# Patient Record
Sex: MALE | Race: BLACK OR AFRICAN AMERICAN | URBAN - METROPOLITAN AREA
[De-identification: names, ages, dates, MRNs, and addresses within clinical notes are randomized per-mention and may not be internally consistent; named-entity substitution may affect disease eponyms.]

---

## 2015-05-08 LAB — CRC RECOMMENDATION EXT: NORMAL

## 2017-07-26 ENCOUNTER — HISTORICAL (OUTPATIENT)
Dept: ADMINISTRATIVE | Facility: HOSPITAL | Age: 57
End: 2017-07-26

## 2017-07-26 LAB
ABS NEUT (OLG): 3.75 X10(3)/MCL (ref 2.1–9.2)
ABS NEUT (OLG): 3.89 X10(3)/MCL (ref 2.1–9.2)
ALBUMIN SERPL-MCNC: 3.5 GM/DL (ref 3.4–5)
ALBUMIN/GLOB SERPL: 1 RATIO (ref 1–2)
ALP SERPL-CCNC: 82 UNIT/L (ref 45–117)
ALT SERPL-CCNC: 21 UNIT/L (ref 12–78)
AST SERPL-CCNC: 15 UNIT/L (ref 15–37)
BASOPHILS # BLD AUTO: 0.04 X10(3)/MCL
BASOPHILS # BLD AUTO: 0.05 X10(3)/MCL
BASOPHILS NFR BLD AUTO: 1 % (ref 0–1)
BASOPHILS NFR BLD AUTO: 1 % (ref 0–1)
BILIRUB SERPL-MCNC: 0.2 MG/DL (ref 0.2–1)
BILIRUBIN DIRECT+TOT PNL SERPL-MCNC: <0.1 MG/DL
BILIRUBIN DIRECT+TOT PNL SERPL-MCNC: >0.1 MG/DL
BUN SERPL-MCNC: 12 MG/DL (ref 7–18)
CALCIUM SERPL-MCNC: 8.8 MG/DL (ref 8.5–10.1)
CD3+CD4+ CELLS # SPEC: 193 UNIT/L (ref 589–1505)
CD3+CD4+ CELLS NFR BLD: 11.1 % (ref 31–59)
CHLORIDE SERPL-SCNC: 108 MMOL/L (ref 98–107)
CHOLEST SERPL-MCNC: 168 MG/DL
CHOLEST/HDLC SERPL: 4.1 {RATIO} (ref 0–5)
CO2 SERPL-SCNC: 28 MMOL/L (ref 21–32)
CREAT SERPL-MCNC: 1.4 MG/DL (ref 0.6–1.3)
EOSINOPHIL # BLD AUTO: 0.1 10*3/UL
EOSINOPHIL # BLD AUTO: 0.11 X10(3)/MCL
EOSINOPHIL NFR BLD AUTO: 2 % (ref 0–5)
EOSINOPHIL NFR BLD AUTO: 2 % (ref 0–5)
ERYTHROCYTE [DISTWIDTH] IN BLOOD BY AUTOMATED COUNT: 12.6 % (ref 11.5–14.5)
ERYTHROCYTE [DISTWIDTH] IN BLOOD BY AUTOMATED COUNT: 12.6 % (ref 11.5–14.5)
GLOBULIN SER-MCNC: 4.8 GM/ML (ref 2.3–3.5)
GLUCOSE SERPL-MCNC: 89 MG/DL (ref 74–106)
HCT VFR BLD AUTO: 45.4 % (ref 40–51)
HCT VFR BLD AUTO: 45.4 % (ref 40–51)
HDLC SERPL-MCNC: 41 MG/DL
HGB BLD-MCNC: 15 GM/DL (ref 13.5–17.5)
HGB BLD-MCNC: 15.3 GM/DL (ref 13.5–17.5)
IMM GRANULOCYTES # BLD AUTO: 0.01 10*3/UL
IMM GRANULOCYTES # BLD AUTO: 0.02 10*3/UL
IMM GRANULOCYTES NFR BLD AUTO: 0 %
IMM GRANULOCYTES NFR BLD AUTO: 0 %
LDLC SERPL CALC-MCNC: 71 MG/DL (ref 0–130)
LYMPHOCYTES # BLD AUTO: 1.74 X10(3)/MCL
LYMPHOCYTES # BLD AUTO: 1.77 X10(3)/MCL
LYMPHOCYTES # BLD AUTO: 1736 UNIT/L (ref 1260–5520)
LYMPHOCYTES NFR BLD AUTO: 28 % (ref 15–40)
LYMPHOCYTES NFR BLD AUTO: 29 % (ref 15–40)
LYMPHOCYTES NFR LN MANUAL: 28 % (ref 28–48)
LYMPHOMA - T-CELL MARKERS SPEC-IMP: ABNORMAL
MCH RBC QN AUTO: 30.6 PG (ref 26–34)
MCH RBC QN AUTO: 31.2 PG (ref 26–34)
MCHC RBC AUTO-ENTMCNC: 33 GM/DL (ref 31–37)
MCHC RBC AUTO-ENTMCNC: 33.7 GM/DL (ref 31–37)
MCV RBC AUTO: 92.5 FL (ref 80–100)
MCV RBC AUTO: 92.7 FL (ref 80–100)
MONOCYTES # BLD AUTO: 0.4 X10(3)/MCL
MONOCYTES # BLD AUTO: 0.44 X10(3)/MCL
MONOCYTES NFR BLD AUTO: 7 % (ref 4–12)
MONOCYTES NFR BLD AUTO: 7 % (ref 4–12)
NEUTROPHILS # BLD AUTO: 3.75 X10(3)/MCL
NEUTROPHILS # BLD AUTO: 3.89 X10(3)/MCL
NEUTROPHILS NFR BLD AUTO: 62 X10(3)/MCL
NEUTROPHILS NFR BLD AUTO: 62 X10(3)/MCL
PLATELET # BLD AUTO: 241 X10(3)/MCL (ref 130–400)
PLATELET # BLD AUTO: 253 X10(3)/MCL (ref 130–400)
PMV BLD AUTO: 10.8 FL (ref 7.4–10.4)
PMV BLD AUTO: 10.9 FL (ref 7.4–10.4)
POTASSIUM SERPL-SCNC: 4.2 MMOL/L (ref 3.5–5.1)
PROT SERPL-MCNC: 8.3 GM/DL (ref 6.4–8.2)
RBC # BLD AUTO: 4.9 X10(6)/MCL (ref 4.5–5.9)
RBC # BLD AUTO: 4.91 X10(6)/MCL (ref 4.5–5.9)
SODIUM SERPL-SCNC: 142 MMOL/L (ref 136–145)
TRIGL SERPL-MCNC: 282 MG/DL
VLDLC SERPL CALC-MCNC: 56 MG/DL
WBC # BLD AUTO: 6200 /MM3 (ref 4500–11500)
WBC # SPEC AUTO: 6.1 X10(3)/MCL (ref 4.5–11)
WBC # SPEC AUTO: 6.2 X10(3)/MCL (ref 4.5–11)

## 2017-08-14 ENCOUNTER — HISTORICAL (OUTPATIENT)
Dept: RADIOLOGY | Facility: HOSPITAL | Age: 57
End: 2017-08-14

## 2017-10-09 ENCOUNTER — HISTORICAL (OUTPATIENT)
Dept: ADMINISTRATIVE | Facility: HOSPITAL | Age: 57
End: 2017-10-09

## 2017-10-09 LAB
ABS NEUT (OLG): 3.72 X10(3)/MCL (ref 2.1–9.2)
ABS NEUT (OLG): 3.85 X10(3)/MCL (ref 2.1–9.2)
ALBUMIN SERPL-MCNC: 3.6 GM/DL (ref 3.4–5)
ALBUMIN/GLOB SERPL: 1 RATIO (ref 1–2)
ALP SERPL-CCNC: 68 UNIT/L (ref 45–117)
ALT SERPL-CCNC: 17 UNIT/L (ref 12–78)
AST SERPL-CCNC: 11 UNIT/L (ref 15–37)
BASOPHILS # BLD AUTO: 0.05 X10(3)/MCL
BASOPHILS # BLD AUTO: 0.05 X10(3)/MCL
BASOPHILS NFR BLD AUTO: 1 % (ref 0–1)
BASOPHILS NFR BLD AUTO: 1 % (ref 0–1)
BILIRUB SERPL-MCNC: 0.3 MG/DL (ref 0.2–1)
BILIRUBIN DIRECT+TOT PNL SERPL-MCNC: 0.1 MG/DL
BILIRUBIN DIRECT+TOT PNL SERPL-MCNC: 0.2 MG/DL
BUN SERPL-MCNC: 16 MG/DL (ref 7–18)
CALCIUM SERPL-MCNC: 9 MG/DL (ref 8.5–10.1)
CD3+CD4+ CELLS # SPEC: 220 UNIT/L (ref 589–1505)
CD3+CD4+ CELLS NFR BLD: 12.9 % (ref 31–59)
CHLORIDE SERPL-SCNC: 106 MMOL/L (ref 98–107)
CHOLEST SERPL-MCNC: 213 MG/DL
CHOLEST/HDLC SERPL: 4.7 {RATIO} (ref 0–5)
CO2 SERPL-SCNC: 28 MMOL/L (ref 21–32)
CREAT SERPL-MCNC: 1.3 MG/DL (ref 0.6–1.3)
EOSINOPHIL # BLD AUTO: 0.09 X10(3)/MCL
EOSINOPHIL # BLD AUTO: 0.1 10*3/UL
EOSINOPHIL NFR BLD AUTO: 2 % (ref 0–5)
EOSINOPHIL NFR BLD AUTO: 2 % (ref 0–5)
ERYTHROCYTE [DISTWIDTH] IN BLOOD BY AUTOMATED COUNT: 12.5 % (ref 11.5–14.5)
ERYTHROCYTE [DISTWIDTH] IN BLOOD BY AUTOMATED COUNT: 12.6 % (ref 11.5–14.5)
GLOBULIN SER-MCNC: 4.4 GM/ML (ref 2.3–3.5)
GLUCOSE SERPL-MCNC: 130 MG/DL (ref 74–106)
HCT VFR BLD AUTO: 46.3 % (ref 40–51)
HCT VFR BLD AUTO: 46.9 % (ref 40–51)
HDLC SERPL-MCNC: 45 MG/DL
HGB BLD-MCNC: 15.9 GM/DL (ref 13.5–17.5)
HGB BLD-MCNC: 16 GM/DL (ref 13.5–17.5)
IMM GRANULOCYTES # BLD AUTO: 0.01 10*3/UL
IMM GRANULOCYTES # BLD AUTO: 0.01 10*3/UL
IMM GRANULOCYTES NFR BLD AUTO: 0 %
IMM GRANULOCYTES NFR BLD AUTO: 0 %
LDLC SERPL CALC-MCNC: 108 MG/DL (ref 0–130)
LYMPHOCYTES # BLD AUTO: 1.56 X10(3)/MCL
LYMPHOCYTES # BLD AUTO: 1.74 X10(3)/MCL
LYMPHOCYTES # BLD AUTO: 1708 UNIT/L (ref 1260–5520)
LYMPHOCYTES NFR BLD AUTO: 27 % (ref 15–40)
LYMPHOCYTES NFR BLD AUTO: 28 % (ref 15–40)
LYMPHOCYTES NFR LN MANUAL: 28 % (ref 28–48)
LYMPHOMA - T-CELL MARKERS SPEC-IMP: ABNORMAL
MCH RBC QN AUTO: 30.9 PG (ref 26–34)
MCH RBC QN AUTO: 31.6 PG (ref 26–34)
MCHC RBC AUTO-ENTMCNC: 33.9 GM/DL (ref 31–37)
MCHC RBC AUTO-ENTMCNC: 34.6 GM/DL (ref 31–37)
MCV RBC AUTO: 91.1 FL (ref 80–100)
MCV RBC AUTO: 91.5 FL (ref 80–100)
MONOCYTES # BLD AUTO: 0.37 X10(3)/MCL
MONOCYTES # BLD AUTO: 0.39 X10(3)/MCL
MONOCYTES NFR BLD AUTO: 6 % (ref 4–12)
MONOCYTES NFR BLD AUTO: 6 % (ref 4–12)
NEUTROPHILS # BLD AUTO: 3.72 X10(3)/MCL
NEUTROPHILS # BLD AUTO: 3.85 X10(3)/MCL
NEUTROPHILS NFR BLD AUTO: 63 X10(3)/MCL
NEUTROPHILS NFR BLD AUTO: 64 X10(3)/MCL
PLATELET # BLD AUTO: 238 X10(3)/MCL (ref 130–400)
PLATELET # BLD AUTO: 251 X10(3)/MCL (ref 130–400)
PMV BLD AUTO: 10.8 FL (ref 7.4–10.4)
PMV BLD AUTO: 11.2 FL (ref 7.4–10.4)
POTASSIUM SERPL-SCNC: 3.8 MMOL/L (ref 3.5–5.1)
PROT SERPL-MCNC: 8 GM/DL (ref 6.4–8.2)
RBC # BLD AUTO: 5.06 X10(6)/MCL (ref 4.5–5.9)
RBC # BLD AUTO: 5.15 X10(6)/MCL (ref 4.5–5.9)
SODIUM SERPL-SCNC: 141 MMOL/L (ref 136–145)
TRIGL SERPL-MCNC: 299 MG/DL
VLDLC SERPL CALC-MCNC: 60 MG/DL
WBC # BLD AUTO: 6100 /MM3 (ref 4500–11500)
WBC # SPEC AUTO: 5.8 X10(3)/MCL (ref 4.5–11)
WBC # SPEC AUTO: 6.1 X10(3)/MCL (ref 4.5–11)

## 2018-01-08 ENCOUNTER — HISTORICAL (OUTPATIENT)
Dept: ADMINISTRATIVE | Facility: HOSPITAL | Age: 58
End: 2018-01-08

## 2018-01-08 LAB
ABS NEUT (OLG): 3.06 X10(3)/MCL (ref 2.1–9.2)
ABS NEUT (OLG): 3.22 X10(3)/MCL (ref 2.1–9.2)
ALBUMIN SERPL-MCNC: 3.7 GM/DL (ref 3.4–5)
ALBUMIN/GLOB SERPL: 1 RATIO (ref 1–2)
ALP SERPL-CCNC: 66 UNIT/L (ref 45–117)
ALT SERPL-CCNC: 29 UNIT/L (ref 12–78)
AST SERPL-CCNC: 39 UNIT/L (ref 15–37)
BASOPHILS # BLD AUTO: 0.02 X10(3)/MCL
BASOPHILS # BLD AUTO: 0.03 X10(3)/MCL
BASOPHILS NFR BLD AUTO: 0 % (ref 0–1)
BASOPHILS NFR BLD AUTO: 0 % (ref 0–1)
BILIRUB SERPL-MCNC: 0.5 MG/DL (ref 0.2–1)
BILIRUBIN DIRECT+TOT PNL SERPL-MCNC: <0.1 MG/DL
BILIRUBIN DIRECT+TOT PNL SERPL-MCNC: ABNORMAL MG/DL
BUN SERPL-MCNC: 14 MG/DL (ref 7–18)
CALCIUM SERPL-MCNC: 8.6 MG/DL (ref 8.5–10.1)
CD3+CD4+ CELLS # SPEC: 185 UNIT/L (ref 589–1505)
CD3+CD4+ CELLS NFR BLD: 11.6 % (ref 31–59)
CHLORIDE SERPL-SCNC: 106 MMOL/L (ref 98–107)
CHOLEST SERPL-MCNC: 184 MG/DL
CHOLEST/HDLC SERPL: 5 {RATIO} (ref 0–5)
CO2 SERPL-SCNC: 28 MMOL/L (ref 21–32)
CREAT SERPL-MCNC: 1.1 MG/DL (ref 0.6–1.3)
EOSINOPHIL # BLD AUTO: 0.08 X10(3)/MCL
EOSINOPHIL # BLD AUTO: 0.1 X10(3)/MCL
EOSINOPHIL NFR BLD AUTO: 2 % (ref 0–5)
EOSINOPHIL NFR BLD AUTO: 2 % (ref 0–5)
ERYTHROCYTE [DISTWIDTH] IN BLOOD BY AUTOMATED COUNT: 11.9 % (ref 11.5–14.5)
ERYTHROCYTE [DISTWIDTH] IN BLOOD BY AUTOMATED COUNT: 11.9 % (ref 11.5–14.5)
GLOBULIN SER-MCNC: 5 GM/ML (ref 2.3–3.5)
GLUCOSE SERPL-MCNC: 93 MG/DL (ref 74–106)
HCT VFR BLD AUTO: 47 % (ref 40–51)
HCT VFR BLD AUTO: 47 % (ref 40–51)
HDLC SERPL-MCNC: 37 MG/DL
HGB BLD-MCNC: 16.1 GM/DL (ref 13.5–17.5)
HGB BLD-MCNC: 16.2 GM/DL (ref 13.5–17.5)
IMM GRANULOCYTES # BLD AUTO: 0.01 10*3/UL
IMM GRANULOCYTES # BLD AUTO: 0.01 10*3/UL
IMM GRANULOCYTES NFR BLD AUTO: 0 %
IMM GRANULOCYTES NFR BLD AUTO: 0 %
LDLC SERPL CALC-MCNC: 99 MG/DL (ref 0–130)
LYMPHOCYTES # BLD AUTO: 1.51 X10(3)/MCL
LYMPHOCYTES # BLD AUTO: 1.59 X10(3)/MCL
LYMPHOCYTES # BLD AUTO: 1595 UNIT/L (ref 1260–5520)
LYMPHOCYTES NFR BLD AUTO: 29 % (ref 15–40)
LYMPHOCYTES NFR BLD AUTO: 30 % (ref 15–40)
LYMPHOCYTES NFR LN MANUAL: 29 % (ref 28–48)
LYMPHOMA - T-CELL MARKERS SPEC-IMP: ABNORMAL
MCH RBC QN AUTO: 31.2 PG (ref 26–34)
MCH RBC QN AUTO: 31.3 PG (ref 26–34)
MCHC RBC AUTO-ENTMCNC: 34.3 GM/DL (ref 31–37)
MCHC RBC AUTO-ENTMCNC: 34.5 GM/DL (ref 31–37)
MCV RBC AUTO: 90.7 FL (ref 80–100)
MCV RBC AUTO: 91.1 FL (ref 80–100)
MONOCYTES # BLD AUTO: 0.42 X10(3)/MCL
MONOCYTES # BLD AUTO: 0.52 X10(3)/MCL
MONOCYTES NFR BLD AUTO: 10 % (ref 4–12)
MONOCYTES NFR BLD AUTO: 8 % (ref 4–12)
NEUTROPHILS # BLD AUTO: 3.06 X10(3)/MCL
NEUTROPHILS # BLD AUTO: 3.22 X10(3)/MCL
NEUTROPHILS NFR BLD AUTO: 59 X10(3)/MCL
NEUTROPHILS NFR BLD AUTO: 60 X10(3)/MCL
PLATELET # BLD AUTO: 116 X10(3)/MCL (ref 130–400)
PLATELET # BLD AUTO: 145 X10(3)/MCL (ref 130–400)
PMV BLD AUTO: 11.4 FL (ref 7.4–10.4)
PMV BLD AUTO: 11.9 FL (ref 7.4–10.4)
POTASSIUM SERPL-SCNC: 4.4 MMOL/L (ref 3.5–5.1)
PROT SERPL-MCNC: 8.7 GM/DL (ref 6.4–8.2)
RBC # BLD AUTO: 5.16 X10(6)/MCL (ref 4.5–5.9)
RBC # BLD AUTO: 5.18 X10(6)/MCL (ref 4.5–5.9)
SODIUM SERPL-SCNC: 141 MMOL/L (ref 136–145)
TRIGL SERPL-MCNC: 242 MG/DL
VLDLC SERPL CALC-MCNC: 48 MG/DL
WBC # BLD AUTO: 5500 /MM3 (ref 4500–11500)
WBC # SPEC AUTO: 5.1 X10(3)/MCL (ref 4.5–11)
WBC # SPEC AUTO: 5.5 X10(3)/MCL (ref 4.5–11)

## 2018-07-17 ENCOUNTER — HISTORICAL (OUTPATIENT)
Dept: ADMINISTRATIVE | Facility: HOSPITAL | Age: 58
End: 2018-07-17

## 2018-07-17 LAB
ABS NEUT (OLG): 4.07 X10(3)/MCL (ref 2.1–9.2)
ALBUMIN SERPL-MCNC: 3.9 GM/DL (ref 3.4–5)
ALBUMIN/GLOB SERPL: 1 RATIO (ref 1–2)
ALP SERPL-CCNC: 70 UNIT/L (ref 45–117)
ALT SERPL-CCNC: 27 UNIT/L (ref 12–78)
APPEARANCE, UA: CLEAR
AST SERPL-CCNC: 26 UNIT/L (ref 15–37)
BACTERIA #/AREA URNS AUTO: ABNORMAL /[HPF]
BASOPHILS # BLD AUTO: 0.05 X10(3)/MCL
BASOPHILS NFR BLD AUTO: 1 %
BILIRUB SERPL-MCNC: 0.5 MG/DL (ref 0.2–1)
BILIRUB UR QL STRIP: NEGATIVE
BILIRUBIN DIRECT+TOT PNL SERPL-MCNC: 0.1 MG/DL
BILIRUBIN DIRECT+TOT PNL SERPL-MCNC: 0.4 MG/DL
BUN SERPL-MCNC: 14 MG/DL (ref 7–18)
CALCIUM SERPL-MCNC: 9 MG/DL (ref 8.5–10.1)
CD3+CD4+ CELLS # SPEC: 269 UNIT/L (ref 589–1505)
CD3+CD4+ CELLS NFR BLD: 13 % (ref 31–59)
CHLORIDE SERPL-SCNC: 103 MMOL/L (ref 98–107)
CHOLEST SERPL-MCNC: 236 MG/DL
CHOLEST/HDLC SERPL: 6.2 {RATIO} (ref 0–5)
CO2 SERPL-SCNC: 29 MMOL/L (ref 21–32)
COLOR UR: NORMAL
CREAT SERPL-MCNC: 1.5 MG/DL (ref 0.6–1.3)
DEPRECATED CALCIDIOL+CALCIFEROL SERPL-MC: 26.9 NG/ML (ref 30–80)
EOSINOPHIL # BLD AUTO: 0.12 X10(3)/MCL
EOSINOPHIL NFR BLD AUTO: 2 %
ERYTHROCYTE [DISTWIDTH] IN BLOOD BY AUTOMATED COUNT: 12.9 % (ref 11.5–14.5)
GLOBULIN SER-MCNC: 4.8 GM/ML (ref 2.3–3.5)
GLUCOSE (UA): NORMAL
GLUCOSE SERPL-MCNC: 103 MG/DL (ref 74–106)
HCT VFR BLD AUTO: 47.9 % (ref 40–51)
HCV AB SERPL QL IA: NONREACTIVE
HDLC SERPL-MCNC: 38 MG/DL
HGB BLD-MCNC: 16 GM/DL (ref 13.5–17.5)
HGB UR QL STRIP: NEGATIVE
HYALINE CASTS #/AREA URNS LPF: ABNORMAL /[LPF]
IMM GRANULOCYTES # BLD AUTO: 0.01 10*3/UL
IMM GRANULOCYTES NFR BLD AUTO: 0 %
KETONES UR QL STRIP: NEGATIVE
LDLC SERPL CALC-MCNC: ABNORMAL MG/DL (ref 0–130)
LEUKOCYTE ESTERASE UR QL STRIP: NEGATIVE
LYMPHOCYTES # BLD AUTO: 2.06 X10(3)/MCL
LYMPHOCYTES # BLD AUTO: 2070 UNIT/L (ref 1260–5520)
LYMPHOCYTES NFR BLD AUTO: 30 % (ref 13–40)
LYMPHOCYTES NFR LN MANUAL: 30 % (ref 28–48)
LYMPHOMA - T-CELL MARKERS SPEC-IMP: ABNORMAL
MCH RBC QN AUTO: 30.4 PG (ref 26–34)
MCHC RBC AUTO-ENTMCNC: 33.4 GM/DL (ref 31–37)
MCV RBC AUTO: 91.1 FL (ref 80–100)
MONOCYTES # BLD AUTO: 0.55 X10(3)/MCL
MONOCYTES NFR BLD AUTO: 8 % (ref 4–12)
NEG CONT SPOT COUNT: NORMAL
NEUTROPHILS # BLD AUTO: 4.07 X10(3)/MCL
NEUTROPHILS NFR BLD AUTO: 60 X10(3)/MCL
NITRITE UR QL STRIP: NEGATIVE
PANEL A SPOT COUNT: 1
PANEL B SPOT COUNT: 1
PH UR STRIP: 6 [PH] (ref 4.5–8)
PLATELET # BLD AUTO: 216 X10(3)/MCL (ref 130–400)
PMV BLD AUTO: 11.2 FL (ref 7.4–10.4)
POS CONT SPOT COUNT: NORMAL
POTASSIUM SERPL-SCNC: 3.6 MMOL/L (ref 3.5–5.1)
PROT SERPL-MCNC: 8.7 GM/DL (ref 6.4–8.2)
PROT UR QL STRIP: NEGATIVE
RBC # BLD AUTO: 5.26 X10(6)/MCL (ref 4.5–5.9)
RBC #/AREA URNS AUTO: ABNORMAL /[HPF]
SODIUM SERPL-SCNC: 140 MMOL/L (ref 136–145)
SP GR UR STRIP: 1.02 (ref 1–1.03)
SQUAMOUS #/AREA URNS LPF: ABNORMAL /[LPF]
T PALLIDUM AB SER QL: NONREACTIVE
T-SPOT.TB: NEGATIVE
TRIGL SERPL-MCNC: 402 MG/DL
TSH SERPL-ACNC: 2.14 MIU/L (ref 0.36–3.74)
UROBILINOGEN UR STRIP-ACNC: NORMAL
VLDLC SERPL CALC-MCNC: ABNORMAL MG/DL
WBC # BLD AUTO: 6900 /MM3 (ref 4500–11500)
WBC # SPEC AUTO: 6.9 X10(3)/MCL (ref 4.5–11)
WBC #/AREA URNS AUTO: ABNORMAL /HPF

## 2018-09-11 ENCOUNTER — HISTORICAL (OUTPATIENT)
Dept: ADMINISTRATIVE | Facility: HOSPITAL | Age: 58
End: 2018-09-11

## 2018-09-11 LAB
ABS NEUT (OLG): 4.92 X10(3)/MCL (ref 2.1–9.2)
ALBUMIN SERPL-MCNC: 3.9 GM/DL (ref 3.4–5)
ALBUMIN/GLOB SERPL: 1 RATIO (ref 1–2)
ALP SERPL-CCNC: 71 UNIT/L (ref 45–117)
ALT SERPL-CCNC: 22 UNIT/L (ref 12–78)
AST SERPL-CCNC: 24 UNIT/L (ref 15–37)
BASOPHILS # BLD AUTO: 0.05 X10(3)/MCL
BASOPHILS NFR BLD AUTO: 1 %
BILIRUB SERPL-MCNC: 0.5 MG/DL (ref 0.2–1)
BILIRUBIN DIRECT+TOT PNL SERPL-MCNC: 0.1 MG/DL
BILIRUBIN DIRECT+TOT PNL SERPL-MCNC: 0.4 MG/DL
BUN SERPL-MCNC: 17 MG/DL (ref 7–18)
CALCIUM SERPL-MCNC: 8.9 MG/DL (ref 8.5–10.1)
CD3+CD4+ CELLS # SPEC: 286 UNIT/L (ref 589–1505)
CD3+CD4+ CELLS NFR BLD: 14.1 % (ref 31–59)
CHLORIDE SERPL-SCNC: 105 MMOL/L (ref 98–107)
CHOLEST SERPL-MCNC: 231 MG/DL
CHOLEST/HDLC SERPL: 5.2 {RATIO} (ref 0–5)
CO2 SERPL-SCNC: 27 MMOL/L (ref 21–32)
CREAT SERPL-MCNC: 1.7 MG/DL (ref 0.6–1.3)
EOSINOPHIL # BLD AUTO: 0.16 10*3/UL
EOSINOPHIL NFR BLD AUTO: 2 %
ERYTHROCYTE [DISTWIDTH] IN BLOOD BY AUTOMATED COUNT: 13.4 % (ref 11.5–14.5)
GLOBULIN SER-MCNC: 5.1 GM/ML (ref 2.3–3.5)
GLUCOSE SERPL-MCNC: 99 MG/DL (ref 74–106)
HCT VFR BLD AUTO: 49.1 % (ref 40–51)
HDLC SERPL-MCNC: 44 MG/DL
HGB BLD-MCNC: 16.2 GM/DL (ref 13.5–17.5)
IMM GRANULOCYTES # BLD AUTO: 0.02 10*3/UL
IMM GRANULOCYTES NFR BLD AUTO: 0 %
LDLC SERPL CALC-MCNC: 132 MG/DL (ref 0–130)
LYMPHOCYTES # BLD AUTO: 2.08 X10(3)/MCL
LYMPHOCYTES # BLD AUTO: 2028 UNIT/L (ref 1260–5520)
LYMPHOCYTES NFR BLD AUTO: 26 % (ref 13–40)
LYMPHOCYTES NFR LN MANUAL: 26 % (ref 28–48)
LYMPHOMA - T-CELL MARKERS SPEC-IMP: ABNORMAL
MCH RBC QN AUTO: 30.3 PG (ref 26–34)
MCHC RBC AUTO-ENTMCNC: 33 GM/DL (ref 31–37)
MCV RBC AUTO: 91.9 FL (ref 80–100)
MONOCYTES # BLD AUTO: 0.61 X10(3)/MCL
MONOCYTES NFR BLD AUTO: 8 % (ref 4–12)
NEUTROPHILS # BLD AUTO: 4.92 X10(3)/MCL
NEUTROPHILS NFR BLD AUTO: 63 X10(3)/MCL
PLATELET # BLD AUTO: 241 X10(3)/MCL (ref 130–400)
PMV BLD AUTO: 10.9 FL (ref 7.4–10.4)
POTASSIUM SERPL-SCNC: 4 MMOL/L (ref 3.5–5.1)
PROT SERPL-MCNC: 9 GM/DL (ref 6.4–8.2)
RBC # BLD AUTO: 5.34 X10(6)/MCL (ref 4.5–5.9)
SODIUM SERPL-SCNC: 137 MMOL/L (ref 136–145)
TRIGL SERPL-MCNC: 273 MG/DL
VLDLC SERPL CALC-MCNC: 55 MG/DL
WBC # BLD AUTO: 7800 /MM3 (ref 4500–11500)
WBC # SPEC AUTO: 7.8 X10(3)/MCL (ref 4.5–11)

## 2018-12-12 ENCOUNTER — HISTORICAL (OUTPATIENT)
Dept: ADMINISTRATIVE | Facility: HOSPITAL | Age: 58
End: 2018-12-12

## 2018-12-12 LAB
ABS NEUT (OLG): 5.59 X10(3)/MCL (ref 2.1–9.2)
ALBUMIN SERPL-MCNC: 3.8 GM/DL (ref 3.4–5)
ALBUMIN/GLOB SERPL: 1 RATIO (ref 1–2)
ALP SERPL-CCNC: 70 UNIT/L (ref 45–117)
ALT SERPL-CCNC: 23 UNIT/L (ref 12–78)
AST SERPL-CCNC: 17 UNIT/L (ref 15–37)
BASOPHILS # BLD AUTO: 0.06 X10(3)/MCL
BASOPHILS NFR BLD AUTO: 1 %
BILIRUB SERPL-MCNC: 0.4 MG/DL (ref 0.2–1)
BILIRUBIN DIRECT+TOT PNL SERPL-MCNC: 0.1 MG/DL
BILIRUBIN DIRECT+TOT PNL SERPL-MCNC: 0.3 MG/DL
BUN SERPL-MCNC: 16 MG/DL (ref 7–18)
CALCIUM SERPL-MCNC: 9.4 MG/DL (ref 8.5–10.1)
CD3+CD4+ CELLS # SPEC: 280 UNIT/L (ref 589–1505)
CD3+CD4+ CELLS NFR BLD: 15.7 % (ref 31–59)
CHLORIDE SERPL-SCNC: 105 MMOL/L (ref 98–107)
CHOLEST SERPL-MCNC: 210 MG/DL
CHOLEST/HDLC SERPL: 5.2 {RATIO} (ref 0–5)
CO2 SERPL-SCNC: 32 MMOL/L (ref 21–32)
CREAT SERPL-MCNC: 1.5 MG/DL (ref 0.6–1.3)
DEPRECATED CALCIDIOL+CALCIFEROL SERPL-MC: 26.16 NG/ML (ref 30–80)
EOSINOPHIL # BLD AUTO: 0.09 X10(3)/MCL
EOSINOPHIL NFR BLD AUTO: 1 %
ERYTHROCYTE [DISTWIDTH] IN BLOOD BY AUTOMATED COUNT: 13.3 % (ref 11.5–14.5)
GLOBULIN SER-MCNC: 4.8 GM/ML (ref 2.3–3.5)
GLUCOSE SERPL-MCNC: 74 MG/DL (ref 74–106)
HCT VFR BLD AUTO: 49.8 % (ref 40–51)
HDLC SERPL-MCNC: 40 MG/DL
HGB BLD-MCNC: 16.6 GM/DL (ref 13.5–17.5)
IMM GRANULOCYTES # BLD AUTO: 0.02 10*3/UL
IMM GRANULOCYTES NFR BLD AUTO: 0 %
LDLC SERPL CALC-MCNC: 111 MG/DL (ref 0–130)
LYMPHOCYTES # BLD AUTO: 1.81 X10(3)/MCL
LYMPHOCYTES # BLD AUTO: 1782 UNIT/L (ref 1260–5520)
LYMPHOCYTES NFR BLD AUTO: 22 % (ref 13–40)
LYMPHOCYTES NFR LN MANUAL: 22 % (ref 28–48)
LYMPHOMA - T-CELL MARKERS SPEC-IMP: ABNORMAL
MCH RBC QN AUTO: 31 PG (ref 26–34)
MCHC RBC AUTO-ENTMCNC: 33.3 GM/DL (ref 31–37)
MCV RBC AUTO: 92.9 FL (ref 80–100)
MONOCYTES # BLD AUTO: 0.55 X10(3)/MCL
MONOCYTES NFR BLD AUTO: 7 % (ref 4–12)
NEUTROPHILS # BLD AUTO: 5.59 X10(3)/MCL
NEUTROPHILS NFR BLD AUTO: 69 X10(3)/MCL
PLATELET # BLD AUTO: 250 X10(3)/MCL (ref 130–400)
PMV BLD AUTO: 11.7 FL (ref 7.4–10.4)
POTASSIUM SERPL-SCNC: 3.9 MMOL/L (ref 3.5–5.1)
PROT SERPL-MCNC: 8.6 GM/DL (ref 6.4–8.2)
RBC # BLD AUTO: 5.36 X10(6)/MCL (ref 4.5–5.9)
SODIUM SERPL-SCNC: 140 MMOL/L (ref 136–145)
TRIGL SERPL-MCNC: 297 MG/DL
VLDLC SERPL CALC-MCNC: 59 MG/DL
WBC # BLD AUTO: 8100 /MM3 (ref 4500–11500)
WBC # SPEC AUTO: 8.1 X10(3)/MCL (ref 4.5–11)

## 2019-05-07 ENCOUNTER — HISTORICAL (OUTPATIENT)
Dept: ADMINISTRATIVE | Facility: HOSPITAL | Age: 59
End: 2019-05-07

## 2019-05-07 LAB
ABS NEUT (OLG): 5.72 X10(3)/MCL (ref 2.1–9.2)
ALBUMIN SERPL-MCNC: 3.6 GM/DL (ref 3.4–5)
ALBUMIN/GLOB SERPL: 0.8 RATIO (ref 1.1–2)
ALP SERPL-CCNC: 64 UNIT/L (ref 45–117)
ALT SERPL-CCNC: 21 UNIT/L (ref 12–78)
AST SERPL-CCNC: 12 UNIT/L (ref 15–37)
BASOPHILS # BLD AUTO: 0.07 X10(3)/MCL
BASOPHILS NFR BLD AUTO: 1 %
BILIRUB SERPL-MCNC: 0.3 MG/DL (ref 0.2–1)
BILIRUBIN DIRECT+TOT PNL SERPL-MCNC: 0.1 MG/DL
BILIRUBIN DIRECT+TOT PNL SERPL-MCNC: 0.2 MG/DL
BUN SERPL-MCNC: 21 MG/DL (ref 7–18)
CALCIUM SERPL-MCNC: 9.4 MG/DL (ref 8.5–10.1)
CD3+CD4+ CELLS # SPEC: 306 UNIT/L (ref 589–1505)
CD3+CD4+ CELLS NFR BLD: 15 % (ref 31–59)
CHLORIDE SERPL-SCNC: 108 MMOL/L (ref 98–107)
CHOLEST SERPL-MCNC: 207 MG/DL
CHOLEST/HDLC SERPL: 4.4 {RATIO} (ref 0–5)
CO2 SERPL-SCNC: 31 MMOL/L (ref 21–32)
CREAT SERPL-MCNC: 1.5 MG/DL (ref 0.6–1.3)
DEPRECATED CALCIDIOL+CALCIFEROL SERPL-MC: 28.71 NG/ML (ref 30–80)
EOSINOPHIL # BLD AUTO: 0.12 10*3/UL
EOSINOPHIL NFR BLD AUTO: 1 %
ERYTHROCYTE [DISTWIDTH] IN BLOOD BY AUTOMATED COUNT: 12.8 % (ref 11.5–14.5)
GLOBULIN SER-MCNC: 4.4 GM/ML (ref 2.3–3.5)
GLUCOSE SERPL-MCNC: 94 MG/DL (ref 74–106)
HCT VFR BLD AUTO: 47.5 % (ref 40–51)
HDLC SERPL-MCNC: 47 MG/DL
HGB BLD-MCNC: 15.6 GM/DL (ref 13.5–17.5)
IMM GRANULOCYTES # BLD AUTO: 0.03 10*3/UL
IMM GRANULOCYTES NFR BLD AUTO: 0 %
LDLC SERPL CALC-MCNC: 95 MG/DL (ref 0–130)
LYMPHOCYTES # BLD AUTO: 2 X10(3)/MCL
LYMPHOCYTES # BLD AUTO: 2040 UNIT/L (ref 1260–5520)
LYMPHOCYTES NFR BLD AUTO: 24 % (ref 13–40)
LYMPHOCYTES NFR LN MANUAL: 24 % (ref 28–48)
LYMPHOMA - T-CELL MARKERS SPEC-IMP: ABNORMAL
MCH RBC QN AUTO: 31.1 PG (ref 26–34)
MCHC RBC AUTO-ENTMCNC: 32.8 GM/DL (ref 31–37)
MCV RBC AUTO: 94.8 FL (ref 80–100)
MONOCYTES # BLD AUTO: 0.57 X10(3)/MCL
MONOCYTES NFR BLD AUTO: 7 % (ref 4–12)
NEUTROPHILS # BLD AUTO: 5.72 X10(3)/MCL
NEUTROPHILS NFR BLD AUTO: 67 X10(3)/MCL
PLATELET # BLD AUTO: 221 X10(3)/MCL (ref 130–400)
PMV BLD AUTO: 11 FL (ref 7.4–10.4)
POTASSIUM SERPL-SCNC: 4.8 MMOL/L (ref 3.5–5.1)
PROT SERPL-MCNC: 8 GM/DL (ref 6.4–8.2)
RBC # BLD AUTO: 5.01 X10(6)/MCL (ref 4.5–5.9)
SODIUM SERPL-SCNC: 141 MMOL/L (ref 136–145)
TRIGL SERPL-MCNC: 324 MG/DL
VLDLC SERPL CALC-MCNC: 65 MG/DL
WBC # BLD AUTO: 8500 /MM3 (ref 4500–11500)
WBC # SPEC AUTO: 8.5 X10(3)/MCL (ref 4.5–11)

## 2019-07-01 ENCOUNTER — HISTORICAL (OUTPATIENT)
Dept: RADIOLOGY | Facility: HOSPITAL | Age: 59
End: 2019-07-01

## 2019-07-01 LAB
ALBUMIN SERPL-MCNC: 3.6 GM/DL (ref 3.4–5)
ALBUMIN/GLOB SERPL: 0.9 RATIO (ref 1.1–2)
ALP SERPL-CCNC: 67 UNIT/L (ref 45–117)
ALT SERPL-CCNC: 24 UNIT/L (ref 12–78)
AST SERPL-CCNC: 17 UNIT/L (ref 15–37)
BILIRUB SERPL-MCNC: 0.2 MG/DL (ref 0.2–1)
BILIRUBIN DIRECT+TOT PNL SERPL-MCNC: <0.1 MG/DL
BILIRUBIN DIRECT+TOT PNL SERPL-MCNC: ABNORMAL MG/DL
BUN SERPL-MCNC: 12 MG/DL (ref 7–18)
CALCIUM SERPL-MCNC: 8.9 MG/DL (ref 8.5–10.1)
CHLORIDE SERPL-SCNC: 106 MMOL/L (ref 98–107)
CO2 SERPL-SCNC: 32 MMOL/L (ref 21–32)
CREAT SERPL-MCNC: 1.2 MG/DL (ref 0.6–1.3)
GLOBULIN SER-MCNC: 4.1 GM/ML (ref 2.3–3.5)
GLUCOSE SERPL-MCNC: 110 MG/DL (ref 74–106)
POTASSIUM SERPL-SCNC: 4.1 MMOL/L (ref 3.5–5.1)
PROT SERPL-MCNC: 7.7 GM/DL (ref 6.4–8.2)
SODIUM SERPL-SCNC: 139 MMOL/L (ref 136–145)

## 2019-11-07 ENCOUNTER — HISTORICAL (OUTPATIENT)
Dept: ADMINISTRATIVE | Facility: HOSPITAL | Age: 59
End: 2019-11-07

## 2019-11-07 LAB
ABS NEUT (OLG): 3.84 X10(3)/MCL (ref 2.1–9.2)
ALBUMIN SERPL-MCNC: 3.9 GM/DL (ref 3.4–5)
ALBUMIN/GLOB SERPL: 0.8 RATIO (ref 1.1–2)
ALP SERPL-CCNC: 75 UNIT/L (ref 45–117)
ALT SERPL-CCNC: 35 UNIT/L (ref 12–78)
APPEARANCE, UA: CLEAR
AST SERPL-CCNC: 30 UNIT/L (ref 15–37)
BACTERIA #/AREA URNS AUTO: ABNORMAL /HPF
BASOPHILS # BLD AUTO: 0.1 X10(3)/MCL (ref 0–0.2)
BASOPHILS NFR BLD AUTO: 1 %
BILIRUB SERPL-MCNC: 0.3 MG/DL (ref 0.2–1)
BILIRUB UR QL STRIP: NEGATIVE
BILIRUBIN DIRECT+TOT PNL SERPL-MCNC: <0.1 MG/DL (ref 0–0.2)
BILIRUBIN DIRECT+TOT PNL SERPL-MCNC: ABNORMAL MG/DL
BUN SERPL-MCNC: 12 MG/DL (ref 7–18)
CALCIUM SERPL-MCNC: 9 MG/DL (ref 8.5–10.1)
CD3+CD4+ CELLS # SPEC: 367 UNIT/L (ref 589–1505)
CD3+CD4+ CELLS NFR BLD: 17.1 % (ref 31–59)
CHLORIDE SERPL-SCNC: 108 MMOL/L (ref 98–107)
CHOLEST SERPL-MCNC: 221 MG/DL
CHOLEST/HDLC SERPL: 5.1 {RATIO} (ref 0–5)
CO2 SERPL-SCNC: 25 MMOL/L (ref 21–32)
COLOR UR: YELLOW
CREAT SERPL-MCNC: 1.3 MG/DL (ref 0.6–1.3)
DEPRECATED CALCIDIOL+CALCIFEROL SERPL-MC: 24.08 NG/ML (ref 30–80)
EOSINOPHIL # BLD AUTO: 0.1 X10(3)/MCL (ref 0–0.9)
EOSINOPHIL NFR BLD AUTO: 2 %
ERYTHROCYTE [DISTWIDTH] IN BLOOD BY AUTOMATED COUNT: 12 % (ref 11.5–14.5)
GLOBULIN SER-MCNC: 4.9 GM/ML (ref 2.3–3.5)
GLUCOSE (UA): NEGATIVE
GLUCOSE SERPL-MCNC: 103 MG/DL (ref 74–106)
HCT VFR BLD AUTO: 49.4 % (ref 40–51)
HCV AB SERPL QL IA: NONREACTIVE
HDLC SERPL-MCNC: 43 MG/DL (ref 40–59)
HGB BLD-MCNC: 16.6 GM/DL (ref 13.5–17.5)
HGB UR QL STRIP: 0.03 MG/DL
HYALINE CASTS #/AREA URNS LPF: ABNORMAL /LPF
IMM GRANULOCYTES # BLD AUTO: 0.03 10*3/UL
IMM GRANULOCYTES NFR BLD AUTO: 0 %
KETONES UR QL STRIP: NEGATIVE
LDLC SERPL CALC-MCNC: 126 MG/DL
LEUKOCYTE ESTERASE UR QL STRIP: NEGATIVE
LYMPHOCYTES # BLD AUTO: 2.2 X10(3)/MCL (ref 0.6–4.6)
LYMPHOCYTES # BLD AUTO: 2144 UNIT/L (ref 1260–5520)
LYMPHOCYTES NFR BLD AUTO: 32 %
LYMPHOCYTES NFR LN MANUAL: 32 % (ref 28–48)
LYMPHOMA - T-CELL MARKERS SPEC-IMP: ABNORMAL
MCH RBC QN AUTO: 30.9 PG (ref 26–34)
MCHC RBC AUTO-ENTMCNC: 33.6 GM/DL (ref 31–37)
MCV RBC AUTO: 92 FL (ref 80–100)
MONOCYTES # BLD AUTO: 0.5 X10(3)/MCL (ref 0.1–1.3)
MONOCYTES NFR BLD AUTO: 7 %
NEG CONT SPOT COUNT: NORMAL
NEUTROPHILS # BLD AUTO: 3.84 X10(3)/MCL (ref 2.1–9.2)
NEUTROPHILS NFR BLD AUTO: 57 %
NITRITE UR QL STRIP: NEGATIVE
PANEL A SPOT COUNT: 0
PANEL B SPOT COUNT: 0
PH UR STRIP: 5.5 [PH] (ref 4.5–8)
PLATELET # BLD AUTO: 280 X10(3)/MCL (ref 130–400)
PMV BLD AUTO: 9.8 FL (ref 7.4–10.4)
POS CONT SPOT COUNT: NORMAL
POTASSIUM SERPL-SCNC: 3.7 MMOL/L (ref 3.5–5.1)
PROT SERPL-MCNC: 8.8 GM/DL (ref 6.4–8.2)
PROT UR QL STRIP: 30 MG/DL
PSA SERPL-MCNC: 0.5 NG/ML
RBC # BLD AUTO: 5.37 X10(6)/MCL (ref 4.5–5.9)
RBC #/AREA URNS AUTO: ABNORMAL /HPF
SODIUM SERPL-SCNC: 140 MMOL/L (ref 136–145)
SP GR UR STRIP: 1.02 (ref 1–1.03)
SQUAMOUS #/AREA URNS LPF: ABNORMAL /LPF
T PALLIDUM AB SER QL: NONREACTIVE
T-SPOT.TB: NORMAL
T4 FREE SERPL-MCNC: 0.69 NG/DL (ref 0.76–1.46)
TRIGL SERPL-MCNC: 261 MG/DL
TSH SERPL-ACNC: 4.11 MIU/L (ref 0.36–3.74)
UROBILINOGEN UR STRIP-ACNC: NORMAL
VLDLC SERPL CALC-MCNC: 52 MG/DL
WBC # BLD AUTO: 6700 /MM3 (ref 4500–11500)
WBC # SPEC AUTO: 6.7 X10(3)/MCL (ref 4.5–11)
WBC #/AREA URNS AUTO: ABNORMAL /HPF

## 2020-07-21 ENCOUNTER — HISTORICAL (OUTPATIENT)
Dept: ADMINISTRATIVE | Facility: HOSPITAL | Age: 60
End: 2020-07-21

## 2020-07-21 LAB
ABS NEUT (OLG): 4.63 X10(3)/MCL (ref 2.1–9.2)
ALBUMIN SERPL-MCNC: 3.8 GM/DL (ref 3.4–5)
ALBUMIN/GLOB SERPL: 0.8 RATIO (ref 1.1–2)
ALP SERPL-CCNC: 74 UNIT/L (ref 45–117)
ALT SERPL-CCNC: 27 UNIT/L (ref 12–78)
AST SERPL-CCNC: 20 UNIT/L (ref 15–37)
BASOPHILS # BLD AUTO: 0.1 X10(3)/MCL (ref 0–0.2)
BASOPHILS NFR BLD AUTO: 1 %
BILIRUB SERPL-MCNC: 0.4 MG/DL (ref 0.2–1)
BILIRUBIN DIRECT+TOT PNL SERPL-MCNC: <0.1 MG/DL (ref 0–0.2)
BILIRUBIN DIRECT+TOT PNL SERPL-MCNC: >0.3 MG/DL
BUN SERPL-MCNC: 15 MG/DL (ref 7–18)
CALCIUM SERPL-MCNC: 8.8 MG/DL (ref 8.5–10.1)
CD3+CD4+ CELLS # SPEC: 344 UNIT/L (ref 589–1505)
CD3+CD4+ CELLS NFR BLD: 15.1 % (ref 31–59)
CHLORIDE SERPL-SCNC: 108 MMOL/L (ref 98–107)
CHOLEST SERPL-MCNC: 175 MG/DL
CHOLEST/HDLC SERPL: 4.4 {RATIO} (ref 0–5)
CO2 SERPL-SCNC: 28 MMOL/L (ref 21–32)
CREAT SERPL-MCNC: 1.3 MG/DL (ref 0.6–1.3)
DEPRECATED CALCIDIOL+CALCIFEROL SERPL-MC: 25.1 NG/ML (ref 30–80)
EOSINOPHIL # BLD AUTO: 0.1 X10(3)/MCL (ref 0–0.9)
EOSINOPHIL NFR BLD AUTO: 2 %
ERYTHROCYTE [DISTWIDTH] IN BLOOD BY AUTOMATED COUNT: 12.5 % (ref 11.5–14.5)
GLOBULIN SER-MCNC: 4.6 GM/ML (ref 2.3–3.5)
GLUCOSE SERPL-MCNC: 103 MG/DL (ref 74–106)
HCT VFR BLD AUTO: 44.8 % (ref 40–51)
HDLC SERPL-MCNC: 40 MG/DL (ref 40–59)
HGB BLD-MCNC: 15 GM/DL (ref 13.5–17.5)
IMM GRANULOCYTES # BLD AUTO: 0.02 10*3/UL
IMM GRANULOCYTES NFR BLD AUTO: 0 %
LDLC SERPL CALC-MCNC: 88 MG/DL
LYMPHOCYTES # BLD AUTO: 2.2 X10(3)/MCL (ref 0.6–4.6)
LYMPHOCYTES # BLD AUTO: 2280 UNIT/L (ref 1260–5520)
LYMPHOCYTES NFR BLD AUTO: 30 %
LYMPHOCYTES NFR LN MANUAL: 30 % (ref 28–48)
LYMPHOMA - T-CELL MARKERS SPEC-IMP: ABNORMAL
MCH RBC QN AUTO: 30.1 PG (ref 26–34)
MCHC RBC AUTO-ENTMCNC: 33.5 GM/DL (ref 31–37)
MCV RBC AUTO: 90 FL (ref 80–100)
MONOCYTES # BLD AUTO: 0.5 X10(3)/MCL (ref 0.1–1.3)
MONOCYTES NFR BLD AUTO: 7 %
NEUTROPHILS # BLD AUTO: 4.63 X10(3)/MCL (ref 2.1–9.2)
NEUTROPHILS NFR BLD AUTO: 61 %
PLATELET # BLD AUTO: 280 X10(3)/MCL (ref 130–400)
PMV BLD AUTO: 10.2 FL (ref 7.4–10.4)
POTASSIUM SERPL-SCNC: 3.8 MMOL/L (ref 3.5–5.1)
PROT SERPL-MCNC: 8.4 GM/DL (ref 6.4–8.2)
RBC # BLD AUTO: 4.98 X10(6)/MCL (ref 4.5–5.9)
SODIUM SERPL-SCNC: 140 MMOL/L (ref 136–145)
TRIGL SERPL-MCNC: 236 MG/DL
TSH SERPL-ACNC: 1.68 MIU/L (ref 0.36–3.74)
VLDLC SERPL CALC-MCNC: 47 MG/DL
WBC # BLD AUTO: 7600 /MM3 (ref 4500–11500)
WBC # SPEC AUTO: 7.6 X10(3)/MCL (ref 4.5–11)

## 2020-09-30 ENCOUNTER — HISTORICAL (OUTPATIENT)
Dept: RADIOLOGY | Facility: HOSPITAL | Age: 60
End: 2020-09-30

## 2021-01-19 ENCOUNTER — HISTORICAL (OUTPATIENT)
Dept: ADMINISTRATIVE | Facility: HOSPITAL | Age: 61
End: 2021-01-19

## 2021-01-19 LAB
ABS NEUT (OLG): 3.21 X10(3)/MCL (ref 2.1–9.2)
ALBUMIN SERPL-MCNC: 4.3 GM/DL (ref 3.5–5)
ALBUMIN/GLOB SERPL: 1.1 RATIO (ref 1.1–2)
ALP SERPL-CCNC: 58 UNIT/L (ref 40–150)
ALT SERPL-CCNC: 21 UNIT/L (ref 0–55)
APPEARANCE, UA: CLEAR
AST SERPL-CCNC: 29 UNIT/L (ref 5–34)
BACTERIA #/AREA URNS AUTO: ABNORMAL /HPF
BASOPHILS # BLD AUTO: 0 X10(3)/MCL (ref 0–0.2)
BASOPHILS NFR BLD AUTO: 1 %
BILIRUB SERPL-MCNC: 0.7 MG/DL
BILIRUB UR QL STRIP: NEGATIVE
BILIRUBIN DIRECT+TOT PNL SERPL-MCNC: 0.2 MG/DL (ref 0–0.5)
BILIRUBIN DIRECT+TOT PNL SERPL-MCNC: 0.5 MG/DL (ref 0–0.8)
BUN SERPL-MCNC: 14 MG/DL (ref 8.4–25.7)
CALCIUM SERPL-MCNC: 9.6 MG/DL (ref 8.4–10.2)
CD3+CD4+ CELLS # SPEC: 361 UNIT/L (ref 589–1505)
CD3+CD4+ CELLS NFR BLD: 17.2 % (ref 31–59)
CHLORIDE SERPL-SCNC: 105 MMOL/L (ref 98–107)
CHOLEST SERPL-MCNC: 250 MG/DL
CHOLEST/HDLC SERPL: 7 {RATIO} (ref 0–5)
CO2 SERPL-SCNC: 25 MMOL/L (ref 22–29)
COLOR UR: YELLOW
CREAT SERPL-MCNC: 1.21 MG/DL (ref 0.73–1.18)
DEPRECATED CALCIDIOL+CALCIFEROL SERPL-MC: 24.8 NG/ML (ref 30–80)
EOSINOPHIL # BLD AUTO: 0.1 X10(3)/MCL (ref 0–0.9)
EOSINOPHIL NFR BLD AUTO: 2 %
ERYTHROCYTE [DISTWIDTH] IN BLOOD BY AUTOMATED COUNT: 13.1 % (ref 11.5–14.5)
GLOBULIN SER-MCNC: 3.8 GM/DL (ref 2.4–3.5)
GLUCOSE (UA): NEGATIVE
GLUCOSE SERPL-MCNC: 94 MG/DL (ref 74–100)
HCT VFR BLD AUTO: 47.6 % (ref 40–51)
HCV AB SERPL QL IA: NONREACTIVE
HDLC SERPL-MCNC: 37 MG/DL (ref 35–60)
HGB BLD-MCNC: 15.9 GM/DL (ref 13.5–17.5)
HGB UR QL STRIP: NEGATIVE
HYALINE CASTS #/AREA URNS LPF: ABNORMAL /LPF
IMM GRANULOCYTES # BLD AUTO: 0.02 10*3/UL
IMM GRANULOCYTES NFR BLD AUTO: 0 %
KETONES UR QL STRIP: NEGATIVE
LDLC SERPL CALC-MCNC: 158 MG/DL (ref 50–140)
LEUKOCYTE ESTERASE UR QL STRIP: NEGATIVE
LYMPHOCYTES # BLD AUTO: 2.1 X10(3)/MCL (ref 0.6–4.6)
LYMPHOCYTES # BLD AUTO: 2100 UNIT/L (ref 1260–5520)
LYMPHOCYTES NFR BLD AUTO: 35 %
LYMPHOCYTES NFR LN MANUAL: 35 % (ref 28–48)
LYMPHOMA - T-CELL MARKERS SPEC-IMP: ABNORMAL
MCH RBC QN AUTO: 30.9 PG (ref 26–34)
MCHC RBC AUTO-ENTMCNC: 33.4 GM/DL (ref 31–37)
MCV RBC AUTO: 92.4 FL (ref 80–100)
MONOCYTES # BLD AUTO: 0.4 X10(3)/MCL (ref 0.1–1.3)
MONOCYTES NFR BLD AUTO: 8 %
NEG CONT SPOT COUNT: NORMAL
NEUTROPHILS # BLD AUTO: 3.21 X10(3)/MCL (ref 2.1–9.2)
NEUTROPHILS NFR BLD AUTO: 54 %
NITRITE UR QL STRIP: NEGATIVE
PANEL A SPOT COUNT: 0
PANEL B SPOT COUNT: 1
PH UR STRIP: 6 [PH] (ref 4.5–8)
PLATELET # BLD AUTO: 225 X10(3)/MCL (ref 130–400)
PMV BLD AUTO: 10.8 FL (ref 7.4–10.4)
POS CONT SPOT COUNT: NORMAL
POTASSIUM SERPL-SCNC: 4.7 MMOL/L (ref 3.5–5.1)
PROT SERPL-MCNC: 8.1 GM/DL (ref 6.4–8.3)
PROT UR QL STRIP: 10 MG/DL
PSA SERPL-MCNC: 0.63 NG/ML
RBC # BLD AUTO: 5.15 X10(6)/MCL (ref 4.5–5.9)
RBC #/AREA URNS AUTO: ABNORMAL /HPF
SODIUM SERPL-SCNC: 141 MMOL/L (ref 136–145)
SP GR UR STRIP: 1.02 (ref 1–1.03)
SQUAMOUS #/AREA URNS LPF: ABNORMAL /LPF
T PALLIDUM AB SER QL: NONREACTIVE
T-SPOT.TB: NORMAL
TRIGL SERPL-MCNC: 274 MG/DL (ref 34–140)
TSH SERPL-ACNC: 1.84 UIU/ML (ref 0.35–4.94)
UROBILINOGEN UR STRIP-ACNC: 3 MG/DL
VLDLC SERPL CALC-MCNC: 55 MG/DL
WBC # BLD AUTO: 6000 /MM3 (ref 4500–11500)
WBC # SPEC AUTO: 6 X10(3)/MCL (ref 4.5–11)
WBC #/AREA URNS AUTO: ABNORMAL /HPF

## 2021-05-25 ENCOUNTER — HISTORICAL (OUTPATIENT)
Dept: ADMINISTRATIVE | Facility: HOSPITAL | Age: 61
End: 2021-05-25

## 2021-05-25 LAB
ABS NEUT (OLG): 3.59 X10(3)/MCL (ref 2.1–9.2)
ALBUMIN SERPL-MCNC: 3.8 GM/DL (ref 3.5–5)
ALBUMIN/GLOB SERPL: 1 RATIO (ref 1.1–2)
ALP SERPL-CCNC: 56 UNIT/L (ref 40–150)
ALT SERPL-CCNC: 28 UNIT/L (ref 0–55)
AST SERPL-CCNC: 45 UNIT/L (ref 5–34)
BASOPHILS # BLD AUTO: 0 X10(3)/MCL (ref 0–0.2)
BASOPHILS NFR BLD AUTO: 0 %
BILIRUB SERPL-MCNC: 0.7 MG/DL
BILIRUBIN DIRECT+TOT PNL SERPL-MCNC: 0.2 MG/DL (ref 0–0.5)
BILIRUBIN DIRECT+TOT PNL SERPL-MCNC: 0.5 MG/DL (ref 0–0.8)
BUN SERPL-MCNC: 9.8 MG/DL (ref 8.4–25.7)
CALCIUM SERPL-MCNC: 9.1 MG/DL (ref 8.4–10.2)
CD3+CD4+ CELLS # SPEC: 186 UNIT/L (ref 589–1505)
CD3+CD4+ CELLS NFR BLD: 14.7 % (ref 31–59)
CHLORIDE SERPL-SCNC: 107 MMOL/L (ref 98–107)
CO2 SERPL-SCNC: 24 MMOL/L (ref 22–29)
CREAT SERPL-MCNC: 1.19 MG/DL (ref 0.73–1.18)
DEPRECATED CALCIDIOL+CALCIFEROL SERPL-MC: 21.3 NG/ML (ref 30–80)
EOSINOPHIL # BLD AUTO: 0.1 X10(3)/MCL (ref 0–0.9)
EOSINOPHIL NFR BLD AUTO: 2 %
ERYTHROCYTE [DISTWIDTH] IN BLOOD BY AUTOMATED COUNT: 12.3 % (ref 11.5–14.5)
GLOBULIN SER-MCNC: 3.9 GM/DL (ref 2.4–3.5)
GLUCOSE SERPL-MCNC: 135 MG/DL (ref 74–100)
HCT VFR BLD AUTO: 46.7 % (ref 40–51)
HGB BLD-MCNC: 15.7 GM/DL (ref 13.5–17.5)
IMM GRANULOCYTES # BLD AUTO: 0.01 10*3/UL
IMM GRANULOCYTES NFR BLD AUTO: 0 %
LYMPHOCYTES # BLD AUTO: 1.2 X10(3)/MCL (ref 0.6–4.6)
LYMPHOCYTES # BLD AUTO: 1265 UNIT/L (ref 1260–5520)
LYMPHOCYTES NFR BLD AUTO: 23 %
LYMPHOCYTES NFR LN MANUAL: 23 % (ref 28–48)
LYMPHOMA - T-CELL MARKERS SPEC-IMP: ABNORMAL
MCH RBC QN AUTO: 30.7 PG (ref 26–34)
MCHC RBC AUTO-ENTMCNC: 33.6 GM/DL (ref 31–37)
MCV RBC AUTO: 91.4 FL (ref 80–100)
MONOCYTES # BLD AUTO: 0.6 X10(3)/MCL (ref 0.1–1.3)
MONOCYTES NFR BLD AUTO: 10 %
NEUTROPHILS # BLD AUTO: 3.59 X10(3)/MCL (ref 2.1–9.2)
NEUTROPHILS NFR BLD AUTO: 65 %
NRBC BLD AUTO-RTO: 0 % (ref 0–0.2)
PLATELET # BLD AUTO: 226 X10(3)/MCL (ref 130–400)
PMV BLD AUTO: 10.6 FL (ref 7.4–10.4)
POTASSIUM SERPL-SCNC: 3.4 MMOL/L (ref 3.5–5.1)
PROT SERPL-MCNC: 7.7 GM/DL (ref 6.4–8.3)
RBC # BLD AUTO: 5.11 X10(6)/MCL (ref 4.5–5.9)
SODIUM SERPL-SCNC: 139 MMOL/L (ref 136–145)
WBC # BLD AUTO: 5500 /MM3 (ref 4500–11500)
WBC # SPEC AUTO: 5.5 X10(3)/MCL (ref 4.5–11)

## 2021-11-08 ENCOUNTER — HISTORICAL (OUTPATIENT)
Dept: ADMINISTRATIVE | Facility: HOSPITAL | Age: 61
End: 2021-11-08

## 2021-11-08 LAB
ABS NEUT (OLG): 3.42 X10(3)/MCL (ref 2.1–9.2)
ALBUMIN SERPL-MCNC: 4 GM/DL (ref 3.5–5)
ALBUMIN/GLOB SERPL: 1 RATIO (ref 1.1–2)
ALP SERPL-CCNC: 66 UNIT/L (ref 40–150)
ALT SERPL-CCNC: 17 UNIT/L (ref 0–55)
AST SERPL-CCNC: 24 UNIT/L (ref 5–34)
BASOPHILS # BLD AUTO: 0 X10(3)/MCL (ref 0–0.2)
BASOPHILS NFR BLD AUTO: 0 %
BILIRUB SERPL-MCNC: 0.6 MG/DL
BILIRUBIN DIRECT+TOT PNL SERPL-MCNC: 0.2 MG/DL (ref 0–0.5)
BILIRUBIN DIRECT+TOT PNL SERPL-MCNC: 0.4 MG/DL (ref 0–0.8)
BUN SERPL-MCNC: 14.2 MG/DL (ref 8.4–25.7)
CALCIUM SERPL-MCNC: 9.8 MG/DL (ref 8.7–10.5)
CD3+CD4+ CELLS # SPEC: 147 UNIT/L (ref 589–1505)
CD3+CD4+ CELLS NFR BLD: 12.1 % (ref 31–59)
CHLORIDE SERPL-SCNC: 106 MMOL/L (ref 98–107)
CHOLEST SERPL-MCNC: 198 MG/DL
CHOLEST/HDLC SERPL: 4 {RATIO} (ref 0–5)
CO2 SERPL-SCNC: 25 MMOL/L (ref 22–29)
CREAT SERPL-MCNC: 1.35 MG/DL (ref 0.73–1.18)
DEPRECATED CALCIDIOL+CALCIFEROL SERPL-MC: 33.3 NG/ML (ref 30–80)
EOSINOPHIL # BLD AUTO: 0.1 X10(3)/MCL (ref 0–0.9)
EOSINOPHIL NFR BLD AUTO: 1 %
ERYTHROCYTE [DISTWIDTH] IN BLOOD BY AUTOMATED COUNT: 11.9 % (ref 11.5–14.5)
GLOBULIN SER-MCNC: 4.1 GM/DL (ref 2.4–3.5)
GLUCOSE SERPL-MCNC: 106 MG/DL (ref 74–100)
HAV AB SER QL IA: NONREACTIVE
HBV SURFACE AB SER-ACNC: 0 MIU/ML
HBV SURFACE AB SERPL IA-ACNC: NONREACTIVE M[IU]/ML
HCT VFR BLD AUTO: 50 % (ref 40–51)
HDLC SERPL-MCNC: 45 MG/DL (ref 35–60)
HGB BLD-MCNC: 16.6 GM/DL (ref 13.5–17.5)
IMM GRANULOCYTES # BLD AUTO: 0.02 10*3/UL
IMM GRANULOCYTES NFR BLD AUTO: 0 %
LDLC SERPL CALC-MCNC: 126 MG/DL (ref 50–140)
LYMPHOCYTES # BLD AUTO: 1.2 X10(3)/MCL (ref 0.6–4.6)
LYMPHOCYTES # BLD AUTO: 1219 UNIT/L (ref 1260–5520)
LYMPHOCYTES NFR BLD AUTO: 23 %
LYMPHOCYTES NFR LN MANUAL: 23 % (ref 28–48)
MCH RBC QN AUTO: 32 PG (ref 26–34)
MCHC RBC AUTO-ENTMCNC: 33.2 GM/DL (ref 31–37)
MCV RBC AUTO: 96.3 FL (ref 80–100)
MONOCYTES # BLD AUTO: 0.5 X10(3)/MCL (ref 0.1–1.3)
MONOCYTES NFR BLD AUTO: 10 %
NEUTROPHILS # BLD AUTO: 3.42 X10(3)/MCL (ref 2.1–9.2)
NEUTROPHILS NFR BLD AUTO: 65 %
NRBC BLD AUTO-RTO: 0 % (ref 0–0.2)
PLATELET # BLD AUTO: 226 X10(3)/MCL (ref 130–400)
PMV BLD AUTO: 10.1 FL (ref 7.4–10.4)
POTASSIUM SERPL-SCNC: 4 MMOL/L (ref 3.5–5.1)
PROT SERPL-MCNC: 8.1 GM/DL (ref 6.4–8.3)
RBC # BLD AUTO: 5.19 X10(6)/MCL (ref 4.5–5.9)
SODIUM SERPL-SCNC: 140 MMOL/L (ref 136–145)
TRIGL SERPL-MCNC: 137 MG/DL (ref 34–140)
VLDLC SERPL CALC-MCNC: 27 MG/DL
WBC # BLD AUTO: 5300 /MM3 (ref 4500–11500)
WBC # SPEC AUTO: 5.3 X10(3)/MCL (ref 4.5–11)

## 2022-04-10 ENCOUNTER — HISTORICAL (OUTPATIENT)
Dept: ADMINISTRATIVE | Facility: HOSPITAL | Age: 62
End: 2022-04-10
Payer: COMMERCIAL

## 2022-04-25 DIAGNOSIS — Z23 NEED FOR VACCINATION: Primary | ICD-10-CM

## 2022-04-28 VITALS
WEIGHT: 200.31 LBS | DIASTOLIC BLOOD PRESSURE: 70 MMHG | OXYGEN SATURATION: 100 % | HEIGHT: 73 IN | SYSTOLIC BLOOD PRESSURE: 122 MMHG | BODY MASS INDEX: 26.55 KG/M2

## 2022-07-13 ENCOUNTER — TELEPHONE (OUTPATIENT)
Dept: INFECTIOUS DISEASES | Facility: CLINIC | Age: 62
End: 2022-07-13
Payer: COMMERCIAL

## 2022-07-15 NOTE — TELEPHONE ENCOUNTER
Called and spoke with patient to schedule follow up appointment with Romi. Patient is scheduled for 08/22/2022 at 7:50am. Patient voiced understanding.

## 2022-09-15 ENCOUNTER — DOCUMENTATION ONLY (OUTPATIENT)
Dept: ADMINISTRATIVE | Facility: HOSPITAL | Age: 62
End: 2022-09-15
Payer: COMMERCIAL

## 2023-03-15 ENCOUNTER — TELEPHONE (OUTPATIENT)
Dept: INFECTIOUS DISEASES | Facility: CLINIC | Age: 63
End: 2023-03-15
Payer: COMMERCIAL

## 2023-03-15 NOTE — TELEPHONE ENCOUNTER
Attempted to contact patient to schedule B20 intake. No answer. No option to leave voicemail. Unable to reach letter mailed to patient.    LV 11/08/2021  NS 03/02/2022, 08/22/2022, 09/13/2022, 10/26/2022

## 2024-11-19 ENCOUNTER — HOSPITAL ENCOUNTER (INPATIENT)
Facility: HOSPITAL | Age: 64
LOS: 7 days | Discharge: HOME | DRG: 974 | End: 2024-11-26
Attending: EMERGENCY MEDICINE | Admitting: INTERNAL MEDICINE
Payer: MEDICARE

## 2024-11-19 DIAGNOSIS — C79.9 METASTATIC MALIGNANT NEOPLASM, UNSPECIFIED SITE: ICD-10-CM

## 2024-11-19 DIAGNOSIS — R91.8 RIGHT LOWER LOBE LUNG MASS: ICD-10-CM

## 2024-11-19 DIAGNOSIS — B20 HIV DISEASE: ICD-10-CM

## 2024-11-19 DIAGNOSIS — R07.9 CHEST PAIN: ICD-10-CM

## 2024-11-19 DIAGNOSIS — R31.9 URINARY TRACT INFECTION WITH HEMATURIA, SITE UNSPECIFIED: ICD-10-CM

## 2024-11-19 DIAGNOSIS — B20 SYMPTOMATIC HIV INFECTION: ICD-10-CM

## 2024-11-19 DIAGNOSIS — R62.7 FAILURE TO THRIVE IN ADULT: Primary | ICD-10-CM

## 2024-11-19 DIAGNOSIS — N39.0 URINARY TRACT INFECTION WITH HEMATURIA, SITE UNSPECIFIED: ICD-10-CM

## 2024-11-19 LAB
ABS NEUT CALC (OHS): 7.17 X10(3)/MCL (ref 2.1–9.2)
ALBUMIN SERPL-MCNC: 2 G/DL (ref 3.4–4.8)
ALBUMIN/GLOB SERPL: 0.3 RATIO (ref 1.1–2)
ALP SERPL-CCNC: 152 UNIT/L (ref 40–150)
ALT SERPL-CCNC: 15 UNIT/L (ref 0–55)
ANION GAP SERPL CALC-SCNC: 8 MEQ/L
AST SERPL-CCNC: 21 UNIT/L (ref 5–34)
BACTERIA #/AREA URNS AUTO: ABNORMAL /HPF
BILIRUB SERPL-MCNC: 0.4 MG/DL
BILIRUB UR QL STRIP.AUTO: NEGATIVE
BUN SERPL-MCNC: 35.3 MG/DL (ref 8.4–25.7)
CALCIUM SERPL-MCNC: 8.6 MG/DL (ref 8.8–10)
CHLORIDE SERPL-SCNC: 106 MMOL/L (ref 98–107)
CLARITY UR: ABNORMAL
CO2 SERPL-SCNC: 23 MMOL/L (ref 23–31)
COLOR UR AUTO: YELLOW
CREAT SERPL-MCNC: 1.8 MG/DL (ref 0.72–1.25)
CREAT/UREA NIT SERPL: 20
ERYTHROCYTE [DISTWIDTH] IN BLOOD BY AUTOMATED COUNT: 14.9 % (ref 11.5–17)
GFR SERPLBLD CREATININE-BSD FMLA CKD-EPI: 42 ML/MIN/1.73/M2
GLOBULIN SER-MCNC: 6.3 GM/DL (ref 2.4–3.5)
GLUCOSE SERPL-MCNC: 93 MG/DL (ref 82–115)
GLUCOSE UR QL STRIP: NORMAL
HCT VFR BLD AUTO: 24.2 % (ref 42–52)
HGB BLD-MCNC: 7.8 G/DL (ref 14–18)
HGB UR QL STRIP: ABNORMAL
HOLD SPECIMEN: NORMAL
HYALINE CASTS #/AREA URNS LPF: ABNORMAL /LPF
KETONES UR QL STRIP: NEGATIVE
LEUKOCYTE ESTERASE UR QL STRIP: 500
LIPASE SERPL-CCNC: 34 U/L
LYMPHOCYTES NFR BLD MANUAL: 0.5 X10(3)/MCL
LYMPHOCYTES NFR BLD MANUAL: 6 % (ref 13–40)
MAGNESIUM SERPL-MCNC: 1.9 MG/DL (ref 1.6–2.6)
MCH RBC QN AUTO: 27.9 PG (ref 27–31)
MCHC RBC AUTO-ENTMCNC: 32.2 G/DL (ref 33–36)
MCV RBC AUTO: 86.4 FL (ref 80–94)
MONOCYTES NFR BLD MANUAL: 0.67 X10(3)/MCL (ref 0.1–1.3)
MONOCYTES NFR BLD MANUAL: 8 % (ref 2–11)
NEUTROPHILS NFR BLD MANUAL: 75 % (ref 47–80)
NEUTS BAND NFR BLD MANUAL: 11 % (ref 0–11)
NITRITE UR QL STRIP: NEGATIVE
NRBC BLD AUTO-RTO: 0 %
PH UR STRIP: 6.5 [PH]
PLATELET # BLD AUTO: 481 X10(3)/MCL (ref 130–400)
PLATELET # BLD EST: ABNORMAL 10*3/UL
PMV BLD AUTO: 9.6 FL (ref 7.4–10.4)
POTASSIUM SERPL-SCNC: 2.7 MMOL/L (ref 3.5–5.1)
PROT SERPL-MCNC: 8.3 GM/DL (ref 5.8–7.6)
PROT UR QL STRIP: ABNORMAL
RBC # BLD AUTO: 2.8 X10(6)/MCL (ref 4.7–6.1)
RBC #/AREA URNS AUTO: ABNORMAL /HPF
RBC MORPH BLD: NORMAL
SODIUM SERPL-SCNC: 137 MMOL/L (ref 136–145)
SP GR UR STRIP.AUTO: 1.01 (ref 1–1.03)
SQUAMOUS #/AREA URNS LPF: ABNORMAL /HPF
TRICHOMONAS UR QL COMP ASSIST: ABNORMAL /HPF
UROBILINOGEN UR STRIP-ACNC: NORMAL
WBC # BLD AUTO: 8.34 X10(3)/MCL (ref 4.5–11.5)
WBC #/AREA URNS AUTO: >100 /HPF

## 2024-11-19 PROCEDURE — 82746 ASSAY OF FOLIC ACID SERUM: CPT

## 2024-11-19 PROCEDURE — 85730 THROMBOPLASTIN TIME PARTIAL: CPT

## 2024-11-19 PROCEDURE — 63600175 PHARM REV CODE 636 W HCPCS

## 2024-11-19 PROCEDURE — 80053 COMPREHEN METABOLIC PANEL: CPT | Performed by: EMERGENCY MEDICINE

## 2024-11-19 PROCEDURE — 82607 VITAMIN B-12: CPT

## 2024-11-19 PROCEDURE — 85610 PROTHROMBIN TIME: CPT

## 2024-11-19 PROCEDURE — 83690 ASSAY OF LIPASE: CPT | Performed by: EMERGENCY MEDICINE

## 2024-11-19 PROCEDURE — 87077 CULTURE AEROBIC IDENTIFY: CPT | Performed by: EMERGENCY MEDICINE

## 2024-11-19 PROCEDURE — 25000003 PHARM REV CODE 250: Performed by: EMERGENCY MEDICINE

## 2024-11-19 PROCEDURE — 96374 THER/PROPH/DIAG INJ IV PUSH: CPT

## 2024-11-19 PROCEDURE — 85007 BL SMEAR W/DIFF WBC COUNT: CPT | Performed by: EMERGENCY MEDICINE

## 2024-11-19 PROCEDURE — 81001 URINALYSIS AUTO W/SCOPE: CPT | Performed by: EMERGENCY MEDICINE

## 2024-11-19 PROCEDURE — 83735 ASSAY OF MAGNESIUM: CPT | Performed by: EMERGENCY MEDICINE

## 2024-11-19 PROCEDURE — 63600175 PHARM REV CODE 636 W HCPCS: Performed by: EMERGENCY MEDICINE

## 2024-11-19 PROCEDURE — 21400001 HC TELEMETRY ROOM

## 2024-11-19 PROCEDURE — 99285 EMERGENCY DEPT VISIT HI MDM: CPT | Mod: 25

## 2024-11-19 PROCEDURE — 25500020 PHARM REV CODE 255

## 2024-11-19 PROCEDURE — 80074 ACUTE HEPATITIS PANEL: CPT

## 2024-11-19 RX ORDER — SODIUM CHLORIDE, SODIUM LACTATE, POTASSIUM CHLORIDE, CALCIUM CHLORIDE 600; 310; 30; 20 MG/100ML; MG/100ML; MG/100ML; MG/100ML
INJECTION, SOLUTION INTRAVENOUS CONTINUOUS
Status: DISCONTINUED | OUTPATIENT
Start: 2024-11-19 | End: 2024-11-22

## 2024-11-19 RX ORDER — MAGNESIUM SULFATE HEPTAHYDRATE 40 MG/ML
2 INJECTION, SOLUTION INTRAVENOUS ONCE
Status: COMPLETED | OUTPATIENT
Start: 2024-11-20 | End: 2024-11-20

## 2024-11-19 RX ORDER — CEFTRIAXONE 1 G/1
1 INJECTION, POWDER, FOR SOLUTION INTRAMUSCULAR; INTRAVENOUS
Status: COMPLETED | OUTPATIENT
Start: 2024-11-19 | End: 2024-11-19

## 2024-11-19 RX ORDER — CEFTRIAXONE 1 G/1
1 INJECTION, POWDER, FOR SOLUTION INTRAMUSCULAR; INTRAVENOUS
Status: DISCONTINUED | OUTPATIENT
Start: 2024-11-20 | End: 2024-11-21

## 2024-11-19 RX ORDER — IBUPROFEN 200 MG
24 TABLET ORAL
Status: DISCONTINUED | OUTPATIENT
Start: 2024-11-20 | End: 2024-11-26 | Stop reason: HOSPADM

## 2024-11-19 RX ORDER — DOCUSATE SODIUM 100 MG
400 CAPSULE ORAL
Status: DISCONTINUED | OUTPATIENT
Start: 2024-11-19 | End: 2024-11-26 | Stop reason: HOSPADM

## 2024-11-19 RX ORDER — NALOXONE HCL 0.4 MG/ML
0.02 VIAL (ML) INJECTION
Status: DISCONTINUED | OUTPATIENT
Start: 2024-11-20 | End: 2024-11-26 | Stop reason: HOSPADM

## 2024-11-19 RX ORDER — GLUCAGON 1 MG
1 KIT INJECTION
Status: DISCONTINUED | OUTPATIENT
Start: 2024-11-20 | End: 2024-11-26 | Stop reason: HOSPADM

## 2024-11-19 RX ORDER — HEPARIN SODIUM 5000 [USP'U]/ML
5000 INJECTION, SOLUTION INTRAVENOUS; SUBCUTANEOUS EVERY 12 HOURS
Status: DISCONTINUED | OUTPATIENT
Start: 2024-11-20 | End: 2024-11-26

## 2024-11-19 RX ORDER — IBUPROFEN 200 MG
16 TABLET ORAL
Status: DISCONTINUED | OUTPATIENT
Start: 2024-11-20 | End: 2024-11-26 | Stop reason: HOSPADM

## 2024-11-19 RX ORDER — SODIUM CHLORIDE 0.9 % (FLUSH) 0.9 %
10 SYRINGE (ML) INJECTION EVERY 12 HOURS PRN
Status: DISCONTINUED | OUTPATIENT
Start: 2024-11-20 | End: 2024-11-26 | Stop reason: HOSPADM

## 2024-11-19 RX ORDER — METRONIDAZOLE 500 MG/1
500 TABLET ORAL EVERY 12 HOURS
Status: DISCONTINUED | OUTPATIENT
Start: 2024-11-20 | End: 2024-11-26 | Stop reason: HOSPADM

## 2024-11-19 RX ORDER — HEPARIN SODIUM 5000 [USP'U]/ML
5000 INJECTION, SOLUTION INTRAVENOUS; SUBCUTANEOUS EVERY 8 HOURS
Status: DISCONTINUED | OUTPATIENT
Start: 2024-11-20 | End: 2024-11-19

## 2024-11-19 RX ADMIN — POTASSIUM BICARBONATE 40 MEQ: 391 TABLET, EFFERVESCENT ORAL at 09:11

## 2024-11-19 RX ADMIN — IOHEXOL 100 ML: 350 INJECTION, SOLUTION INTRAVENOUS at 07:11

## 2024-11-19 RX ADMIN — Medication 400 ML: at 06:11

## 2024-11-19 RX ADMIN — SODIUM CHLORIDE, POTASSIUM CHLORIDE, SODIUM LACTATE AND CALCIUM CHLORIDE: 600; 310; 30; 20 INJECTION, SOLUTION INTRAVENOUS at 11:11

## 2024-11-19 RX ADMIN — POTASSIUM BICARBONATE 40 MEQ: 391 TABLET, EFFERVESCENT ORAL at 06:11

## 2024-11-19 RX ADMIN — MAGNESIUM SULFATE HEPTAHYDRATE 2 G: 40 INJECTION, SOLUTION INTRAVENOUS at 11:11

## 2024-11-19 RX ADMIN — CEFTRIAXONE SODIUM 1 G: 1 INJECTION, POWDER, FOR SOLUTION INTRAMUSCULAR; INTRAVENOUS at 09:11

## 2024-11-19 NOTE — ED PROVIDER NOTES
Encounter Date: 11/19/2024       History     Chief Complaint   Patient presents with    Fatigue     IN / [facility] W REPORT OF DECLINING HEALTH FOR MONTHS.  PT ONLY CO LT FOOT PAIN.  FAMILY REPORTS WEAKNESS W WT LOSS.  PT POOR HISTORIAN.      Patient presents emergency department via EMS accompanied by his sister generalized fatigue.  Patient is oriented x3 and states that he has been cough for a long time but can not quantify timeframe.  He also states that he has lost over 60 lb but can not quantify long a time this is gone on for.  He states he is supposed to be on medications not tell me what he should be on and denies any medical.  He denies any pain although he does get nausea with some vomiting sometimes after eating.  Patient states that he was at [facility] approximately 2 weeks ago and was ultimately discharged.  He is accompanied by sister is other sister on the phone stating that he is unable to take care for themselves and he needs to go to nursing home.  They state that he lives in a home that she owns and he wants him out.  She also states that he has been urinating blood.  He denies any history of cancer.      Review of patient's allergies indicates:  No Known Allergies  History reviewed. No pertinent past medical history.  Past Surgical History:   Procedure Laterality Date    COLONOSCOPY  05/08/2015     No family history on file.  Social History     Tobacco Use    Smoking status: Never    Smokeless tobacco: Never   Substance Use Topics    Alcohol use: Not Currently     Review of Systems   Constitutional:  Positive for fatigue.   Respiratory:  Positive for cough.    Gastrointestinal:  Positive for nausea and vomiting.   Genitourinary:  Positive for hematuria.       Physical Exam     Initial Vitals [11/19/24 1543]   BP Pulse Resp Temp SpO2   120/80 93 16 98.8 °F (37.1 °C) 100 %      MAP       --         Physical Exam    Constitutional: No distress.   Frail-appearing patient   HENT:   Head:  Normocephalic and atraumatic.   Dry oral mucous membranes   Eyes: EOM are normal. Pupils are equal, round, and reactive to light.   Neck:   Normal range of motion.  Cardiovascular:  Normal rate and regular rhythm.           Pulmonary/Chest: Breath sounds normal. No respiratory distress. He has no wheezes. He has no rales.   Abdominal: Abdomen is soft. Bowel sounds are normal. He exhibits no distension. There is no abdominal tenderness. There is no rebound.   Musculoskeletal:         General: Normal range of motion.      Cervical back: Normal range of motion.     Neurological: He is alert and oriented to person, place, and time.   Skin:   Bilateral lower extremity stasis dermatitis   Psychiatric: He has a normal mood and affect.         ED Course   Critical Care    Date/Time: 11/19/2024 9:10 PM    Performed by: Boni Emmanuel MD  Authorized by: Boni Emmanuel MD  Total critical care time (exclusive of procedural time) : 35 minutes  Critical care was time spent personally by me on the following activities: re-evaluation of patient's condition, review of old charts, ordering and review of radiographic studies, ordering and review of laboratory studies, ordering and performing treatments and interventions and obtaining history from patient or surrogate.  Comments: IV antibiotics        Labs Reviewed   COMPREHENSIVE METABOLIC PANEL - Abnormal       Result Value    Sodium 137      Potassium 2.7 (*)     Chloride 106      CO2 23      Glucose 93      Blood Urea Nitrogen 35.3 (*)     Creatinine 1.80 (*)     Calcium 8.6 (*)     Protein Total 8.3 (*)     Albumin 2.0 (*)     Globulin 6.3 (*)     Albumin/Globulin Ratio 0.3 (*)     Bilirubin Total 0.4       (*)     ALT 15      AST 21      eGFR 42      Anion Gap 8.0      BUN/Creatinine Ratio 20     URINALYSIS, REFLEX TO URINE CULTURE - Abnormal    Color, UA Yellow      Appearance, UA Extra Turbid (*)     Specific Gravity, UA 1.011      pH, UA 6.5      Protein, UA 2+ (*)      Glucose, UA Normal      Ketones, UA Negative      Blood, UA 1+ (*)     Bilirubin, UA Negative      Urobilinogen, UA Normal      Nitrites, UA Negative      Leukocyte Esterase,  (*)     RBC, UA 6-10 (*)     WBC, UA >100 (*)     Bacteria, UA Many (*)     Squamous Epithelial Cells, UA Many      Trichomonas, UA Many (*)     Hyaline Casts, UA None Seen     CBC WITH DIFFERENTIAL - Abnormal    WBC 8.34      RBC 2.80 (*)     Hgb 7.8 (*)     Hct 24.2 (*)     MCV 86.4      MCH 27.9      MCHC 32.2 (*)     RDW 14.9      Platelet 481 (*)     MPV 9.6      NRBC% 0.0     MANUAL DIFFERENTIAL - Abnormal    Neutrophils % 75      Bands % 11      Lymphs % 6 (*)     Monocytes % 8      Neutrophils Abs Calc 7.1724      Lymphs Abs 0.5004 (*)     Monocytes Abs 0.6672      Platelets Increased (*)     RBC Morph Normal     MAGNESIUM - Normal    Magnesium Level 1.90     LIPASE - Normal    Lipase Level 34     CULTURE, URINE   CBC W/ AUTO DIFFERENTIAL    Narrative:     The following orders were created for panel order CBC auto differential.  Procedure                               Abnormality         Status                     ---------                               -----------         ------                     CBC with Differential[[id number]]       Abnormal            Final result               Manual Differential[[id number]]         Abnormal            Final result                 Please view results for these tests on the individual orders.   EXTRA TUBES    Narrative:     The following orders were created for panel order EXTRA TUBES.  Procedure                               Abnormality         Status                     ---------                               -----------         ------                     Light Blue Top Hold[[id number]]                             Final result               Light Green Top Hold[[id number]]                            Final result               Lavender Top Hold[[id number]]                                Final result               Gold Top Hold[[id number]]                                   Final result               Pink Top Hold[[id number]]                                   Final result                 Please view results for these tests on the individual orders.   LIGHT BLUE TOP HOLD    Extra Tube Hold for add-ons.     LIGHT GREEN TOP HOLD    Extra Tube Hold for add-ons.     LAVENDER TOP HOLD    Extra Tube Hold for add-ons.     GOLD TOP HOLD    Extra Tube Hold for add-ons.     PINK TOP HOLD    Extra Tube Hold for add-ons.            Imaging Results              CT Chest Abdomen Pelvis With IV Contrast (XPD) NO Oral Contrast (Final result)  Result time 11/19/24 19:30:59   Procedure changed from CT Abdomen Pelvis With IV Contrast NO Oral Contrast     Final result by Ezio Deleon MD (11/19/24 19:30:59)                   Impression:      1.   Right lower lung lobe large irregular defined consolidation with areas of necrosis may be secondary to infection though with concern for associated malignancy.    2.  Left lower lung lobe nodules could be metastatic.    3.  Small bowel and colonic excessive fluid and slight mural thickening and some enhancement suggest enterocolitis.  Please correlate clinically.      Electronically signed by: Ezio Deleon  Date:    11/19/2024  Time:    19:30               Narrative:    EXAMINATION:  CT CHEST ABDOMEN PELVIS WITH IV CONTRAST (XPD)    CLINICAL HISTORY:  unexplained weight loss, hematuria;    TECHNIQUE:  Multidetector axial images were obtained from the thoracic inlet through the greater trochanters following the administration of IV contrast.    Dose length product of 258 mGycm. Automated exposure control was utilized to minimize radiation dose.    COMPARISON:  CT abdomen pelvis December 22, 2015.    CHEST FINDINGS:    Right lower lung lobe posteriorly along the subpleural location is remarkable for large irregular defined heterogeneous consolidation with necrotic component  which shows maximum anterior-posterior diameter of 5.2 cm and transverse diameter 9.0 cm.  The maximum sagittal length of this consolidation 16.0 cm on image 53 series 8..  Within left lower lung lobe are up to 5-6 mm nodules like on image 71 and image 75 series 4.  Also seen are left lower lung lobe are very subtle minimal nodularities.  There is no pulmonary edema.  No significant fluid within the pleural and pericardial spaces.  There are no enlarged chest lymph nodes.  Thoracic aorta is without aneurysmal dilatation.  There are no signs of dissection.  Cardiac chamber size is within normal limits.    ABDOMINAL FINDINGS:    Liver, pancreas and spleen are unremarkable.  Gallbladder is decompressed without dilatation of the ducts.  Adrenals are not enlarged in size.  Kidneys function and drain well.  No intra-abdominal or pelvic hypermetabolic enlarged lymph nodes or implants.    There is stable presumably benign retrocaval ovoid hypodensity without significant enhancement and is seen on image 115 series 2.    Stomach is mostly decompressed.  There are multiple loops of small bowel with mild fluid-filled dilatation and mural thickening without definite transition.  There is also excessive fluid within the colon and perhaps slight mural thickening and enhancement.  Findings suggest enterocolitis.  Please correlate clinically.  Overall assessment of colon is limited due to presence of fecal content and lack of enteric contrast.  Please correlate patient is up-to-date on colonoscopy.  No suggestion of bowel obstruction.  No inflammatory standings identified.  No free fluid.    PELVIC FINDINGS:    Urinary bladder is partially decompressed without thickened wall.  No intrapelvic free fluid.    No acute or aggressive skeletal abnormality                                       Medications   electrolytes-dextrose (Pedialyte) oral solution 400 mL (400 mLs Oral Given 11/19/24 6744)   potassium bicarbonate disintegrating  tablet 40 mEq (40 mEq Oral Given 11/19/24 1805)   iohexoL (OMNIPAQUE 350) 350 mg iodine/mL injection (100 mLs  Given 11/19/24 1902)   cefTRIAXone injection 1 g (1 g Intravenous Given 11/19/24 2103)   potassium bicarbonate disintegrating tablet 40 mEq (40 mEq Oral Given 11/19/24 2102)     Medical Decision Making                        Medical Decision Making:   Differential Diagnosis:   Electrolyte derangement, gastritis, cancer, failure to thrive, neglect, pneumonia, bronchitis, dehydration, kidney failure  ED Management:  Patient's potassium 2 was provided oral.  Labs otherwise within nonsignificant does show signs of UTI ceftriaxone provided.  As patient looks cachectic has been having unexplained weight loss CT chest pelvis confirm concern of metastasis not only his lungs but likely and abdomen as he has diffuse read as suggestive of enterocolitis but he has no abdominal likely metastatic abnormality.  Discussed with the admitting team             Clinical Impression:  Final diagnoses:  [R62.7] Failure to thrive in adult (Primary)  [C79.9] Metastatic malignant neoplasm, unspecified site  [N39.0, R31.9] Urinary tract infection with hematuria, site unspecified          ED Disposition Condition    Admit Stable                Boni Emmanuel MD  11/19/24 2110

## 2024-11-20 PROBLEM — E43 SEVERE MALNUTRITION: Status: ACTIVE | Noted: 2024-11-20

## 2024-11-20 LAB
ALBUMIN SERPL-MCNC: 2 G/DL (ref 3.4–4.8)
ALBUMIN/GLOB SERPL: 0.3 RATIO (ref 1.1–2)
ALP SERPL-CCNC: 140 UNIT/L (ref 40–150)
ALT SERPL-CCNC: 16 UNIT/L (ref 0–55)
ANION GAP SERPL CALC-SCNC: 10 MEQ/L
ANION GAP SERPL CALC-SCNC: 7 MEQ/L
APTT PPP: 48.3 SECONDS (ref 23.2–33.7)
AST SERPL-CCNC: 25 UNIT/L (ref 5–34)
B PERT.PT PRMT NPH QL NAA+NON-PROBE: NOT DETECTED
BASOPHILS # BLD AUTO: 0.01 X10(3)/MCL
BASOPHILS NFR BLD AUTO: 0.1 %
BILIRUB SERPL-MCNC: 0.3 MG/DL
BUN SERPL-MCNC: 31.5 MG/DL (ref 8.4–25.7)
BUN SERPL-MCNC: 31.8 MG/DL (ref 8.4–25.7)
C PNEUM DNA NPH QL NAA+NON-PROBE: NOT DETECTED
CALCIUM SERPL-MCNC: 8.3 MG/DL (ref 8.8–10)
CALCIUM SERPL-MCNC: 8.6 MG/DL (ref 8.8–10)
CHLORIDE SERPL-SCNC: 103 MMOL/L (ref 98–107)
CHLORIDE SERPL-SCNC: 105 MMOL/L (ref 98–107)
CO2 SERPL-SCNC: 23 MMOL/L (ref 23–31)
CO2 SERPL-SCNC: 26 MMOL/L (ref 23–31)
CREAT SERPL-MCNC: 1.37 MG/DL (ref 0.72–1.25)
CREAT SERPL-MCNC: 1.49 MG/DL (ref 0.72–1.25)
CREAT/UREA NIT SERPL: 21
CREAT/UREA NIT SERPL: 23
CRYPTOC AG SER QL IA.RAPID: NEGATIVE
CRYPTOSP AG SPEC QL: NEGATIVE
EOSINOPHIL # BLD AUTO: 0.03 X10(3)/MCL (ref 0–0.9)
EOSINOPHIL NFR BLD AUTO: 0.4 %
ERYTHROCYTE [DISTWIDTH] IN BLOOD BY AUTOMATED COUNT: 14.8 % (ref 11.5–17)
FOLATE SERPL-MCNC: 14.2 NG/ML (ref 7–31.4)
G LAMBLIA AG STL QL IA: NEGATIVE
GFR SERPLBLD CREATININE-BSD FMLA CKD-EPI: 53 ML/MIN/1.73/M2
GFR SERPLBLD CREATININE-BSD FMLA CKD-EPI: 58 ML/MIN/1.73/M2
GLOBULIN SER-MCNC: 6.6 GM/DL (ref 2.4–3.5)
GLUCOSE SERPL-MCNC: 108 MG/DL (ref 82–115)
GLUCOSE SERPL-MCNC: 98 MG/DL (ref 82–115)
HADV DNA NPH QL NAA+NON-PROBE: NOT DETECTED
HAV IGM SERPL QL IA: NONREACTIVE
HBV CORE IGM SERPL QL IA: NONREACTIVE
HBV SURFACE AG SERPL QL IA: NONREACTIVE
HCOV 229E RNA NPH QL NAA+NON-PROBE: NOT DETECTED
HCOV HKU1 RNA NPH QL NAA+NON-PROBE: NOT DETECTED
HCOV NL63 RNA NPH QL NAA+NON-PROBE: NOT DETECTED
HCOV OC43 RNA NPH QL NAA+NON-PROBE: NOT DETECTED
HCT VFR BLD AUTO: 24.7 % (ref 42–52)
HCV AB SERPL QL IA: NONREACTIVE
HGB BLD-MCNC: 7.7 G/DL (ref 14–18)
HIV1 RNA # SERPL NAA+PROBE: ABNORMAL COPIES/ML
HIV1 RNA SERPL NAA+PROBE-LOG#: DETECTED {LOG_COPIES}/ML
HMPV RNA NPH QL NAA+NON-PROBE: NOT DETECTED
HOLD SPECIMEN: NORMAL
HPIV1 RNA NPH QL NAA+NON-PROBE: NOT DETECTED
HPIV2 RNA NPH QL NAA+NON-PROBE: NOT DETECTED
HPIV3 RNA NPH QL NAA+NON-PROBE: NOT DETECTED
HPIV4 RNA NPH QL NAA+NON-PROBE: NOT DETECTED
IMM GRANULOCYTES # BLD AUTO: 0.08 X10(3)/MCL (ref 0–0.04)
IMM GRANULOCYTES NFR BLD AUTO: 0.9 %
INR PPP: 1.2
LYMPHOCYTES # BLD AUTO: 0.62 X10(3)/MCL (ref 0.6–4.6)
LYMPHOCYTES NFR BLD AUTO: 7.3 %
M PNEUMO DNA NPH QL NAA+NON-PROBE: NOT DETECTED
MAGNESIUM SERPL-MCNC: 2.2 MG/DL (ref 1.6–2.6)
MCH RBC QN AUTO: 26.7 PG (ref 27–31)
MCHC RBC AUTO-ENTMCNC: 31.2 G/DL (ref 33–36)
MCV RBC AUTO: 85.8 FL (ref 80–94)
MONOCYTES # BLD AUTO: 0.68 X10(3)/MCL (ref 0.1–1.3)
MONOCYTES NFR BLD AUTO: 8.1 %
NEUTROPHILS # BLD AUTO: 7.02 X10(3)/MCL (ref 2.1–9.2)
NEUTROPHILS NFR BLD AUTO: 83.2 %
NRBC BLD AUTO-RTO: 0 %
PHOSPHATE SERPL-MCNC: 2.5 MG/DL (ref 2.3–4.7)
PLATELET # BLD AUTO: 496 X10(3)/MCL (ref 130–400)
PMV BLD AUTO: 9.6 FL (ref 7.4–10.4)
POTASSIUM SERPL-SCNC: 2.8 MMOL/L (ref 3.5–5.1)
POTASSIUM SERPL-SCNC: 3.2 MMOL/L (ref 3.5–5.1)
PROT SERPL-MCNC: 8.6 GM/DL (ref 5.8–7.6)
PROTHROMBIN TIME: 15.1 SECONDS (ref 11.4–14)
RBC # BLD AUTO: 2.88 X10(6)/MCL (ref 4.7–6.1)
RSV RNA NPH QL NAA+NON-PROBE: NOT DETECTED
RV+EV RNA NPH QL NAA+NON-PROBE: NOT DETECTED
SODIUM SERPL-SCNC: 136 MMOL/L (ref 136–145)
SODIUM SERPL-SCNC: 138 MMOL/L (ref 136–145)
T PALLIDUM AB SER QL: NONREACTIVE
VIT B12 SERPL-MCNC: 224 PG/ML (ref 213–816)
WBC # BLD AUTO: 8.44 X10(3)/MCL (ref 4.5–11.5)

## 2024-11-20 PROCEDURE — 84100 ASSAY OF PHOSPHORUS: CPT

## 2024-11-20 PROCEDURE — 97165 OT EVAL LOW COMPLEX 30 MIN: CPT

## 2024-11-20 PROCEDURE — 36415 COLL VENOUS BLD VENIPUNCTURE: CPT

## 2024-11-20 PROCEDURE — 36415 COLL VENOUS BLD VENIPUNCTURE: CPT | Mod: XB | Performed by: INTERNAL MEDICINE

## 2024-11-20 PROCEDURE — 86780 TREPONEMA PALLIDUM: CPT

## 2024-11-20 PROCEDURE — 63600175 PHARM REV CODE 636 W HCPCS

## 2024-11-20 PROCEDURE — 87899 AGENT NOS ASSAY W/OPTIC: CPT

## 2024-11-20 PROCEDURE — 85025 COMPLETE CBC W/AUTO DIFF WBC: CPT

## 2024-11-20 PROCEDURE — 87328 CRYPTOSPORIDIUM AG IA: CPT

## 2024-11-20 PROCEDURE — 25000003 PHARM REV CODE 250

## 2024-11-20 PROCEDURE — 83735 ASSAY OF MAGNESIUM: CPT

## 2024-11-20 PROCEDURE — 87040 BLOOD CULTURE FOR BACTERIA: CPT

## 2024-11-20 PROCEDURE — 87798 DETECT AGENT NOS DNA AMP: CPT

## 2024-11-20 PROCEDURE — 87536 HIV-1 QUANT&REVRSE TRNSCRPJ: CPT

## 2024-11-20 PROCEDURE — 86635 COCCIDIOIDES ANTIBODY: CPT

## 2024-11-20 PROCEDURE — 87209 SMEAR COMPLEX STAIN: CPT

## 2024-11-20 PROCEDURE — 86777 TOXOPLASMA ANTIBODY: CPT

## 2024-11-20 PROCEDURE — 80053 COMPREHEN METABOLIC PANEL: CPT

## 2024-11-20 PROCEDURE — 97161 PT EVAL LOW COMPLEX 20 MIN: CPT

## 2024-11-20 PROCEDURE — 21400001 HC TELEMETRY ROOM

## 2024-11-20 PROCEDURE — 27000207 HC ISOLATION

## 2024-11-20 PROCEDURE — 87427 SHIGA-LIKE TOXIN AG IA: CPT

## 2024-11-20 PROCEDURE — 80048 BASIC METABOLIC PNL TOTAL CA: CPT | Mod: XB

## 2024-11-20 PROCEDURE — 25000003 PHARM REV CODE 250: Performed by: EMERGENCY MEDICINE

## 2024-11-20 PROCEDURE — 87070 CULTURE OTHR SPECIMN AEROBIC: CPT

## 2024-11-20 PROCEDURE — 87632 RESP VIRUS 6-11 TARGETS: CPT

## 2024-11-20 PROCEDURE — 86698 HISTOPLASMA ANTIBODY: CPT

## 2024-11-20 PROCEDURE — 84425 ASSAY OF VITAMIN B-1: CPT

## 2024-11-20 PROCEDURE — 86480 TB TEST CELL IMMUN MEASURE: CPT

## 2024-11-20 PROCEDURE — 86359 T CELLS TOTAL COUNT: CPT

## 2024-11-20 PROCEDURE — 0219U NFCT AGT HIV GNRJ SEQ ALYS: CPT

## 2024-11-20 RX ORDER — POTASSIUM CHLORIDE 750 MG/1
30 CAPSULE, EXTENDED RELEASE ORAL ONCE
Status: COMPLETED | OUTPATIENT
Start: 2024-11-20 | End: 2024-11-20

## 2024-11-20 RX ADMIN — CEFTRIAXONE SODIUM 1 G: 1 INJECTION, POWDER, FOR SOLUTION INTRAMUSCULAR; INTRAVENOUS at 09:11

## 2024-11-20 RX ADMIN — Medication 400 ML: at 10:11

## 2024-11-20 RX ADMIN — Medication 400 ML: at 01:11

## 2024-11-20 RX ADMIN — METRONIDAZOLE 500 MG: 500 TABLET ORAL at 08:11

## 2024-11-20 RX ADMIN — METRONIDAZOLE 500 MG: 500 TABLET ORAL at 09:11

## 2024-11-20 RX ADMIN — Medication 400 ML: at 09:11

## 2024-11-20 RX ADMIN — HEPARIN SODIUM 5000 UNITS: 5000 INJECTION, SOLUTION INTRAVENOUS; SUBCUTANEOUS at 09:11

## 2024-11-20 RX ADMIN — THERA TABS 1 TABLET: TAB at 09:11

## 2024-11-20 RX ADMIN — SODIUM CHLORIDE, POTASSIUM CHLORIDE, SODIUM LACTATE AND CALCIUM CHLORIDE: 600; 310; 30; 20 INJECTION, SOLUTION INTRAVENOUS at 03:11

## 2024-11-20 RX ADMIN — Medication 400 ML: at 06:11

## 2024-11-20 RX ADMIN — POTASSIUM BICARBONATE 25 MEQ: 977.5 TABLET, EFFERVESCENT ORAL at 01:11

## 2024-11-20 RX ADMIN — POTASSIUM BICARBONATE 50 MEQ: 977.5 TABLET, EFFERVESCENT ORAL at 06:11

## 2024-11-20 RX ADMIN — HEPARIN SODIUM 5000 UNITS: 5000 INJECTION, SOLUTION INTRAVENOUS; SUBCUTANEOUS at 08:11

## 2024-11-20 RX ADMIN — POTASSIUM BICARBONATE 25 MEQ: 977.5 TABLET, EFFERVESCENT ORAL at 03:11

## 2024-11-20 RX ADMIN — POTASSIUM CHLORIDE 30 MEQ: 750 CAPSULE, EXTENDED RELEASE ORAL at 09:11

## 2024-11-20 RX ADMIN — SODIUM CHLORIDE, POTASSIUM CHLORIDE, SODIUM LACTATE AND CALCIUM CHLORIDE: 600; 310; 30; 20 INJECTION, SOLUTION INTRAVENOUS at 12:11

## 2024-11-20 RX ADMIN — Medication 400 ML: at 02:11

## 2024-11-20 NOTE — PLAN OF CARE
24 1001   Discharge Assessment   Assessment Type Discharge Planning Assessment   Confirmed/corrected address, phone number and insurance Yes   Confirmed Demographics Correct on Facesheet   Source of Information patient   Reason For Admission FTT, Chest pain, UTI with hematuria, Metastatic malignant neoplasm   People in Home sibling(s)   Facility Arrived From: Home   Do you expect to return to your current living situation? Yes   Do you have help at home or someone to help you manage your care at home? Yes   Who are your caregiver(s) and their phone number(s)? BrotherJonathan; SonEnrique   Prior to hospitilization cognitive status: Alert/Oriented   Current cognitive status: Alert/Oriented   Walking or Climbing Stairs Difficulty no   Dressing/Bathing Difficulty no   Home Layout Able to live on 1st floor   Equipment Currently Used at Home none   Readmission within 30 days? No   Patient currently being followed by outpatient case management? No   Do you currently have service(s) that help you manage your care at home? No   Do you take prescription medications? Yes   Do you have prescription coverage? Yes   Coverage [employer]   Do you have any problems affording any of your prescribed medications? No   Who is going to help you get home at discharge? Family   How do you get to doctors appointments? car, drives self   Are you on dialysis? No   Discharge Plan A Home with family   DME Needed Upon Discharge  none   Discharge Plan discussed with: Patient   Transition of Care Barriers Social   OTHER   Name(s) of People in Home BrotherJonathan     Pt single with 9 children; Resides with brother; SO now  - Pt would like son, Enrique Terry listed as emergency contact, but was unable to provide contact info; CM to follow for d/c planning needs.

## 2024-11-20 NOTE — PLAN OF CARE
ID consulted for HIV management in a patient with known HIV disease who has been noncompliant since 2021. 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    CT chest shows a RLL mass vs consolidation, primary team has reached out to IR to attempt biopsy vs draiange of this.    Recommendations:  - Please obtain the following studies:   - Histoplasma/blastomyces urine and serum antigens   - Fungitell   - Aspergillus serum atg   - PJP PCR, sputum   - Fungal blood culture   - Mycobacterial blood culture   - AFB smear and cultures x3, with 1 specimen obtained from early AM    - ID has placed the patient on airborne isolation out of an abundance of caution   - Fungal sputum cx   - GC/chlamydia urine NAAT   - GC/chlamydia throat and rectal swabs   - GI PCR   - C diff antigen, stool   - HLA  serum    - G6PD   - Brucella serum antibodies   - Francisella serum antibodies   - Q fever serologies   - Bartonella serologies and PCR, serum   - Agree with RLL mass bx. Please send for the following:    - Aerobic culture    - Anaeorbic culture    - AFB culture    - Fungal culture    - MTB PCR (if fluid)    - Cell count and diff (if fluid)    - ADA (if fluid)    - Glucose (if fluid)    - pH (if fluid)    - Pathology with added staining for AFB and fungal organisms    Additional recommendations and full consult note to follow    Sg Sanford MD  Infectious Diseases Faculty   of Medicine

## 2024-11-20 NOTE — PROGRESS NOTES
Met with pt's sons, Anne Terry Jr. ([phone]-[facility]) & Luis F Terry ([phone]-[place]). Obtained son, Enrique's contact # [phone], who resides with  and will be returning to [place] from out of town job over the weekend.

## 2024-11-20 NOTE — CARE UPDATE
[facility] Internal Medicine  CARE UPDATE NOTE    Patient's Name: Anne Terry   : 1960  MRN: [id number]   Date: 2024     Interval History:  Took over care from night team. Patient states that he's doing well. He states that he presented to the ED for decreased PO intake and unintended weight loss. Also reports complaints of productive cough with no bloody streaks for the past 2 months. States that he's been having loose stools upwards of 3 times a day for the past several weeks. Of note, patient states that he started following ID around , but he didn't know for what reason. Otherwise, patient reports nausea and vomiting when eating food, but otherwise denies any chest pain, SOB, abdominal pain, constipation, changes in urination, and peripheral edema.    Review of Systems: A 12-pt review of systems was performed and is negative unless stated above.    VITAL SIGNS: 24 HR MIN & MAX Most Recent Vitals   Temp  Min: 98.4 °F (36.9 °C)  Max: 99.1 °F (37.3 °C)  99.1 °F (37.3 °C)   BP  Min: 119/67  Max: 133/75  133/75    Pulse  Min: 62  Max: 93  77   Resp  Min: 16  Max: 20  20    SpO2  Min: 93 %  Max: 100 %  98 %    Body mass index is 16.87 kg/m².    Physical Exam  Constitutional:       General: He is awake.      Appearance: He is cachectic. He is ill-appearing (chronically). He is not toxic-appearing.   Eyes:      General: No scleral icterus.  Cardiovascular:      Rate and Rhythm: Normal rate and regular rhythm.      Pulses: Normal pulses.      Heart sounds: Normal heart sounds. No murmur heard.  Pulmonary:      Effort: Pulmonary effort is normal. No respiratory distress.      Breath sounds: Normal breath sounds.   Musculoskeletal:      Right lower leg: No edema.      Left lower leg: No edema.   Skin:     General: Skin is warm and dry.      Findings: Lesion (hyperpigmented lesions to the pretibial region) present.   Neurological:      General: No focal deficit present.       Mental Status: He is alert.   Psychiatric:         Behavior: Behavior normal. Behavior is cooperative.       Labs:  CBC:  Recent Labs   Lab 11/19/24  1635 11/20/24  0314   WBC 8.34 8.44   HGB 7.8* 7.7*   HCT 24.2* 24.7*   * 496*   MCV 86.4 85.8   RDW 14.9 14.8     BMP/CMP:  Recent Labs   Lab 11/19/24  1635 11/20/24  0314 11/20/24  1114    136 138   K 2.7* 2.8* 3.2*    103 105   CO2 23 23 26   BUN 35.3* 31.8* 31.5*   CREATININE 1.80* 1.49* 1.37*   GLUCOSE 93 108 98   EGFRNORACEVR 42 53 58     RFTs/LFTs:  Recent Labs   Lab 11/19/24  1635 11/19/24  2332 11/20/24  0314 11/20/24  1114   CALCIUM 8.6*  --  8.6* 8.3*   LABPROT 8.3* 15.1* 8.6*  --    ALBUMIN 2.0*  --  2.0*  --    AST 21  --  25  --    ALT 15  --  16  --    ALKPHOS 152*  --  140  --    BILITOT 0.4  --  0.3  --      Recent Labs   Lab 11/19/24  1635 11/20/24  0314   MG 1.90 2.20   PHOS  --  2.5     Assessment and Plan:  Uncontrolled HIV/AIDs  Thrombocytosis  Lung mass concerning for malignancy  - Has history of HIV infection/AIDS; used to follow with [facility] ID, last appointment in 2021. Not currently on anti-retrovirals. 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  - HIV ,696, CD4 pending. HIV genotyping pending.  - Syphilis, Giardia/Cryptosporidium, and Hepatitis panel negative.  - CMV, Toxoplasma, TB, Histoplasma, Pneumocystis, and Coccidioides   - ID consulted. Recommended opportunistic infection workup as well as RLL biopsy. Orders placed.  - Respiratory panel, stool culture, and stool ova/parasite ordered.  - Plan to consult IR for RLL lung biopsy tomorrow.  - PT/OT following.  - Nutrition consulted    Failure to thrive, suspected 2/2 to above  Normocytic anemia  - Patient reported decreased PO intake and unintended weight loss.  - Vitamin B12 and folate WNL. Vitamin B1 pending.  - Continue multivitamin.  - Nutrition consulted.    Hypokalemia  - Upon  admission, potassium noted to be 2.7. Repeat potassium 2.8 after potassium bicarbonate 40 mEq.  - Ordered potassium bicarbonate 50 mEq and potassium chloride 30 mEq earlier today with repeat 3.2. Ordered another potassium bicarbonate 25 mEq given 2 hours apart.  - CTM electrolytes and     TAYLOR on CKD  - Cr 1.8 (1.3 baseline). Most recent 1.37.  - Continue LR @ 75 mL/hr.  - CTM.     UTI  Trichomonas on UA  - Received ceftriaxone 1 g in ED  - Continue ceftriaxone 1g daily and metronidazole 500 mg BID x 7 days  - Urine culture pending      Case was discussed and seen with Dr. Partida. Please appreciate attending's attestation to follow.    Jarod Best MD  PGY-1 Resident  [employer] [facility]  11/20/2024

## 2024-11-20 NOTE — CONSULTS
Inpatient Nutrition Assessment    Admit Date: 11/19/2024   Total duration of encounter: 1 day   Patient Age: 64 y.o.    Nutrition Recommendation/Prescription     Continue regular diet; encouraged oral intake  Will order boost plus tid; Boost Plus (provides 360 kcal, 14 g protein per serving)   Electrolyte repletion as needed--Low K noted  MVI/fe  Consider megace to stimulate appetite  Pt also mentioned frequent hiccups; ? Medication tx  Will monitor nutrition status     Communication of Recommendations: reviewed with nurse and reviewed with patient    Nutrition Assessment     Malnutrition Assessment/Nutrition-Focused Physical Exam    Malnutrition Context: chronic illness (11/20/24 1421)  Malnutrition Level: severe (11/20/24 1421)  Energy Intake (Malnutrition): less than or equal to 50% for greater than or equal to 1 month (11/20/24 1421)  Weight Loss (Malnutrition): greater than 7.5% in 3 months (11/20/24 1421)  Subcutaneous Fat (Malnutrition): moderate depletion (11/20/24 1421)  Orbital Region (Subcutaneous Fat Loss): moderate depletion  Upper Arm Region (Subcutaneous Fat Loss): mild depletion     Muscle Mass (Malnutrition): mild depletion (11/20/24 1421)  Yarsanism Region (Muscle Loss): mild depletion  Clavicle Bone Region (Muscle Loss): mild depletion                    Fluid Accumulation (Malnutrition): other (see comments) (Not present) (11/20/24 1421)     Hand  Strength, Right (Malnutrition): Unable to assess (11/20/24 1421)  A minimum of two characteristics is recommended for diagnosis of either severe or non-severe malnutrition.    Chart Review    Reason Seen: continuous nutrition monitoring, malnutrition screening tool (MST), and physician consult for failure to thrive     Malnutrition Screening Tool Results   Have you recently lost weight without trying?: Yes: Unsure how much  Have you been eating poorly because of a decreased appetite?: No   MST Score: 2   Diagnosis:  Failure to thrive, HIV/Aids, lung  mass/concern for malignancy, hypokalemia, thrombocytopenia, TAYLOR/CKD, UTI, trichomones     Relevant Medical History: HIV/Aids, HTN, CKD     Scheduled Medications:  cefTRIAXone (Rocephin) IV (PEDS and ADULTS), 1 g, Q24H  electrolytes-dextrose, 400 mL, Q4H  heparin (porcine), 5,000 Units, Q12H  metroNIDAZOLE, 500 mg, Q12H  multivitamin, 1 tablet, Daily  potassium bicarbonate, 25 mEq, Once    Continuous Infusions:  lactated ringers, Last Rate: 75 mL/hr at 11/20/24 0039    PRN Medications:  dextrose 10%, 12.5 g, PRN  dextrose 10%, 25 g, PRN  glucagon (human recombinant), 1 mg, PRN  glucose, 16 g, PRN  glucose, 24 g, PRN  naloxone, 0.02 mg, PRN  sodium chloride 0.9%, 10 mL, Q12H PRN    Calorie Containing IV Medications: no significant kcals from medications at this time    Recent Labs   Lab 11/19/24  1635 11/20/24  0314 11/20/24  1114    136 138   K 2.7* 2.8* 3.2*   CALCIUM 8.6* 8.6* 8.3*   PHOS  --  2.5  --    MG 1.90 2.20  --     103 105   CO2 23 23 26   BUN 35.3* 31.8* 31.5*   CREATININE 1.80* 1.49* 1.37*   EGFRNORACEVR 42 53 58   GLUCOSE 93 108 98   BILITOT 0.4 0.3  --    ALKPHOS 152* 140  --    ALT 15 16  --    AST 21 25  --    ALBUMIN 2.0* 2.0*  --    LIPASE 34  --   --    WBC 8.34 8.44  --    HGB 7.8* 7.7*  --    HCT 24.2* 24.7*  --      Nutrition Orders:  Diet Adult Regular      Appetite/Oral Intake: poor/25-50% of meals  Factors Affecting Nutritional Intake: altered gastrointestinal function, decreased appetite, diarrhea, and hiccups  Social Needs Impacting Access to Food: none identified  Food/Adventist/Cultural Preferences: none reported  Food Allergies: none reported  Last Bowel Movement: 11/18/24  Wound(s):  none reported     Comments    (11/20) Received MST --wt loss; consult for FTT. Pt reported several month hx decreased oral intake; approx 1 meal per day; some foods cause diarrhea; + significant wt loss--approx 40-50# over past 2-3 months; EMR wt hx 3 year ago--pt weighed 200#; no recent  "documentation in EMR. Pt with + fat/muscle wasting . Will order ONS for added nutrition. Consider megace to stimulate appetite. Pt also mentioned hiccups with meals; ? Medication to assist with symptoms. Labs acknowledged: Bun/Cr (H)--hx CKD. K (L)--suggest repletion if remains low; on pedialyte .     Anthropometrics    Height: 6' 1" (185.4 cm),    Last Weight: 58 kg (127 lb 13.9 oz) (24 0141), Weight Method: Standard Scale  BMI (Calculated): 16.9  BMI Classification: underweight (BMI less than 18.5)        Ideal Body Weight (IBW), Male: 184 lb     % Ideal Body Weight, Male (lb): 69.49 %                 Usual Body Weight (UBW), k.2 kg  % Usual Body Weight: 75.29     Usual Weight Provided By: patient and EMR weight history    Wt Readings from Last 5 Encounters:   24 58 kg (127 lb 13.9 oz)   21 90.9 kg (200 lb 4.6 oz)     Weight Change(s) Since Admission: UBW approx 170#  Wt Readings from Last 1 Encounters:   24 0141 58 kg (127 lb 13.9 oz)   24 0019 58 kg (127 lb 13.9 oz)   24 1543 26.8 kg (59 lb)   Admit Weight: 26.8 kg (59 lb) (24 1543), Weight Method: Standard Scale    Estimated Needs    Weight Used For Calorie Calculations: 58 kg (127 lb 13.9 oz)  Energy Calorie Requirements (kcal): 2030 kcal/d; 35 ivory/kg /wt gain  Energy Need Method: Kcal/kg  Weight Used For Protein Calculations: 58 kg (127 lb 13.9 oz)  Protein Requirements: 75 gm protein/d ; 1.3 gm/kg --monitor renal fx  Fluid Requirements (mL): 1740 ml/d; 30 ml/kg        Enteral Nutrition     Patient not receiving enteral nutrition at this time.    Parenteral Nutrition     Patient not receiving parenteral nutrition support at this time.    Evaluation of Received Nutrient Intake    Calories: not meeting estimated needs  Protein: not meeting estimated needs    Patient Education     Not applicable.    Nutrition Diagnosis     PES: Inadequate oral intake related to chronic illness as evidenced by Eating 50% or less meal " > month; + wt loss --> 7.5 % over 2-3 months . (new)     PES: Severe chronic disease or condition related malnutrition Related to chronic illness As Evidenced by:  - weight loss: > 7.5% in 3 months - energy intake: <= 50% for 2 months (meets criteria for <= 50% >= 1 month (severe - social/environmental)) - muscle mass depletion: 3 areas of mild muscle loss (Clavicle, Pectoralis, Temporalis) - loss of subcutaneous fat: 1 area of moderate fat loss (Infraorbital), 2 areas of mild fat loss (Buccal, Triceps Skinfold) new    Nutrition Interventions     Intervention(s): multivitamin/mineral supplement therapy and collaboration with other providers  Intervention(s): Oral diet/nutrient modifications;Oral nutritional supplement    Goal: Meet greater than 80% of nutritional needs by follow-up. (new)  Goal: Maintain weight throughout hospitalization. (new)    Nutrition Goals & Monitoring     Dietitian will monitor: food and beverage intake, weight, and electrolyte/renal panel  Discharge planning: continue regular diet with boost plus oral supplements  Nutrition Risk/Follow-Up: high (follow-up in 1-4 days)   Please consult if re-assessment needed sooner.

## 2024-11-20 NOTE — H&P
[facility] Medicine Wards History and Physical Note      Resident Team: [facility] Medicine List 1  Attending Physician: Marianne Partida MD  Resident: Matt Anthony MD   Intern: Heather Voss MD     Date of Admit: 11/19/2024  Chief Complaint   Fatigue and weight loss    HPI   Anne Terry is a 64 y.o. male, with PMH significant for HIV/AIDS, who presented to [facility] ED on 11/19/2024  with primary complaints of fatigue and weight loss. Patient states that he began feeling more fatigued, had decreased appetite, and has experienced significant weight loss since October. He also has had a dry cough but denies hemoptysis. He has only been able to tolerate one meal a day, consisting of mainly cookies and soda. All other foods cause diarrhea. Denies chest pain, abdominal pain, nausea, vomiting, constipation, dysuria, urinary frequency, hematuria, hematochezia, or melena. He was feeling more weak and fatigued today so he called 911.     Per chart review, patient has a history of HIV/AIDS diagnosed on 2015. He used to follow with [facility] but was lost to follow up since 2021. He was on Ziagen 300 mg daily, Descovy daily, Prezista 800 mg daily, and Norovir 100 mg daily. He has a history of medication non-adherence. He also has a history of cryptococcosis and histoplasmosis in 2015. 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       ED Course - Upon arrival, pt afebrile 98.8 F, /80, HR 93, RR 16, SpO2 100% on RA. Workup notable for H/H 7.8/24.2, Plt 481, decreased absolute lymphocytes, potassium 2.7, BUN 35.3, creatinine 1.8 (baseline 1.3), elevated total protein 8.3, albumin 2.0, . UA 2+ protein, 1+ blood, 500 leukocyte esterase, >100 WBC, many bacteria, many trichomonas. CT chest abdomen pelvis performed showing RLL lung irregularly defined consolidation with areas of necrosis and LLL nodules, concerning for malignancy. Also showed small bowel and colonic mural thickening and enterocolitis.  He was given ceftriaxone 1 g and potassium bicarb 40 mEq in the ED. Internal medicine was consulted for further management.     History    PMH: HIV/AIDS, histoplasmosis, cryptococcosis, HTN, CKD, former tobacco user per chart review    Past Surgical History:   Past Surgical History:   Procedure Laterality Date    COLONOSCOPY  2015     Family History: Patient states that both his mother and father had cancer but he does not recall what kind.     Social:   Social History     Tobacco Use    Smoking status: Never    Smokeless tobacco: Never   Substance Use Topics    Alcohol use: Not Currently     Allergies: Review of patient's allergies indicates:  No Known Allergies  Home Medications   Prior to Admission medications    Not on File       Review of Systems   Review of Systems   Constitutional:  Positive for malaise/fatigue and weight loss. Negative for chills and fever.   Respiratory:  Positive for cough and shortness of breath. Negative for hemoptysis and sputum production.    Cardiovascular:  Negative for chest pain, palpitations and leg swelling.   Gastrointestinal:  Positive for diarrhea. Negative for abdominal pain, blood in stool, constipation, melena, nausea and vomiting.   Genitourinary:  Negative for dysuria, flank pain, frequency and hematuria.   Neurological:  Positive for weakness. Negative for headaches.        Vital Signs    BP  Min: 119/67  Max: 129/76  Temp  Av.8 °F (37.1 °C)  Min: 98.8 °F (37.1 °C)  Max: 98.8 °F (37.1 °C)  Pulse  Av.8  Min: 82  Max: 93  Resp  Av  Min: 16  Max: 20  SpO2  Av.7 %  Min: 99 %  Max: 100 %  Weight  Av.8 kg (59 lb)  Min: 26.8 kg (59 lb)  Max: 26.8 kg (59 lb)  Body mass index is 7.82 kg/m².  I/O last 3 completed shifts:  In: 400 [P.O.:400]  Out: -     Physical Exam    Physical Exam  Vitals and nursing note reviewed.   Constitutional:       General: He is not in acute distress.     Appearance: He is cachectic. He is ill-appearing.   HENT:      Head:  "Normocephalic and atraumatic.      Mouth/Throat:      Mouth: Mucous membranes are dry.      Pharynx: Oropharynx is clear.   Eyes:      Extraocular Movements: Extraocular movements intact.   Cardiovascular:      Rate and Rhythm: Normal rate and regular rhythm.      Pulses: Normal pulses.      Heart sounds: Normal heart sounds. No murmur heard.  Pulmonary:      Effort: Pulmonary effort is normal.      Breath sounds: Decreased air movement present. No wheezing, rhonchi or rales.   Abdominal:      General: Abdomen is flat. Bowel sounds are normal.      Palpations: Abdomen is soft.      Tenderness: There is no abdominal tenderness. There is no guarding.   Musculoskeletal:      Cervical back: Neck supple.      Right lower leg: No edema.      Left lower leg: No edema.   Skin:     General: Skin is warm and dry.      Comments: Hyperpigmented discoloration of bilateral anterior lower legs/ankles. No overlying skin breakdown or ulceration.    Neurological:      Mental Status: He is alert and oriented to person, place, and time.         Labs    Most Recent Data:  CBC:   Lab Results   Component Value Date    WBC 8.34 11/19/2024    HGB 7.8 (L) 11/19/2024    HCT 24.2 (L) 11/19/2024     (H) 11/19/2024    MCV 86.4 11/19/2024    RDW 14.9 11/19/2024     WBC Differential:   Recent Labs   Lab 11/19/24  1635   WBC 8.34   HGB 7.8*   HCT 24.2*   *   MCV 86.4     BMP:   Lab Results   Component Value Date     11/19/2024    K 2.7 (LL) 11/19/2024     11/19/2024    CO2 23 11/19/2024    BUN 35.3 (H) 11/19/2024    CREATININE 1.80 (H) 11/19/2024    CALCIUM 8.6 (L) 11/19/2024    MG 1.90 11/19/2024     LFTs:   Lab Results   Component Value Date    ALBUMIN 2.0 (L) 11/19/2024    BILITOT 0.4 11/19/2024    AST 21 11/19/2024    ALKPHOS 152 (H) 11/19/2024    ALT 15 11/19/2024     Coags: No results found for: "INR", "PROTIME", "PTT"  FLP:   Lab Results   Component Value Date    CHOL 198 11/08/2021    HDL 45 11/08/2021    TRIG 137 " "11/08/2021     DM:   Lab Results   Component Value Date    CREATININE 1.80 (H) 11/19/2024     Thyroid:   Lab Results   Component Value Date    TSH 1.8365 01/19/2021      Anemia: No results found for: "IRON", "TIBC", "FERRITIN", "SATURATEDIRO"  No results found for: "AOWCXQNZ12"  No results found for: "FOLATE"     Cardiac: No results found for: "TROPONINI", "CKTOTAL", "CKMB", "BNP"  Urinalysis:   Lab Results   Component Value Date    COLORU Yellow 11/19/2024    NITRITE Negative 11/19/2024    KETONESU Negative 01/19/2021    UROBILINOGEN Normal 11/19/2024    WBCUA >100 (A) 11/19/2024       Trended Lab Data:  Recent Labs   Lab 11/19/24  1635   WBC 8.34   HGB 7.8*   HCT 24.2*   *   MCV 86.4   RDW 14.9      K 2.7*      CO2 23   BUN 35.3*   CREATININE 1.80*   ALBUMIN 2.0*   BILITOT 0.4   AST 21   ALKPHOS 152*   ALT 15       Trended Cardiac Data:  No results for input(s): "TROPONINI", "CKTOTAL", "CKMB", "BNP" in the last 168 hours.    Microbiology Data:  Microbiology Results (last 7 days)       Procedure Component Value Units Date/Time    Urine culture [[id number]] Collected: 11/19/24 1824    Order Status: Sent Specimen: Urine Updated: 11/19/24 1849             Imaging      Imaging Results              CT Chest Abdomen Pelvis With IV Contrast (XPD) NO Oral Contrast (Final result)  Result time 11/19/24 19:30:59   Procedure changed from CT Abdomen Pelvis With IV Contrast NO Oral Contrast     Final result by Ezio Deleon MD (11/19/24 19:30:59)                   Impression:      1.   Right lower lung lobe large irregular defined consolidation with areas of necrosis may be secondary to infection though with concern for associated malignancy.    2.  Left lower lung lobe nodules could be metastatic.    3.  Small bowel and colonic excessive fluid and slight mural thickening and some enhancement suggest enterocolitis.  Please correlate clinically.      Electronically signed by: Ezio" Deleon  Date:    11/19/2024  Time:    19:30               Narrative:    EXAMINATION:  CT CHEST ABDOMEN PELVIS WITH IV CONTRAST (XPD)    CLINICAL HISTORY:  unexplained weight loss, hematuria;    TECHNIQUE:  Multidetector axial images were obtained from the thoracic inlet through the greater trochanters following the administration of IV contrast.    Dose length product of 258 mGycm. Automated exposure control was utilized to minimize radiation dose.    COMPARISON:  CT abdomen pelvis December 22, 2015.    CHEST FINDINGS:    Right lower lung lobe posteriorly along the subpleural location is remarkable for large irregular defined heterogeneous consolidation with necrotic component which shows maximum anterior-posterior diameter of 5.2 cm and transverse diameter 9.0 cm.  The maximum sagittal length of this consolidation 16.0 cm on image 53 series 8..  Within left lower lung lobe are up to 5-6 mm nodules like on image 71 and image 75 series 4.  Also seen are left lower lung lobe are very subtle minimal nodularities.  There is no pulmonary edema.  No significant fluid within the pleural and pericardial spaces.  There are no enlarged chest lymph nodes.  Thoracic aorta is without aneurysmal dilatation.  There are no signs of dissection.  Cardiac chamber size is within normal limits.    ABDOMINAL FINDINGS:    Liver, pancreas and spleen are unremarkable.  Gallbladder is decompressed without dilatation of the ducts.  Adrenals are not enlarged in size.  Kidneys function and drain well.  No intra-abdominal or pelvic hypermetabolic enlarged lymph nodes or implants.    There is stable presumably benign retrocaval ovoid hypodensity without significant enhancement and is seen on image 115 series 2.    Stomach is mostly decompressed.  There are multiple loops of small bowel with mild fluid-filled dilatation and mural thickening without definite transition.  There is also excessive fluid within the colon and perhaps slight mural  thickening and enhancement.  Findings suggest enterocolitis.  Please correlate clinically.  Overall assessment of colon is limited due to presence of fecal content and lack of enteric contrast.  Please correlate patient is up-to-date on colonoscopy.  No suggestion of bowel obstruction.  No inflammatory standings identified.  No free fluid.    PELVIC FINDINGS:    Urinary bladder is partially decompressed without thickened wall.  No intrapelvic free fluid.    No acute or aggressive skeletal abnormality                                       Assessment and Plan     Failure to thrive  Immunocompromised, hx of HIV/AIDS  Lung mass concerning for malignancy  Hypokalemia  Thrombocytosis  - Has history of HIV infection/AIDS; used to follow with [facility], last appointment in 2021. Not currently on anti-retrovirals. Had history of cryptococcosis and histoplasmosis. Cecal mass biopsied in 2021 showing fungal colitis per chart review  - CD4 count, HIV RNA quant, histoplasma Ab, quant TB gold, CMV quant, toxoplasma Ab, syphilis Ab, giardia/cryptosporidium antigen, hepatitis panel pending  - K 2.7 on admit. Given potassium bicarb 40 mEq in ED  - Daily multivitamin  - CBC, CMP, mag, phos, folate, thiamine, B12, PT/INR, PTT pending  - Replete electrolytes as needed  - Plan to consult IR for lung mass biopsy  - Plan for ID consult in AM  - Nutrition consulted  - PT/OT consulted    TAYLOR on CKD  - Cr 1.8 (1.3 baseline)  - LR @ 75 mL/hr    UTI  Trichomonas on UA  - Received ceftriaxone 1 g in ED  - Continue ceftriaxone 1g daily and metronidazole 500 mg BID x 7 days  - Urine culture pending        CODE STATUS: Full  Access: peripheral IV  Antibiotics: ceftriaxone and metronidazole  Diet: Regular  DVT Prophylaxis: heparin  GI Prophylaxis: None  Fluids: LR @ 75 mL/hr      Disposition: 64 year old male, with history of HIV/AIDS not currently on anti-retrovirals, who is admitted for failure to thrive and TAYLOR. CT chest abdomen pelvis with concern  for malignancy. Discharge pending course.       Heather Voss MD  [facility]

## 2024-11-20 NOTE — MEDICAL/APP STUDENT
[facility]   Inpatient Infectious Diseases Consultation    Physician requesting consultation: Marianne Partida MD  Service requesting consultation: Internal Medicine  Reason for consultation: HIV management    Historian: Patient    Isolation Status: Airborne     HPI:     Patient is a 63 yo male with past medical history of HIV (CD4 147 2021) who presented to [facility] ED on 11/19/2024 due to fatigue, weight loss, decreased oral intake, and cough for the past 2 months. He states that most foods make him nauseous or vomit. He was diagnosed with HIV in 2015 and at that time was found to have cryptococcus, histoplasma, and fungal colitis of the cecum. He was previously treated with Ziagen, Descovy, Prezista, and Norovir but stopped taking his medications and following up in clinic in 2021 after his wife passed away. He reports he has several dogs, cows, and pigs at home and sometimes hunts rabbits and skins them himself. He denies fever, chills, abdominal pain, diarrhea, pain with urination.     In the ED, patient was afebrile and normotensive with normal WBC count, hypokalemia, TAYLOR, decreased absolute lymphocytes, UA with 500 LE and many trichomonas. CT showed right lower lobe irregular consolidation with areas of necrosis secondary to infection with concern for underlying malignancy, left lower lobe nodules concerning for metastasis, and small bowel and colonic fluid and slight mural thickening and enhancement suggestive of enterocolitis. He received ceftriaxone 1g.     Today he remains with normal vital signs and reports feeling at his baseline. HIV viral load found to be 114,696 on 11/20. Syphilis antibody, hepatitis studies, giardia antigen, cryptosporidium antigen, and cryptococcal antigen unremarkable.        Antibiotic / Antiviral / Antifungal history:  11/19 - ceftriaxone 1g qd, Flagyl 500 BID day 1/7 11/20 - ceftriaxone 1g qd, Flagyl 500 BID day 2/7        Past Medical History/Past  Surgical History/Social History     History reviewed. No pertinent past medical history.    Past Surgical History:   Procedure Laterality Date    COLONOSCOPY  05/08/2015        FAMILY HISTORY:  family history is not on file.     SOCIAL HISTORY:   Social History     Socioeconomic History    Marital status: Single   Tobacco Use    Smoking status: Never    Smokeless tobacco: Never   Substance and Sexual Activity    Alcohol use: Not Currently     Social Drivers of Health     Financial Resource Strain: Low Risk  (11/20/2024)    Overall Financial Resource Strain (CARDIA)     Difficulty of Paying Living Expenses: Not hard at all   Food Insecurity: No Food Insecurity (11/20/2024)    Hunger Vital Sign     Worried About Running Out of Food in the Last Year: Never true     Ran Out of Food in the Last Year: Never true   Transportation Needs: No Transportation Needs (11/20/2024)    TRANSPORTATION NEEDS     Transportation : No   Stress: No Stress Concern Present (11/20/2024)    [employer] - Occupational Stress Questionnaire     Feeling of Stress : Not at all   Housing Stability: Low Risk  (11/20/2024)    Housing Stability Vital Sign     Unable to Pay for Housing in the Last Year: No     Homeless in the Last Year: No        ALLERGIES: Review of patient's allergies indicates:  No Known Allergies      Review of Systems     Review of Systems   Constitutional:  Positive for weight loss. Negative for chills and fever.   Respiratory:  Positive for cough and sputum production. Negative for hemoptysis and shortness of breath.    Cardiovascular:  Negative for chest pain and leg swelling.   Gastrointestinal:  Positive for diarrhea, nausea and vomiting.   Genitourinary:  Negative for dysuria, frequency and urgency.         MEDICATIONS:   Scheduled Meds:   cefTRIAXone (Rocephin) IV (PEDS and ADULTS)  1 g Intravenous Q24H    electrolytes-dextrose  400 mL Oral Q4H    heparin (porcine)  5,000 Units Subcutaneous Q12H     metroNIDAZOLE  500 mg Oral Q12H    multivitamin  1 tablet Oral Daily    potassium bicarbonate  25 mEq Oral Once    potassium bicarbonate  25 mEq Oral Once     Continuous Infusions:   lactated ringers   Intravenous Continuous 75 mL/hr at 11/20/24 0039 New Bag at 11/20/24 0039     PRN Meds:.  Current Facility-Administered Medications:     dextrose 10%, 12.5 g, Intravenous, PRN    dextrose 10%, 25 g, Intravenous, PRN    glucagon (human recombinant), 1 mg, Intramuscular, PRN    glucose, 16 g, Oral, PRN    glucose, 24 g, Oral, PRN    naloxone, 0.02 mg, Intravenous, PRN    sodium chloride 0.9%, 10 mL, Intravenous, Q12H PRN    Physical exam:     Temp:  [98.4 °F (36.9 °C)-99.1 °F (37.3 °C)] 99.1 °F (37.3 °C)  Pulse:  [62-93] 77  Resp:  [16-20] 20  SpO2:  [93 %-100 %] 98 %  BP: (119-133)/(67-80) 133/75     GENERAL: A&Ox3, NAD, cachectic   HEENT: atraumatic, normocephalic, anicteric, moist oral mucosa without lesions, exudate, or erythema  LUNGS: breathing unlabored, lungs CTA bilateral  HEART: RRR; no murmur, rub, or gallop  ABDOMEN: abdomen soft, nondistended, BS noted x 4 quadrants, no tympany, no rebound, guarding, or organomegaly  : no CVA tenderness  EXTREMITIES: no edema, clubbing, or cyanosis   SKIN: several dark pigmented lesions to bilateral lower extremities below the knees   NEURO: speech fluent and intact, facial symmetry preserved, no tremor  PSYCH: cooperative, normal mood and affect      LABS:     I have personally reviewed patient's labs.  Pertinent results noted below.    CBC  Recent Labs     11/19/24  1635 11/20/24 0314   WBC 8.34 8.44   HGB 7.8* 7.7*   HCT 24.2* 24.7*   * 496*       Differential  Recent Labs   Lab 11/20/24 0314   WBC 8.44   HGB 7.7*   HCT 24.7*   *        Basic Metabolic Panel  Recent Labs     11/19/24  1635 11/20/24  0314 11/20/24  1114    136 138   K 2.7* 2.8* 3.2*    103 105   CO2 23 23 26   BUN 35.3* 31.8* 31.5*   CREATININE 1.80* 1.49* 1.37*         Hepatic Panel  Lab Results   Component Value Date    ALT 16 11/20/2024    AST 25 11/20/2024    ALKPHOS 140 11/20/2024    BILITOT 0.3 11/20/2024        Urinalysis:  Results for orders placed or performed during the hospital encounter of 11/19/24   Urinalysis, Reflex to Urine Culture    Specimen: Urine   Result Value Ref Range    Color, UA Yellow Yellow, Light-Yellow, Dark Yellow, Taylor, Straw    Appearance, UA Extra Turbid (A) Clear    Specific Gravity, UA 1.011 1.005 - 1.030    pH, UA 6.5 5.0 - 8.5    Protein, UA 2+ (A) Negative    Glucose, UA Normal Negative, Normal    Ketones, UA Negative Negative    Blood, UA 1+ (A) Negative    Bilirubin, UA Negative Negative    Urobilinogen, UA Normal 0.2, 1.0, Normal    Nitrites, UA Negative Negative    Leukocyte Esterase,  (A) Negative    RBC, UA 6-10 (A) None Seen, 0-2, 3-5, 0-5 /HPF    WBC, UA >100 (A) None Seen, 0-2, 3-5, 0-5 /HPF    Bacteria, UA Many (A) None Seen, Rare, Occasional /HPF    Squamous Epithelial Cells, UA Many /HPF    Trichomonas, UA Many (A) None Seen /HPF    Hyaline Casts, UA None Seen /lpf       Estimated Creatinine Clearance: 45.9 mL/min (A) (based on SCr of 1.37 mg/dL (H)).     ESR:  No results found for this or any previous visit.   CRP:  No results found for this or any previous visit.        MICRO AND PATHOLOGY:   Colonization:  No results found for this or any previous visit.      IMAGING:     I have personally reviewed patient's imaging. Pertinent results noted below.  CT Chest Abdomen Pelvis With IV Contrast (XPD) NO Oral Contrast   Final Result      1.   Right lower lung lobe large irregular defined consolidation with areas of necrosis may be secondary to infection though with concern for associated malignancy.      2.  Left lower lung lobe nodules could be metastatic.      3.  Small bowel and colonic excessive fluid and slight mural thickening and some enhancement suggest enterocolitis.  Please correlate clinically.          Electronically signed by: Ezio Deleon   Date:    11/19/2024   Time:    19:30            IMPRESSION   Patient is a 63 yo male with past medical history of HIV (CD4 147 2021) who presented on 11/19 due to fatigue, weight loss, decreased oral intake, and cough for the past 2 months. He has a past history of histoplasma, cryptococcus, and fungal colitis of the cecum. He has not taken his medications for HIV since 2021. He was found on CT to have RLL irregular consolidation with necrosis likely secondary to infection but with concern for malignancy, LLL nodules concerning for metastasis, and fluid and enhancement of the colon suggestive of enterocolitis.    HIV  Right lower lobe consolidation  Lung mass concerning for malignancy   Urinary tract infection   Acute kidney injury  Hypokalemia   Thrombocytosis       RECOMMENDATIONS:    - Recommend increasing ceftriaxone to 2g daily   - Please obtain the following studies:              - Histoplasma/blastomyces urine and serum antigens              - Fungitell              - Aspergillus serum atg              - PJP PCR, sputum              - Fungal blood culture              - Mycobacterial blood culture              - AFB smear and cultures x3, with 1 specimen obtained from early AM                          - ID has placed the patient on airborne isolation out of an abundance of caution              - Fungal sputum cx              - GC/chlamydia urine NAAT              - GC/chlamydia throat and rectal swabs              - GI PCR              - C diff antigen, stool              - HLA  serum               - G6PD              - Brucella serum antibodies              - Francisella serum antibodies              - Q fever serologies              - Bartonella serologies and PCR, serum              - Agree with RLL mass bx. Please send for the following:                          - Aerobic culture                          - Anaeorbic culture                          - AFB  culture                          - Fungal culture                          - MTB PCR (if fluid)                          - Cell count and diff (if fluid)                          - ADA (if fluid)                          - Glucose (if fluid)                          - pH (if fluid)                          - Pathology with added staining for AFB and fungal organisms  - Will follow up CD4 count, CMV PCR, Toxoplasma antibody, Quantiferon gold, Pneumocystis jiroveii PCR, coccidioides antibody, stool ova/parasite  - Will follow up blood, respiratory, urine, and stool cultures     Amelia Davis, MS4  [employer]    *Portions of this note dictated using EMR integrated voice recognition software, and may be subject to voice recognition errors not corrected at proofreading. Please contact writer for clarification if needed. *

## 2024-11-20 NOTE — PT/OT/SLP EVAL
"Occupational Therapy   Evaluation and Discharge Note    Name: Anne Terry  MRN: [id number]  ED due to: fatigue, weight loss, prolonged cough   Admitting Diagnosis: Failure to thrive in adult, immunocompromised-hx HIV/AIDS, lung mass concerning for malignancy, hypokalemia, thrombocytosis, TAYLOR on CKD, UTI  Recent Surgery: * No surgery found *      Recommendations:     Discharge Recommendations: No Therapy Indicated  Discharge Equipment Recommendations: none  Barriers to discharge:  None    Assessment:     Anne Terry is a 64 y.o. male with a medical diagnosis of Failure to thrive in adult. Pt presents with decreased activity tolerance although able to complete ADL tasks and functional mobility without assistance. Pt denies any deficits or decline from PLOF. At this time, patient is functioning at their prior level of function and does not require further acute OT services.     Plan:     During this hospitalization, patient does not require further acute OT services.  Please re-consult if situation changes.    Plan of Care Reviewed with: patient    Subjective     Chief Complaint: "I don't need therapy. I'm just here because I have a cough"  Patient/Family Comments/goals: to return home    Occupational Profile:  Living Environment: pt was residing at home alone in 2 story home although reports will dc to 1st floor apartment with son, tub/shower combo  Previous level of function: pt reports I with ADL tasks and functional mobility without AD, pt reports son provides pt transportation, pt reports able to ambulate in grocery store for needs, reports goes outdoors "to tend to my horses"  Roles and Routines:   Equipment Used at home: none  Assistance upon Discharge: reports son able to assist    Pain/Comfort:  Pain Rating 1: 0/10    Patients cultural, spiritual, Yazidi conflicts given the current situation:      Objective:     Communicated with: nurse prior to session.  Patient found HOB elevated with peripheral IV " "upon OT entry to room.    General Precautions: Standard,    Orthopedic Precautions:    Braces:    Respiratory Status: Room air   BP seated 123/70   Occupational Performance:    Bed Mobility:    Patient completed Rolling/Turning to Left with  independence  Patient completed Scooting/Bridging with independence  Patient completed Supine to Sit with independence  Patient completed Sit to Supine with independence    Functional Mobility/Transfers:  Patient completed Sit <> Stand Transfer with independence  with  no assistive device   Patient completed Toilet Transfer Step Transfer technique with modified independence with  no AD  Functional Mobility: pt ambulated x 130ft without AD I; OT with IV in tow    Activities of Daily Living:  Upper Body Dressing: modified independence doffed/donned hospital gown  Lower Body Dressing: modified independence doffed/donned B socks, doffed/donned pull on brief mod I  Toileting: modified independence .    Cognitive/Visual Perceptual:  Cognitive/Psychosocial Skills:     -       Oriented to: Person, Place, and oriented to month, mod cueing for year initially stated "2004, then 2014", reports in hospital due to cough   -       Follows Commands/attention:Follows multistep  commands  -       Mood/Affect/Coping skills/emotional control: Cooperative and Pleasant    Physical Exam:  Balance: -       standing balance mod I, sitting balance I   Upper Extremity Range of Motion:  -       Right Upper Extremity: WNL  -       Left Upper Extremity: WNL  Upper Extremity Strength:    -       Right Upper Extremity: WNL  -       Left Upper Extremity: WNL    AMPAC 6 Click ADL:  AMPAC Total Score:      Treatment & Education:  Education on OT POC, increasing OOB activity, use of call button when attached to IV, ambulate in hallway 3x/day    Patient left HOB elevated with all lines intact, call button in reach, and nurse notified    GOALS:   Multidisciplinary Problems       Occupational Therapy Goals  "      Not on file                    History:     History reviewed. No pertinent past medical history.      Past Surgical History:   Procedure Laterality Date    COLONOSCOPY  05/08/2015       Time Tracking:     OT Date of Treatment: 11/20/24  OT Start Time: 0956  OT Stop Time: 1020  OT Total Time (min): 24 min    Billable Minutes:Evaluation low comp    11/20/2024

## 2024-11-20 NOTE — PT/OT/SLP EVAL
Physical Therapy Evaluation & Discharge Note    Patient Name:  Anne Terry   MRN:  [id number]    Recommendations     Therapy Intensity Recommendations at Discharge: No Therapy Indicated  Discharge Equipment Recommendations: none   Equipment to be obtained for discharge: none.  Barriers to discharge: medical diagnosis    Assessment     Anne Terry is a 64 y.o. male admitted with a medical diagnosis of Failure to thrive in adult.  1. Failure to thrive in adult    2. Metastatic malignant neoplasm, unspecified site    3. Urinary tract infection with hematuria, site unspecified    4. Chest pain       Patient Active Problem List   Diagnosis    Failure to thrive in adult      He presents with the following impairments/functional limitations:   (none regarding PT).    Patient was seen by therapy for evaluation only.    Recent Surgery: * No surgery found *      Plan     Patient discharged from acute PT Services on 11/20/24.    Based on current functional levels observed, patient does not require further acute PT services.    Re-consult PT Services if patient's status changes and therapy services warranted.    Subjective     Communicated with patient's nurse Anny prior to session.    Patient agreeable to participate in evaluation.     Chief Complaint: none  Patient/Family Comments/goals: to go home  Pain/Comfort:  Pain Rating 1: 0/10  Pain Rating Post-Intervention 1: 0/10    Patients cultural, spiritual, Christian conflicts given the current situation: no    Social History  Living Environment: Patient  lives with his brother  in a single level home, with no steps, with tub-shower combo.  Functional Level: Prior to admission patient ambulated without assistive device, was independent in ADL's, and was independent in IADL's.  Equipment Used at Home: none  Equipment owned (not currently used): none.  Assistance Upon Discharge: family.    Objective     Patient found supine in bed with peripheral IV  upon PT entry to  room.    General Precautions: Standard, airborne   Orthopedic Precautions:N/A   Braces: N/A  Respiratory Status: room air    Exams:  Orientation: Patient is oriented to person, place, time, situation  Commands: Patient follows commands consistently  BILAT UE ROM/strength - defer to OT - see OT note for details  RLE ROM: WNL  RLE Strength: WFL  LLE ROM: WFL  LLE Strength: WFL    Functional Mobility:    Bed Mobility:  Supine to Sit: independence    Transfers:  Sit to Stand: modified independence with no assistive device    Gait:  Patient ambulated 20 ft with no assistive device and modified independence.  Patient demonstrates      steady gait       no loss of balance       no mis-steps.    Other Mobility:  N/A    Balance:  Sit  Static: NORMAL: No deviations seen in posture held statically  Dynamic: NORMAL: No deviations seen in posture held dynamically  Stand  Static: NORMAL: No deviations seen in posture held statically  Dynamic: NORMAL: No deviations seen in posture held dynamically    Additional Treatment Session  N/A    Patient left left side lying in bed with all lines intact, call button in reach, tray table at bedside, and patient's nurse notified.    Education     Patient educated on the importance of early mobility to prevent functional decline during hospital stay.  Patient educated on and assisted with functional mobility as noted above.  Patient educated on PT Plan of Care and role of PT in acute care.  Patient was instructed to utilize staff assistance for mobility/transfers.  White board updated regarding patient's safest level of mobility with staff assistance    Goals - No STG's or LTG's established secondary to patient was seen for evaluation and discharge     Multidisciplinary Problems       Physical Therapy Goals       Not on file                    History   History reviewed. No pertinent past medical history.  Past Surgical History:   Procedure Laterality Date    COLONOSCOPY  05/08/2015     Time  Tracking     PT Received On: 11/20/24  PT Start Time: 1148     PT Stop Time: 1215  PT Total Time (min): 27 min     Billable Minutes: Evaluation 20 11/20/2024

## 2024-11-21 PROBLEM — A59.9: Status: ACTIVE | Noted: 2024-11-21

## 2024-11-21 PROBLEM — B20 SYMPTOMATIC HIV INFECTION: Status: ACTIVE | Noted: 2024-11-21

## 2024-11-21 PROBLEM — N39.0 UTI (URINARY TRACT INFECTION): Status: ACTIVE | Noted: 2024-11-21

## 2024-11-21 LAB
ABO + RH BLD: NORMAL
ABORH RETYPE: NORMAL
ALBUMIN SERPL-MCNC: 1.7 G/DL (ref 3.4–4.8)
ALBUMIN/GLOB SERPL: 0.3 RATIO (ref 1.1–2)
ALP SERPL-CCNC: 153 UNIT/L (ref 40–150)
ALT SERPL-CCNC: 18 UNIT/L (ref 0–55)
ANION GAP SERPL CALC-SCNC: 7 MEQ/L
AST SERPL-CCNC: 35 UNIT/L (ref 5–34)
BACTERIA UR CULT: ABNORMAL
BASOPHILS # BLD AUTO: 0.01 X10(3)/MCL
BASOPHILS NFR BLD AUTO: 0.2 %
BILIRUB SERPL-MCNC: 0.2 MG/DL
BLD PROD TYP BPU: NORMAL
BLOOD UNIT EXPIRATION DATE: NORMAL
BLOOD UNIT TYPE CODE: 7300
BUN SERPL-MCNC: 25.3 MG/DL (ref 8.4–25.7)
C IMMITIS AB SER QL IA: REACTIVE
C TRACH DNA SPEC QL NAA+PROBE: NOT DETECTED
C TRACH RRNA UR QL NAA+PROBE: NOT DETECTED
CALCIUM SERPL-MCNC: 8.3 MG/DL (ref 8.8–10)
CD3 CELLS # BLD: 405 CELLS/MCL (ref 550–2202)
CD3 CELLS NFR BLD: 86 % (ref 58–86)
CD3+CD4+ CELLS # BLD: 37 CELLS/MCL (ref 365–1437)
CD3+CD4+ CELLS NFR BLD: 8 % (ref 32–64)
CD3+CD4+ CELLS/CD3+CD8+ CLL BLD: 0.1 %
CD3+CD8+ CELLS # BLD: 366 CELLS/MCL (ref 80–846)
CD3+CD8+ CELLS NFR BLD: 78 % (ref 8–40)
CD45 CELLS # BLD: 0.47 THOU/MCL (ref 0.82–2.84)
CHLORIDE SERPL-SCNC: 107 MMOL/L (ref 98–107)
CO2 SERPL-SCNC: 25 MMOL/L (ref 23–31)
CREAT SERPL-MCNC: 1.1 MG/DL (ref 0.72–1.25)
CREAT/UREA NIT SERPL: 23
CROSSMATCH INTERPRETATION: NORMAL
CRP SERPL-MCNC: 84.1 MG/L
DISPENSE STATUS: NORMAL
EOSINOPHIL # BLD AUTO: 0.11 X10(3)/MCL (ref 0–0.9)
EOSINOPHIL NFR BLD AUTO: 1.8 %
ERYTHROCYTE [DISTWIDTH] IN BLOOD BY AUTOMATED COUNT: 14.7 % (ref 11.5–17)
ERYTHROCYTE [SEDIMENTATION RATE] IN BLOOD: 49 MM/HR (ref 0–15)
FERRITIN SERPL-MCNC: 1890.19 NG/ML (ref 21.81–274.66)
GFR SERPLBLD CREATININE-BSD FMLA CKD-EPI: >60 ML/MIN/1.73/M2
GLOBULIN SER-MCNC: 5.5 GM/DL (ref 2.4–3.5)
GLUCOSE SERPL-MCNC: 103 MG/DL (ref 82–115)
GROUP & RH: NORMAL
HCT VFR BLD AUTO: 21 % (ref 42–52)
HGB BLD-MCNC: 6.8 G/DL (ref 14–18)
IMM GRANULOCYTES # BLD AUTO: 0.06 X10(3)/MCL (ref 0–0.04)
IMM GRANULOCYTES NFR BLD AUTO: 1 %
INDIRECT COOMBS: NORMAL
IRON SATN MFR SERPL: 15 % (ref 20–50)
IRON SERPL-MCNC: 15 UG/DL (ref 65–175)
LYMPHOCYTES # BLD AUTO: 0.56 X10(3)/MCL (ref 0.6–4.6)
LYMPHOCYTES NFR BLD AUTO: 9 %
MAGNESIUM SERPL-MCNC: 2 MG/DL (ref 1.6–2.6)
MCH RBC QN AUTO: 27.5 PG (ref 27–31)
MCHC RBC AUTO-ENTMCNC: 32.4 G/DL (ref 33–36)
MCV RBC AUTO: 85 FL (ref 80–94)
MONOCYTES # BLD AUTO: 0.39 X10(3)/MCL (ref 0.1–1.3)
MONOCYTES NFR BLD AUTO: 6.3 %
N GONORRHOEA DNA SPEC QL NAA+PROBE: NOT DETECTED
N GONORRHOEA DNA UR QL NAA+PROBE: NOT DETECTED
NEUTROPHILS # BLD AUTO: 5.1 X10(3)/MCL (ref 2.1–9.2)
NEUTROPHILS NFR BLD AUTO: 81.7 %
NRBC BLD AUTO-RTO: 0 %
PHOSPHATE SERPL-MCNC: 1.2 MG/DL (ref 2.3–4.7)
PLATELET # BLD AUTO: 481 X10(3)/MCL (ref 130–400)
PMV BLD AUTO: 9.3 FL (ref 7.4–10.4)
POTASSIUM SERPL-SCNC: 3.5 MMOL/L (ref 3.5–5.1)
PROT SERPL-MCNC: 7.2 GM/DL (ref 5.8–7.6)
RBC # BLD AUTO: 2.47 X10(6)/MCL (ref 4.7–6.1)
SODIUM SERPL-SCNC: 139 MMOL/L (ref 136–145)
SOURCE (OHS): NORMAL
SPECIMEN OUTDATE: NORMAL
T GONDII IGG SER QL IA: NEGATIVE
T GONDII IGG SER-ACNC: <3 IU/ML
T GONDII IGM SERPL QL IA: NEGATIVE
T VAGINALIS RRNA UR QL NAA+PROBE: NOT DETECTED
TIBC SERPL-MCNC: 103 UG/DL (ref 250–450)
TIBC SERPL-MCNC: 88 UG/DL (ref 60–240)
TRANSFERRIN SERPL-MCNC: 82 MG/DL (ref 163–344)
UNIT NUMBER: NORMAL
WBC # BLD AUTO: 6.23 X10(3)/MCL (ref 4.5–11.5)

## 2024-11-21 PROCEDURE — 82955 ASSAY OF G6PD ENZYME: CPT

## 2024-11-21 PROCEDURE — P9016 RBC LEUKOCYTES REDUCED: HCPCS

## 2024-11-21 PROCEDURE — 81381 HLA I TYPING 1 ALLELE HR: CPT

## 2024-11-21 PROCEDURE — 83550 IRON BINDING TEST: CPT

## 2024-11-21 PROCEDURE — 86140 C-REACTIVE PROTEIN: CPT | Performed by: NURSE PRACTITIONER

## 2024-11-21 PROCEDURE — 25000003 PHARM REV CODE 250

## 2024-11-21 PROCEDURE — 87206 SMEAR FLUORESCENT/ACID STAI: CPT

## 2024-11-21 PROCEDURE — 85025 COMPLETE CBC W/AUTO DIFF WBC: CPT

## 2024-11-21 PROCEDURE — 30233N0 TRANSFUSION OF AUTOLOGOUS RED BLOOD CELLS INTO PERIPHERAL VEIN, PERCUTANEOUS APPROACH: ICD-10-PCS | Performed by: INTERNAL MEDICINE

## 2024-11-21 PROCEDURE — 86611 BARTONELLA ANTIBODY: CPT

## 2024-11-21 PROCEDURE — 36430 TRANSFUSION BLD/BLD COMPNT: CPT

## 2024-11-21 PROCEDURE — 36415 COLL VENOUS BLD VENIPUNCTURE: CPT

## 2024-11-21 PROCEDURE — 86668 FRANCISELLA TULARENSIS: CPT

## 2024-11-21 PROCEDURE — 87449 NOS EACH ORGANISM AG IA: CPT

## 2024-11-21 PROCEDURE — 87206 SMEAR FLUORESCENT/ACID STAI: CPT | Performed by: INTERNAL MEDICINE

## 2024-11-21 PROCEDURE — 82728 ASSAY OF FERRITIN: CPT

## 2024-11-21 PROCEDURE — 86901 BLOOD TYPING SEROLOGIC RH(D): CPT

## 2024-11-21 PROCEDURE — 25000003 PHARM REV CODE 250: Performed by: EMERGENCY MEDICINE

## 2024-11-21 PROCEDURE — 87206 SMEAR FLUORESCENT/ACID STAI: CPT | Performed by: STUDENT IN AN ORGANIZED HEALTH CARE EDUCATION/TRAINING PROGRAM

## 2024-11-21 PROCEDURE — 86622 BRUCELLA ANTIBODY: CPT

## 2024-11-21 PROCEDURE — 87661 TRICHOMONAS VAGINALIS AMPLIF: CPT | Performed by: INTERNAL MEDICINE

## 2024-11-21 PROCEDURE — 63600175 PHARM REV CODE 636 W HCPCS

## 2024-11-21 PROCEDURE — 63700000 PHARM REV CODE 250 ALT 637 W/O HCPCS

## 2024-11-21 PROCEDURE — 85652 RBC SED RATE AUTOMATED: CPT | Performed by: NURSE PRACTITIONER

## 2024-11-21 PROCEDURE — 63600175 PHARM REV CODE 636 W HCPCS: Performed by: INTERNAL MEDICINE

## 2024-11-21 PROCEDURE — 27000207 HC ISOLATION

## 2024-11-21 PROCEDURE — 87305 ASPERGILLUS AG IA: CPT

## 2024-11-21 PROCEDURE — 87491 CHLMYD TRACH DNA AMP PROBE: CPT

## 2024-11-21 PROCEDURE — 87801 DETECT AGNT MULT DNA AMPLI: CPT

## 2024-11-21 PROCEDURE — 87103 BLOOD FUNGUS CULTURE: CPT

## 2024-11-21 PROCEDURE — 21400001 HC TELEMETRY ROOM

## 2024-11-21 PROCEDURE — 80053 COMPREHEN METABOLIC PANEL: CPT

## 2024-11-21 PROCEDURE — 84100 ASSAY OF PHOSPHORUS: CPT

## 2024-11-21 PROCEDURE — 86638 Q FEVER ANTIBODY: CPT

## 2024-11-21 PROCEDURE — 86923 COMPATIBILITY TEST ELECTRIC: CPT

## 2024-11-21 PROCEDURE — 87491 CHLMYD TRACH DNA AMP PROBE: CPT | Mod: 59 | Performed by: NURSE PRACTITIONER

## 2024-11-21 PROCEDURE — 83735 ASSAY OF MAGNESIUM: CPT

## 2024-11-21 RX ORDER — LANOLIN ALCOHOL/MO/W.PET/CERES
1 CREAM (GRAM) TOPICAL DAILY
Status: DISCONTINUED | OUTPATIENT
Start: 2024-11-21 | End: 2024-11-26 | Stop reason: HOSPADM

## 2024-11-21 RX ORDER — SODIUM,POTASSIUM PHOSPHATES 280-250MG
2 POWDER IN PACKET (EA) ORAL ONCE
Status: DISCONTINUED | OUTPATIENT
Start: 2024-11-21 | End: 2024-11-21

## 2024-11-21 RX ORDER — CEFTRIAXONE 2 G/1
2 INJECTION, POWDER, FOR SOLUTION INTRAMUSCULAR; INTRAVENOUS
Status: DISCONTINUED | OUTPATIENT
Start: 2024-11-22 | End: 2024-11-24

## 2024-11-21 RX ORDER — HYDROCODONE BITARTRATE AND ACETAMINOPHEN 500; 5 MG/1; MG/1
TABLET ORAL
Status: DISCONTINUED | OUTPATIENT
Start: 2024-11-21 | End: 2024-11-26 | Stop reason: HOSPADM

## 2024-11-21 RX ORDER — FLUCONAZOLE 100 MG/1
200 TABLET ORAL NIGHTLY
Status: DISCONTINUED | OUTPATIENT
Start: 2024-11-21 | End: 2024-11-26 | Stop reason: HOSPADM

## 2024-11-21 RX ADMIN — Medication 400 ML: at 11:11

## 2024-11-21 RX ADMIN — HEPARIN SODIUM 5000 UNITS: 5000 INJECTION, SOLUTION INTRAVENOUS; SUBCUTANEOUS at 08:11

## 2024-11-21 RX ADMIN — FLUCONAZOLE 200 MG: 100 TABLET ORAL at 08:11

## 2024-11-21 RX ADMIN — Medication 400 ML: at 05:11

## 2024-11-21 RX ADMIN — Medication 400 ML: at 10:11

## 2024-11-21 RX ADMIN — Medication 400 ML: at 02:11

## 2024-11-21 RX ADMIN — Medication 400 ML: at 01:11

## 2024-11-21 RX ADMIN — THERA TABS 1 TABLET: TAB at 08:11

## 2024-11-21 RX ADMIN — METRONIDAZOLE 500 MG: 500 TABLET ORAL at 08:11

## 2024-11-21 RX ADMIN — CEFTRIAXONE SODIUM 1 G: 1 INJECTION, POWDER, FOR SOLUTION INTRAMUSCULAR; INTRAVENOUS at 08:11

## 2024-11-21 RX ADMIN — POTASSIUM PHOSPHATE, MONOBASIC AND POTASSIUM PHOSPHATE, DIBASIC 30 MMOL: 224; 236 INJECTION, SOLUTION, CONCENTRATE INTRAVENOUS at 07:11

## 2024-11-21 RX ADMIN — FERROUS SULFATE TAB 325 MG (65 MG ELEMENTAL FE) 1 EACH: 325 (65 FE) TAB at 08:11

## 2024-11-21 RX ADMIN — SODIUM CHLORIDE, POTASSIUM CHLORIDE, SODIUM LACTATE AND CALCIUM CHLORIDE: 600; 310; 30; 20 INJECTION, SOLUTION INTRAVENOUS at 10:11

## 2024-11-21 NOTE — PROGRESS NOTES
"[facility] INFECTIOUS DISEASES CONSULT PROGRESS NOTE      Reason for consultation     HIV management and RLL consolidation      Interval history     Patient reports he feels well but with continued cough productive of yellow sputum. Denies fever, chills, shortness of breath. Urine cultures 11/19 show E.coli. Today he reports that he thinks the dark lesions on his lower legs may have started around the same time as his other symptoms of nausea, vomiting, diarrhea, fatigue about 2 months ago.    Antibiotic history:  Rocephin Flagyl 11/19 - present     Medications     Scheduled Meds:   cefTRIAXone (Rocephin) IV (PEDS and ADULTS)  1 g Intravenous Q24H    electrolytes-dextrose  400 mL Oral Q4H    ferrous sulfate  1 tablet Oral Daily    heparin (porcine)  5,000 Units Subcutaneous Q12H    metroNIDAZOLE  500 mg Oral Q12H    multivitamin  1 tablet Oral Daily    potassium phosphate IVPB  30 mmol Intravenous Once     Continuous Infusions:   lactated ringers   Intravenous Continuous 75 mL/hr at 11/21/24 0618 Rate Verify at 11/21/24 0618     PRN Meds:.  Current Facility-Administered Medications:     dextrose 10%, 12.5 g, Intravenous, PRN    dextrose 10%, 25 g, Intravenous, PRN    glucagon (human recombinant), 1 mg, Intramuscular, PRN    glucose, 16 g, Oral, PRN    glucose, 24 g, Oral, PRN    naloxone, 0.02 mg, Intravenous, PRN    sodium chloride 0.9%, 10 mL, Intravenous, Q12H PRN    Allergies: Review of patient's allergies indicates:  No Known Allergies    Physical exam:     /71   Pulse 76   Temp 99.1 °F (37.3 °C) (Oral)   Resp 18   Ht 6' 1" (1.854 m)   Wt 58 kg (127 lb 13.9 oz)   SpO2 (!) 94%   BMI 16.87 kg/m²   Wt Readings from Last 1 Encounters:   11/20/24 58 kg (127 lb 13.9 oz)     Body mass index is 16.87 kg/m².    GENERAL: A&Ox3, NAD, cachectic   HEENT: NC/AT, anicteric, white patchy lesions to tongue/oral mucosa  LUNGS: mildly decreased breath sounds , no labored breathing  HEART: RRR; no murmur, rub, or " "gallop  ABDOMEN: +BS, no tympany, soft, NT/ND, no rebound, guarding, or organomegaly  EXTREMITIES: no edema, clubbing, or cyanosis   SKIN: several dark pigmented lesions to bilateral lower extremities below the knees   NEURO: speech fluent and intact, facial symmetry preserved, no tremor, CN II-XII grossly intact   PSYCH: cooperative, normal mood and affect  LINES: none       LABS:     I have personally reviewed patient's labs.  Pertinent results noted below.    CBC  Recent Labs     11/19/24  1635 11/20/24  0314 11/21/24  0431   WBC 8.34 8.44 6.23   HGB 7.8* 7.7* 6.8*   HCT 24.2* 24.7* 21.0*   * 496* 481*       Differential  No results for input(s): "NEUTOPHILPCT", "LYMPHOPCT", "MONOPCT", "EOSPCT", "BASOPCT", "NEUTROABS", "LYMPHSABS", "MONOSABS", "EOSABS" in the last 72 hours.    Basic Metabolic Panel  Recent Labs     11/19/24  1635 11/20/24  0314 11/20/24  1114 11/21/24  0431    136 138 139   K 2.7* 2.8* 3.2* 3.5    103 105 107   CO2 23 23 26 25   BUN 35.3* 31.8* 31.5* 25.3   CREATININE 1.80* 1.49* 1.37* 1.10        Hepatic Panel  Recent Labs     11/19/24  1635 11/20/24  0314 11/21/24  0431   ALT 15 16 18   AST 21 25 35*   ALKPHOS 152* 140 153*   BILITOT 0.4 0.3 0.2   ALBUMIN 2.0* 2.0* 1.7*       Urinalysis:  Recent Labs     11/19/24  1824   PROTEINUA 2+*   NITRITE Negative   LEUKOCYTESUR 500*   COLORU Yellow   RBCUA 6-10*   BACTERIA Many*         Imaging     CT Chest Abdomen Pelvis With IV Contrast (XPD) NO Oral Contrast   Final Result      1.   Right lower lung lobe large irregular defined consolidation with areas of necrosis may be secondary to infection though with concern for associated malignancy.      2.  Left lower lung lobe nodules could be metastatic.      3.  Small bowel and colonic excessive fluid and slight mural thickening and some enhancement suggest enterocolitis.  Please correlate clinically.         Electronically signed by: Ezio Deleon   Date:    11/19/2024   Time:    19:30    " "        Micro/Path   Colonization:  No components found for: "MRSMSSSCRFLD", "MRSSCRPCRSWB", "SCRCULT"          IMPRESSION     Patient is a 63 yo male with past medical history of HIV ([id number] 2021) who presented on 11/19 due to fatigue, weight loss, decreased oral intake, and cough for the past 2 months. He has a past history of histoplasma, cryptococcus, and fungal colitis of the cecum. He has not taken his medications for HIV since 2021. He was found on CT to have RLL irregular consolidation with necrosis likely secondary to infection but with concern for malignancy, LLL nodules concerning for metastasis, and fluid and enhancement of the colon suggestive of enterocolitis.     HIV  Right lower lobe consolidation Lung mass concerning for malignancy vs OI pathgoen  Cystitis    - urine culture 11/19 E.coli  Trichomonas infection   - UA 11/19 many Trichomonas  Oral thrush   Acute kidney injury  Hypokalemia   Thrombocytosis   Exposure to lagomorphs     CrCl: Estimated Creatinine Clearance: 57.1 mL/min (based on SCr of 1.1 mg/dL).    RECOMMENDATIONS:    - Start fluconazole 200 mg daily for 10 days    - oral thrush  - Continue ceftriaxone 2g daily for a total of 5 days                  - Urine culture 11/19 E.coli   - Continue Flagyl 500 mg q12 hours for a total of 7 days    - UA 11/19 many Trichomonas  - Given exposures to rabbits, cows, and pigs will add on zoonotic testing below, although these diagnoses are lower on the differential  - Will follow up the following studies:              - Histoplasma/blastomyces urine and serum antigens              - Fungitell              - Aspergillus serum atg              - PJP PCR, sputum              - Fungal blood culture              - Mycobacterial blood culture              - AFB smear and cultures x3, with 1 specimen obtained from early AM                          - ID has placed the patient on airborne isolation out of an abundance of caution              - Fungal sputum " cx              - GC/chlamydia urine NAAT              - GC/chlamydia throat and rectal swabs              - GI PCR              - C diff antigen, stool              - HLA  serum               - G6PD              - Brucella serum antibodies              - Francisella serum antibodies              - Q fever serologies              - Bartonella serologies and PCR, serum              - CMV PCR              - Toxoplasma antibody              - Quantiferon gold              - Pneumocystis jiroveii PCR               - coccidioides antibody               - CD4 count              - blood and stool cultures, stool ova/parasite   - Will avoid starting CAP treatment for now; suspect RLL mass is likely either malignancy or OI pathogen  - Agree with RLL mass bx. Please send for the following:                          - Aerobic culture                          - Anaeorbic culture                          - AFB culture                          - Fungal culture                          - MTB PCR (if fluid)                          - Cell count and diff (if fluid)                          - ADA (if fluid)                          - Glucose (if fluid)                          - pH (if fluid)                          - Pathology with added staining for AFB and fungal organisms  - ID has added on 2 AFB smears to be sent to [facility] for read. 3 smears have also been collected but sent to [place]. Once patient has 3 negative smears, isolation can be removed  - Follow up sputum AFB cultures x3  - Holding ART regimen for now to avoid potential IRIS    ID will follow along with you.     Amelia Davis, MS4  [employer]      Sg Sanford MD  Infectious Diseases Faculty

## 2024-11-21 NOTE — PLAN OF CARE
Problem: Adult Inpatient Plan of Care  Goal: Plan of Care Review  Outcome: Progressing     Problem: Adult Inpatient Plan of Care  Goal: Patient-Specific Goal (Individualized)  Outcome: Progressing     Problem: Adult Inpatient Plan of Care  Goal: Absence of Hospital-Acquired Illness or Injury  Outcome: Progressing     Problem: Adult Inpatient Plan of Care  Goal: Optimal Comfort and Wellbeing  Outcome: Progressing     Problem: Adult Inpatient Plan of Care  Goal: Readiness for Transition of Care  Outcome: Progressing     Problem: Wound  Goal: Optimal Coping  Outcome: Progressing     Problem: Wound  Goal: Optimal Functional Ability  Outcome: Progressing     Problem: Wound  Goal: Absence of Infection Signs and Symptoms  Outcome: Progressing     Problem: Wound  Goal: Improved Oral Intake  Outcome: Progressing

## 2024-11-21 NOTE — PLAN OF CARE
Problem: Adult Inpatient Plan of Care  Goal: Plan of Care Review  Outcome: Progressing  Goal: Patient-Specific Goal (Individualized)  Outcome: Progressing  Goal: Absence of Hospital-Acquired Illness or Injury  Outcome: Progressing  Goal: Optimal Comfort and Wellbeing  Outcome: Progressing  Goal: Readiness for Transition of Care  Outcome: Progressing     Problem: Wound  Goal: Optimal Coping  Outcome: Progressing  Goal: Optimal Functional Ability  Outcome: Progressing  Goal: Absence of Infection Signs and Symptoms  Outcome: Progressing  Goal: Improved Oral Intake  Outcome: Progressing  Goal: Optimal Pain Control and Function  Outcome: Progressing  Goal: Skin Health and Integrity  Outcome: Progressing  Goal: Optimal Wound Healing  Outcome: Progressing     Problem: Infection  Goal: Absence of Infection Signs and Symptoms  Outcome: Progressing     Problem: Skin Injury Risk Increased  Goal: Skin Health and Integrity  Outcome: Progressing

## 2024-11-21 NOTE — PROGRESS NOTES
[facility] Internal Medicine  PROGRESS NOTE    Patient's Name: Anne Terry  : 1960  MRN: [id number]    Admission Date: 2024  Length of Stay: 2  Attending Physician: Marianne Partida MD  Resident: Agustin  Intern: Nasir    SUBJECTIVE     Chief Complaint   Patient presents with    Fatigue     IN / AASI W REPORT OF DECLINING HEALTH FOR MONTHS.  PT ONLY CO LT FOOT PAIN.  FAMILY REPORTS WEAKNESS W WT LOSS.  PT POOR HISTORIAN.      History of Present Illness:  Anne Terry is a 64 y.o. male, with PMH significant for HIV/AIDS, who presented to [facility] ED on 2024  with primary complaints of fatigue and weight loss. Patient states that he began feeling more fatigued, had decreased appetite, and has experienced significant weight loss since October. He also has had a dry cough but denies hemoptysis. He has only been able to tolerate one meal a day, consisting of mainly cookies and soda. All other foods cause diarrhea. Denies chest pain, abdominal pain, nausea, vomiting, constipation, dysuria, urinary frequency, hematuria, hematochezia, or melena. He was feeling more weak and fatigued today so he called 911.      Per chart review, patient has a history of HIV/AIDS diagnosed on . He used to follow with [facility] ID but was lost to follow up since . He was on Ziagen 300 mg daily, Descovy daily, Prezista 800 mg daily, and Norovir 100 mg daily. He has a history of medication non-adherence. He also has a history of cryptococcosis and histoplasmosis in . 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        ED Course - Upon arrival, pt afebrile 98.8 F, /80, HR 93, RR 16, SpO2 100% on RA. Workup notable for H/H 7.8/24.2, Plt 481, decreased absolute lymphocytes, potassium 2.7, BUN 35.3, creatinine 1.8 (baseline 1.3), elevated total protein 8.3, albumin 2.0, . UA 2+ protein, 1+ blood, 500 leukocyte esterase, >100 WBC, many bacteria,  many trichomonas. CT chest abdomen pelvis performed showing RLL lung irregularly defined consolidation with areas of necrosis and LLL nodules, concerning for malignancy. Also showed small bowel and colonic mural thickening and enterocolitis. He was given ceftriaxone 1 g and potassium bicarb 40 mEq in the ED. Internal medicine was consulted for further management.     Hospital Course/Significant Events:  11/19/2024: Admitted to [facility].    Interval History:  NAEON. Overnight vitals WNL. Labs significant for Hgb 6.8, hypophosphatemia 1.2, and improving creatinine 1.1. Type and screen and 1 unit PRBC transfusion ordered. Otherwise, patient states that he's feeling better today. States that he feels hungry and the food is not enough. Otherwise, he states that his nausea and vomiting has improved. Denies any chest pain, SOB, abdominal pain, changes in BM, and changes in urination.    Spoke with IR today. They were able to do it today; however, patient has eaten this morning, they are unable to complete it today. They report that they can do it outpatient, but the turnaround is 2 months. They only do inpatient biopsies on Thursdays.    Review of Systems:  A 12-pt ROS was conducted and was negative unless stated above.    OBJECTIVE     VITAL SIGNS: 24 HR MIN & MAX Most Recent Vitals   Temp  Min: 99 °F (37.2 °C)  Max: 99.9 °F (37.7 °C)  99.1 °F (37.3 °C)   BP  Min: 122/74  Max: 133/75  129/71    Pulse  Min: 66  Max: 100  76   Resp  Min: 18  Max: 20  18    SpO2  Min: 94 %  Max: 100 %  (!) 94 %    Body mass index is 16.87 kg/m².    Intake/Output:  I/O last 3 completed shifts:  In: 4256 [P.O.:2040; I.V.:2216]  Out: 1220 [Urine:720; Stool:500]  Constitutional:       General: He is awake.      Appearance: He is cachectic. He is ill-appearing (chronically). He is not toxic-appearing.   Eyes:      General: No scleral icterus.  Cardiovascular:      Rate and Rhythm: Normal rate and regular rhythm.      Pulses: Normal pulses.       Heart sounds: Normal heart sounds. No murmur heard.  Pulmonary:      Effort: Pulmonary effort is normal. No respiratory distress.      Breath sounds: Normal breath sounds.   Musculoskeletal:      Right lower leg: No edema.      Left lower leg: No edema.   Skin:     General: Skin is warm and dry.      Findings: Lesion (hyperpigmented lesions to the pretibial region) present.   Neurological:      General: No focal deficit present.      Mental Status: He is alert.   Psychiatric:         Behavior: Behavior normal. Behavior is cooperative.     Current Medications:  Scheduled:   [START ON 11/22/2024] cefTRIAXone (Rocephin) IV (PEDS and ADULTS)  2 g Intravenous Q24H    electrolytes-dextrose  400 mL Oral Q4H    ferrous sulfate  1 tablet Oral Daily    heparin (porcine)  5,000 Units Subcutaneous Q12H    metroNIDAZOLE  500 mg Oral Q12H    multivitamin  1 tablet Oral Daily    potassium phosphate IVPB  30 mmol Intravenous Once      Infusions:   lactated ringers   Intravenous Continuous 75 mL/hr at 11/21/24 0618 Rate Verify at 11/21/24 0618     PRNs:    Current Facility-Administered Medications:     0.9%  NaCl infusion (for blood administration), , Intravenous, Q24H PRN    dextrose 10%, 12.5 g, Intravenous, PRN    dextrose 10%, 25 g, Intravenous, PRN    glucagon (human recombinant), 1 mg, Intramuscular, PRN    glucose, 16 g, Oral, PRN    glucose, 24 g, Oral, PRN    naloxone, 0.02 mg, Intravenous, PRN    sodium chloride 0.9%, 10 mL, Intravenous, Q12H PRN  Labs:  CBC:  Recent Labs   Lab 11/19/24  1635 11/20/24  0314 11/21/24  0431   WBC 8.34 8.44 6.23   HGB 7.8* 7.7* 6.8*   HCT 24.2* 24.7* 21.0*   * 496* 481*   MCV 86.4 85.8 85.0   RDW 14.9 14.8 14.7     BMP/CMP:  Recent Labs   Lab 11/20/24  0314 11/20/24  1114 11/21/24  0431    138 139   K 2.8* 3.2* 3.5    105 107   CO2 23 26 25   BUN 31.8* 31.5* 25.3   CREATININE 1.49* 1.37* 1.10   GLUCOSE 108 98 103   EGFRNORACEVR 53 58 >60     RFTs/LFTs:  Recent Labs   Lab  "11/19/24  1635 11/19/24  2332 11/20/24  0314 11/20/24  1114 11/21/24  0431   CALCIUM 8.6*  --  8.6* 8.3* 8.3*   LABPROT 8.3* 15.1* 8.6*  --  7.2   ALBUMIN 2.0*  --  2.0*  --  1.7*   AST 21  --  25  --  35*   ALT 15  --  16  --  18   ALKPHOS 152*  --  140  --  153*   BILITOT 0.4  --  0.3  --  0.2     Recent Labs   Lab 11/19/24  1635 11/20/24  0314 11/21/24  0431   MG 1.90 2.20 2.00   PHOS  --  2.5 1.2*     Cardiac Panel:  No results for input(s): "TROPONINI", "CKTOTAL", "CKMB", "BNP" in the last 168 hours.  Interval Imaging:  CT Chest Abdomen Pelvis With IV Contrast (XPD) NO Oral Contrast  Narrative: EXAMINATION:  CT CHEST ABDOMEN PELVIS WITH IV CONTRAST (XPD)    CLINICAL HISTORY:  unexplained weight loss, hematuria;    TECHNIQUE:  Multidetector axial images were obtained from the thoracic inlet through the greater trochanters following the administration of IV contrast.    Dose length product of 258 mGycm. Automated exposure control was utilized to minimize radiation dose.    COMPARISON:  CT abdomen pelvis December 22, 2015.    CHEST FINDINGS:    Right lower lung lobe posteriorly along the subpleural location is remarkable for large irregular defined heterogeneous consolidation with necrotic component which shows maximum anterior-posterior diameter of 5.2 cm and transverse diameter 9.0 cm.  The maximum sagittal length of this consolidation 16.0 cm on image 53 series 8..  Within left lower lung lobe are up to 5-6 mm nodules like on image 71 and image 75 series 4.  Also seen are left lower lung lobe are very subtle minimal nodularities.  There is no pulmonary edema.  No significant fluid within the pleural and pericardial spaces.  There are no enlarged chest lymph nodes.  Thoracic aorta is without aneurysmal dilatation.  There are no signs of dissection.  Cardiac chamber size is within normal limits.    ABDOMINAL FINDINGS:    Liver, pancreas and spleen are unremarkable.  Gallbladder is decompressed without dilatation " of the ducts.  Adrenals are not enlarged in size.  Kidneys function and drain well.  No intra-abdominal or pelvic hypermetabolic enlarged lymph nodes or implants.    There is stable presumably benign retrocaval ovoid hypodensity without significant enhancement and is seen on image 115 series 2.    Stomach is mostly decompressed.  There are multiple loops of small bowel with mild fluid-filled dilatation and mural thickening without definite transition.  There is also excessive fluid within the colon and perhaps slight mural thickening and enhancement.  Findings suggest enterocolitis.  Please correlate clinically.  Overall assessment of colon is limited due to presence of fecal content and lack of enteric contrast.  Please correlate patient is up-to-date on colonoscopy.  No suggestion of bowel obstruction.  No inflammatory standings identified.  No free fluid.    PELVIC FINDINGS:    Urinary bladder is partially decompressed without thickened wall.  No intrapelvic free fluid.    No acute or aggressive skeletal abnormality  Impression: 1.   Right lower lung lobe large irregular defined consolidation with areas of necrosis may be secondary to infection though with concern for associated malignancy.    2.  Left lower lung lobe nodules could be metastatic.    3.  Small bowel and colonic excessive fluid and slight mural thickening and some enhancement suggest enterocolitis.  Please correlate clinically.    Electronically signed by: Ezio Deleon  Date:    11/19/2024  Time:    19:30     Microbiology:  Microbiology Results (last 7 days)       Procedure Component Value Units Date/Time    Chlamydia/GC, PCR [[id number]] Collected: 11/21/24 0928    Order Status: Completed Specimen: Genital from Rectal Updated: 11/21/24 1125     Chlamydia trachomatis PCR Not Detected     N. gonorrhea PCR Not Detected     Source Rectal    Narrative:      The Xpert CT/NG test, performed on the [employer] system is a qualitative in vitro real-time  polymerase chain reaction (PCR) test for the automated detected and differentiation for genomic DNA from Chlamydia trachomatis (CT) and/or Neisseria gonorrhoeae (NG).    Mycobacteria and Nocardia Culture [[id number]]     Order Status: Sent Specimen: Respiratory from Sputum, Expectorated     Mycobacteria and Nocardia Culture [[id number]] Collected: 11/21/24 0739    Order Status: Sent Specimen: Respiratory from Sputum, Expectorated Updated: 11/21/24 0746    Mycobacteria and Nocardia Culture [[id number]] Collected: 11/21/24 0738    Order Status: Sent Specimen: Respiratory from Sputum, Expectorated Updated: 11/21/24 0745    Respiratory Culture [[id number]] Collected: 11/20/24 1245    Order Status: Completed Specimen: Sputum, Expectorated Updated: 11/21/24 0724     Respiratory Culture Normal respiratory adriel     GRAM STAIN Quality 3+      Moderate Yeast      Moderate Gram Positive Rods      Few Gram Negative Rods      Rare Gram positive cocci    Urine culture [[id number]]  (Abnormal)  (Susceptibility) Collected: 11/19/24 1824    Order Status: Completed Specimen: Urine Updated: 11/21/24 0631     Urine Culture >/= 100,000 colonies/ml Escherichia coli    Fungal Culture Blood [[id number]] Collected: 11/21/24 0431    Order Status: Sent Specimen: Venous Blood Line Updated: 11/21/24 0445    Chlamydia/GC, PCR [[id number]] Collected: 11/21/24 0341    Order Status: Canceled Specimen: Urine, Clean Catch Updated: 11/21/24 0400    Fungal Culture [[id number]] Collected: 11/20/24 2251    Order Status: Sent Specimen: Sputum, Expectorated Updated: 11/20/24 2346    C.trach/N.gonor AMP RNA [[id number]]     Order Status: Canceled     Clostridium Diff Toxin, A & B, EIA [[id number]]     Order Status: Sent Specimen: Stool     Cryptococcal antigen, blood [[id number]]  (Normal) Collected: 11/20/24 0314    Order Status: Completed Specimen: Blood, Venous Updated: 11/20/24 0721     Cryptococcal Antigen, Serum Negative    Stool Culture [[id number]]  Collected: 11/20/24 0547    Order Status: Resulted Specimen: Stool Updated: 11/20/24 0713    Clostridium Diff Toxin, A & B, EIA [[id number]]     Order Status: Canceled Specimen: Stool     Blood Culture [[id number]] Collected: 11/20/24 0314    Order Status: Sent Specimen: Blood from Hand, Left Updated: 11/20/24 0430    Blood Culture [[id number]] Collected: 11/20/24 0314    Order Status: Sent Specimen: Blood from Arm, Left Updated: 11/20/24 0429          Antibiotics:  Antibiotics (From admission, onward)      Start     Stop Route Frequency Ordered    11/22/24 0900  cefTRIAXone injection 2 g         11/27/24 0859 IV Every 24 hours (non-standard times) 11/21/24 1133    11/20/24 0900  metroNIDAZOLE tablet 500 mg         11/27/24 0859 Oral Every 12 hours 11/19/24 2324             ASSESSMENT AND PLAN   Uncontrolled HIV/AIDs  Thrombocytosis  Lung mass concerning for malignancy  - Has history of HIV infection/AIDS; used to follow with [facility], last appointment in 2021. Not currently on anti-retrovirals. 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  - HIV ,696, CD4 pending. HIV genotyping pending.  - Syphilis, Giardia/Cryptosporidium antigen, hepatitis panel, RVP negative  - ID consulted. Recommended opportunistic infection workup as well as RLL biopsy. Orders placed for OI workup.  - GI panel, stool culture, and stool ova/parasite pending.  - IR consulted. IR states they would be able to do it today but unable to do so due to patient not being NPO. Stated they can do outpatient biopsy but waitlist is 2 months.  - Will put referral to IR for outpatient biopsy.   - PT/OT following.  - Nutrition consulted.     Failure to thrive, suspected 2/2 to above  Iron deficiency anemia  Normocytic anemia  - Patient reported decreased PO intake and unintended weight loss.  - Vitamin B12 and folate WNL. Iron panel notable for low iron, transferrin, TIBC, and iron  saturation, suggestive of TIFFANIE.  - Vitamin B1 and ferritin pending  - Continue multivitamin. Ordered additional iron.  - Hgb 6.8 this morning. Ordered 1 unit pRBC.  - Will need GI evaluation outpatient with scope.  - Nutrition consulted.     Hypokalemia  Hypophosphatemia  - Upon admission, potassium noted to be 2.7. Repeat potassium 2.8 after potassium bicarbonate 40 mEq.  - K 3.5 and P 1.2 today.   - Ordered potassium phosphate.  - CTM electrolytes and replete PRN.     TAYLOR on CKD (improved).  - Cr 1.8 (1.3 baseline). Most recent 1.1.0  - Continue LR @ 75 mL/hr.  - CTM.     UTI  Trichomonas on UA  - Received ceftriaxone 1 g in ED  - Increased ceftriaxone to 2g daily and continue metronidazole 500 mg BID x 7 days  - Urine culture positive for 100k+ Gram negative rods.      DVT Prophylaxis: Heparin  GI Prophylaxis: None  Abx: Rocephin and metronidazole  Access: PIV  Fluids: LR @ 75 mL/hr  Diet: Diet Adult Regular    Code Status: Full Code    Disposition: 64 y.o. male with PMHx uncontrolled HIV admitted for failure to thrive. ID consulted and recommended OI workup, which were placed. Disposition pending clinical improvement and tolerating PO intake.       Case was discussed and seen with Dr. Partida. Please appreciate attending's attestation to follow.    Jarod Best MD  PGY-1 Resident  [facility] Internal Medicine Team 1  11/21/2024

## 2024-11-21 NOTE — CONSULTS
[facility] and [facility]   Inpatient Infectious Diseases Consultation    Physician requesting consultation: Marianne Partida MD  Service requesting consultation: Internal Medicine  Reason for consultation: HIV management    Historian: Patient    Isolation Status: Airborne     HPI:     Patient is a 63 yo male with past medical history of HIV (CD4 147 2021) who presented to [facility] ED on 11/19/2024 due to fatigue, weight loss, decreased oral intake, and cough for the past 2 months. He states that most foods make him nauseous or vomit. He was diagnosed with HIV in 2015 and at that time was found to have cryptococcus, histoplasma, and fungal colitis of the cecum. He was previously treated with Ziagen, Descovy, Prezista, and Norovir but stopped taking his medications and following up in clinic in 2021 after his wife passed away. He reports he has several dogs, cows, and pigs at home and sometimes hunts rabbits and skins them himself. He denies fever, chills, abdominal pain, diarrhea, pain with urination.     In the ED, patient was afebrile and normotensive with normal WBC count, hypokalemia, TAYLOR, decreased absolute lymphocytes, UA with 500 LE and many trichomonas. CT showed right lower lobe irregular consolidation with areas of necrosis secondary to infection with concern for underlying malignancy, left lower lobe nodules concerning for metastasis, and small bowel and colonic fluid and slight mural thickening and enhancement suggestive of enterocolitis. He received ceftriaxone 1g.     Today he remains with normal vital signs and reports feeling at his baseline. HIV viral load found to be 114,696 on 11/20. Syphilis antibody, hepatitis studies, giardia antigen, cryptosporidium antigen, and cryptococcal antigen unremarkable.      Patient reports that he often hunts and skins rabbits on his property, although cannot recall the last time he went hunting. He also has cows and pigs on his property but denies  exposure to other farm animals. Denies bird exposure. States he does not do much yardwork. 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 Denies any sexual activity since his wife passed away in 2019 and is unsure how he acquired trichomonas. Denies h/o MSM or IVDU. Patient states he is willing to restart his HIV treatment and come to clinic for follow up.      Antibiotic / Antiviral / Antifungal history:  11/19 - ceftriaxone 1g qd, Flagyl 500 BID day 1/7 11/20 - ceftriaxone 1g qd, Flagyl 500 BID day 2/7        Past Medical History/Past Surgical History/Social History     History reviewed. No pertinent past medical history.    Past Surgical History:   Procedure Laterality Date    COLONOSCOPY  05/08/2015        FAMILY HISTORY:  family history is not on file.     SOCIAL HISTORY:   Social History     Socioeconomic History    Marital status: Single   Tobacco Use    Smoking status: Never    Smokeless tobacco: Never   Substance and Sexual Activity    Alcohol use: Not Currently     Social Drivers of Health     Financial Resource Strain: Low Risk  (11/20/2024)    Overall Financial Resource Strain (CARDIA)     Difficulty of Paying Living Expenses: Not hard at all   Food Insecurity: No Food Insecurity (11/20/2024)    Hunger Vital Sign     Worried About Running Out of Food in the Last Year: Never true     Ran Out of Food in the Last Year: Never true   Transportation Needs: No Transportation Needs (11/20/2024)    TRANSPORTATION NEEDS     Transportation : No   Stress: No Stress Concern Present (11/20/2024)    [place] [employer] - Occupational Stress Questionnaire     Feeling of Stress : Not at all   Housing Stability: Low Risk  (11/20/2024)    Housing Stability Vital Sign     Unable to Pay for Housing in the Last Year: No     Homeless in the Last Year: No        ALLERGIES: Review of patient's allergies indicates:  No Known Allergies      Review of Systems     Review of Systems    Constitutional:  Positive for weight loss. Negative for chills and fever.   Respiratory:  Positive for cough and sputum production. Negative for hemoptysis and shortness of breath.    Cardiovascular:  Negative for chest pain and leg swelling.   Gastrointestinal:  Positive for diarrhea, nausea and vomiting.   Genitourinary:  Negative for dysuria, frequency and urgency.         MEDICATIONS:   Scheduled Meds:   cefTRIAXone (Rocephin) IV (PEDS and ADULTS)  1 g Intravenous Q24H    electrolytes-dextrose  400 mL Oral Q4H    ferrous sulfate  1 tablet Oral Daily    heparin (porcine)  5,000 Units Subcutaneous Q12H    metroNIDAZOLE  500 mg Oral Q12H    multivitamin  1 tablet Oral Daily    potassium phosphate IVPB  30 mmol Intravenous Once     Continuous Infusions:   lactated ringers   Intravenous Continuous 75 mL/hr at 11/21/24 0618 Rate Verify at 11/21/24 0618     PRN Meds:.  Current Facility-Administered Medications:     dextrose 10%, 12.5 g, Intravenous, PRN    dextrose 10%, 25 g, Intravenous, PRN    glucagon (human recombinant), 1 mg, Intramuscular, PRN    glucose, 16 g, Oral, PRN    glucose, 24 g, Oral, PRN    naloxone, 0.02 mg, Intravenous, PRN    sodium chloride 0.9%, 10 mL, Intravenous, Q12H PRN    Physical exam:     Temp:  [99 °F (37.2 °C)-99.9 °F (37.7 °C)] 99.1 °F (37.3 °C)  Pulse:  [] 76  Resp:  [18-20] 18  SpO2:  [94 %-100 %] 94 %  BP: (122-133)/(68-78) 129/71     GENERAL: A&Ox3, NAD, cachectic   HEENT: atraumatic, normocephalic, anicteric, moist oral mucosa without lesions, exudate, or erythema  LUNGS: breathing unlabored, lungs CTA bilateral  HEART: RRR; no murmur, rub, or gallop  ABDOMEN: abdomen soft, nondistended, BS noted x 4 quadrants, no tympany, no rebound, guarding, or organomegaly  : no CVA tenderness  EXTREMITIES: no edema, clubbing, or cyanosis   SKIN: several dark pigmented lesions to bilateral lower extremities below the knees   NEURO: speech fluent and intact, facial symmetry preserved,  no tremor  PSYCH: cooperative, normal mood and affect      LABS:     I have personally reviewed patient's labs.  Pertinent results noted below.    CBC  Recent Labs     11/19/24  1635 11/20/24  0314 11/21/24  0431   WBC 8.34 8.44 6.23   HGB 7.8* 7.7* 6.8*   HCT 24.2* 24.7* 21.0*   * 496* 481*       Differential  Recent Labs   Lab 11/21/24  0431   WBC 6.23   HGB 6.8*   HCT 21.0*   *        Basic Metabolic Panel  Recent Labs     11/19/24  1635 11/20/24  0314 11/20/24  1114 11/21/24  0431    136 138 139   K 2.7* 2.8* 3.2* 3.5    103 105 107   CO2 23 23 26 25   BUN 35.3* 31.8* 31.5* 25.3   CREATININE 1.80* 1.49* 1.37* 1.10        Hepatic Panel  Lab Results   Component Value Date    ALT 18 11/21/2024    AST 35 (H) 11/21/2024    ALKPHOS 153 (H) 11/21/2024    BILITOT 0.2 11/21/2024        Urinalysis:  Results for orders placed or performed during the hospital encounter of 11/19/24   Urinalysis, Reflex to Urine Culture    Specimen: Urine   Result Value Ref Range    Color, UA Yellow Yellow, Light-Yellow, Dark Yellow, Taylor, Straw    Appearance, UA Extra Turbid (A) Clear    Specific Gravity, UA 1.011 1.005 - 1.030    pH, UA 6.5 5.0 - 8.5    Protein, UA 2+ (A) Negative    Glucose, UA Normal Negative, Normal    Ketones, UA Negative Negative    Blood, UA 1+ (A) Negative    Bilirubin, UA Negative Negative    Urobilinogen, UA Normal 0.2, 1.0, Normal    Nitrites, UA Negative Negative    Leukocyte Esterase,  (A) Negative    RBC, UA 6-10 (A) None Seen, 0-2, 3-5, 0-5 /HPF    WBC, UA >100 (A) None Seen, 0-2, 3-5, 0-5 /HPF    Bacteria, UA Many (A) None Seen, Rare, Occasional /HPF    Squamous Epithelial Cells, UA Many /HPF    Trichomonas, UA Many (A) None Seen /HPF    Hyaline Casts, UA None Seen /lpf       Estimated Creatinine Clearance: 57.1 mL/min (based on SCr of 1.1 mg/dL).     ESR:  No results found for this or any previous visit.   CRP:  No results found for this or any previous visit.        MICRO  AND PATHOLOGY:   Colonization:  No results found for this or any previous visit.      IMAGING:     I have personally reviewed patient's imaging. Pertinent results noted below.  CT Chest Abdomen Pelvis With IV Contrast (XPD) NO Oral Contrast   Final Result      1.   Right lower lung lobe large irregular defined consolidation with areas of necrosis may be secondary to infection though with concern for associated malignancy.      2.  Left lower lung lobe nodules could be metastatic.      3.  Small bowel and colonic excessive fluid and slight mural thickening and some enhancement suggest enterocolitis.  Please correlate clinically.         Electronically signed by: Ezio Deleon   Date:    11/19/2024   Time:    19:30            IMPRESSION   Patient is a 63 yo male with past medical history of HIV (CD4 147 2021) who presented on 11/19 due to fatigue, weight loss, decreased oral intake, and cough for the past 2 months. He has a past history of histoplasma, cryptococcus, and fungal colitis of the cecum. He has not taken his medications for HIV since 2021. He was found on CT to have RLL irregular consolidation with necrosis likely secondary to infection but with concern for malignancy, LLL nodules concerning for metastasis, and fluid and enhancement of the colon suggestive of enterocolitis.    HIV  Right lower lobe consolidation  Lung mass concerning for malignancy   Urinary tract infection   Acute kidney injury  Hypokalemia   Thrombocytosis   Exposure to lagomorphs     RECOMMENDATIONS:    - Recommend increasing ceftriaxone to 2g daily for empiric treatment of UTI   - Follow up urine culture  - Given exposures to rabbits, cows, and pigs will add on zoonotic testing below, although these diagnoses are lower on the differential  - Will avoid starting CAP treatment for now; suspect RLL mass is likely either malignancy or OI pathogen  - Please obtain the following studies:              - Histoplasma/blastomyces urine and serum  antigens              - Fungitell              - Aspergillus serum atg              - PJP PCR, sputum              - Fungal blood culture              - Mycobacterial blood culture              - AFB smear and cultures x3, with 1 specimen obtained from early AM                          - ID has placed the patient on airborne isolation out of an abundance of caution              - Fungal sputum cx              - GC/chlamydia urine NAAT              - GC/chlamydia throat and rectal swabs              - GI PCR              - C diff antigen, stool              - HLA  serum               - G6PD              - Brucella serum antibodies              - Francisella serum antibodies              - Q fever serologies              - Bartonella serologies and PCR, serum              - Agree with RLL mass bx. Please send for the following:                          - Aerobic culture                          - Anaeorbic culture                          - AFB culture                          - Fungal culture                          - MTB PCR (if fluid)                          - Cell count and diff (if fluid)                          - ADA (if fluid)                          - Glucose (if fluid)                          - pH (if fluid)                          - Pathology with added staining for AFB and fungal organisms  - Will follow up CD4 count, CMV PCR, Toxoplasma antibody, Quantiferon gold, Pneumocystis jiroveii PCR, coccidioides antibody, stool ova/parasite  - Will follow up blood, respiratory, urine, and stool cultures     Amelia Davis, MS4  [employer]    Attending attestation    I hereby acknowledge that I am relying upon documentation authored by a medical student working under my supervision and further I hereby attest that I have verified the student documentation or findings by personally performing the physical exam and medical decision making activities of the Evaluation and Management  service to be billed. Note has been edited to reflect updated assessment and plan.    Sg Sanford MD  Infectious Diseases Faculty   of Medicine

## 2024-11-22 ENCOUNTER — TELEPHONE (OUTPATIENT)
Dept: INFECTIOUS DISEASES | Facility: HOSPITAL | Age: 64
End: 2024-11-22
Payer: MEDICARE

## 2024-11-22 LAB
1,3 BETA GLUCAN SER QL: POSITIVE
1,3 BETA GLUCAN SER-MCNC: 301 PG/ML
ALBUMIN SERPL-MCNC: 1.6 G/DL (ref 3.4–4.8)
ALBUMIN/GLOB SERPL: 0.3 RATIO (ref 1.1–2)
ALP SERPL-CCNC: 153 UNIT/L (ref 40–150)
ALT SERPL-CCNC: 20 UNIT/L (ref 0–55)
ANION GAP SERPL CALC-SCNC: 3 MEQ/L
AST SERPL-CCNC: 32 UNIT/L (ref 5–34)
BACTERIA SPEC CULT: ABNORMAL
BACTERIA STL CULT: NORMAL
BASOPHILS # BLD AUTO: 0.01 X10(3)/MCL
BASOPHILS NFR BLD AUTO: 0.2 %
BILIRUB SERPL-MCNC: 0.2 MG/DL
BUN SERPL-MCNC: 19.4 MG/DL (ref 8.4–25.7)
CALCIUM SERPL-MCNC: 7.9 MG/DL (ref 8.8–10)
CHLORIDE SERPL-SCNC: 108 MMOL/L (ref 98–107)
CMV DNA SERPL NAA+PROBE-ACNC: 37 IU/ML
CO2 SERPL-SCNC: 28 MMOL/L (ref 23–31)
CREAT SERPL-MCNC: 1.1 MG/DL (ref 0.72–1.25)
CREAT/UREA NIT SERPL: 18
EOSINOPHIL # BLD AUTO: 0.16 X10(3)/MCL (ref 0–0.9)
EOSINOPHIL NFR BLD AUTO: 3.1 %
ERYTHROCYTE [DISTWIDTH] IN BLOOD BY AUTOMATED COUNT: 15 % (ref 11.5–17)
G6PD RBC-CCNT: 11.4 U/G HB (ref 8–11.9)
GALACTOMANNAN AG SERPL IA-ACNC: <0.5 INDEX
GAMMA INTERFERON BACKGROUND BLD IA-ACNC: 0.03 IU/ML
GFR SERPLBLD CREATININE-BSD FMLA CKD-EPI: >60 ML/MIN/1.73/M2
GLOBULIN SER-MCNC: 4.7 GM/DL (ref 2.4–3.5)
GLUCOSE SERPL-MCNC: 87 MG/DL (ref 82–115)
GRAM STN SPEC: ABNORMAL
HCT VFR BLD AUTO: 22 % (ref 42–52)
HGB BLD-MCNC: 7.1 G/DL (ref 14–18)
HOLD SPECIMEN: NORMAL
IMM GRANULOCYTES # BLD AUTO: 0.04 X10(3)/MCL (ref 0–0.04)
IMM GRANULOCYTES NFR BLD AUTO: 0.8 %
LYMPHOCYTES # BLD AUTO: 0.77 X10(3)/MCL (ref 0.6–4.6)
LYMPHOCYTES NFR BLD AUTO: 15.1 %
M AVIUM PARATB TISS QL ZN STN: NORMAL
M AVIUM PARATB TISS QL ZN STN: NORMAL
M TB IFN-G BLD-IMP: ABNORMAL
M TB IFN-G CD4+ BCKGRND COR BLD-ACNC: 0 IU/ML
M TB IFN-G CD4+CD8+ BCKGRND COR BLD-ACNC: 0 IU/ML
MAGNESIUM SERPL-MCNC: 1.9 MG/DL (ref 1.6–2.6)
MCH RBC QN AUTO: 27.8 PG (ref 27–31)
MCHC RBC AUTO-ENTMCNC: 32.3 G/DL (ref 33–36)
MCV RBC AUTO: 86.3 FL (ref 80–94)
MITOGEN IGNF BCKGRD COR BLD-ACNC: 0.21 IU/ML
MONOCYTES # BLD AUTO: 0.48 X10(3)/MCL (ref 0.1–1.3)
MONOCYTES NFR BLD AUTO: 9.4 %
NEUTROPHILS # BLD AUTO: 3.63 X10(3)/MCL (ref 2.1–9.2)
NEUTROPHILS NFR BLD AUTO: 71.4 %
NRBC BLD AUTO-RTO: 0 %
PHOSPHATE SERPL-MCNC: 1.9 MG/DL (ref 2.3–4.7)
PLATELET # BLD AUTO: 389 X10(3)/MCL (ref 130–400)
PMV BLD AUTO: 9.4 FL (ref 7.4–10.4)
POTASSIUM SERPL-SCNC: 3.5 MMOL/L (ref 3.5–5.1)
PROT SERPL-MCNC: 6.3 GM/DL (ref 5.8–7.6)
RBC # BLD AUTO: 2.55 X10(6)/MCL (ref 4.7–6.1)
SODIUM SERPL-SCNC: 139 MMOL/L (ref 136–145)
VIT B1 BLD-SCNC: 64 NMOL/L (ref 70–180)
WBC # BLD AUTO: 5.09 X10(3)/MCL (ref 4.5–11.5)

## 2024-11-22 PROCEDURE — 21400001 HC TELEMETRY ROOM

## 2024-11-22 PROCEDURE — 36415 COLL VENOUS BLD VENIPUNCTURE: CPT | Performed by: INTERNAL MEDICINE

## 2024-11-22 PROCEDURE — 84100 ASSAY OF PHOSPHORUS: CPT

## 2024-11-22 PROCEDURE — 25000003 PHARM REV CODE 250

## 2024-11-22 PROCEDURE — 63600175 PHARM REV CODE 636 W HCPCS

## 2024-11-22 PROCEDURE — 85025 COMPLETE CBC W/AUTO DIFF WBC: CPT

## 2024-11-22 PROCEDURE — 36415 COLL VENOUS BLD VENIPUNCTURE: CPT

## 2024-11-22 PROCEDURE — 25000003 PHARM REV CODE 250: Performed by: EMERGENCY MEDICINE

## 2024-11-22 PROCEDURE — 83735 ASSAY OF MAGNESIUM: CPT

## 2024-11-22 PROCEDURE — 80053 COMPREHEN METABOLIC PANEL: CPT

## 2024-11-22 PROCEDURE — 27000207 HC ISOLATION

## 2024-11-22 PROCEDURE — 63700000 PHARM REV CODE 250 ALT 637 W/O HCPCS

## 2024-11-22 RX ORDER — ATOVAQUONE 750 MG/5ML
1500 SUSPENSION ORAL DAILY
Status: DISCONTINUED | OUTPATIENT
Start: 2024-11-23 | End: 2024-11-23

## 2024-11-22 RX ORDER — POTASSIUM CHLORIDE 20 MEQ/1
40 TABLET, EXTENDED RELEASE ORAL ONCE
Status: COMPLETED | OUTPATIENT
Start: 2024-11-22 | End: 2024-11-22

## 2024-11-22 RX ORDER — SODIUM,POTASSIUM PHOSPHATES 280-250MG
2 POWDER IN PACKET (EA) ORAL
Status: COMPLETED | OUTPATIENT
Start: 2024-11-22 | End: 2024-11-22

## 2024-11-22 RX ADMIN — METRONIDAZOLE 500 MG: 500 TABLET ORAL at 08:11

## 2024-11-22 RX ADMIN — CEFTRIAXONE SODIUM 2 G: 2 INJECTION, POWDER, FOR SOLUTION INTRAMUSCULAR; INTRAVENOUS at 09:11

## 2024-11-22 RX ADMIN — POTASSIUM & SODIUM PHOSPHATES POWDER PACK 280-160-250 MG 2 PACKET: 280-160-250 PACK at 08:11

## 2024-11-22 RX ADMIN — POTASSIUM CHLORIDE 40 MEQ: 1500 TABLET, EXTENDED RELEASE ORAL at 09:11

## 2024-11-22 RX ADMIN — METRONIDAZOLE 500 MG: 500 TABLET ORAL at 09:11

## 2024-11-22 RX ADMIN — Medication 400 ML: at 10:11

## 2024-11-22 RX ADMIN — Medication 400 ML: at 11:11

## 2024-11-22 RX ADMIN — Medication 400 ML: at 05:11

## 2024-11-22 RX ADMIN — POTASSIUM & SODIUM PHOSPHATES POWDER PACK 280-160-250 MG 2 PACKET: 280-160-250 PACK at 09:11

## 2024-11-22 RX ADMIN — HEPARIN SODIUM 5000 UNITS: 5000 INJECTION, SOLUTION INTRAVENOUS; SUBCUTANEOUS at 08:11

## 2024-11-22 RX ADMIN — Medication 400 ML: at 03:11

## 2024-11-22 RX ADMIN — FLUCONAZOLE 200 MG: 100 TABLET ORAL at 08:11

## 2024-11-22 RX ADMIN — THERA TABS 1 TABLET: TAB at 09:11

## 2024-11-22 RX ADMIN — FERROUS SULFATE TAB 325 MG (65 MG ELEMENTAL FE) 1 EACH: 325 (65 FE) TAB at 09:11

## 2024-11-22 RX ADMIN — POTASSIUM & SODIUM PHOSPHATES POWDER PACK 280-160-250 MG 2 PACKET: 280-160-250 PACK at 11:11

## 2024-11-22 RX ADMIN — POTASSIUM & SODIUM PHOSPHATES POWDER PACK 280-160-250 MG 2 PACKET: 280-160-250 PACK at 05:11

## 2024-11-22 RX ADMIN — HEPARIN SODIUM 5000 UNITS: 5000 INJECTION, SOLUTION INTRAVENOUS; SUBCUTANEOUS at 09:11

## 2024-11-22 NOTE — TELEPHONE ENCOUNTER
Please do intake on patient.  He will need a RTC appointment for B20 with Romi ZHANG in 2-3 weeks.    He is in . He is currently on airborne precautions.  Hopefully these will be removed by Monday.  Thank you

## 2024-11-22 NOTE — PROGRESS NOTES
Inpatient Nutrition Assessment    Admit Date: 11/19/2024   Total duration of encounter: 3 days   Patient Age: 64 y.o.    Nutrition Recommendation/Prescription     Continue regular/double portion diet; encouraged oral intake  continue boost plus tid; Boost Plus (provides 360 kcal, 14 g protein per serving)   Electrolyte repletion as needed  MVI/fe  Consider megace to stimulate appetite  Pt also mentioned frequent hiccups; ? Medication tx  Will monitor nutrition status     Communication of Recommendations: reviewed with nurse and reviewed with patient    Nutrition Assessment     Malnutrition Assessment/Nutrition-Focused Physical Exam    Malnutrition Context: chronic illness (11/20/24 1421)  Malnutrition Level: severe (11/20/24 1421)  Energy Intake (Malnutrition): less than or equal to 50% for greater than or equal to 1 month (11/20/24 1421)  Weight Loss (Malnutrition): greater than 7.5% in 3 months (11/20/24 1421)  Subcutaneous Fat (Malnutrition): moderate depletion (11/20/24 1421)  Orbital Region (Subcutaneous Fat Loss): moderate depletion  Upper Arm Region (Subcutaneous Fat Loss): mild depletion     Muscle Mass (Malnutrition): mild depletion (11/20/24 1421)  Christianity Region (Muscle Loss): mild depletion  Clavicle Bone Region (Muscle Loss): mild depletion            Fluid Accumulation (Malnutrition): other (see comments) (Not present) (11/20/24 1421)     Hand  Strength, Right (Malnutrition): Unable to assess (11/20/24 1421)  A minimum of two characteristics is recommended for diagnosis of either severe or non-severe malnutrition.    Chart Review    Reason Seen: continuous nutrition monitoring, malnutrition screening tool (MST), and physician consult for failure to thrive     Malnutrition Screening Tool Results   Have you recently lost weight without trying?: Yes: Unsure how much  Have you been eating poorly because of a decreased appetite?: No   MST Score: 2   Diagnosis:  Failure to thrive, HIV/Aids, lung  mass/concern for malignancy, hypokalemia, thrombocytopenia, TAYLOR/CKD, UTI, trichomones thrush    Relevant Medical History: HIV/Aids, HTN, CKD     Scheduled Medications:  cefTRIAXone (Rocephin) IV (PEDS and ADULTS), 2 g, Q24H  electrolytes-dextrose, 400 mL, Q4H  ferrous sulfate, 1 tablet, Daily  fluconazole, 200 mg, QHS  heparin (porcine), 5,000 Units, Q12H  metroNIDAZOLE, 500 mg, Q12H  multivitamin, 1 tablet, Daily  potassium, sodium phosphates, 2 packet, QID (AC & HS)    Continuous Infusions:     PRN Medications:  0.9%  NaCl infusion (for blood administration), , Q24H PRN  dextrose 10%, 12.5 g, PRN  dextrose 10%, 25 g, PRN  glucagon (human recombinant), 1 mg, PRN  glucose, 16 g, PRN  glucose, 24 g, PRN  naloxone, 0.02 mg, PRN  sodium chloride 0.9%, 10 mL, Q12H PRN    Calorie Containing IV Medications: no significant kcals from medications at this time    Recent Labs   Lab 11/19/24  1635 11/20/24  0314 11/20/24  1114 11/21/24  0431 11/22/24  0401    136 138 139 139   K 2.7* 2.8* 3.2* 3.5 3.5   CALCIUM 8.6* 8.6* 8.3* 8.3* 7.9*   PHOS  --  2.5  --  1.2* 1.9*   MG 1.90 2.20  --  2.00 1.90    103 105 107 108*   CO2 23 23 26 25 28   BUN 35.3* 31.8* 31.5* 25.3 19.4   CREATININE 1.80* 1.49* 1.37* 1.10 1.10   EGFRNORACEVR 42 53 58 >60 >60   GLUCOSE 93 108 98 103 87   BILITOT 0.4 0.3  --  0.2 0.2   ALKPHOS 152* 140  --  153* 153*   ALT 15 16  --  18 20   AST 21 25  --  35* 32   ALBUMIN 2.0* 2.0*  --  1.7* 1.6*   CRP  --   --   --  84.10*  --    LIPASE 34  --   --   --   --    WBC 8.34 8.44  --  6.23 5.09   HGB 7.8* 7.7*  --  6.8* 7.1*   HCT 24.2* 24.7*  --  21.0* 22.0*     Nutrition Orders:  Diet Adult Regular Double Portions  Dietary nutrition supplements TID; Boost Plus Nutritional Drink - Very Vanilla    Appetite/Oral Intake: fair/50-75% of mealspo intake increasing   Factors Affecting Nutritional Intake: altered gastrointestinal function, decreased appetite, diarrhea, and hiccups  Social Needs Impacting Access  "to Food: none identified  Food/Buddhism/Cultural Preferences: none reported  Food Allergies: none reported  Last Bowel Movement: 24  Wound(s):  nurse reported dry skin; not open wounds     Comments  () Pt reported he is eating better; 50-75% meals; drinking some of boost supplement. Encouraged oral intake. Labs acknowledged: H/H (L)--consider fe.     () Received MST --wt loss; consult for FTT. Pt reported several month hx decreased oral intake; approx 1 meal per day; some foods cause diarrhea; + significant wt loss--approx 40-50# over past 2-3 months; EMR wt hx 3 year ago--pt weighed 200#; no recent documentation in EMR. Pt with + fat/muscle wasting . Will order ONS for added nutrition. Consider megace to stimulate appetite. Pt also mentioned hiccups with meals; ? Medication to assist with symptoms. Labs acknowledged: Bun/Cr (H)--hx CKD. K (L)--suggest repletion if remains low; on pedialyte .     Anthropometrics    Height: 6' 1" (185.4 cm),    Last Weight: 58 kg (127 lb 13.9 oz) (24 0141), Weight Method: Standard Scale  BMI (Calculated): 16.9  BMI Classification: underweight (BMI less than 18.5)        Ideal Body Weight (IBW), Male: 184 lb     % Ideal Body Weight, Male (lb): 69.49 %                 Usual Body Weight (UBW), k.2 kg  % Usual Body Weight: 75.29     Usual Weight Provided By: patient and EMR weight history    Wt Readings from Last 5 Encounters:   24 58 kg (127 lb 13.9 oz)   21 90.9 kg (200 lb 4.6 oz)     Weight Change(s) Since Admission: UBW approx 170#  Wt Readings from Last 1 Encounters:   24 0141 58 kg (127 lb 13.9 oz)   24 0019 58 kg (127 lb 13.9 oz)   24 1543 26.8 kg (59 lb)   Admit Weight: 26.8 kg (59 lb) (24 1543), Weight Method: Standard Scale    Estimated Needs    Weight Used For Calorie Calculations: 58 kg (127 lb 13.9 oz)  Energy Calorie Requirements (kcal): 2030 kcal/d; 35 ivory/kg /wt gain  Energy Need Method: Kcal/kg  Weight Used " For Protein Calculations: 58 kg (127 lb 13.9 oz)  Protein Requirements: 75 gm protein/d ; 1.3 gm/kg --monitor renal fx  Fluid Requirements (mL): 1740 ml/d; 30 ml/kg        Enteral Nutrition     Patient not receiving enteral nutrition at this time.    Parenteral Nutrition     Patient not receiving parenteral nutrition support at this time.    Evaluation of Received Nutrient Intake    Calories: not meeting estimated needs(11/22) oral intake increasing   Protein: not meeting estimated needs    Patient Education     Not applicable.    Nutrition Diagnosis     PES: Inadequate oral intake related to chronic illness as evidenced by Eating 50% or less meal > month; + wt loss --> 7.5 % over 2-3 months . (active)     PES: Severe chronic disease or condition related malnutrition Related to chronic illness As Evidenced by:  - weight loss: > 7.5% in 3 months - energy intake: <= 50% for 2 months (meets criteria for <= 50% >= 1 month (severe - social/environmental)) - muscle mass depletion: 3 areas of mild muscle loss (Clavicle, Pectoralis, Temporalis) - loss of subcutaneous fat: 1 area of moderate fat loss (Infraorbital), 2 areas of mild fat loss (Buccal, Triceps Skinfold) active    Nutrition Interventions     Intervention(s): multivitamin/mineral supplement therapy and collaboration with other providers  Intervention(s): Oral diet/nutrient modifications;Oral nutritional supplement    Goal: Meet greater than 80% of nutritional needs by follow-up. (goal progressing)  Goal: Maintain weight throughout hospitalization. (goal progressing)    Nutrition Goals & Monitoring     Dietitian will monitor: food and beverage intake, weight, and electrolyte/renal panel  Discharge planning: continue regular diet with boost plus oral supplements  Nutrition Risk/Follow-Up: high (follow-up in 1-4 days)   Please consult if re-assessment needed sooner.

## 2024-11-22 NOTE — PROGRESS NOTES
"[facility] INFECTIOUS DISEASES CONSULT PROGRESS NOTE      Reason for consultation     HIV management, RLL consolidation       Interval history     Patient reports he feels well with improvement in his cough and sputum production. Denies fever, chills, shortness of breath, abdominal pain, nausea, vomiting, diarrhea. CD4 count 37 (8%).     Antibiotic history:    Rocephin, Flagyl 11/19 - present   Fluconazole 11/21 - present     Medications     Scheduled Meds:   cefTRIAXone (Rocephin) IV (PEDS and ADULTS)  2 g Intravenous Q24H    electrolytes-dextrose  400 mL Oral Q4H    ferrous sulfate  1 tablet Oral Daily    fluconazole  200 mg Oral QHS    heparin (porcine)  5,000 Units Subcutaneous Q12H    metroNIDAZOLE  500 mg Oral Q12H    multivitamin  1 tablet Oral Daily    potassium, sodium phosphates  2 packet Oral QID (AC & HS)     Continuous Infusions:  PRN Meds:.  Current Facility-Administered Medications:     0.9%  NaCl infusion (for blood administration), , Intravenous, Q24H PRN    dextrose 10%, 12.5 g, Intravenous, PRN    dextrose 10%, 25 g, Intravenous, PRN    glucagon (human recombinant), 1 mg, Intramuscular, PRN    glucose, 16 g, Oral, PRN    glucose, 24 g, Oral, PRN    naloxone, 0.02 mg, Intravenous, PRN    sodium chloride 0.9%, 10 mL, Intravenous, Q12H PRN    Allergies: Review of patient's allergies indicates:  No Known Allergies    Physical exam:     BP (!) 150/67   Pulse 61   Temp 98.7 °F (37.1 °C) (Oral)   Resp 18   Ht 6' 1" (1.854 m)   Wt 58 kg (127 lb 13.9 oz)   SpO2 97%   BMI 16.87 kg/m²   Wt Readings from Last 1 Encounters:   11/20/24 58 kg (127 lb 13.9 oz)     Body mass index is 16.87 kg/m².    GENERAL: A&Ox3, NAD, cachectic   HEENT: NC/AT, anicteric, white patchy lesions to tongue/oral mucosa  LUNGS: CTA b/l, no labored breathing  HEART: RRR; no murmur, rub, or gallop  ABDOMEN: +BS, no tympany, soft, NT/ND, no rebound, guarding, or organomegaly  EXTREMITIES: no edema, clubbing, or cyanosis   SKIN: several " "dark pigmented lesions to bilateral lower extremities   NEURO: speech fluent and intact, facial symmetry preserved, no tremor, CN II-XII grossly intact   PSYCH: cooperative, normal mood and affect  LINES: none       LABS:     I have personally reviewed patient's labs.  Pertinent results noted below.    CBC  Recent Labs     11/19/24  1635 11/20/24  0314 11/21/24  0431 11/22/24  0401   WBC 8.34 8.44 6.23 5.09   HGB 7.8* 7.7* 6.8* 7.1*   HCT 24.2* 24.7* 21.0* 22.0*   * 496* 481* 389       Differential  No results for input(s): "NEUTOPHILPCT", "LYMPHOPCT", "MONOPCT", "EOSPCT", "BASOPCT", "NEUTROABS", "LYMPHSABS", "MONOSABS", "EOSABS" in the last 72 hours.    Basic Metabolic Panel  Recent Labs     11/19/24  1635 11/20/24  0314 11/20/24  1114 11/21/24  0431 11/22/24  0401    136 138 139 139   K 2.7* 2.8* 3.2* 3.5 3.5    103 105 107 108*   CO2 23 23 26 25 28   BUN 35.3* 31.8* 31.5* 25.3 19.4   CREATININE 1.80* 1.49* 1.37* 1.10 1.10        Hepatic Panel  Recent Labs     11/19/24  1635 11/20/24  0314 11/21/24  0431 11/22/24  0401   ALT 15 16 18 20   AST 21 25 35* 32   ALKPHOS 152* 140 153* 153*   BILITOT 0.4 0.3 0.2 0.2   ALBUMIN 2.0* 2.0* 1.7* 1.6*       Urinalysis:  Recent Labs     11/19/24  1824   PROTEINUA 2+*   NITRITE Negative   LEUKOCYTESUR 500*   COLORU Yellow   RBCUA 6-10*   BACTERIA Many*         Imaging     CT Chest Abdomen Pelvis With IV Contrast (XPD) NO Oral Contrast   Final Result      1.   Right lower lung lobe large irregular defined consolidation with areas of necrosis may be secondary to infection though with concern for associated malignancy.      2.  Left lower lung lobe nodules could be metastatic.      3.  Small bowel and colonic excessive fluid and slight mural thickening and some enhancement suggest enterocolitis.  Please correlate clinically.         Electronically signed by: Ezio Deleon   Date:    11/19/2024   Time:    19:30            Micro/Path   Colonization:  No components " "found for: "MRSMSSSCRFLD", "MRSSCRPCRSWB", "SCRCULT"  Urine culture 11/19 E.coli           IMPRESSION     Patient is a 63 yo male with past medical history of HIV (CD4 147 2021) who presented on 11/19 due to fatigue, weight loss, decreased oral intake, and cough for the past 2 months. He has a past history of histoplasma, cryptococcus, and fungal colitis of the cecum. He has not taken his medications for HIV since 2021. He was found on CT to have RLL irregular consolidation with necrosis likely secondary to infection but with concern for malignancy, LLL nodules concerning for metastasis, and fluid and enhancement of the colon suggestive of enterocolitis.     HIV   - CD4 count 37 11/20  Right lower lobe consolidation   Lung mass concerning for malignancy vs OI pathgoen  Cystitis                  - Urine culture 11/19 E.coli  Trichomonas infection                 - UA 11/19 many Trichomonas  Oral thrush   Acute kidney injury  Hypokalemia   Thrombocytosis   Exposure to lagomorphs     CrCl: Estimated Creatinine Clearance: 57.1 mL/min (based on SCr of 1.1 mg/dL).    RECOMMENDATIONS:    - Start atovaquone 1500 mg daily for PJP prophylaxis; patient was previously on atovaquone so will restart due to possible history of Bactrim intolerance   - CD4 count 37 (8%)  - Continue fluconazole 200 mg daily for a total of 10 days                  - Oral thrush  - Continue ceftriaxone 2g daily for a total of 5 days                  - Urine culture 11/19 E.coli   - Continue Flagyl 500 mg q12 hours for a total of 7 days                  - UA 11/19 many Trichomonas  - Given exposures to rabbits, cows, and pigs will add on zoonotic testing below, although these diagnoses are lower on the differential  - Will follow up the following studies:              - Histoplasma/blastomyces urine and serum antigens              - Fungitell              - Aspergillus serum atg              - PJP PCR, sputum              - Fungal blood culture          "     - Mycobacterial blood culture              - AFB smear and cultures x3, with 1 specimen obtained from early AM                          - ID has placed the patient on airborne isolation out of an abundance of caution              - Fungal sputum cx              - GC/chlamydia urine NAAT              - GC/chlamydia throat and rectal swabs              - GI PCR              - C diff antigen, stool              - HLA  serum               - G6PD              - Brucella serum antibodies              - Francisella serum antibodies              - Q fever serologies              - Bartonella serologies and PCR, serum              - CMV PCR              - Toxoplasma antibody              - Quantiferon gold              - Pneumocystis jiroveii PCR               - coccidioides antibody               - blood and stool cultures, stool ova/parasite   - Will avoid starting CAP treatment for now; suspect RLL mass is likely either malignancy or OI pathogen  - Holding ART regimen for now to avoid potential IRIS  - Agree with RLL mass bx. Please send for the following:                          - Aerobic culture                          - Anaeorbic culture                          - AFB culture                          - Fungal culture                          - MTB PCR (if fluid)                          - Cell count and diff (if fluid)                          - ADA (if fluid)                          - Glucose (if fluid)                          - pH (if fluid)                          - Pathology with added staining for AFB and fungal organisms  - ID has added on 2 AFB smears to be sent to [facility] for read. 3 smears have also been collected but sent to [facility]. Once patient has 3 negative smears, isolation can be removed  - Follow up sputum AFB cultures x3   - Patient is not ready for discharge at this time. Ideally patient will receive lung mass biopsy while inpatient due to significant outpatient wait time.      ID will follow  along with you.     Amelia Davis, MS4  [employer]    Attending attestation    I hereby acknowledge that I am relying upon documentation authored by a medical student working under my supervision and further I hereby attest that I have verified the student documentation or findings by personally performing the physical exam and medical decision making activities of the Evaluation and Management service to be billed. Note has been edited to reflect updated assessment and plan.    Sg Sanford MD  [employer]

## 2024-11-22 NOTE — PROGRESS NOTES
[facility] Internal Medicine  PROGRESS NOTE    Patient's Name: Anne Terry  : 1960  MRN: [id number]    Admission Date: 2024  Length of Stay: 3  Attending Physician: Marianne Partida MD  Resident: Agustin  Intern: Nasir    SUBJECTIVE     Chief Complaint   Patient presents with    Fatigue     IN / AASI W REPORT OF DECLINING HEALTH FOR MONTHS.  PT ONLY CO LT FOOT PAIN.  FAMILY REPORTS WEAKNESS W WT LOSS.  PT POOR HISTORIAN.      History of Present Illness:  Anne Terry is a 64 y.o. male, with PMH significant for HIV/AIDS, who presented to [facility] ED on 2024  with primary complaints of fatigue and weight loss. Patient states that he began feeling more fatigued, had decreased appetite, and has experienced significant weight loss since October. He also has had a dry cough but denies hemoptysis. He has only been able to tolerate one meal a day, consisting of mainly cookies and soda. All other foods cause diarrhea. Denies chest pain, abdominal pain, nausea, vomiting, constipation, dysuria, urinary frequency, hematuria, hematochezia, or melena. He was feeling more weak and fatigued today so he called 911.      Per chart review, patient has a history of HIV/AIDS diagnosed on . He used to follow with [facility] ID but was lost to follow up since . He was on Ziagen 300 mg daily, Descovy daily, Prezista 800 mg daily, and Norovir 100 mg daily. He has a history of medication non-adherence. He also has a history of cryptococcosis and histoplasmosis in . 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        ED Course - Upon arrival, pt afebrile 98.8 F, /80, HR 93, RR 16, SpO2 100% on RA. Workup notable for H/H 7.8/24.2, Plt 481, decreased absolute lymphocytes, potassium 2.7, BUN 35.3, creatinine 1.8 (baseline 1.3), elevated total protein 8.3, albumin 2.0, . UA 2+ protein, 1+ blood, 500 leukocyte esterase, >100 WBC, many bacteria,  many trichomonas. CT chest abdomen pelvis performed showing RLL lung irregularly defined consolidation with areas of necrosis and LLL nodules, concerning for malignancy. Also showed small bowel and colonic mural thickening and enterocolitis. He was given ceftriaxone 1 g and potassium bicarb 40 mEq in the ED. Internal medicine was consulted for further management.     Hospital Course/Significant Events:  11/19/2024: Admitted to [facility].    Interval History:  NAEON. Slightly hypertensive last night to  but otherwise WNL. Labs significant for Hgb 7.1 s/p 1 unit pRBC and hypophosphatemia 1.9. Otherwise, patient states that he's doing well. Tolerating food and fluids without nausea or vomiting. Denies any chest pain, SOB, abdominal pain, changes in BM, and changes in urination.    Awaiting further ID recommendations and awaiting scheduling with biopsy with IR.    Review of Systems:  A 12-pt ROS was conducted and was negative unless stated above.    OBJECTIVE     VITAL SIGNS: 24 HR MIN & MAX Most Recent Vitals   Temp  Min: 98.6 °F (37 °C)  Max: 99.9 °F (37.7 °C)  98.7 °F (37.1 °C)   BP  Min: 128/68  Max: 150/67  (!) 150/67    Pulse  Min: 61  Max: 83  61   Resp  Min: 17  Max: 18  18    SpO2  Min: 94 %  Max: 100 %  97 %    Body mass index is 16.87 kg/m².    Intake/Output:  I/O last 3 completed shifts:  In: 6629.4 [P.O.:3360; I.V.:2776.1; IV Piggyback:493.3]  Out: 600 [Urine:600]  Constitutional:       General: He is awake.      Appearance: He is cachectic. He is ill-appearing (chronically). He is not toxic-appearing.   Eyes:      General: No scleral icterus.  Cardiovascular:      Rate and Rhythm: Normal rate and regular rhythm.      Pulses: Normal pulses.      Heart sounds: Normal heart sounds. No murmur heard.  Pulmonary:      Effort: Pulmonary effort is normal. No respiratory distress.      Breath sounds: Normal breath sounds.   Musculoskeletal:      Right lower leg: No edema.      Left lower leg: No edema.    Skin:     General: Skin is warm and dry.      Findings: Lesion (hyperpigmented lesions to the pretibial region) present.   Neurological:      General: No focal deficit present.      Mental Status: He is alert.   Psychiatric:         Behavior: Behavior normal. Behavior is cooperative.     Current Medications:  Scheduled:   cefTRIAXone (Rocephin) IV (PEDS and ADULTS)  2 g Intravenous Q24H    electrolytes-dextrose  400 mL Oral Q4H    ferrous sulfate  1 tablet Oral Daily    fluconazole  200 mg Oral QHS    heparin (porcine)  5,000 Units Subcutaneous Q12H    metroNIDAZOLE  500 mg Oral Q12H    multivitamin  1 tablet Oral Daily      Infusions:   lactated ringers   Intravenous Continuous 75 mL/hr at 11/22/24 0419 Rate Verify at 11/22/24 0419     PRNs:    Current Facility-Administered Medications:     0.9%  NaCl infusion (for blood administration), , Intravenous, Q24H PRN    dextrose 10%, 12.5 g, Intravenous, PRN    dextrose 10%, 25 g, Intravenous, PRN    glucagon (human recombinant), 1 mg, Intramuscular, PRN    glucose, 16 g, Oral, PRN    glucose, 24 g, Oral, PRN    naloxone, 0.02 mg, Intravenous, PRN    sodium chloride 0.9%, 10 mL, Intravenous, Q12H PRN  Labs:  CBC:  Recent Labs   Lab 11/20/24 0314 11/21/24  0431 11/22/24  0401   WBC 8.44 6.23 5.09   HGB 7.7* 6.8* 7.1*   HCT 24.7* 21.0* 22.0*   * 481* 389   MCV 85.8 85.0 86.3   RDW 14.8 14.7 15.0     BMP/CMP:  Recent Labs   Lab 11/20/24 0314 11/20/24  1114 11/21/24  0431    138 139   K 2.8* 3.2* 3.5    105 107   CO2 23 26 25   BUN 31.8* 31.5* 25.3   CREATININE 1.49* 1.37* 1.10   GLUCOSE 108 98 103   EGFRNORACEVR 53 58 >60     RFTs/LFTs:  Recent Labs   Lab 11/19/24  1635 11/19/24  2332 11/20/24 0314 11/20/24  1114 11/21/24  0431   CALCIUM 8.6*  --  8.6* 8.3* 8.3*   LABPROT 8.3* 15.1* 8.6*  --  7.2   ALBUMIN 2.0*  --  2.0*  --  1.7*   AST 21  --  25  --  35*   ALT 15  --  16  --  18   ALKPHOS 152*  --  140  --  153*   BILITOT 0.4  --  0.3  --  0.2  "    Recent Labs   Lab 11/19/24  1635 11/20/24  0314 11/21/24  0431   MG 1.90 2.20 2.00   PHOS  --  2.5 1.2*     Cardiac Panel:  No results for input(s): "TROPONINI", "CKTOTAL", "CKMB", "BNP" in the last 168 hours.  Interval Imaging:  CT Chest Abdomen Pelvis With IV Contrast (XPD) NO Oral Contrast  Narrative: EXAMINATION:  CT CHEST ABDOMEN PELVIS WITH IV CONTRAST (XPD)    CLINICAL HISTORY:  unexplained weight loss, hematuria;    TECHNIQUE:  Multidetector axial images were obtained from the thoracic inlet through the greater trochanters following the administration of IV contrast.    Dose length product of 258 mGycm. Automated exposure control was utilized to minimize radiation dose.    COMPARISON:  CT abdomen pelvis December 22, 2015.    CHEST FINDINGS:    Right lower lung lobe posteriorly along the subpleural location is remarkable for large irregular defined heterogeneous consolidation with necrotic component which shows maximum anterior-posterior diameter of 5.2 cm and transverse diameter 9.0 cm.  The maximum sagittal length of this consolidation 16.0 cm on image 53 series 8..  Within left lower lung lobe are up to 5-6 mm nodules like on image 71 and image 75 series 4.  Also seen are left lower lung lobe are very subtle minimal nodularities.  There is no pulmonary edema.  No significant fluid within the pleural and pericardial spaces.  There are no enlarged chest lymph nodes.  Thoracic aorta is without aneurysmal dilatation.  There are no signs of dissection.  Cardiac chamber size is within normal limits.    ABDOMINAL FINDINGS:    Liver, pancreas and spleen are unremarkable.  Gallbladder is decompressed without dilatation of the ducts.  Adrenals are not enlarged in size.  Kidneys function and drain well.  No intra-abdominal or pelvic hypermetabolic enlarged lymph nodes or implants.    There is stable presumably benign retrocaval ovoid hypodensity without significant enhancement and is seen on image 115 series " 2.    Stomach is mostly decompressed.  There are multiple loops of small bowel with mild fluid-filled dilatation and mural thickening without definite transition.  There is also excessive fluid within the colon and perhaps slight mural thickening and enhancement.  Findings suggest enterocolitis.  Please correlate clinically.  Overall assessment of colon is limited due to presence of fecal content and lack of enteric contrast.  Please correlate patient is up-to-date on colonoscopy.  No suggestion of bowel obstruction.  No inflammatory standings identified.  No free fluid.    PELVIC FINDINGS:    Urinary bladder is partially decompressed without thickened wall.  No intrapelvic free fluid.    No acute or aggressive skeletal abnormality  Impression: 1.   Right lower lung lobe large irregular defined consolidation with areas of necrosis may be secondary to infection though with concern for associated malignancy.    2.  Left lower lung lobe nodules could be metastatic.    3.  Small bowel and colonic excessive fluid and slight mural thickening and some enhancement suggest enterocolitis.  Please correlate clinically.    Electronically signed by: Ezio Deleon  Date:    11/19/2024  Time:    19:30     Microbiology:  Microbiology Results (last 7 days)       Procedure Component Value Units Date/Time    Mycobacteria and Nocardia Culture [[id number]] Collected: 11/21/24 2143    Order Status: Sent Specimen: Respiratory from Sputum, Expectorated Updated: 11/21/24 2153    AFB Smear [[id number]] Collected: 11/21/24 2143    Order Status: Sent Specimen: Sputum, Expectorated Updated: 11/21/24 2153    Mycobacteria and Nocardia Culture [[id number]] Collected: 11/21/24 1632    Order Status: Sent Specimen: Respiratory from Sputum, Expectorated Updated: 11/21/24 2002    C.trach/N.gonor AMP RNA [[id number]] Collected: 11/21/24 1510    Order Status: Sent Specimen: Throat Updated: 11/21/24 1539    AFB Smear [[id number]] Collected: 11/21/24 2574     Order Status: Canceled Specimen: Sputum Updated: 11/21/24 1401    AFB Smear [[id number]] Collected: 11/21/24 0738    Order Status: Sent Specimen: Sputum Updated: 11/21/24 1401    Chlamydia/GC, PCR [[id number]]     Order Status: Canceled Specimen: Genital from Anal     Stool Culture [[id number]]  (Normal) Collected: 11/20/24 0547    Order Status: Completed Specimen: Stool Updated: 11/21/24 1208     Stool Culture Negative for Salmonella, Shigella, Campylobacter, Vibrio, Aeromonas, Pleisiomonas,Yersinia, or Shiga Toxin 1 and 2.    Chlamydia/GC, PCR [[id number]] Collected: 11/21/24 0928    Order Status: Completed Specimen: Genital from Rectal Updated: 11/21/24 1125     Chlamydia trachomatis PCR Not Detected     N. gonorrhea PCR Not Detected     Source Rectal    Narrative:      The Xpert CT/NG test, performed on the GeneXpert system is a qualitative in vitro real-time polymerase chain reaction (PCR) test for the automated detected and differentiation for genomic DNA from Chlamydia trachomatis (CT) and/or Neisseria gonorrhoeae (NG).    Mycobacteria and Nocardia Culture [[id number]] Collected: 11/21/24 0739    Order Status: Sent Specimen: Respiratory from Sputum, Expectorated Updated: 11/21/24 0746    Mycobacteria and Nocardia Culture [[id number]] Collected: 11/21/24 0738    Order Status: Sent Specimen: Respiratory from Sputum, Expectorated Updated: 11/21/24 0745    Respiratory Culture [[id number]] Collected: 11/20/24 1245    Order Status: Completed Specimen: Sputum, Expectorated Updated: 11/21/24 0724     Respiratory Culture Normal respiratory adriel     GRAM STAIN Quality 3+      Moderate Yeast      Moderate Gram Positive Rods      Few Gram Negative Rods      Rare Gram positive cocci    Urine culture [[id number]]  (Abnormal)  (Susceptibility) Collected: 11/19/24 1824    Order Status: Completed Specimen: Urine Updated: 11/21/24 0631     Urine Culture >/= 100,000 colonies/ml Escherichia coli    Fungal Culture Blood  [[id number]] Collected: 11/21/24 0431    Order Status: Sent Specimen: Venous Blood Line Updated: 11/21/24 0445    Chlamydia/GC, PCR [[id number]] Collected: 11/21/24 0341    Order Status: Canceled Specimen: Urine, Clean Catch Updated: 11/21/24 0400    Fungal Culture [[id number]] Collected: 11/20/24 2251    Order Status: Sent Specimen: Sputum, Expectorated Updated: 11/20/24 2346    C.trach/N.gonor AMP RNA [[id number]]     Order Status: Canceled     Clostridium Diff Toxin, A & B, EIA [[id number]]     Order Status: Sent Specimen: Stool     Cryptococcal antigen, blood [[id number]]  (Normal) Collected: 11/20/24 0314    Order Status: Completed Specimen: Blood, Venous Updated: 11/20/24 0721     Cryptococcal Antigen, Serum Negative    Clostridium Diff Toxin, A & B, EIA [[id number]]     Order Status: Canceled Specimen: Stool     Blood Culture [[id number]] Collected: 11/20/24 0314    Order Status: Sent Specimen: Blood from Hand, Left Updated: 11/20/24 0430    Blood Culture [[id number]] Collected: 11/20/24 0314    Order Status: Sent Specimen: Blood from Arm, Left Updated: 11/20/24 0429          Antibiotics:  Antibiotics (From admission, onward)      Start     Stop Route Frequency Ordered    11/22/24 0900  cefTRIAXone injection 2 g         11/27/24 0859 IV Every 24 hours (non-standard times) 11/21/24 1133    11/20/24 0900  metroNIDAZOLE tablet 500 mg         11/27/24 0859 Oral Every 12 hours 11/19/24 2324             ASSESSMENT AND PLAN   Uncontrolled HIV/AIDs (VL 114k, CD4 count 37 (8%))  Thrombocytosis  RLL lung opacity concerning for malignancy  - Has history of HIV infection/AIDS; used to follow with [facility] ID, last appointment in 2021. Not currently on anti-retrovirals. 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  - HIV ,696, CD4 count 37 (8%). HIV genotyping pending.  - Syphilis, Giardia/Cryptosporidium antigen, hepatitis panel, RVP,  Coccidioides, Toxoplasma negative  - ID consulted. Recommended opportunistic infection workup as well as RLL biopsy. Following OI workup.  - GI panel, stool culture, and stool ova/parasite pending.  - IR consulted for RLL lung opacity, infection vs malignancy. They state that they can possible do it early next week. Will work with IR to schedule and place NPO status as appropriate.  - PT/OT following.  - Nutrition consulted.     Failure to thrive, suspected 2/2 to above  Severe malnutrition  Iron deficiency anemia  Anemia of chronic disease  - Patient reported decreased PO intake and unintended weight loss.  - Vitamin B12 and folate WNL. Iron panel notable for low iron, transferrin, TIBC, and iron saturation, suggestive of TIFFANIE. Ferritin elevated most likely 2/2 chronic disease.  - Vitamin B1 pending.  - Continue multivitamin and iron.  - Hgb 7.1 from 6.8 s/p 1 unit pRBC. Inappropriate, but may be due to extensive blood draws for infectious workup.  - Will need GI evaluation outpatient with scope.  - Nutrition consulted. Recommended Megace.     Hypokalemia (improving)  Hypophosphatemia  - Upon admission, potassium noted to be 2.7. Repeat potassium 2.8 after potassium bicarbonate 40 mEq.  - K 3.5 and P 1.9 today.  - Ordered potassium chloride 40 mEq and 2 potassium-sodium-phosphate packets.  - CTM electrolytes and replete PRN.     TAYLOR on CKD (resolved)  - Cr 1.8 (1.1 baseline). Most recent 1.10.  - Discontinued LR as creatinine now at baseline.  - Continue Pedialyte and encouraged PO hydration.  - CTM.     UTI  Trichomonas on UA  - UA suggestive of UTI. Received ceftriaxone 1 g in ED.  - Urine culture positive for 100k+ E. Coli  - Continue ceftriaxone 2g daily for 5 days and continue metronidazole 500 mg BID for 7 days    Oral candidiasis  - Respiratory culture showed moderate yeast.  - Started on fluconazole 200 mg daily on 11/21 for 10 days (end date: 12/1/2024)      DVT Prophylaxis: Heparin  GI Prophylaxis:  None  Abx: Rocephin, metronidazole, and fluconazole  Access: PIV  Fluids: None  Diet: Diet Adult Regular Double Portions    Code Status: Full Code    Disposition: 64 y.o. male with PMHx uncontrolled HIV admitted for failure to thrive. ID following. Awaiting negative Mycobacterium and AFB before isolation precautions taken off. Disposition pending Mycobacterium workup.       Case was discussed and seen with Dr. Partida. Please appreciate attending's attestation to follow.    Jarod Best MD  PGY-1 Resident  [facility] Medicine Team 1  11/22/2024

## 2024-11-23 LAB
ALBUMIN SERPL-MCNC: 1.5 G/DL (ref 3.4–4.8)
ALBUMIN/GLOB SERPL: 0.3 RATIO (ref 1.1–2)
ALP SERPL-CCNC: 231 UNIT/L (ref 40–150)
ALT SERPL-CCNC: 29 UNIT/L (ref 0–55)
ANION GAP SERPL CALC-SCNC: 5 MEQ/L
AST SERPL-CCNC: 64 UNIT/L (ref 5–34)
BARTONELLA DNA BLD QL NAA+PROBE: NEGATIVE
BASOPHILS # BLD AUTO: 0.02 X10(3)/MCL
BASOPHILS NFR BLD AUTO: 0.4 %
BILIRUB SERPL-MCNC: 0.1 MG/DL
BUN SERPL-MCNC: 17.5 MG/DL (ref 8.4–25.7)
C TRACH RRNA SPEC QL NAA+PROBE: NEGATIVE
CALCIUM SERPL-MCNC: 7.9 MG/DL (ref 8.8–10)
CHLORIDE SERPL-SCNC: 111 MMOL/L (ref 98–107)
CO2 SERPL-SCNC: 26 MMOL/L (ref 23–31)
CREAT SERPL-MCNC: 0.95 MG/DL (ref 0.72–1.25)
CREAT/UREA NIT SERPL: 18
CRP SERPL-MCNC: 31.2 MG/L
EOSINOPHIL # BLD AUTO: 0.2 X10(3)/MCL (ref 0–0.9)
EOSINOPHIL NFR BLD AUTO: 4.3 %
ERYTHROCYTE [DISTWIDTH] IN BLOOD BY AUTOMATED COUNT: 15.1 % (ref 11.5–17)
ERYTHROCYTE [SEDIMENTATION RATE] IN BLOOD: 41 MM/HR (ref 0–15)
GFR SERPLBLD CREATININE-BSD FMLA CKD-EPI: >60 ML/MIN/1.73/M2
GLOBULIN SER-MCNC: 4.7 GM/DL (ref 2.4–3.5)
GLUCOSE SERPL-MCNC: 152 MG/DL (ref 82–115)
HCT VFR BLD AUTO: 22.8 % (ref 42–52)
HGB BLD-MCNC: 7.5 G/DL (ref 14–18)
IMM GRANULOCYTES # BLD AUTO: 0.03 X10(3)/MCL (ref 0–0.04)
IMM GRANULOCYTES NFR BLD AUTO: 0.6 %
LYMPHOCYTES # BLD AUTO: 1.18 X10(3)/MCL (ref 0.6–4.6)
LYMPHOCYTES NFR BLD AUTO: 25.4 %
M HISTOPLASMA/BLASTOMYCES AG RESULT, U: NOT DETECTED
M HISTOPLASMA/BLASTOMYCES AG RESULT: NOT DETECTED
M HISTOPLASMA/BLASTOMYCES AG VALUE, U: NOT DETECTED NG/ML
M HISTOPLASMA/BLASTOMYCES AG VALUE: NOT DETECTED NG/ML
MAGNESIUM SERPL-MCNC: 1.5 MG/DL (ref 1.6–2.6)
MCH RBC QN AUTO: 29 PG (ref 27–31)
MCHC RBC AUTO-ENTMCNC: 32.9 G/DL (ref 33–36)
MCV RBC AUTO: 88 FL (ref 80–94)
MONOCYTES # BLD AUTO: 0.44 X10(3)/MCL (ref 0.1–1.3)
MONOCYTES NFR BLD AUTO: 9.5 %
N GONORRHOEA RRNA SPEC QL NAA+PROBE: NEGATIVE
NEUTROPHILS # BLD AUTO: 2.78 X10(3)/MCL (ref 2.1–9.2)
NEUTROPHILS NFR BLD AUTO: 59.8 %
NRBC BLD AUTO-RTO: 0 %
P JIROVECII DNA L RESP QL NAA+NON-PROBE: NEGATIVE
P JIROVECII DNA L RESP QL NAA+NON-PROBE: POSITIVE
PHOSPHATE SERPL-MCNC: 2.2 MG/DL (ref 2.3–4.7)
PLATELET # BLD AUTO: 411 X10(3)/MCL (ref 130–400)
PMV BLD AUTO: 9.5 FL (ref 7.4–10.4)
POTASSIUM SERPL-SCNC: 4 MMOL/L (ref 3.5–5.1)
PROT SERPL-MCNC: 6.2 GM/DL (ref 5.8–7.6)
RBC # BLD AUTO: 2.59 X10(6)/MCL (ref 4.7–6.1)
RHODAMINE-AURAMINE STN SPEC: NORMAL
SODIUM SERPL-SCNC: 142 MMOL/L (ref 136–145)
SPECIMEN SOURCE: ABNORMAL
SPECIMEN SOURCE: NORMAL
WBC # BLD AUTO: 4.65 X10(3)/MCL (ref 4.5–11.5)

## 2024-11-23 PROCEDURE — 85652 RBC SED RATE AUTOMATED: CPT | Performed by: NURSE PRACTITIONER

## 2024-11-23 PROCEDURE — 36415 COLL VENOUS BLD VENIPUNCTURE: CPT

## 2024-11-23 PROCEDURE — 86140 C-REACTIVE PROTEIN: CPT | Performed by: NURSE PRACTITIONER

## 2024-11-23 PROCEDURE — 63600175 PHARM REV CODE 636 W HCPCS

## 2024-11-23 PROCEDURE — 27000207 HC ISOLATION

## 2024-11-23 PROCEDURE — 80053 COMPREHEN METABOLIC PANEL: CPT

## 2024-11-23 PROCEDURE — 25000003 PHARM REV CODE 250

## 2024-11-23 PROCEDURE — 63700000 PHARM REV CODE 250 ALT 637 W/O HCPCS

## 2024-11-23 PROCEDURE — 84100 ASSAY OF PHOSPHORUS: CPT

## 2024-11-23 PROCEDURE — 25000003 PHARM REV CODE 250: Performed by: EMERGENCY MEDICINE

## 2024-11-23 PROCEDURE — 83735 ASSAY OF MAGNESIUM: CPT

## 2024-11-23 PROCEDURE — 21400001 HC TELEMETRY ROOM

## 2024-11-23 PROCEDURE — 85025 COMPLETE CBC W/AUTO DIFF WBC: CPT

## 2024-11-23 RX ORDER — SODIUM,POTASSIUM PHOSPHATES 280-250MG
2 POWDER IN PACKET (EA) ORAL
Status: DISCONTINUED | OUTPATIENT
Start: 2024-11-23 | End: 2024-11-26 | Stop reason: HOSPADM

## 2024-11-23 RX ORDER — ATOVAQUONE 750 MG/5ML
750 SUSPENSION ORAL EVERY 12 HOURS
Status: DISCONTINUED | OUTPATIENT
Start: 2024-11-24 | End: 2024-11-26 | Stop reason: HOSPADM

## 2024-11-23 RX ORDER — THIAMINE HCL 100 MG
100 TABLET ORAL DAILY
Status: DISCONTINUED | OUTPATIENT
Start: 2024-11-23 | End: 2024-11-26 | Stop reason: HOSPADM

## 2024-11-23 RX ORDER — MAGNESIUM SULFATE HEPTAHYDRATE 40 MG/ML
2 INJECTION, SOLUTION INTRAVENOUS ONCE
Status: COMPLETED | OUTPATIENT
Start: 2024-11-23 | End: 2024-11-23

## 2024-11-23 RX ADMIN — Medication 400 ML: at 06:11

## 2024-11-23 RX ADMIN — Medication 400 ML: at 11:11

## 2024-11-23 RX ADMIN — Medication 400 ML: at 07:11

## 2024-11-23 RX ADMIN — HEPARIN SODIUM 5000 UNITS: 5000 INJECTION, SOLUTION INTRAVENOUS; SUBCUTANEOUS at 08:11

## 2024-11-23 RX ADMIN — METRONIDAZOLE 500 MG: 500 TABLET ORAL at 08:11

## 2024-11-23 RX ADMIN — Medication 400 ML: at 02:11

## 2024-11-23 RX ADMIN — Medication 100 MG: at 11:11

## 2024-11-23 RX ADMIN — HEPARIN SODIUM 5000 UNITS: 5000 INJECTION, SOLUTION INTRAVENOUS; SUBCUTANEOUS at 09:11

## 2024-11-23 RX ADMIN — METRONIDAZOLE 500 MG: 500 TABLET ORAL at 09:11

## 2024-11-23 RX ADMIN — POTASSIUM & SODIUM PHOSPHATES POWDER PACK 280-160-250 MG 2 PACKET: 280-160-250 PACK at 05:11

## 2024-11-23 RX ADMIN — FERROUS SULFATE TAB 325 MG (65 MG ELEMENTAL FE) 1 EACH: 325 (65 FE) TAB at 08:11

## 2024-11-23 RX ADMIN — CEFTRIAXONE SODIUM 2 G: 2 INJECTION, POWDER, FOR SOLUTION INTRAMUSCULAR; INTRAVENOUS at 08:11

## 2024-11-23 RX ADMIN — FLUCONAZOLE 200 MG: 100 TABLET ORAL at 09:11

## 2024-11-23 RX ADMIN — THERA TABS 1 TABLET: TAB at 08:11

## 2024-11-23 RX ADMIN — ATOVAQUONE 1500 MG: 750 SUSPENSION ORAL at 08:11

## 2024-11-23 RX ADMIN — MAGNESIUM SULFATE HEPTAHYDRATE 2 G: 40 INJECTION, SOLUTION INTRAVENOUS at 07:11

## 2024-11-23 NOTE — PROGRESS NOTES
[facility] Internal Medicine  PROGRESS NOTE    Patient's Name: Anne Terry  : 1960  MRN: [id number]    Admission Date: 2024  Length of Stay: 4  Attending Physician: Marianne Partida MD  Resident: Agustin  Intern: Nasir    SUBJECTIVE     Chief Complaint   Patient presents with    Fatigue     IN / AASI W REPORT OF DECLINING HEALTH FOR MONTHS.  PT ONLY CO LT FOOT PAIN.  FAMILY REPORTS WEAKNESS W WT LOSS.  PT POOR HISTORIAN.      History of Present Illness:  Anne Terry is a 64 y.o. male, with PMH significant for HIV/AIDS, who presented to [facility] ED on 2024  with primary complaints of fatigue and weight loss. Patient states that he began feeling more fatigued, had decreased appetite, and has experienced significant weight loss since October. He also has had a dry cough but denies hemoptysis. He has only been able to tolerate one meal a day, consisting of mainly cookies and soda. All other foods cause diarrhea. Denies chest pain, abdominal pain, nausea, vomiting, constipation, dysuria, urinary frequency, hematuria, hematochezia, or melena. He was feeling more weak and fatigued today so he called 911.      Per chart review, patient has a history of HIV/AIDS diagnosed on . He used to follow with [facility] ID but was lost to follow up since . He was on Ziagen 300 mg daily, Descovy daily, Prezista 800 mg daily, and Norovir 100 mg daily. He has a history of medication non-adherence. He also has a history of cryptococcosis and histoplasmosis in . 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        ED Course - Upon arrival, pt afebrile 98.8 F, /80, HR 93, RR 16, SpO2 100% on RA. Workup notable for H/H 7.8/24.2, Plt 481, decreased absolute lymphocytes, potassium 2.7, BUN 35.3, creatinine 1.8 (baseline 1.3), elevated total protein 8.3, albumin 2.0, . UA 2+ protein, 1+ blood, 500 leukocyte esterase, >100 WBC, many bacteria,  many trichomonas. CT chest abdomen pelvis performed showing RLL lung irregularly defined consolidation with areas of necrosis and LLL nodules, concerning for malignancy. Also showed small bowel and colonic mural thickening and enterocolitis. He was given ceftriaxone 1 g and potassium bicarb 40 mEq in the ED. Internal medicine was consulted for further management.     Hospital Course/Significant Events:  11/19/2024: Admitted to [facility].    Interval History:  NAEON. Hefty appetite. Vitals stable. labs significant for Hgb 7.5 stable. Hyperhosphatemia improving 2.2. mag 1.5. Denies any chest pain, SOB, abdominal pain, changes in BM, and changes in urination.      Review of Systems:  A 12-pt ROS was conducted and was negative unless stated above.    OBJECTIVE     VITAL SIGNS: 24 HR MIN & MAX Most Recent Vitals   Temp  Min: 98 °F (36.7 °C)  Max: 98.7 °F (37.1 °C)  98 °F (36.7 °C)   BP  Min: 130/70  Max: 165/81  138/72    Pulse  Min: 61  Max: 80  68   Resp  Min: 18  Max: 20  18    SpO2  Min: 98 %  Max: 100 %  98 %    Body mass index is 16.87 kg/m².    Intake/Output:  I/O last 3 completed shifts:  In: 6232.6 [P.O.:4600; I.V.:1139.3; IV Piggyback:493.3]  Out: -   Constitutional:       General: He is awake.      Appearance: He is cachectic.  Chronically ill-appearing.  No acute distress.    Eyes:      General: No scleral icterus.  Cardiovascular:      Rate and Rhythm: Normal rate and regular rhythm.      Pulses: Normal pulses.      Heart sounds: Normal heart sounds. No murmur heard.  Pulmonary:      Effort: Pulmonary effort is normal. No respiratory distress.      Breath sounds: Normal breath sounds.   Musculoskeletal:      Right lower leg: No edema.      Left lower leg: No edema.   Skin:     General: Skin is warm and dry.      Findings: Lesion (hyperpigmented lesions to the pretibial region) present.   Neurological:      General: No focal deficit present.      Mental Status: He is alert.   Psychiatric:         Behavior:  Behavior normal. Behavior is cooperative.     Current Medications:  Scheduled:   atovaquone  1,500 mg Oral Daily    cefTRIAXone (Rocephin) IV (PEDS and ADULTS)  2 g Intravenous Q24H    electrolytes-dextrose  400 mL Oral Q4H    ferrous sulfate  1 tablet Oral Daily    fluconazole  200 mg Oral QHS    heparin (porcine)  5,000 Units Subcutaneous Q12H    metroNIDAZOLE  500 mg Oral Q12H    multivitamin  1 tablet Oral Daily      Infusions:      PRNs:    Current Facility-Administered Medications:     0.9%  NaCl infusion (for blood administration), , Intravenous, Q24H PRN    dextrose 10%, 12.5 g, Intravenous, PRN    dextrose 10%, 25 g, Intravenous, PRN    glucagon (human recombinant), 1 mg, Intramuscular, PRN    glucose, 16 g, Oral, PRN    glucose, 24 g, Oral, PRN    naloxone, 0.02 mg, Intravenous, PRN    sodium chloride 0.9%, 10 mL, Intravenous, Q12H PRN       Microbiology:  Microbiology Results (last 7 days)       Procedure Component Value Units Date/Time    AFB Smear [[id number]] Collected: 11/21/24 0738    Order Status: Completed Specimen: Sputum Updated: 11/22/24 1208     AFB Smear No AFB seen (Direct smear only) - Concentration to follow    AFB Smear [[id number]] Collected: 11/21/24 2143    Order Status: Completed Specimen: Sputum, Expectorated Updated: 11/22/24 1208     AFB Smear No AFB seen (Direct smear only) - Concentration to follow    Blood Culture [[id number]]  (Normal) Collected: 11/20/24 0314    Order Status: Completed Specimen: Blood from Hand, Left Updated: 11/22/24 1150     Blood Culture No Growth At 48 Hours    Blood Culture [[id number]]  (Normal) Collected: 11/20/24 0314    Order Status: Completed Specimen: Blood from Arm, Left Updated: 11/22/24 1150     Blood Culture No Growth At 48 Hours    Stool Culture [[id number]]  (Normal) Collected: 11/20/24 0547    Order Status: Completed Specimen: Stool Updated: 11/22/24 0909     Stool Culture Negative for Salmonella, Shigella, Campylobacter, Vibrio, Aeromonas,  Pleisiomonas,Yersinia, or Shiga Toxin 1 and 2.    Respiratory Culture [[id number]]  (Abnormal) Collected: 11/20/24 1245    Order Status: Completed Specimen: Sputum, Expectorated Updated: 11/22/24 0803     Respiratory Culture Moderate Yeast     Comment: with normal respiratory adriel        GRAM STAIN Quality 3+      Moderate Yeast      Moderate Gram Positive Rods      Few Gram Negative Rods      Rare Gram positive cocci    Mycobacteria and Nocardia Culture [[id number]] Collected: 11/21/24 2143    Order Status: Sent Specimen: Respiratory Updated: 11/21/24 2153    Mycobacteria and Nocardia Culture [[id number]] Collected: 11/21/24 1632    Order Status: Sent Specimen: Respiratory Updated: 11/21/24 2002    C.trach/N.gonor AMP RNA [[id number]] Collected: 11/21/24 1510    Order Status: Sent Specimen: Throat Updated: 11/21/24 1539    AFB Smear [[id number]] Collected: 11/21/24 0739    Order Status: Canceled Specimen: Sputum Updated: 11/21/24 1401    Chlamydia/GC, PCR [[id number]]     Order Status: Canceled Specimen: Genital from Anal     Chlamydia/GC, PCR [[id number]] Collected: 11/21/24 0928    Order Status: Completed Specimen: Genital from Rectal Updated: 11/21/24 1125     Chlamydia trachomatis PCR Not Detected     N. gonorrhea PCR Not Detected     Source Rectal    Narrative:      The Xpert CT/NG test, performed on the GeneXpert system is a qualitative in vitro real-time polymerase chain reaction (PCR) test for the automated detected and differentiation for genomic DNA from Chlamydia trachomatis (CT) and/or Neisseria gonorrhoeae (NG).    Mycobacteria and Nocardia Culture [[id number]] Collected: 11/21/24 0739    Order Status: Sent Specimen: Respiratory from Sputum, Expectorated Updated: 11/21/24 0746    Mycobacteria and Nocardia Culture [[id number]] Collected: 11/21/24 0738    Order Status: Sent Specimen: Respiratory from Sputum, Expectorated Updated: 11/21/24 0745    Urine culture [[id number]]  (Abnormal)  (Susceptibility)  Collected: 11/19/24 1824    Order Status: Completed Specimen: Urine Updated: 11/21/24 0631     Urine Culture >/= 100,000 colonies/ml Escherichia coli    Fungal Culture Blood [[id number]] Collected: 11/21/24 0431    Order Status: Sent Specimen: Venous Blood Line Updated: 11/21/24 0445    Chlamydia/GC, PCR [[id number]] Collected: 11/21/24 0341    Order Status: Canceled Specimen: Urine, Clean Catch Updated: 11/21/24 0400    Fungal Culture [[id number]] Collected: 11/20/24 2251    Order Status: Sent Specimen: Sputum, Expectorated Updated: 11/20/24 2346    C.trach/N.gonor AMP RNA [[id number]]     Order Status: Canceled     Clostridium Diff Toxin, A & B, EIA [[id number]]     Order Status: Canceled Specimen: Stool     Cryptococcal antigen, blood [[id number]]  (Normal) Collected: 11/20/24 0314    Order Status: Completed Specimen: Blood, Venous Updated: 11/20/24 0721     Cryptococcal Antigen, Serum Negative    Clostridium Diff Toxin, A & B, EIA [[id number]]     Order Status: Canceled Specimen: Stool           Antibiotics:  Antibiotics (From admission, onward)      Start     Stop Route Frequency Ordered    11/22/24 0900  cefTRIAXone injection 2 g         11/27/24 0859 IV Every 24 hours (non-standard times) 11/21/24 1133    11/20/24 0900  metroNIDAZOLE tablet 500 mg         11/27/24 0859 Oral Every 12 hours 11/19/24 2324             ASSESSMENT AND PLAN   Uncontrolled HIV/AIDs (VL 114k, CD4 count 37 (8%))  Thrombocytosis  RLL lung opacity concerning for malignancy    - Has history of HIV infection/AIDS; used to follow with [facility] ID, last appointment in 2021. Not currently on anti-retrovirals. 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  - HIV ,696, CD4 count 37 (8%). HIV genotyping pending.  - ID consulted. Recommended opportunistic infection workup as well as RLL biopsy. Following OI workup.  - GI panel, stool culture, and stool ova/parasite  pending.  - IR consulted for RLL lung opacity, infection vs malignancy. They state that they can possible do it early next week. Will work with IR to schedule and place NPO status as appropriate.  - PT/OT following.  - Nutrition consulted.  Blood cultures no growth at 48.    A why workup thus far:  Fungitell positive at 301 QuantiFERON gold indeterminate result.  Aspergillus antigen negative.  CMV DNA PCR positive FiO2 7.  G6 PD negative.  Coccidioides antibody screen reactive.  Syphilis, gonorrhea, Trichomonas tests negative.  Respiratory panel negative.  Hepatitis a, B, C negative.  Cryptococcus antigen negative.  Otherwise following with infectious disease workup.     Failure to thrive, suspected 2/2 to above  Severe malnutrition  Iron deficiency anemia  Anemia of chronic disease  Thiamine deficiency  - Patient reported decreased PO intake and unintended weight loss.  - Vitamin B12 and folate WNL. Iron panel notable for low iron, transferrin, TIBC, and iron saturation, suggestive of TIFFANIE. Ferritin elevated most likely 2/2 chronic disease.  - thiamine 100 daily.  - Continue multivitamin and iron.  - hg 7.5 today stable.   - Will need GI evaluation outpatient with scope.  - Nutrition consulted. Recommended Megace.     Hypokalemia (improving)  Hypophosphatemia  Hypomagnesemia  Potassium normal limits today.  Phos level 2.2.  Magnesium 1.5.  Repleting magnesium with 2 g IV magnesium, continuing sodium potassium phosphate packets pains  Patient now has a healthy appetite tolerating double portions.    UTI  Trichomonas on UA  - UA suggestive of UTI. Received ceftriaxone 1 g in ED.  - Urine culture positive for 100k+ E. Coli  - Continue ceftriaxone 2g daily for 5 days and continue metronidazole 500 mg BID for 7 days    Oral thrush  - Respiratory culture showed moderate yeast.  - Started on fluconazole 200 mg daily on 11/21 for 10 days (end date: 12/1/2024)      DVT Prophylaxis: Heparin  GI Prophylaxis: None  Abx:  Rocephin, metronidazole, and fluconazole  Access: PIV  Fluids: None  Diet: Diet Adult Regular Double Portions    Code Status: Full Code    Disposition: 64 y.o. male with PMHx uncontrolled HIV admitted for failure to thrive. ID following. Awaiting negative Mycobacterium and AFB before isolation precautions taken off. Disposition pending Mycobacterium workup.          Goran Velez DO  [facility] Internal Medicine, [facility]

## 2024-11-24 PROBLEM — B59: Status: ACTIVE | Noted: 2024-11-24

## 2024-11-24 LAB
ALBUMIN SERPL-MCNC: 1.7 G/DL (ref 3.4–4.8)
ALBUMIN/GLOB SERPL: 0.3 RATIO (ref 1.1–2)
ALP SERPL-CCNC: 290 UNIT/L (ref 40–150)
ALT SERPL-CCNC: 36 UNIT/L (ref 0–55)
ANION GAP SERPL CALC-SCNC: 4 MEQ/L
AST SERPL-CCNC: 61 UNIT/L (ref 5–34)
BASOPHILS # BLD AUTO: 0.02 X10(3)/MCL
BASOPHILS NFR BLD AUTO: 0.4 %
BILIRUB SERPL-MCNC: 0.2 MG/DL
BUN SERPL-MCNC: 16.5 MG/DL (ref 8.4–25.7)
CALCIUM SERPL-MCNC: 8.5 MG/DL (ref 8.8–10)
CHLORIDE SERPL-SCNC: 107 MMOL/L (ref 98–107)
CO2 SERPL-SCNC: 30 MMOL/L (ref 23–31)
CREAT SERPL-MCNC: 0.85 MG/DL (ref 0.72–1.25)
CREAT/UREA NIT SERPL: 19
EOSINOPHIL # BLD AUTO: 0.25 X10(3)/MCL (ref 0–0.9)
EOSINOPHIL NFR BLD AUTO: 5.5 %
ERYTHROCYTE [DISTWIDTH] IN BLOOD BY AUTOMATED COUNT: 15.2 % (ref 11.5–17)
GFR SERPLBLD CREATININE-BSD FMLA CKD-EPI: >60 ML/MIN/1.73/M2
GLOBULIN SER-MCNC: 5.3 GM/DL (ref 2.4–3.5)
GLUCOSE SERPL-MCNC: 114 MG/DL (ref 82–115)
H CAPSUL AB SERPL ID: NEGATIVE
H CAPSUL YST AB TITR SER CF: NEGATIVE {TITER}
HCT VFR BLD AUTO: 24.5 % (ref 42–52)
HGB BLD-MCNC: 7.9 G/DL (ref 14–18)
IMM GRANULOCYTES # BLD AUTO: 0.09 X10(3)/MCL (ref 0–0.04)
IMM GRANULOCYTES NFR BLD AUTO: 2 %
LYMPHOCYTES # BLD AUTO: 0.83 X10(3)/MCL (ref 0.6–4.6)
LYMPHOCYTES NFR BLD AUTO: 18.2 %
MAGNESIUM SERPL-MCNC: 1.7 MG/DL (ref 1.6–2.6)
MCH RBC QN AUTO: 28.5 PG (ref 27–31)
MCHC RBC AUTO-ENTMCNC: 32.2 G/DL (ref 33–36)
MCV RBC AUTO: 88.4 FL (ref 80–94)
MONOCYTES # BLD AUTO: 0.39 X10(3)/MCL (ref 0.1–1.3)
MONOCYTES NFR BLD AUTO: 8.6 %
NEUTROPHILS # BLD AUTO: 2.98 X10(3)/MCL (ref 2.1–9.2)
NEUTROPHILS NFR BLD AUTO: 65.3 %
NRBC BLD AUTO-RTO: 0 %
PHOSPHATE SERPL-MCNC: 3 MG/DL (ref 2.3–4.7)
PLATELET # BLD AUTO: 440 X10(3)/MCL (ref 130–400)
PMV BLD AUTO: 9.3 FL (ref 7.4–10.4)
POTASSIUM SERPL-SCNC: 4.5 MMOL/L (ref 3.5–5.1)
PROT SERPL-MCNC: 7 GM/DL (ref 5.8–7.6)
RBC # BLD AUTO: 2.77 X10(6)/MCL (ref 4.7–6.1)
RHODAMINE-AURAMINE STN SPEC: NORMAL
RHODAMINE-AURAMINE STN SPEC: NORMAL
SODIUM SERPL-SCNC: 141 MMOL/L (ref 136–145)
WBC # BLD AUTO: 4.56 X10(3)/MCL (ref 4.5–11.5)

## 2024-11-24 PROCEDURE — 21400001 HC TELEMETRY ROOM

## 2024-11-24 PROCEDURE — 25000003 PHARM REV CODE 250

## 2024-11-24 PROCEDURE — 80053 COMPREHEN METABOLIC PANEL: CPT

## 2024-11-24 PROCEDURE — 36415 COLL VENOUS BLD VENIPUNCTURE: CPT

## 2024-11-24 PROCEDURE — 83735 ASSAY OF MAGNESIUM: CPT

## 2024-11-24 PROCEDURE — 63600175 PHARM REV CODE 636 W HCPCS

## 2024-11-24 PROCEDURE — 84100 ASSAY OF PHOSPHORUS: CPT

## 2024-11-24 PROCEDURE — 85025 COMPLETE CBC W/AUTO DIFF WBC: CPT

## 2024-11-24 PROCEDURE — 63700000 PHARM REV CODE 250 ALT 637 W/O HCPCS

## 2024-11-24 PROCEDURE — 27000207 HC ISOLATION

## 2024-11-24 PROCEDURE — 25000003 PHARM REV CODE 250: Performed by: EMERGENCY MEDICINE

## 2024-11-24 RX ADMIN — ATOVAQUONE 750 MG: 750 SUSPENSION ORAL at 08:11

## 2024-11-24 RX ADMIN — HEPARIN SODIUM 5000 UNITS: 5000 INJECTION, SOLUTION INTRAVENOUS; SUBCUTANEOUS at 08:11

## 2024-11-24 RX ADMIN — POTASSIUM & SODIUM PHOSPHATES POWDER PACK 280-160-250 MG 2 PACKET: 280-160-250 PACK at 05:11

## 2024-11-24 RX ADMIN — FERROUS SULFATE TAB 325 MG (65 MG ELEMENTAL FE) 1 EACH: 325 (65 FE) TAB at 08:11

## 2024-11-24 RX ADMIN — FLUCONAZOLE 200 MG: 100 TABLET ORAL at 08:11

## 2024-11-24 RX ADMIN — Medication 400 ML: at 06:11

## 2024-11-24 RX ADMIN — METRONIDAZOLE 500 MG: 500 TABLET ORAL at 08:11

## 2024-11-24 RX ADMIN — Medication 400 ML: at 05:11

## 2024-11-24 RX ADMIN — Medication 400 ML: at 10:11

## 2024-11-24 RX ADMIN — Medication 400 ML: at 02:11

## 2024-11-24 RX ADMIN — CEFTRIAXONE SODIUM 2 G: 2 INJECTION, POWDER, FOR SOLUTION INTRAMUSCULAR; INTRAVENOUS at 08:11

## 2024-11-24 RX ADMIN — Medication 400 ML: at 12:11

## 2024-11-24 RX ADMIN — THERA TABS 1 TABLET: TAB at 08:11

## 2024-11-24 RX ADMIN — Medication 100 MG: at 08:11

## 2024-11-24 NOTE — PROGRESS NOTES
[facility] Internal Medicine  PROGRESS NOTE    Patient's Name: Anne Terry  : 1960  MRN: [id number]    Admission Date: 2024  Length of Stay: 5  Attending Physician: Marianne Partida MD  Resident: Agustin  Intern: Nasir    SUBJECTIVE     Chief Complaint   Patient presents with    Fatigue     IN / AASI W REPORT OF DECLINING HEALTH FOR MONTHS.  PT ONLY CO LT FOOT PAIN.  FAMILY REPORTS WEAKNESS W WT LOSS.  PT POOR HISTORIAN.      History of Present Illness:  Anne Terry is a 64 y.o. male, with PMH significant for HIV/AIDS, who presented to [facility] ED on 2024  with primary complaints of fatigue and weight loss. Patient states that he began feeling more fatigued, had decreased appetite, and has experienced significant weight loss since October. He also has had a dry cough but denies hemoptysis. He has only been able to tolerate one meal a day, consisting of mainly cookies and soda. All other foods cause diarrhea. Denies chest pain, abdominal pain, nausea, vomiting, constipation, dysuria, urinary frequency, hematuria, hematochezia, or melena. He was feeling more weak and fatigued today so he called 911.      Per chart review, patient has a history of HIV/AIDS diagnosed on . He used to follow with [facility] ID but was lost to follow up since . He was on Ziagen 300 mg daily, Descovy daily, Prezista 800 mg daily, and Norovir 100 mg daily. He has a history of medication non-adherence. He also has a history of cryptococcosis and histoplasmosis in . 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        ED Course - Upon arrival, pt afebrile 98.8 F, /80, HR 93, RR 16, SpO2 100% on RA. Workup notable for H/H 7.8/24.2, Plt 481, decreased absolute lymphocytes, potassium 2.7, BUN 35.3, creatinine 1.8 (baseline 1.3), elevated total protein 8.3, albumin 2.0, . UA 2+ protein, 1+ blood, 500 leukocyte esterase, >100 WBC, many bacteria,  many trichomonas. CT chest abdomen pelvis performed showing RLL lung irregularly defined consolidation with areas of necrosis and LLL nodules, concerning for malignancy. Also showed small bowel and colonic mural thickening and enterocolitis. He was given ceftriaxone 1 g and potassium bicarb 40 mEq in the ED. Internal medicine was consulted for further management.     Hospital Course/Significant Events:  11/19/2024: Admitted to [facility].    Interval History:  NAEON. SBP slightly elevated in the 140s but other vitals WNL. Hgb uptrending. Otherwise, patient states that he's doing well. He's tolerating PO intake eating most of his meals. Reports improved stool output and consistency with solid food intake. Denies any fever, chills, chest pain, SOB, abdominal pain, nausea, vomiting, urinary difficulties/pain, and peripheral edema.      Review of Systems:  A 12-pt ROS was conducted and was negative unless stated above.    OBJECTIVE     VITAL SIGNS: 24 HR MIN & MAX Most Recent Vitals   Temp  Min: 98.2 °F (36.8 °C)  Max: 98.5 °F (36.9 °C)  98.5 °F (36.9 °C)   BP  Min: 145/85  Max: 149/85  (!) 146/85    Pulse  Min: 59  Max: 69  (!) 59   Resp  Min: 16  Max: 20  20    SpO2  Min: 99 %  Max: 100 %  99 %    Body mass index is 16.87 kg/m².    Intake/Output:  I/O last 3 completed shifts:  In: 4729.9 [P.O.:4686; I.V.:43.9]  Out: 300 [Urine:300]  Constitutional:       General: He is awake.      Appearance: He is cachectic.  Chronically ill-appearing.  No acute distress.    Eyes:      General: No scleral icterus.  Cardiovascular:      Rate and Rhythm: Normal rate and regular rhythm.      Pulses: Normal pulses.      Heart sounds: Normal heart sounds. No murmur heard.  Pulmonary:      Effort: Pulmonary effort is normal. No respiratory distress.      Breath sounds: Normal breath sounds.   Musculoskeletal:      Right lower leg: No edema.      Left lower leg: No edema.   Skin:     General: Skin is warm and dry.      Findings: Lesion  (hyperpigmented patches/lesions to the pretibial region and ankles with surrounding dry skin) present.   Neurological:      General: No focal deficit present.      Mental Status: He is alert.   Psychiatric:         Behavior: Behavior normal. Behavior is cooperative.     Current Medications:  Scheduled:   atovaquone  750 mg Oral Q12H    cefTRIAXone (Rocephin) IV (PEDS and ADULTS)  2 g Intravenous Q24H    electrolytes-dextrose  400 mL Oral Q4H    ferrous sulfate  1 tablet Oral Daily    fluconazole  200 mg Oral QHS    heparin (porcine)  5,000 Units Subcutaneous Q12H    metroNIDAZOLE  500 mg Oral Q12H    multivitamin  1 tablet Oral Daily    potassium, sodium phosphates  2 packet Oral Daily before evening meal    thiamine  100 mg Oral Daily      Infusions:      PRNs:    Current Facility-Administered Medications:     0.9%  NaCl infusion (for blood administration), , Intravenous, Q24H PRN    dextrose 10%, 12.5 g, Intravenous, PRN    dextrose 10%, 25 g, Intravenous, PRN    glucagon (human recombinant), 1 mg, Intramuscular, PRN    glucose, 16 g, Oral, PRN    glucose, 24 g, Oral, PRN    naloxone, 0.02 mg, Intravenous, PRN    sodium chloride 0.9%, 10 mL, Intravenous, Q12H PRN       Microbiology:  Microbiology Results (last 7 days)       Procedure Component Value Units Date/Time    C.trach/N.gonor AMP RNA [[id number]] Collected: 11/21/24 1510    Order Status: Completed Specimen: Throat Updated: 11/23/24 2026     Source THROAT     Chlamydia trachomatis amplified RNA Negative     Comment:    -------------------ADDITIONAL INFORMATION-------------------  This report is intended for use in clinical monitoring   and management of patients. It is not intended for use   in medical-legal applications.         Neisseria gonorrhoeae amplified RNA Negative     Comment:    -------------------ADDITIONAL INFORMATION-------------------  This report is intended for use in clinical monitoring   and management of patients. It is not intended for  use   in medical-legal applications.      Test Performed by:  [facility]  [address] [place] [address]  : Shayna Chavarria Ph.D.; CLIA# [id number]        SOURCE: THROAT    Blood Culture [[id number]]  (Normal) Collected: 11/20/24 0314    Order Status: Completed Specimen: Blood from Hand, Left Updated: 11/23/24 1238     Blood Culture No Growth At 72 Hours    Blood Culture [[id number]]  (Normal) Collected: 11/20/24 0314    Order Status: Completed Specimen: Blood from Arm, Left Updated: 11/23/24 1238     Blood Culture No Growth At 72 Hours    AFB Smear [[id number]] Collected: 11/21/24 0738    Order Status: Completed Specimen: Sputum Updated: 11/22/24 1208     AFB Smear No AFB seen (Direct smear only) - Concentration to follow    AFB Smear [[id number]] Collected: 11/21/24 2143    Order Status: Completed Specimen: Sputum, Expectorated Updated: 11/22/24 1208     AFB Smear No AFB seen (Direct smear only) - Concentration to follow    Stool Culture [[id number]]  (Normal) Collected: 11/20/24 0547    Order Status: Completed Specimen: Stool Updated: 11/22/24 0909     Stool Culture Negative for Salmonella, Shigella, Campylobacter, Vibrio, Aeromonas, Pleisiomonas,Yersinia, or Shiga Toxin 1 and 2.    Respiratory Culture [[id number]]  (Abnormal) Collected: 11/20/24 1245    Order Status: Completed Specimen: Sputum, Expectorated Updated: 11/22/24 0803     Respiratory Culture Moderate Yeast     Comment: with normal respiratory adriel        GRAM STAIN Quality 3+      Moderate Yeast      Moderate Gram Positive Rods      Few Gram Negative Rods      Rare Gram positive cocci    Mycobacteria and Nocardia Culture [[id number]] Collected: 11/21/24 2143    Order Status: Sent Specimen: Respiratory Updated: 11/21/24 2153    Mycobacteria and Nocardia Culture [[id number]] Collected: 11/21/24 1632    Order Status: Sent Specimen: Respiratory Updated: 11/21/24 2002    AFB Smear  [[id number]] Collected: 11/21/24 0739    Order Status: Canceled Specimen: Sputum Updated: 11/21/24 1401    Chlamydia/GC, PCR [[id number]]     Order Status: Canceled Specimen: Genital from Anal     Chlamydia/GC, PCR [[id number]] Collected: 11/21/24 0928    Order Status: Completed Specimen: Genital from Rectal Updated: 11/21/24 1125     Chlamydia trachomatis PCR Not Detected     N. gonorrhea PCR Not Detected     Source Rectal    Narrative:      The Xpert CT/NG test, performed on the GeneXpert system is a qualitative in vitro real-time polymerase chain reaction (PCR) test for the automated detected and differentiation for genomic DNA from Chlamydia trachomatis (CT) and/or Neisseria gonorrhoeae (NG).    Mycobacteria and Nocardia Culture [[id number]] Collected: 11/21/24 0739    Order Status: Sent Specimen: Respiratory from Sputum, Expectorated Updated: 11/21/24 0746    Mycobacteria and Nocardia Culture [[id number]] Collected: 11/21/24 0738    Order Status: Sent Specimen: Respiratory from Sputum, Expectorated Updated: 11/21/24 0745    Urine culture [[id number]]  (Abnormal)  (Susceptibility) Collected: 11/19/24 1824    Order Status: Completed Specimen: Urine Updated: 11/21/24 0631     Urine Culture >/= 100,000 colonies/ml Escherichia coli    Fungal Culture Blood [[id number]] Collected: 11/21/24 0431    Order Status: Sent Specimen: Venous Blood Line Updated: 11/21/24 0445    Chlamydia/GC, PCR [[id number]] Collected: 11/21/24 0341    Order Status: Canceled Specimen: Urine, Clean Catch Updated: 11/21/24 0400    Fungal Culture [[id number]] Collected: 11/20/24 2251    Order Status: Sent Specimen: Sputum, Expectorated Updated: 11/20/24 2346    C.trach/N.gonor AMP RNA [[id number]]     Order Status: Canceled     Clostridium Diff Toxin, A & B, EIA [[id number]]     Order Status: Canceled Specimen: Stool     Cryptococcal antigen, blood [[id number]]  (Normal) Collected: 11/20/24 0314    Order Status: Completed Specimen: Blood, Venous  Updated: 11/20/24 0721     Cryptococcal Antigen, Serum Negative    Clostridium Diff Toxin, A & B, EIA [[id number]]     Order Status: Canceled Specimen: Stool           Antibiotics:  Antibiotics (From admission, onward)      Start     Stop Route Frequency Ordered    11/22/24 0900  cefTRIAXone injection 2 g         11/27/24 0859 IV Every 24 hours (non-standard times) 11/21/24 1133    11/20/24 0900  metroNIDAZOLE tablet 500 mg         11/27/24 0859 Oral Every 12 hours 11/19/24 1474             ASSESSMENT AND PLAN   Uncontrolled HIV/AIDs (VL 114k, CD4 count 37 (8%))  Thrombocytosis  RLL lung opacity concerning for malignancy  Positive PJP  - Has history of HIV infection/AIDS; used to follow with [facility] ID, last appointment in 2021. Not currently on anti-retrovirals. 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  - HIV ,696, CD4 count 37 (8%). HIV genotyping pending.  - ID consulted. Recommended opportunistic infection workup as well as RLL biopsy. Following OI workup.  - GI panel, stool culture, and stool ova/parasite pending.  - IR consulted for RLL lung opacity, infection vs malignancy. They state that they can possible do it early next week. Will work with IR to schedule and place NPO status as appropriate.  - PT/OT following.  - Nutrition consulted.  - Blood cultures no growth at 72hr.  - OI workup thus far:  - Fungitell positive at 301, PJP PCR +, QuantiFERON gold indeterminate result.  Aspergillus antigen negative.  CMV DNA PCR positive FiO2 7.  G6PD negative.  Coccidioides antibody screen reactive.  Syphilis, gonorrhea, Trichomonas tests negative.  Respiratory panel negative.  Hepatitis a, B, C negative.  Bartonella, Cryptococcus antigen negative.  Otherwise following with infectious disease workup.     Failure to thrive, suspected 2/2 to above  Severe malnutrition  Iron deficiency anemia  Anemia of chronic disease  Thiamine deficiency  -  Patient reported decreased PO intake and unintended weight loss.  - Vitamin B12 and folate WNL. Iron panel notable for low iron, transferrin, TIBC, and iron saturation, suggestive of TIFFANIE. Ferritin elevated most likely 2/2 chronic disease.  - thiamine 100 daily.  - Continue multivitamin and iron.  - hg 7.9 today, uptrending.   - Will need GI evaluation outpatient with scope.  - Nutrition consulted. Recommended Megace.     Hypokalemia (improving)  Hypophosphatemia  Hypomagnesemia  - Potassium, phosphate, and magnesium WNL today.  - Continuing sodium potassium phosphate packets PRN.  - Patient now has a healthy appetite tolerating double portions.    UTI  Trichomonas on UA  - UA suggestive of UTI. Received ceftriaxone 1 g in ED.  - Urine culture positive for 100k+ E. Coli  - Discontinued ceftriaxone (s/p 5 day course). Continue metronidazole 500 mg BID for 7 days (end date 11/27).    Oral thrush  - Respiratory culture showed moderate yeast.  - Started on fluconazole 200 mg daily on 11/21 for 10 days (end date: 12/1/2024)      DVT Prophylaxis: Heparin  GI Prophylaxis: None  Abx: Rocephin, metronidazole, and fluconazole  Access: PIV  Fluids: None  Diet: Diet Adult Regular Double Portions    Code Status: Full Code    Disposition: 64 y.o. male with PMHx uncontrolled HIV admitted for failure to thrive. ID following. Awaiting negative Mycobacterium and AFB before isolation precautions taken off. Disposition pending IR biopsy.          Jarod Best MD  PGY-1 Resident  [facility] Internal Medicine Team 1  11/24/2024

## 2024-11-25 LAB
ABACAVIR ISLT GENOTYP: NORMAL
ALBUMIN SERPL-MCNC: 1.8 G/DL (ref 3.4–4.8)
ALBUMIN/GLOB SERPL: 0.3 RATIO (ref 1.1–2)
ALP SERPL-CCNC: 265 UNIT/L (ref 40–150)
ALT SERPL-CCNC: 32 UNIT/L (ref 0–55)
ANION GAP SERPL CALC-SCNC: 5 MEQ/L
AST SERPL-CCNC: 51 UNIT/L (ref 5–34)
ATAZANAVIR+RITONAVIR ISLT GENOTYP: NORMAL
BACTERIA BLD CULT: NORMAL
BACTERIA BLD CULT: NORMAL
BASOPHILS # BLD AUTO: 0.01 X10(3)/MCL
BASOPHILS NFR BLD AUTO: 0.3 %
BICTEGRAVIR ISLT GENOTYP: NORMAL
BILIRUB SERPL-MCNC: 0.2 MG/DL
BUN SERPL-MCNC: 13.5 MG/DL (ref 8.4–25.7)
CABOTEGRAVIR ISLT GENOTYP: NORMAL
CALCIUM SERPL-MCNC: 8.1 MG/DL (ref 8.8–10)
CHLORIDE SERPL-SCNC: 103 MMOL/L (ref 98–107)
CO2 SERPL-SCNC: 31 MMOL/L (ref 23–31)
COCCIDIOIDES AB SER QL CF: NEGATIVE
COCCIDIOIDES IGG SER QL: NEGATIVE
COCCIDIOIDES IGM SER QL: NEGATIVE
CREAT SERPL-MCNC: 0.86 MG/DL (ref 0.72–1.25)
CREAT/UREA NIT SERPL: 16
CRP SERPL-MCNC: 16.4 MG/L
DARUNAVIR+RITONAVIR ISLT GENOTYP: NORMAL
DIDANOSINE ISLT GENOTYP: NORMAL
DOLUTEGRAVIR ISLT GENOTYP: NORMAL
DORAVIRINE ISLT GENOTYP: NORMAL
EFAVIRENZ ISLT GENOTYP: NORMAL
ELVITEGRAVIR ISLT GENOTYP: NORMAL
EMTRICITABINE ISLT GENOTYP: NORMAL
EOSINOPHIL # BLD AUTO: 0.26 X10(3)/MCL (ref 0–0.9)
EOSINOPHIL NFR BLD AUTO: 6.6 %
ERYTHROCYTE [DISTWIDTH] IN BLOOD BY AUTOMATED COUNT: 15.5 % (ref 11.5–17)
ERYTHROCYTE [SEDIMENTATION RATE] IN BLOOD: 60 MM/HR (ref 0–15)
ETRAVIRINE ISLT GENOTYP: NORMAL
FOSAMPRENAVIR+RITONAVIR ISLT GENOTYP: NORMAL
FUNGUS SPEC CULT: ABNORMAL
GFR SERPLBLD CREATININE-BSD FMLA CKD-EPI: >60 ML/MIN/1.73/M2
GLOBULIN SER-MCNC: 5.3 GM/DL (ref 2.4–3.5)
GLUCOSE SERPL-MCNC: 97 MG/DL (ref 82–115)
HCT VFR BLD AUTO: 24.1 % (ref 42–52)
HGB BLD-MCNC: 7.6 G/DL (ref 14–18)
HIV 1 RNA GENOTYPE ISLT: NORMAL
HIV 1 RNA RT + PR + IN MUT DET PLAS SEQ: NORMAL
HIV NNRTI GENE MUT DET ISLT: NORMAL
HIV NRTI GENE MUT DET ISLT: NORMAL
HIV PI GENE MUT DET ISLT: NORMAL
HIV1 INTEGRASE GENE MUT DET ISLT: NORMAL
IMM GRANULOCYTES # BLD AUTO: 0.07 X10(3)/MCL (ref 0–0.04)
IMM GRANULOCYTES NFR BLD AUTO: 1.8 %
INDINAVIR+RITONAVIR ISLT GENOTYP: NORMAL
LAB AOE PNL PATIENT: NORMAL
LAMIVUDINE ISLT GENOTYP: NORMAL
LOPINAVIR+RITONAVIR ISLT GENOTYP: NORMAL
LYMPHOCYTES # BLD AUTO: 0.75 X10(3)/MCL (ref 0.6–4.6)
LYMPHOCYTES NFR BLD AUTO: 18.9 %
M INTEGRASE FAILED CODONS: NORMAL
M PROTEASE FAILED CODONS: NORMAL
M REVERSE TRANSCRIPTASE FAILED CODONS: NORMAL
MAGNESIUM SERPL-MCNC: 1.5 MG/DL (ref 1.6–2.6)
MCH RBC QN AUTO: 27.9 PG (ref 27–31)
MCHC RBC AUTO-ENTMCNC: 31.5 G/DL (ref 33–36)
MCV RBC AUTO: 88.6 FL (ref 80–94)
MONOCYTES # BLD AUTO: 0.38 X10(3)/MCL (ref 0.1–1.3)
MONOCYTES NFR BLD AUTO: 9.6 %
NELFINAVIR ISLT GENOTYP: NORMAL
NEUTROPHILS # BLD AUTO: 2.49 X10(3)/MCL (ref 2.1–9.2)
NEUTROPHILS NFR BLD AUTO: 62.8 %
NEVIRAPINE ISLT GENOTYP: NORMAL
NRBC BLD AUTO-RTO: 0 %
O+P STL MICRO: NORMAL
PHOSPHATE SERPL-MCNC: 3.5 MG/DL (ref 2.3–4.7)
PLATELET # BLD AUTO: 417 X10(3)/MCL (ref 130–400)
PMV BLD AUTO: 9.5 FL (ref 7.4–10.4)
POTASSIUM SERPL-SCNC: 4.4 MMOL/L (ref 3.5–5.1)
PROT SERPL-MCNC: 7.1 GM/DL (ref 5.8–7.6)
RALTEGRAVIR ISLT GENOTYP: NORMAL
RBC # BLD AUTO: 2.72 X10(6)/MCL (ref 4.7–6.1)
RILPIVIRINE ISLT GENOTYP: NORMAL
SAQUINAVIR+RITONAVIR ISLT GENOTYP: NORMAL
SODIUM SERPL-SCNC: 139 MMOL/L (ref 136–145)
STAVUDINE ISLT GENOTYP: NORMAL
TENOFOVIR ISLT GENOTYP: NORMAL
TIPRANAVIR+RITONAVIR ISLT GENOTYP: NORMAL
WBC # BLD AUTO: 3.96 X10(3)/MCL (ref 4.5–11.5)
ZIDOVUDINE ISLT GENOTYP: NORMAL

## 2024-11-25 PROCEDURE — 21400001 HC TELEMETRY ROOM

## 2024-11-25 PROCEDURE — 25000003 PHARM REV CODE 250: Performed by: EMERGENCY MEDICINE

## 2024-11-25 PROCEDURE — 36415 COLL VENOUS BLD VENIPUNCTURE: CPT

## 2024-11-25 PROCEDURE — 83735 ASSAY OF MAGNESIUM: CPT

## 2024-11-25 PROCEDURE — 63600175 PHARM REV CODE 636 W HCPCS

## 2024-11-25 PROCEDURE — 85652 RBC SED RATE AUTOMATED: CPT | Performed by: NURSE PRACTITIONER

## 2024-11-25 PROCEDURE — 85025 COMPLETE CBC W/AUTO DIFF WBC: CPT

## 2024-11-25 PROCEDURE — 25000003 PHARM REV CODE 250

## 2024-11-25 PROCEDURE — 86140 C-REACTIVE PROTEIN: CPT | Performed by: NURSE PRACTITIONER

## 2024-11-25 PROCEDURE — 80053 COMPREHEN METABOLIC PANEL: CPT

## 2024-11-25 PROCEDURE — 63700000 PHARM REV CODE 250 ALT 637 W/O HCPCS

## 2024-11-25 PROCEDURE — 84100 ASSAY OF PHOSPHORUS: CPT

## 2024-11-25 RX ORDER — MAGNESIUM SULFATE 1 G/100ML
1 INJECTION INTRAVENOUS ONCE
Status: COMPLETED | OUTPATIENT
Start: 2024-11-25 | End: 2024-11-25

## 2024-11-25 RX ADMIN — HEPARIN SODIUM 5000 UNITS: 5000 INJECTION, SOLUTION INTRAVENOUS; SUBCUTANEOUS at 08:11

## 2024-11-25 RX ADMIN — METRONIDAZOLE 500 MG: 500 TABLET ORAL at 08:11

## 2024-11-25 RX ADMIN — Medication 400 ML: at 02:11

## 2024-11-25 RX ADMIN — MAGNESIUM SULFATE IN DEXTROSE 1 G: 10 INJECTION, SOLUTION INTRAVENOUS at 06:11

## 2024-11-25 RX ADMIN — FERROUS SULFATE TAB 325 MG (65 MG ELEMENTAL FE) 1 EACH: 325 (65 FE) TAB at 09:11

## 2024-11-25 RX ADMIN — ATOVAQUONE 750 MG: 750 SUSPENSION ORAL at 08:11

## 2024-11-25 RX ADMIN — Medication 400 ML: at 06:11

## 2024-11-25 RX ADMIN — Medication 400 ML: at 11:11

## 2024-11-25 RX ADMIN — POTASSIUM & SODIUM PHOSPHATES POWDER PACK 280-160-250 MG 2 PACKET: 280-160-250 PACK at 05:11

## 2024-11-25 RX ADMIN — Medication 400 ML: at 10:11

## 2024-11-25 RX ADMIN — METRONIDAZOLE 500 MG: 500 TABLET ORAL at 09:11

## 2024-11-25 RX ADMIN — Medication 100 MG: at 09:11

## 2024-11-25 RX ADMIN — ATOVAQUONE 750 MG: 750 SUSPENSION ORAL at 09:11

## 2024-11-25 RX ADMIN — FLUCONAZOLE 200 MG: 100 TABLET ORAL at 08:11

## 2024-11-25 RX ADMIN — THERA TABS 1 TABLET: TAB at 09:11

## 2024-11-25 RX ADMIN — HEPARIN SODIUM 5000 UNITS: 5000 INJECTION, SOLUTION INTRAVENOUS; SUBCUTANEOUS at 09:11

## 2024-11-25 NOTE — PROGRESS NOTES
[facility] Internal Medicine  PROGRESS NOTE    Patient's Name: Anne Terry  : 1960  MRN: [id number]    Admission Date: 2024  Length of Stay: 6  Attending Physician: Marianne Partida MD  Resident: Agustin  Intern: Nasir    SUBJECTIVE     Chief Complaint   Patient presents with    Fatigue     IN / AASI W REPORT OF DECLINING HEALTH FOR MONTHS.  PT ONLY CO LT FOOT PAIN.  FAMILY REPORTS WEAKNESS W WT LOSS.  PT POOR HISTORIAN.      History of Present Illness:  Anne Terry is a 64 y.o. male, with PMH significant for HIV/AIDS, who presented to [facility] ED on 2024  with primary complaints of fatigue and weight loss. Patient states that he began feeling more fatigued, had decreased appetite, and has experienced significant weight loss since October. He also has had a dry cough but denies hemoptysis. He has only been able to tolerate one meal a day, consisting of mainly cookies and soda. All other foods cause diarrhea. Denies chest pain, abdominal pain, nausea, vomiting, constipation, dysuria, urinary frequency, hematuria, hematochezia, or melena. He was feeling more weak and fatigued today so he called 911.      Per chart review, patient has a history of HIV/AIDS diagnosed on . He used to follow with [facility] ID but was lost to follow up since . He was on Ziagen 300 mg daily, Descovy daily, Prezista 800 mg daily, and Norovir 100 mg daily. He has a history of medication non-adherence. He also has a history of cryptococcosis and histoplasmosis in . 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        ED Course - Upon arrival, pt afebrile 98.8 F, /80, HR 93, RR 16, SpO2 100% on RA. Workup notable for H/H 7.8/24.2, Plt 481, decreased absolute lymphocytes, potassium 2.7, BUN 35.3, creatinine 1.8 (baseline 1.3), elevated total protein 8.3, albumin 2.0, . UA 2+ protein, 1+ blood, 500 leukocyte esterase, >100 WBC, many bacteria,  many trichomonas. CT chest abdomen pelvis performed showing RLL lung irregularly defined consolidation with areas of necrosis and LLL nodules, concerning for malignancy. Also showed small bowel and colonic mural thickening and enterocolitis. He was given ceftriaxone 1 g and potassium bicarb 40 mEq in the ED. Internal medicine was consulted for further management.     Hospital Course/Significant Events:  11/19/2024: Admitted to [facility].    Interval History:  NAEON. SBP slightly elevated in the 140s but other vitals WNL. Stable. Otherwise, patient states that he's doing well. Tolerating PO well. Denies any fever, chills, chest pain, SOB, abdominal pain, nausea, vomiting, urinary difficulties/pain, and peripheral edema.    Plan to reconvene with IR today to discuss timing of biopsy of lung lesion.    Review of Systems:  A 12-pt ROS was conducted and was negative unless stated above.    OBJECTIVE     VITAL SIGNS: 24 HR MIN & MAX Most Recent Vitals   Temp  Min: 98.5 °F (36.9 °C)  Max: 99 °F (37.2 °C)  99 °F (37.2 °C)   BP  Min: 140/84  Max: 162/86  (!) 140/84    Pulse  Min: 52  Max: 74  74   Resp  Min: 20  Max: 20  20    SpO2  Min: 97 %  Max: 100 %  97 %    Body mass index is 16.87 kg/m².    Intake/Output:  I/O last 3 completed shifts:  In: 3773.9 [P.O.:3730; I.V.:43.9]  Out: 300 [Urine:300]  Constitutional:       General: He is awake.      Appearance: He is cachectic.  Chronically ill-appearing.  No acute distress.    Eyes:      General: No scleral icterus.  Cardiovascular:      Rate and Rhythm: Normal rate and regular rhythm.      Pulses: Normal pulses.      Heart sounds: Normal heart sounds. No murmur heard.  Pulmonary:      Effort: Pulmonary effort is normal. No respiratory distress.      Breath sounds: Normal breath sounds.   Musculoskeletal:      Right lower leg: No edema.      Left lower leg: No edema.   Skin:     General: Skin is warm and dry.      Findings: Lesion (hyperpigmented patches/lesions to the  pretibial region and ankles with surrounding dry skin) present.   Neurological:      General: No focal deficit present.      Mental Status: He is alert.   Psychiatric:         Behavior: Behavior normal. Behavior is cooperative.     Current Medications:  Scheduled:   atovaquone  750 mg Oral Q12H    electrolytes-dextrose  400 mL Oral Q4H    ferrous sulfate  1 tablet Oral Daily    fluconazole  200 mg Oral QHS    heparin (porcine)  5,000 Units Subcutaneous Q12H    metroNIDAZOLE  500 mg Oral Q12H    multivitamin  1 tablet Oral Daily    potassium, sodium phosphates  2 packet Oral Daily before evening meal    thiamine  100 mg Oral Daily      Infusions:      PRNs:    Current Facility-Administered Medications:     0.9%  NaCl infusion (for blood administration), , Intravenous, Q24H PRN    dextrose 10%, 12.5 g, Intravenous, PRN    dextrose 10%, 25 g, Intravenous, PRN    glucagon (human recombinant), 1 mg, Intramuscular, PRN    glucose, 16 g, Oral, PRN    glucose, 24 g, Oral, PRN    naloxone, 0.02 mg, Intravenous, PRN    sodium chloride 0.9%, 10 mL, Intravenous, Q12H PRN       Microbiology:  Microbiology Results (last 7 days)       Procedure Component Value Units Date/Time    Blood Culture [[id number]]  (Normal) Collected: 11/20/24 0314    Order Status: Completed Specimen: Blood from Hand, Left Updated: 11/24/24 1308     Blood Culture No Growth At 96 Hours    Blood Culture [[id number]]  (Normal) Collected: 11/20/24 0314    Order Status: Completed Specimen: Blood from Arm, Left Updated: 11/24/24 1308     Blood Culture No Growth At 96 Hours    C.trach/N.gonor AMP RNA [[id number]] Collected: 11/21/24 1510    Order Status: Completed Specimen: Throat Updated: 11/23/24 2026     Source THROAT     Chlamydia trachomatis amplified RNA Negative     Comment:    -------------------ADDITIONAL INFORMATION-------------------  This report is intended for use in clinical monitoring   and management of patients. It is not intended for use   in  medical-legal applications.         Neisseria gonorrhoeae amplified RNA Negative     Comment:    -------------------ADDITIONAL INFORMATION-------------------  This report is intended for use in clinical monitoring   and management of patients. It is not intended for use   in medical-legal applications.      Test Performed by:  [facility]  [address] [place] [address]  : Shayna Chavarria Ph.D.; CLIA# [id number]        SOURCE: THROAT    AFB Smear [[id number]] Collected: 11/21/24 0738    Order Status: Completed Specimen: Sputum Updated: 11/22/24 1208     AFB Smear No AFB seen (Direct smear only) - Concentration to follow    AFB Smear [[id number]] Collected: 11/21/24 2143    Order Status: Completed Specimen: Sputum, Expectorated Updated: 11/22/24 1208     AFB Smear No AFB seen (Direct smear only) - Concentration to follow    Stool Culture [[id number]]  (Normal) Collected: 11/20/24 0547    Order Status: Completed Specimen: Stool Updated: 11/22/24 0909     Stool Culture Negative for Salmonella, Shigella, Campylobacter, Vibrio, Aeromonas, Pleisiomonas,Yersinia, or Shiga Toxin 1 and 2.    Respiratory Culture [[id number]]  (Abnormal) Collected: 11/20/24 1245    Order Status: Completed Specimen: Sputum, Expectorated Updated: 11/22/24 0803     Respiratory Culture Moderate Yeast     Comment: with normal respiratory adriel        GRAM STAIN Quality 3+      Moderate Yeast      Moderate Gram Positive Rods      Few Gram Negative Rods      Rare Gram positive cocci    Mycobacteria and Nocardia Culture [[id number]] Collected: 11/21/24 2143    Order Status: Sent Specimen: Respiratory Updated: 11/21/24 2153    Mycobacteria and Nocardia Culture [[id number]] Collected: 11/21/24 1632    Order Status: Sent Specimen: Respiratory Updated: 11/21/24 2002    AFB Smear [[id number]] Collected: 11/21/24 0739    Order Status: Canceled Specimen: Sputum Updated: 11/21/24 1401     Chlamydia/GC, PCR [[id number]]     Order Status: Canceled Specimen: Genital from Anal     Chlamydia/GC, PCR [[id number]] Collected: 11/21/24 0928    Order Status: Completed Specimen: Genital from Rectal Updated: 11/21/24 1125     Chlamydia trachomatis PCR Not Detected     N. gonorrhea PCR Not Detected     Source Rectal    Narrative:      The Xpert CT/NG test, performed on the GeneXpert system is a qualitative in vitro real-time polymerase chain reaction (PCR) test for the automated detected and differentiation for genomic DNA from Chlamydia trachomatis (CT) and/or Neisseria gonorrhoeae (NG).    Mycobacteria and Nocardia Culture [[id number]] Collected: 11/21/24 0739    Order Status: Sent Specimen: Respiratory from Sputum, Expectorated Updated: 11/21/24 0746    Mycobacteria and Nocardia Culture [[id number]] Collected: 11/21/24 0738    Order Status: Sent Specimen: Respiratory from Sputum, Expectorated Updated: 11/21/24 0745    Urine culture [[id number]]  (Abnormal)  (Susceptibility) Collected: 11/19/24 1824    Order Status: Completed Specimen: Urine Updated: 11/21/24 0631     Urine Culture >/= 100,000 colonies/ml Escherichia coli    Fungal Culture Blood [[id number]] Collected: 11/21/24 0431    Order Status: Sent Specimen: Venous Blood Line Updated: 11/21/24 0445    Chlamydia/GC, PCR [[id number]] Collected: 11/21/24 0341    Order Status: Canceled Specimen: Urine, Clean Catch Updated: 11/21/24 0400    Fungal Culture [[id number]] Collected: 11/20/24 2251    Order Status: Sent Specimen: Sputum, Expectorated Updated: 11/20/24 2346    C.trach/N.gonor AMP RNA [[id number]]     Order Status: Canceled     Clostridium Diff Toxin, A & B, EIA [[id number]]     Order Status: Canceled Specimen: Stool     Cryptococcal antigen, blood [[id number]]  (Normal) Collected: 11/20/24 0314    Order Status: Completed Specimen: Blood, Venous Updated: 11/20/24 0721     Cryptococcal Antigen, Serum Negative    Clostridium Diff Toxin, A & B, EIA  [[id number]]     Order Status: Canceled Specimen: Stool           Antibiotics:  Antibiotics (From admission, onward)      Start     Stop Route Frequency Ordered    11/20/24 0900  metroNIDAZOLE tablet 500 mg         11/27/24 0859 Oral Every 12 hours 11/19/24 0161             ASSESSMENT AND PLAN   Uncontrolled HIV/AIDs (VL 114k, CD4 count 37 (8%))  Thrombocytosis  RLL lung opacity concerning for malignancy  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 Not currently on anti-retrovirals. 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  - HIV ,696, CD4 count 37 (8%). HIV genotyping pending.  - ID consulted. Recommended opportunistic infection workup as well as RLL biopsy. Following OI workup.  - GI panel, stool culture, and stool ova/parasite pending.  - IR consulted for RLL lung opacity, infection vs malignancy. They state that they can possible do it early this week. Plan to reconvene with IR today to discuss timing.  - PT/OT following.  - Nutrition consulted.  - Blood cultures no growth at 96hr.  - Continue atovoquone 750 mg BID for PJP treatment.  - OI workup thus far:  - Fungitell positive at 301, PJP PCR +, QuantiFERON gold indeterminate result.  Aspergillus antigen negative.  CMV DNA PCR positive FiO2 7.  G6PD negative.  Coccidioides antibody screen reactive.  Syphilis, gonorrhea, Trichomonas tests negative.  Respiratory panel negative.  Hepatitis a, B, C negative.  Bartonella, Cryptococcus antigen negative.  AFB x3 negative. Isolation precautions removed. Otherwise following with infectious disease workup.     Failure to thrive, suspected 2/2 to above  Severe malnutrition  Iron deficiency anemia  Anemia of chronic disease  Thiamine deficiency  - Patient reported decreased PO intake and unintended weight loss.  - Vitamin B12 and folate WNL. Iron panel notable for low iron,  transferrin, TIBC, and iron saturation, suggestive of TIFFANIE. Ferritin elevated most likely 2/2 chronic disease.  - thiamine 100 daily.  - Continue multivitamin and iron.  - hgb stable.   - Will need GI evaluation outpatient with scope.  - Nutrition consulted. Recommended Megace.     Hypokalemia (improving)  Hypophosphatemia  Hypomagnesemia  - Potassium, phosphate, and magnesium WNL today.  - Continuing sodium potassium phosphate packets PRN.  - Patient now has a healthy appetite tolerating double portions.    UTI  Trichomonas on UA  - UA suggestive of UTI. Received ceftriaxone 1 g in ED.  - Urine culture positive for 100k+ E. Coli  - Discontinued ceftriaxone (s/p 5 day course). Continue metronidazole 500 mg BID for 7 days (end date 11/27).    Oral thrush  - Respiratory culture showed moderate yeast.  - Started on fluconazole 200 mg daily on 11/21 for 10 days (end date: 12/1/2024)      DVT Prophylaxis: Heparin  GI Prophylaxis: None  Abx: Rocephin, metronidazole, and fluconazole  Access: PIV  Fluids: None  Diet: Diet Adult Regular Double Portions; Isolation Tray - Styrofoam    Code Status: Full Code    Disposition: 64 y.o. male with PMHx uncontrolled HIV admitted for failure to thrive. ID following. AFB smears x3 negative with airborne precautions removed. Disposition pending completion of IR biopsy results.          Jarod Best MD  PGY-1 Resident  [facility] Internal Medicine Team 1  11/25/2024

## 2024-11-25 NOTE — PROGRESS NOTES
"[facility] INFECTIOUS DISEASES CONSULT PROGRESS NOTE      Reason for consultation     HIV management, R lung consolidation      Interval history     Patient reports he feels well with no complaints today. Airborne isolation removed as patient had 3 negative AFB smears. PJP PCR returned positive so atovaquone dose was adjusted to 750mg PO BID. Ceftriaxone now completed. Awaiting R lung biopsy with IR.     Antibiotic history:  Ceftriaxone - 11/20-11/24  Metronidazole - 11/20-present  Fluconazole - 11/21-present  Atovaquone - 11/23-present      Medications     Scheduled Meds:   atovaquone  750 mg Oral Q12H    electrolytes-dextrose  400 mL Oral Q4H    ferrous sulfate  1 tablet Oral Daily    fluconazole  200 mg Oral QHS    heparin (porcine)  5,000 Units Subcutaneous Q12H    metroNIDAZOLE  500 mg Oral Q12H    multivitamin  1 tablet Oral Daily    potassium, sodium phosphates  2 packet Oral Daily before evening meal    thiamine  100 mg Oral Daily     Continuous Infusions:  PRN Meds:.  Current Facility-Administered Medications:     0.9%  NaCl infusion (for blood administration), , Intravenous, Q24H PRN    dextrose 10%, 12.5 g, Intravenous, PRN    dextrose 10%, 25 g, Intravenous, PRN    glucagon (human recombinant), 1 mg, Intramuscular, PRN    glucose, 16 g, Oral, PRN    glucose, 24 g, Oral, PRN    naloxone, 0.02 mg, Intravenous, PRN    sodium chloride 0.9%, 10 mL, Intravenous, Q12H PRN    Allergies: Review of patient's allergies indicates:  No Known Allergies    Physical exam:     BP (!) 159/87   Pulse (!) 59   Temp 98.3 °F (36.8 °C) (Oral)   Resp 20   Ht 6' 1" (1.854 m)   Wt 58 kg (127 lb 13.9 oz)   SpO2 100%   BMI 16.87 kg/m²   Wt Readings from Last 1 Encounters:   11/20/24 58 kg (127 lb 13.9 oz)     Body mass index is 16.87 kg/m².    GENERAL: A&Ox3, NAD, cachectic   HEENT: NC/AT, anicteric, white patchy lesions to tongue/oral mucosa   LUNGS: CTA b/l, no labored breathing  HEART: RRR; no murmur, rub, or gallop  ABDOMEN: " "+BS, no tympany, soft, NT/ND, no rebound, guarding, or organomegaly  EXTREMITIES: no edema, several dark pigmented lesions to bilateral lower extremities   SKIN: hyperpigmented lesions to bilateral lower extremities   NEURO: speech fluent and intact, facial symmetry preserved, no tremor, CN II-XII grossly intact   PSYCH: cooperative, normal mood and affect  LINES: none       LABS:     I have personally reviewed patient's labs.  Pertinent results noted below.    CBC  Recent Labs     11/23/24  0746 11/24/24  0430 11/25/24  0355   WBC 4.65 4.56 3.96*   HGB 7.5* 7.9* 7.6*   HCT 22.8* 24.5* 24.1*   * 440* 417*       Differential  No results for input(s): "NEUTOPHILPCT", "LYMPHOPCT", "MONOPCT", "EOSPCT", "BASOPCT", "NEUTROABS", "LYMPHSABS", "MONOSABS", "EOSABS" in the last 72 hours.    Basic Metabolic Panel  Recent Labs     11/23/24  0309 11/24/24  0429 11/25/24  0355    141 139   K 4.0 4.5 4.4   * 107 103   CO2 26 30 31   BUN 17.5 16.5 13.5   CREATININE 0.95 0.85 0.86        Hepatic Panel  Recent Labs     11/23/24  0309 11/24/24  0429 11/25/24  0355   ALT 29 36 32   AST 64* 61* 51*   ALKPHOS 231* 290* 265*   BILITOT 0.1 0.2 0.2   ALBUMIN 1.5* 1.7* 1.8*       Urinalysis:  No results for input(s): "GLUCOSEU", "PROTEINUA", "LABPH", "KETONESU", "NITRITE", "LEUKOCYTESUR", "LABSPEC", "COLORU", "WBCU", "RBCUA", "BACTERIA", "HYST", "SQUAMOUSEPIT", "MUCUS", "GRANULARCAST", "CRYST" in the last 72 hours.      Imaging     CT Chest Abdomen Pelvis With IV Contrast (XPD) NO Oral Contrast   Final Result      1.   Right lower lung lobe large irregular defined consolidation with areas of necrosis may be secondary to infection though with concern for associated malignancy.      2.  Left lower lung lobe nodules could be metastatic.      3.  Small bowel and colonic excessive fluid and slight mural thickening and some enhancement suggest enterocolitis.  Please correlate clinically.         Electronically signed by: Ezio" "Deleon   Date:    11/19/2024   Time:    19:30            Micro/Path   Colonization:  No components found for: "MRSMSSSCRFLD", "MRSSCRPCRSWB", "SCRCULT"          IMPRESSION     Patient is a 63 yo male with past medical history of HIV (CD4 147 2021) who presented on 11/19 due to fatigue, weight loss, decreased oral intake, and cough for the past 2 months. He has a past history of histoplasma, cryptococcus, and fungal colitis of the cecum. He has not taken his medications for HIV since 2021. He was found on CT to have RLL irregular consolidation with necrosis likely secondary to infection but with concern for malignancy, LLL nodules concerning for metastasis, and fluid and enhancement of the colon suggestive of enterocolitis.     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    RECOMMENDATIONS:    - Recommend repeat CMV PCR on 11/27 1 week after initial CMV PCR of 37; patient with low level CMV viremia likely due to reactivation secondary to acute illness  - Continue atovaquone 750 mg BID for PJP treatment; patient was previously on atovaquone - possible history of Bactrim intolerance   - Treat for 21 days  - Continue fluconazole 200 mg daily for a total of 10 days                  - Oral thrush  - Continue Flagyl 500 mg q12 hours for a total of 7 days                  - UA 11/19 many Trichomonas  - Patient is s/p ceftriaxone 2g daily for 5 days                  - Urine culture 11/19 E.coli   - AFB smear negative x3 isolation precautions removed  - Will avoid starting CAP " treatment for now; suspect RLL mass is likely either malignancy or OI pathogen  - Holding ART regimen for now to avoid potential IRIS  - Agree with RLL mass bx. Please send for the following:                          - Aerobic culture                          - Anaeorbic culture                          - AFB culture                          - Fungal culture                          - MTB PCR (if fluid)                          - Cell count and diff (if fluid)                          - ADA (if fluid)                          - Glucose (if fluid)                          - pH (if fluid)                          - Pathology with added staining for AFB and fungal organisms      ID will follow along with you.     Amelia Davis, MS4  [facility] [employer]    Attending attestation    I hereby acknowledge that I am relying upon documentation authored by a medical student working under my supervision and further I hereby attest that I have verified the student documentation or findings by personally performing the physical exam and medical decision making activities of the Evaluation and Management service to be billed. Note has been edited to reflect updated assessment and plan.    Sg Sanford MD  Infectious Diseases Faculty   of Medicine

## 2024-11-25 NOTE — CARE UPDATE
[facility] Internal Medicine  CARE UPDATE NOTE    Patient's Name: Anne Terry   : 1960  MRN: [id number]   Date: 2024       Talked to Chanel with IR regarding consultation for potential biopsy of RLL lesion with necrosis concerning for infection vs maligancy. They state that they can potentially do it either tomorrow or Wednesday afternoon, but will reach out to Dr. Daley and let us know. They report for a lung biopsy, patient would need CBC, CMP, PT/INR, and to hold one dose of subQ heparin. Orders placed in addition to putting patient on NPO at midnight in case if patient is able to do it tomorrow.      Jarod Best MD  PGY-1 Resident  [facility] Internal Medicine  2024

## 2024-11-26 VITALS
DIASTOLIC BLOOD PRESSURE: 78 MMHG | SYSTOLIC BLOOD PRESSURE: 141 MMHG | RESPIRATION RATE: 18 BRPM | OXYGEN SATURATION: 99 % | HEIGHT: 73 IN | HEART RATE: 82 BPM | TEMPERATURE: 99 F | BODY MASS INDEX: 16.95 KG/M2 | WEIGHT: 127.88 LBS

## 2024-11-26 LAB
ADVERSE DRUG SEQ VAR INTERP BLD/T-IMP: NORMAL
ALBUMIN SERPL-MCNC: 1.7 G/DL (ref 3.4–4.8)
ALBUMIN/GLOB SERPL: 0.3 RATIO (ref 1.1–2)
ALP SERPL-CCNC: 235 UNIT/L (ref 40–150)
ALT SERPL-CCNC: 33 UNIT/L (ref 0–55)
ANION GAP SERPL CALC-SCNC: 3 MEQ/L
ANNOTATION COMMENT IMP: NORMAL
AST SERPL-CCNC: 56 UNIT/L (ref 5–34)
BASOPHILS # BLD AUTO: 0.02 X10(3)/MCL
BASOPHILS NFR BLD AUTO: 0.5 %
BILIRUB SERPL-MCNC: 0.2 MG/DL
BRUCELLA IGG SER QL IA: NEGATIVE
BRUCELLA IGG+IGM SER-IMP: NORMAL
BRUCELLA IGM SER QL IA: NEGATIVE
BUN SERPL-MCNC: 12.6 MG/DL (ref 8.4–25.7)
CALCIUM SERPL-MCNC: 7.9 MG/DL (ref 8.8–10)
CHLORIDE SERPL-SCNC: 105 MMOL/L (ref 98–107)
CO2 SERPL-SCNC: 30 MMOL/L (ref 23–31)
CREAT SERPL-MCNC: 0.92 MG/DL (ref 0.72–1.25)
CREAT/UREA NIT SERPL: 14
EOSINOPHIL # BLD AUTO: 0.23 X10(3)/MCL (ref 0–0.9)
EOSINOPHIL NFR BLD AUTO: 6.1 %
ERYTHROCYTE [DISTWIDTH] IN BLOOD BY AUTOMATED COUNT: 15.7 % (ref 11.5–17)
F TULAR IGG SER QL IA: NEGATIVE
F TULAR IGG+IGM SER-IMP: NORMAL
F TULAR IGM SER QL IA: NEGATIVE
GENETICIST REVIEW: NORMAL
GFR SERPLBLD CREATININE-BSD FMLA CKD-EPI: >60 ML/MIN/1.73/M2
GLOBULIN SER-MCNC: 5 GM/DL (ref 2.4–3.5)
GLUCOSE SERPL-MCNC: 93 MG/DL (ref 82–115)
HCT VFR BLD AUTO: 23.1 % (ref 42–52)
HGB BLD-MCNC: 7.4 G/DL (ref 14–18)
HLA-B*57:01 QL: NEGATIVE
HOLD SPECIMEN: NORMAL
HOLD SPECIMEN: NORMAL
IMM GRANULOCYTES # BLD AUTO: 0.06 X10(3)/MCL (ref 0–0.04)
IMM GRANULOCYTES NFR BLD AUTO: 1.6 %
INR PPP: 1.1
LAB TEST METHOD: NORMAL
LYMPHOCYTES # BLD AUTO: 0.78 X10(3)/MCL (ref 0.6–4.6)
LYMPHOCYTES NFR BLD AUTO: 20.8 %
MAGNESIUM SERPL-MCNC: 1.5 MG/DL (ref 1.6–2.6)
MCH RBC QN AUTO: 28.2 PG (ref 27–31)
MCHC RBC AUTO-ENTMCNC: 32 G/DL (ref 33–36)
MCV RBC AUTO: 88.2 FL (ref 80–94)
MONOCYTES # BLD AUTO: 0.39 X10(3)/MCL (ref 0.1–1.3)
MONOCYTES NFR BLD AUTO: 10.4 %
NEUTROPHILS # BLD AUTO: 2.27 X10(3)/MCL (ref 2.1–9.2)
NEUTROPHILS NFR BLD AUTO: 60.6 %
NRBC BLD AUTO-RTO: 0 %
PHOSPHATE SERPL-MCNC: 3.4 MG/DL (ref 2.3–4.7)
PLATELET # BLD AUTO: 315 X10(3)/MCL (ref 130–400)
PMV BLD AUTO: 9.5 FL (ref 7.4–10.4)
POTASSIUM SERPL-SCNC: 4.4 MMOL/L (ref 3.5–5.1)
PROT SERPL-MCNC: 6.7 GM/DL (ref 5.8–7.6)
PROTHROMBIN TIME: 14.6 SECONDS (ref 11.4–14)
PROVIDER SIGNING NAME: NORMAL
RBC # BLD AUTO: 2.62 X10(6)/MCL (ref 4.7–6.1)
SODIUM SERPL-SCNC: 138 MMOL/L (ref 136–145)
TEST PERFORMANCE INFO SPEC: NORMAL
WBC # BLD AUTO: 3.75 X10(3)/MCL (ref 4.5–11.5)

## 2024-11-26 PROCEDURE — 86644 CMV ANTIBODY: CPT | Performed by: NURSE PRACTITIONER

## 2024-11-26 PROCEDURE — 25000003 PHARM REV CODE 250

## 2024-11-26 PROCEDURE — 85025 COMPLETE CBC W/AUTO DIFF WBC: CPT

## 2024-11-26 PROCEDURE — 36415 COLL VENOUS BLD VENIPUNCTURE: CPT

## 2024-11-26 PROCEDURE — 36415 COLL VENOUS BLD VENIPUNCTURE: CPT | Performed by: NURSE PRACTITIONER

## 2024-11-26 PROCEDURE — 84100 ASSAY OF PHOSPHORUS: CPT

## 2024-11-26 PROCEDURE — 85610 PROTHROMBIN TIME: CPT

## 2024-11-26 PROCEDURE — 25000003 PHARM REV CODE 250: Performed by: EMERGENCY MEDICINE

## 2024-11-26 PROCEDURE — 86480 TB TEST CELL IMMUN MEASURE: CPT | Performed by: NURSE PRACTITIONER

## 2024-11-26 PROCEDURE — 80053 COMPREHEN METABOLIC PANEL: CPT

## 2024-11-26 PROCEDURE — 36415 COLL VENOUS BLD VENIPUNCTURE: CPT | Performed by: INTERNAL MEDICINE

## 2024-11-26 PROCEDURE — 83735 ASSAY OF MAGNESIUM: CPT

## 2024-11-26 PROCEDURE — 63600175 PHARM REV CODE 636 W HCPCS

## 2024-11-26 RX ORDER — ATOVAQUONE 750 MG/5ML
750 SUSPENSION ORAL EVERY 12 HOURS
Qty: 170 ML | Refills: 0 | Status: SHIPPED | OUTPATIENT
Start: 2024-11-26 | End: 2024-12-13

## 2024-11-26 RX ORDER — SODIUM,POTASSIUM PHOSPHATES 280-250MG
1 POWDER IN PACKET (EA) ORAL
Qty: 100 PACKET | Refills: 0 | Status: SHIPPED | OUTPATIENT
Start: 2024-11-26

## 2024-11-26 RX ORDER — FLUCONAZOLE 200 MG/1
200 TABLET ORAL NIGHTLY
Qty: 6 TABLET | Refills: 0 | Status: SHIPPED | OUTPATIENT
Start: 2024-11-26 | End: 2024-12-02

## 2024-11-26 RX ORDER — METRONIDAZOLE 500 MG/1
500 TABLET ORAL EVERY 12 HOURS
Qty: 3 TABLET | Refills: 0 | Status: SHIPPED | OUTPATIENT
Start: 2024-11-26

## 2024-11-26 RX ORDER — MAGNESIUM SULFATE HEPTAHYDRATE 40 MG/ML
2 INJECTION, SOLUTION INTRAVENOUS ONCE
Status: COMPLETED | OUTPATIENT
Start: 2024-11-26 | End: 2024-11-26

## 2024-11-26 RX ORDER — HEPARIN SODIUM 5000 [USP'U]/ML
5000 INJECTION, SOLUTION INTRAVENOUS; SUBCUTANEOUS EVERY 12 HOURS
Status: DISCONTINUED | OUTPATIENT
Start: 2024-11-27 | End: 2024-11-26 | Stop reason: HOSPADM

## 2024-11-26 RX ORDER — LANOLIN ALCOHOL/MO/W.PET/CERES
100 CREAM (GRAM) TOPICAL DAILY
Qty: 90 TABLET | Refills: 0 | Status: SHIPPED | OUTPATIENT
Start: 2024-11-27

## 2024-11-26 RX ORDER — FERROUS SULFATE 325(65) MG
325 TABLET, DELAYED RELEASE (ENTERIC COATED) ORAL DAILY
Qty: 90 TABLET | Refills: 0 | Status: SHIPPED | OUTPATIENT
Start: 2024-11-26

## 2024-11-26 RX ADMIN — THERA TABS 1 TABLET: TAB at 08:11

## 2024-11-26 RX ADMIN — FERROUS SULFATE TAB 325 MG (65 MG ELEMENTAL FE) 1 EACH: 325 (65 FE) TAB at 08:11

## 2024-11-26 RX ADMIN — ATOVAQUONE 750 MG: 750 SUSPENSION ORAL at 08:11

## 2024-11-26 RX ADMIN — MAGNESIUM SULFATE HEPTAHYDRATE 2 G: 40 INJECTION, SOLUTION INTRAVENOUS at 05:11

## 2024-11-26 RX ADMIN — Medication 400 ML: at 11:11

## 2024-11-26 RX ADMIN — METRONIDAZOLE 500 MG: 500 TABLET ORAL at 08:11

## 2024-11-26 RX ADMIN — Medication 100 MG: at 08:11

## 2024-11-26 NOTE — PROGRESS NOTES
Inpatient Nutrition Assessment    Admit Date: 11/19/2024   Total duration of encounter: 7 days   Patient Age: 64 y.o.    Nutrition Recommendation/Prescription     Continue regular/double portion diet  Continue boost plus tid; Boost Plus (provides 360 kcal, 14 g protein per serving)   Electrolyte repletion as needed  MVI/fe  Consider megace to stimulate appetite if appetite declines  Will monitor nutrition status     Communication of Recommendations: reviewed with patient    Nutrition Assessment     Malnutrition Assessment/Nutrition-Focused Physical Exam    Malnutrition Context: chronic illness (11/20/24 1421)  Malnutrition Level: severe (11/20/24 1421)  Energy Intake (Malnutrition): less than or equal to 50% for greater than or equal to 1 month (11/20/24 1421)  Weight Loss (Malnutrition): greater than 7.5% in 3 months (11/20/24 1421)  Subcutaneous Fat (Malnutrition): moderate depletion (11/20/24 1421)  Orbital Region (Subcutaneous Fat Loss): moderate depletion  Upper Arm Region (Subcutaneous Fat Loss): mild depletion     Muscle Mass (Malnutrition): mild depletion (11/20/24 1421)  Moss Region (Muscle Loss): mild depletion  Clavicle Bone Region (Muscle Loss): mild depletion            Fluid Accumulation (Malnutrition): other (see comments) (Not present) (11/20/24 1421)     Hand  Strength, Right (Malnutrition): Unable to assess (11/20/24 1421)  A minimum of two characteristics is recommended for diagnosis of either severe or non-severe malnutrition.    Chart Review    Reason Seen: follow-up    Malnutrition Screening Tool Results   Have you recently lost weight without trying?: Yes: Unsure how much  Have you been eating poorly because of a decreased appetite?: No   MST Score: 2   Diagnosis:  Failure to thrive, HIV/Aids, lung mass/concern for malignancy, hypokalemia, thrombocytopenia, TAYLOR/CKD, UTI, trichomones thrush    Relevant Medical History: HIV/Aids, HTN, CKD     Scheduled Medications:  atovaquone, 750 mg,  Q12H  electrolytes-dextrose, 400 mL, Q4H  ferrous sulfate, 1 tablet, Daily  fluconazole, 200 mg, QHS  [START ON 11/27/2024] heparin (porcine), 5,000 Units, Q12H  metroNIDAZOLE, 500 mg, Q12H  multivitamin, 1 tablet, Daily  potassium, sodium phosphates, 2 packet, Daily before evening meal  thiamine, 100 mg, Daily    Continuous Infusions:     PRN Medications:  0.9%  NaCl infusion (for blood administration), , Q24H PRN  dextrose 10%, 12.5 g, PRN  dextrose 10%, 25 g, PRN  glucagon (human recombinant), 1 mg, PRN  glucose, 16 g, PRN  glucose, 24 g, PRN  naloxone, 0.02 mg, PRN  sodium chloride 0.9%, 10 mL, Q12H PRN    Calorie Containing IV Medications: no significant kcals from medications at this time    Recent Labs   Lab 11/19/24  1635 11/20/24  0314 11/20/24  1114 11/21/24  0431 11/22/24  0401 11/23/24  0309 11/23/24  0746 11/24/24  0429 11/24/24  0430 11/25/24  0355 11/26/24  0356    136 138 139 139 142  --  141  --  139 138   K 2.7* 2.8* 3.2* 3.5 3.5 4.0  --  4.5  --  4.4 4.4   CALCIUM 8.6* 8.6* 8.3* 8.3* 7.9* 7.9*  --  8.5*  --  8.1* 7.9*   PHOS  --  2.5  --  1.2* 1.9* 2.2*  --  3.0  --  3.5 3.4   MG 1.90 2.20  --  2.00 1.90 1.50*  --  1.70  --  1.50* 1.50*    103 105 107 108* 111*  --  107  --  103 105   CO2 23 23 26 25 28 26  --  30  --  31 30   BUN 35.3* 31.8* 31.5* 25.3 19.4 17.5  --  16.5  --  13.5 12.6   CREATININE 1.80* 1.49* 1.37* 1.10 1.10 0.95  --  0.85  --  0.86 0.92   EGFRNORACEVR 42 53 58 >60 >60 >60  --  >60  --  >60 >60   GLUCOSE 93 108 98 103 87 152*  --  114  --  97 93   BILITOT 0.4 0.3  --  0.2 0.2 0.1  --  0.2  --  0.2 0.2   ALKPHOS 152* 140  --  153* 153* 231*  --  290*  --  265* 235*   ALT 15 16  --  18 20 29  --  36  --  32 33   AST 21 25  --  35* 32 64*  --  61*  --  51* 56*   ALBUMIN 2.0* 2.0*  --  1.7* 1.6* 1.5*  --  1.7*  --  1.8* 1.7*   CRP  --   --   --  84.10*  --  31.20*  --   --   --  16.40*  --    LIPASE 34  --   --   --   --   --   --   --   --   --   --    WBC 8.34 8.44   "--  6.23 5.09  --  4.65  --  4.56 3.96* 3.75*   HGB 7.8* 7.7*  --  6.8* 7.1*  --  7.5*  --  7.9* 7.6* 7.4*   HCT 24.2* 24.7*  --  21.0* 22.0*  --  22.8*  --  24.5* 24.1* 23.1*     Nutrition Orders:  Diet Adult Regular Double Portions  Dietary nutrition supplements TID; Boost Plus Nutritional Drink - Very Vanilla    Appetite/Oral Intake: good/% of meals  Factors Affecting Nutritional Intake: none identified  Social Needs Impacting Access to Food: none identified  Food/Gnosticism/Cultural Preferences: none reported  Food Allergies: none reported  Last Bowel Movement: 24  Wound(s):  nurse reported dry skin; not open wounds     Comments  : Pt reports good po intake, consuming ONS as ordered; 100% intake documented. Pt denies any GI complaints at this time; LBM . Pt reports hiccups have stopped. Mg (L) -- replete prn; H/H (L) -- on fe. No new wts.    () Pt reported he is eating better; 50-75% meals; drinking some of boost supplement. Encouraged oral intake. Labs acknowledged: H/H (L)--consider fe.     () Received MST --wt loss; consult for FTT. Pt reported several month hx decreased oral intake; approx 1 meal per day; some foods cause diarrhea; + significant wt loss--approx 40-50# over past 2-3 months; EMR wt hx 3 year ago--pt weighed 200#; no recent documentation in EMR. Pt with + fat/muscle wasting . Will order ONS for added nutrition. Consider megace to stimulate appetite. Pt also mentioned hiccups with meals; ? Medication to assist with symptoms. Labs acknowledged: Bun/Cr (H)--hx CKD. K (L)--suggest repletion if remains low; on pedialyte.     Anthropometrics    Height: 6' 1" (185.4 cm),    Last Weight: 58 kg (127 lb 13.9 oz) (24 0141), Weight Method: Standard Scale  BMI (Calculated): 16.9  BMI Classification: underweight (BMI less than 18.5)        Ideal Body Weight (IBW), Male: 184 lb     % Ideal Body Weight, Male (lb): 69.49 %                 Usual Body Weight (UBW), k.2 " kg  % Usual Body Weight: 75.29     Usual Weight Provided By: patient and EMR weight history    Wt Readings from Last 5 Encounters:   11/20/24 58 kg (127 lb 13.9 oz)   11/08/21 90.9 kg (200 lb 4.6 oz)     Weight Change(s) Since Admission: UBW approx 170#  Wt Readings from Last 1 Encounters:   11/20/24 0141 58 kg (127 lb 13.9 oz)   11/20/24 0019 58 kg (127 lb 13.9 oz)   11/19/24 1543 26.8 kg (59 lb)   Admit Weight: 26.8 kg (59 lb) (11/19/24 1543), Weight Method: Standard Scale    Estimated Needs    Weight Used For Calorie Calculations: 58 kg (127 lb 13.9 oz)  Energy Calorie Requirements (kcal): 2030 kcal/d; 35 ivory/kg /wt gain  Energy Need Method: Kcal/kg  Weight Used For Protein Calculations: 58 kg (127 lb 13.9 oz)  Protein Requirements: 75 gm protein/d ; 1.3 gm/kg --monitor renal fx  Fluid Requirements (mL): 1740 ml/d; 30 ml/kg        Enteral Nutrition     Patient not receiving enteral nutrition at this time.    Parenteral Nutrition     Patient not receiving parenteral nutrition support at this time.    Evaluation of Received Nutrient Intake    Calories: meeting estimated needs  Protein: meeting estimated needs    Patient Education     Not applicable.    Nutrition Diagnosis     PES: Inadequate oral intake related to chronic illness as evidenced by Eating 50% or less meal > month; + wt loss --> 7.5 % over 2-3 months . (active)     PES: Severe chronic disease or condition related malnutrition Related to chronic illness As Evidenced by:  - weight loss: > 7.5% in 3 months - energy intake: <= 50% for 2 months (meets criteria for <= 50% >= 1 month (severe - social/environmental)) - muscle mass depletion: 3 areas of mild muscle loss (Clavicle, Pectoralis, Temporalis) - loss of subcutaneous fat: 1 area of moderate fat loss (Infraorbital), 2 areas of mild fat loss (Buccal, Triceps Skinfold) active    Nutrition Interventions     Intervention(s): multivitamin/mineral supplement therapy and collaboration with other  providers  Intervention(s): Oral diet/nutrient modifications;Oral nutritional supplement    Goal: Meet greater than 80% of nutritional needs by follow-up. (goal met)  Goal: Maintain weight throughout hospitalization. (goal progressing)    Nutrition Goals & Monitoring     Dietitian will monitor: food and beverage intake, weight, and electrolyte/renal panel  Discharge planning: continue regular diet with boost plus oral supplements  Nutrition Risk/Follow-Up: high (follow-up in 1-4 days)   Please consult if re-assessment needed sooner.

## 2024-11-26 NOTE — PLAN OF CARE
11/26/24 134   Medicare Message   Important Message from Medicare regarding Discharge Appeal Rights Given to patient/caregiver;Explained to patient/caregiver   Date IMM was signed 11/26/24   Time IMM was signed [id number]

## 2024-11-26 NOTE — PLAN OF CARE
Problem: Adult Inpatient Plan of Care  Goal: Plan of Care Review  Outcome: Progressing     Problem: Adult Inpatient Plan of Care  Goal: Patient-Specific Goal (Individualized)  Outcome: Progressing     Problem: Adult Inpatient Plan of Care  Goal: Absence of Hospital-Acquired Illness or Injury  Outcome: Progressing     Problem: Adult Inpatient Plan of Care  Goal: Optimal Comfort and Wellbeing  Outcome: Progressing     Problem: Adult Inpatient Plan of Care  Goal: Readiness for Transition of Care  Outcome: Progressing     Problem: Wound  Goal: Optimal Coping  Outcome: Progressing     Problem: Wound  Goal: Optimal Functional Ability  Outcome: Progressing     Problem: Wound  Goal: Absence of Infection Signs and Symptoms  Outcome: Progressing     Problem: Wound  Goal: Improved Oral Intake  Outcome: Progressing     Problem: Wound  Goal: Optimal Pain Control and Function  Outcome: Progressing     Problem: Wound  Goal: Skin Health and Integrity  Outcome: Progressing     Problem: Wound  Goal: Optimal Wound Healing  Outcome: Progressing     Problem: Infection  Goal: Absence of Infection Signs and Symptoms  Outcome: Progressing

## 2024-11-26 NOTE — PLAN OF CARE
Problem: Adult Inpatient Plan of Care  Goal: Plan of Care Review  11/26/2024 0249 by Soy Matos LPN  Outcome: Progressing  11/26/2024 0249 by Soy Matos LPN  Outcome: Progressing  Goal: Patient-Specific Goal (Individualized)  11/26/2024 0249 by Soy Matos LPN  Outcome: Progressing  11/26/2024 0249 by Soy Matos LPN  Outcome: Progressing  Goal: Absence of Hospital-Acquired Illness or Injury  11/26/2024 0249 by Soy Matos LPN  Outcome: Progressing  11/26/2024 0249 by Soy Matos LPN  Outcome: Progressing  Goal: Optimal Comfort and Wellbeing  11/26/2024 0249 by Soy Matos LPN  Outcome: Progressing  11/26/2024 0249 by Soy Matos LPN  Outcome: Progressing  Goal: Readiness for Transition of Care  11/26/2024 0249 by Soy Matos LPN  Outcome: Progressing  11/26/2024 0249 by Soy Matos LPN  Outcome: Progressing     Problem: Wound  Goal: Optimal Coping  11/26/2024 0249 by Soy Matos LPN  Outcome: Progressing  11/26/2024 0249 by Soy Matos LPN  Outcome: Progressing  Goal: Optimal Functional Ability  11/26/2024 0249 by Soy Matos LPN  Outcome: Progressing  11/26/2024 0249 by Soy Matos LPN  Outcome: Progressing  Goal: Absence of Infection Signs and Symptoms  11/26/2024 0249 by Soy Matos LPN  Outcome: Progressing  11/26/2024 0249 by Soy Matos LPN  Outcome: Progressing  Goal: Improved Oral Intake  11/26/2024 0249 by Soy Matos LPN  Outcome: Progressing  11/26/2024 0249 by Soy Matos LPN  Outcome: Progressing  Goal: Optimal Pain Control and Function  11/26/2024 0249 by Soy Matos LPN  Outcome: Progressing  11/26/2024 0249 by Soy Matos LPN  Outcome: Progressing  Goal: Skin Health and Integrity  11/26/2024 0249 by Soy Matos LPN  Outcome: Progressing  11/26/2024 0249 by Soy Matos LPN  Outcome: Progressing  Goal: Optimal Wound Healing  11/26/2024 0249 by Soy Matos LPN  Outcome: Progressing  11/26/2024 0249 by  Gallow, Soy, LPN  Outcome: Progressing     Problem: Infection  Goal: Absence of Infection Signs and Symptoms  11/26/2024 0249 by Soy Matos LPN  Outcome: Progressing  11/26/2024 0249 by Soy Matos LPN  Outcome: Progressing     Problem: Skin Injury Risk Increased  Goal: Skin Health and Integrity  11/26/2024 0249 by Soy Matos LPN  Outcome: Progressing  11/26/2024 0249 by Soy Matos LPN  Outcome: Progressing

## 2024-11-26 NOTE — DISCHARGE SUMMARY
[facility]   DISCHARGE SUMMARY    Patient's Name: Anne Terry  : 1960  MRN: [id number]  Code Status: Full Code    Admitting Physician: Marianne Partida MD  Attending Physician: Marianne Partida MD  Primary Care Provider: Nadia, Primary Doctor  Date of Admission: 2024  Date of Discharge: 2024    Condition: Good  Outcome: Condition has improved and patient is now ready for discharge.  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 male, with PMH significant for HIV/AIDS, who presented to [facility] ED on 2024  with primary complaints of fatigue and weight loss. Patient states that he began feeling more fatigued, had decreased appetite, and has experienced significant weight loss since October. He also has had a dry cough but denies hemoptysis. He has only been able to tolerate one meal a day, consisting of mainly cookies and soda. All other foods cause diarrhea. Denies chest pain, abdominal pain, nausea, vomiting, constipation, dysuria, urinary frequency, hematuria, hematochezia, or melena. He was feeling more weak and fatigued today so he called 911.      Per chart review, patient has a history of HIV/AIDS diagnosed on . He used to follow with [facility] ID but was lost to follow up since . He was on Ziagen 300 mg daily,  Descovy daily, Prezista 800 mg daily, and Norovir 100 mg daily. He has a history of medication non-adherence. He also has a history of cryptococcosis and histoplasmosis in 2015. 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        ED Course - Upon arrival, pt afebrile 98.8 F, /80, HR 93, RR 16, SpO2 100% on RA. Workup notable for H/H 7.8/24.2, Plt 481, decreased absolute lymphocytes, potassium 2.7, BUN 35.3, creatinine 1.8 (baseline 1.3), elevated total protein 8.3, albumin 2.0, . UA 2+ protein, 1+ blood, 500 leukocyte esterase, >100 WBC, many bacteria, many trichomonas. CT chest abdomen pelvis performed showing RLL lung irregularly defined consolidation with areas of necrosis and LLL nodules, concerning for malignancy. Also showed small bowel and colonic mural thickening and enterocolitis. He was given ceftriaxone 1 g and potassium bicarb 40 mEq in the ED. Internal medicine was consulted for further management.       Hospital Course with Pertinent Findings   Infectious Disease was consulted for assistance for HIV/AIDS infection and additional opportunistic workup. Workup positive for Fungitell, PJP, and oral candidiasis. He was started on atovoquone 750 mg BID and fluconazole 200 mg daily for treatment. He was also started on Rocephin 2 g daily for 5 days for E. Coli UTI. During workup, CT chest notable for RLL lung mass suspected to be of infectious vs neoplastic origin. ID recommended IR biopsy of mass for further workup while patient. IR was consulted for biopsy, but they did not recommend biopsy at this time due to current PJP treatment and possible decrease in diagnostic yield. They stated that they can schedule the patient on 12/26/2024 for outpatient IR biopsy, which patient was amenable to. Throughout admission, patient gradually tolerated PO food and fluid intake. In addition, his diarrhea has improved. He was given atovoquone, metronidazole, and fluconazole  "delivered to bedside. He was counseled on ED return precautions, medication adherence, IR biopsy scheduling, and close ID follow-up. In addition, he was discharged with a referral to [facility] Medicine Clinic for post-wards follow-up and establishing of care.    Discharge Physical Exam:  Vitals  BP: (!) 141/78  Temp: 98.6 °F (37 °C)  Temp Source: Oral  Pulse: 82  Resp: 18  SpO2: 99 %  Height: 6' 1" (185.4 cm)  Weight: 58 kg (127 lb 13.9 oz)    Constitutional:       General: He is awake.      Appearance: He is cachectic.  Chronically ill-appearing.  No acute distress.    Eyes:      General: No scleral icterus.  Cardiovascular:      Rate and Rhythm: Normal rate and regular rhythm.      Pulses: Normal pulses.      Heart sounds: Normal heart sounds. No murmur heard.  Pulmonary:      Effort: Pulmonary effort is normal. No respiratory distress.      Breath sounds: Normal breath sounds.   Musculoskeletal:      Right lower leg: No edema.      Left lower leg: No edema.   Skin:     General: Skin is warm and dry.      Findings: Lesion (hyperpigmented patches/lesions to the pretibial region and ankles with surrounding dry skin) present.   Neurological:      General: No focal deficit present.      Mental Status: He is alert.   Psychiatric:         Behavior: Behavior normal. Behavior is cooperative.      Discharge Medications        Medication List        START taking these medications      atovaquone 750 mg/5 mL Susp oral liquid  Commonly known as: MEPRON  Take 5 mLs (750 mg total) by mouth every 12 (twelve) hours. for 17 days     ferrous sulfate 325 (65 FE) MG EC tablet  Take 1 tablet (325 mg total) by mouth once daily.     fluconazole 200 MG Tab  Commonly known as: DIFLUCAN  Take 1 tablet (200 mg total) by mouth every evening. for 6 days     metroNIDAZOLE 500 MG tablet  Commonly known as: FLAGYL  Take 1 tablet (500 mg total) by mouth every 12 (twelve) hours.     multivitamin Tab  Take 1 tablet by mouth once daily.  Start taking on: " November 27, 2024     potassium, sodium phosphates 280-160-250 mg Pwpk  Commonly known as: PHOS-NAK  Take 1 packet by mouth before evening meal.     thiamine 100 MG tablet  Take 1 tablet (100 mg total) by mouth once daily.  Start taking on: November 27, 2024               Where to Get Your Medications        These medications were sent to [facility] [address]  [address] [address]      Phone: [phone]   atovaquone 750 mg/5 mL Susp oral liquid  ferrous sulfate 325 (65 FE) MG EC tablet  fluconazole 200 MG Tab  metroNIDAZOLE 500 MG tablet  multivitamin Tab  potassium, sodium phosphates 280-160-250 mg Pwpk  thiamine 100 MG tablet         Discharge Information   Diet:  As tolerated    Physical Activity:  As tolerated    Counseling Provided to Patient and Family:  Yes; ED return precautions    Instructions:  1. Take all medications as prescribed.  2. Keep all follow-up appointments.  3. Return to the hospital or call your primary care physician if any worsening symptoms occur, such as fever, chills, SOB, cough, sore throat, dysuria, swelling, redness, and other infectious symptoms occur.  4. The above information was discussed with the patient in clear terms. he was able to repeat the instructions to me in his own words. All questions answered.  Patient verbalized understanding. ED precautions provided.    Follow-Up Appointments:  - Referral sent to [facility] for post-wards follow-up and establishment of care.    Future Appointments:  Future Appointments   Date Time Provider Department Center   12/12/2024  8:30 AM Romi Maria FNP [facility]       Follow up items to address with PCP and pending results at time of discharge:  - Completion of atovoquone for PJP, fluconazole for oral candiasis, and metronidazole for Trichomonas infection  - IR biopsy of RLL lesion suspicious for infection vs malignancy  ([facility] will schedule on 12/26/2024).  - Pending: Brucella, [place]are[place], Q fever    TIME SPENT ON DISCHARGE: 60 minutes      Case discussed with Dr. Partida. Please appreciate attending's attestation to follow.    Jarod Best MD  PGY-1 Resident  [facility] Internal Medicine Team 1  11/26/2024

## 2024-11-26 NOTE — TELEPHONE ENCOUNTER
Please schedule patient with Romi NP for RTC B20 / PJP on 12/12/2024 at 0830 (please block an hour slot).   Thank you

## 2024-11-26 NOTE — NURSING
[facility] called and scheduled patient appointment with Dr. Romi Smith on December 12, 2024 @ 2586. Phone number [phone].

## 2024-11-26 NOTE — PROGRESS NOTES
Contacted son, Anne Yolis ([phone]) Re possible discharge today. Son stated he has someone on stand-by to provide transportation home when order in.

## 2024-11-26 NOTE — PROGRESS NOTES
[facility]  Infectious Diseases Progress Note        SUBJECTIVE:   HPI: Patient is a 63 yo male with past medical history of HIV (CD4 147 2021) who presented to [facility] ED on 11/19/2024 due to fatigue, weight loss, decreased oral intake, and cough for the past 2 months. He states that most foods make him nauseous or vomit. He was diagnosed with HIV in 2015 and at that time was found to have cryptococcus, histoplasma, and fungal colitis of the cecum. He was previously treated with Ziagen, Descovy, Prezista, and Norovir but stopped taking his medications and following up in clinic in 2021 after his wife passed away. He reports he has several dogs, cows, and pigs at home and sometimes hunts rabbits and skins them himself. He denies fever, chills, abdominal pain, diarrhea, pain with urination.      In the ED, patient was afebrile and normotensive with normal WBC count, hypokalemia, TAYLOR, decreased absolute lymphocytes, UA with 500 LE and many trichomonas. CT showed right lower lobe irregular consolidation with areas of necrosis secondary to infection with concern for underlying malignancy, left lower lobe nodules concerning for metastasis, and small bowel and colonic fluid and slight mural thickening and enhancement suggestive of enterocolitis. He received ceftriaxone 1g.      Today he remains with normal vital signs and reports feeling at his baseline. HIV viral load found to be 114,696 on 11/20. Syphilis antibody, hepatitis studies, giardia antigen, cryptosporidium antigen, and cryptococcal antigen unremarkable.       Patient reports that he often hunts and skins rabbits on his property, although cannot recall the last time he went hunting. He also has cows and pigs on his property but denies exposure to other farm animals. Denies bird exposure. States he does not do much yardwork. 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 Denies any sexual activity since his wife  passed away in 2019 and is unsure how he acquired trichomonas. Denies h/o MSM or IVDU. Patient states he is willing to restart his HIV treatment and come to clinic for follow up.      Interval History:  Patient lying in bed.  Looks good.  He states he finally rested all night and slept like a baby.  No current complaints. Denies fever, chills, night sweats, rash, HA, vision changes, dizziness, CP/SOB, cough, N/V/D, abdominal pain, or urinary complaints.  Biopsy right lower lobe lesion was requested.  IR is wanting to do this as an outpatient procedure.  Primary team is obtaining date.  Additional cultures and workup or needed for this to rule out further infection versus malignancy.  Patient is currently on Flagyl, Fluconazole, and Atovaquone with no reported issues.      Antibiotic / Antiviral / Antifungal History:  Ceftriaxone 1 g IV daily - 11/19/2024 to 11/21/2024  Ceftriaxone 2 g IV daily - 11/22/2024 to present  Flagyl 500 mg p.o. b.i.d. - 11/20/2024 to present  Fluconazole 200 mg p.o. daily - 11/21/2024 to present   Atovaquone 750 mg p.o. daily - 11/23/2024 to present      MEDICATIONS:   Reviewed in EMR    REVIEW OF SYSTEMS:   Except as documented, all other systems reviewed and negative     PHYSICAL EXAM:   T 98.1 °F (36.7 °C)   BP (!) 143/85   P 65   RR 18   O2 99 %  GENERAL: Middle age cachectic BM who is A&Ox3, NAD, does not appear overtly ill   HEENT: atraumatic, normocephalic, anicteric, moist oral mucosa without lesions, exudate, or erythema; no thrush  LUNGS: breathing unlabored, lungs CTA bilateral  HEART: RRR; no murmur, rub, or gallop  ABDOMEN: abdomen soft, nondistended, nontender to palpation  : no CVA tenderness  EXTREMITIES: no edema, clubbing, or cyanosis   SKIN: several dark pigmented lesions to bilateral lower extremities below the knees   NEURO: speech fluent and intact, facial symmetry preserved, no tremor  PSYCH: cooperative, normal mood and affect  LINES: PIV       LABS AND IMAGING:  "    Recent Labs     11/25/24 0355 11/26/24 0356   WBC 3.96* 3.75*   RBC 2.72* 2.62*   HGB 7.6* 7.4*   HCT 24.1* 23.1*   MCV 88.6 88.2   MCH 27.9 28.2   MCHC 31.5* 32.0*   RDW 15.5 15.7   * 315     No results for input(s): "LACTIC" in the last 72 hours.  Recent Labs     11/26/24 0356   INR 1.1     No results for input(s): "HGBA1C", "CHOL", "TRIG", "LDL", "VLDL", "HDL" in the last 72 hours.   Recent Labs     11/25/24 0355 11/26/24 0356    138   K 4.4 4.4   CO2 31 30   BUN 13.5 12.6   CREATININE 0.86 0.92   GLUCOSE 97 93   CALCIUM 8.1* 7.9*   MG 1.50* 1.50*   PHOS 3.5 3.4   ALBUMIN 1.8* 1.7*   GLOBULIN 5.3* 5.0*   ALKPHOS 265* 235*   ALT 32 33   AST 51* 56*   BILITOT 0.2 0.2   CRP 16.40*  --      No results for input(s): "BNP", "CPK", "TROPONINI" in the last 72 hours.       Estimated Creatinine Clearance: 68.3 mL/min (based on SCr of 0.92 mg/dL).   Lab Results   Component Value Date    SEDRATE 60 (H) 11/25/2024      Lab Results   Component Value Date    CRP 16.40 (H) 11/25/2024        Micro:   11/19/2024 urine culture with E coli (R - Ampicillin)  11/20/2024 blood cultures x2 NGTD   11/20/2024 stool culture negative for Salmonella, Shigella, Campylobacter, Vibrio, Aeromonas, Pleisiomonas,Yersinia, or Shiga Toxin 1 and 2  11/20/2024 sputum culture with yeast   11/21/2024 sputum for AFB negative   11/21/2024 sputum culture for mycobacteria and Nocardia pending   11/21/2024 sputum for AFB negative   11/21/2024 sputum culture for mycobacteria and Nocardia pending  11/21/2024 sputum culture for mycobacteria and Nocardia pending  11/21/2024 sputum culture for mycobacteria and Nocardia pending        CT Chest Abdomen Pelvis With IV Contrast (XPD) NO Oral Contrast  Narrative: EXAMINATION:  CT CHEST ABDOMEN PELVIS WITH IV CONTRAST (XPD)    CLINICAL HISTORY:  unexplained weight loss, hematuria;    TECHNIQUE:  Multidetector axial images were obtained from the thoracic inlet through the greater trochanters " following the administration of IV contrast.    Dose length product of 258 mGycm. Automated exposure control was utilized to minimize radiation dose.    COMPARISON:  CT abdomen pelvis December 22, 2015.    CHEST FINDINGS:    Right lower lung lobe posteriorly along the subpleural location is remarkable for large irregular defined heterogeneous consolidation with necrotic component which shows maximum anterior-posterior diameter of 5.2 cm and transverse diameter 9.0 cm.  The maximum sagittal length of this consolidation 16.0 cm on image 53 series 8..  Within left lower lung lobe are up to 5-6 mm nodules like on image 71 and image 75 series 4.  Also seen are left lower lung lobe are very subtle minimal nodularities.  There is no pulmonary edema.  No significant fluid within the pleural and pericardial spaces.  There are no enlarged chest lymph nodes.  Thoracic aorta is without aneurysmal dilatation.  There are no signs of dissection.  Cardiac chamber size is within normal limits.    ABDOMINAL FINDINGS:    Liver, pancreas and spleen are unremarkable.  Gallbladder is decompressed without dilatation of the ducts.  Adrenals are not enlarged in size.  Kidneys function and drain well.  No intra-abdominal or pelvic hypermetabolic enlarged lymph nodes or implants.    There is stable presumably benign retrocaval ovoid hypodensity without significant enhancement and is seen on image 115 series 2.    Stomach is mostly decompressed.  There are multiple loops of small bowel with mild fluid-filled dilatation and mural thickening without definite transition.  There is also excessive fluid within the colon and perhaps slight mural thickening and enhancement.  Findings suggest enterocolitis.  Please correlate clinically.  Overall assessment of colon is limited due to presence of fecal content and lack of enteric contrast.  Please correlate patient is up-to-date on colonoscopy.  No suggestion of bowel obstruction.  No inflammatory  standings identified.  No free fluid.    PELVIC FINDINGS:    Urinary bladder is partially decompressed without thickened wall.  No intrapelvic free fluid.    No acute or aggressive skeletal abnormality  Impression: 1.   Right lower lung lobe large irregular defined consolidation with areas of necrosis may be secondary to infection though with concern for associated malignancy.    2.  Left lower lung lobe nodules could be metastatic.    3.  Small bowel and colonic excessive fluid and slight mural thickening and some enhancement suggest enterocolitis.  Please correlate clinically.    Electronically signed by: Ezio Deleon  Date:    11/19/2024  Time:    19:30          ASSESSMENT & PLAN:     88Patient is a 63 yo male with past medical history of HIV (CD4 147 2021) who presented on 11/19 due to fatigue, weight loss, decreased oral intake, and cough for the past 2 months. He has a past history of histoplasma, cryptococcus, and fungal colitis of the cecum. He has not taken his medications for HIV since 2021. He was found on CT to have RLL irregular consolidation with necrosis likely secondary to infection but with concern for malignancy, LLL nodules concerning for metastasis, and fluid and enhancement of the colon suggestive of enterocolitis.      Advanced HIV disease   CD4 count 37 (8%) with viral load 114,696   CMV DNA 37    G6PD negative; 11.4   Toxo antibodies negative    Screening for hepatitis/syphilis/gonorrhea/chlamydia negative.  Indeterminate QuantiFERON gold; repeating lab.  Serum Histoplasma negative.  Giardia and Cryptosporidium negative.  Coccidioides antibody reactive with negative antibodies.  Urine Histoplasma antigen/urine blasto antigen negative.  Histo/blasto antigen serum negative.  Aspergillus antigen negative.  Bartonella PCR negative.  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        RECOMMENDATIONS:       Agree with biopsy of RT lower lobe mass.  IR is wanting to do procedure as outpatient.  Following testing are recommended:              - Aerobic culture                          - Anaeorbic culture                          - AFB culture                          - Fungal culture                          - MTB PCR (if fluid)                          - Cell count and diff (if fluid)                          - ADA (if fluid)                          - Glucose (if fluid)                          - pH (if fluid)             - Pathology with added staining for AFB and fungal organisms  Will avoid starting CAP treatment for now; suspect RLL mass is likely either malignancy or OI pathogen  Recommend repeat CMV PCR on 11/27/2024 which is 1 week after initial CMV PCR of 37.  Patient with low level CMV viremia likely due to reactivation secondary to acute illness  Continue Atovaquone 750 mg po BID for PJP treatment to complete a 21 day course.  Today is day 3 of 21.  Pt was noted to be previously on Atovaquone.  Avoiding Bactrim d/t possible intolerance.  Once patient is complete with the 21 day course, he will need Atovaquone 1500 mg po daily for OI prophylaxis until CD4 restoration   Continue Fluconazole 200 mg po daily to complete a 10 day course for oral thrush.  Today is day 6 of 10.   Continue Flagyl 500 mg po q 12 hours to complete a 7 day course for trichomonas.  Today is day 7 of 7.   Pending tests include:  Brucella antibody, HLA , tularemia antibody, Q  fever antibody, Bartonella antibody panel, CMV antibodies, repeat QuantiFERON gold  Patient will be scheduled with Romi ZHANG for ID post huynh appointment on 12/12/2024 at 8:30 a.m.  ART will be deferred to start as outpatient   ID will sign off at this time. Thank you for the consult.  Please call for any additional questions.        Sara Freeman, MARCE  [facility] Infectious Diseases     *Portions of this note dictated using EMR integrated voice recognition software, and may be subject to voice recognition errors not corrected at proofreading. Please contact writer for clarification if needed. *

## 2024-11-29 LAB
CMV IGG SERPL QL IA: POSITIVE
CMV IGM SERPL QL IA: NEGATIVE

## 2024-12-02 LAB
GAMMA INTERFERON BACKGROUND BLD IA-ACNC: 0.1 IU/ML
M TB IFN-G BLD-IMP: NEGATIVE
M TB IFN-G CD4+ BCKGRND COR BLD-ACNC: 0.01 IU/ML
M TB IFN-G CD4+CD8+ BCKGRND COR BLD-ACNC: 0 IU/ML
MITOGEN IGNF BCKGRD COR BLD-ACNC: 0.87 IU/ML

## 2024-12-03 ENCOUNTER — TELEPHONE (OUTPATIENT)
Dept: INTERVENTIONAL RADIOLOGY/VASCULAR | Facility: HOSPITAL | Age: 64
End: 2024-12-03
Payer: MEDICARE

## 2024-12-03 LAB
C BURNET PH1 IGG TITR SER IF: NORMAL {TITER}
C BURNET PH1 IGM TITR SER: NORMAL {TITER}
C BURNET PH2 IGG TITR SER IF: NORMAL {TITER}
C BURNET PH2 IGM TITR SER: NORMAL {TITER}
IMMUNOLOGIST REVIEW: NORMAL
MAYO GENERIC ORDERABLE RESULT: NORMAL

## 2024-12-04 ENCOUNTER — TELEPHONE (OUTPATIENT)
Dept: INTERVENTIONAL RADIOLOGY/VASCULAR | Facility: HOSPITAL | Age: 64
End: 2024-12-04
Payer: MEDICARE

## 2024-12-04 DIAGNOSIS — R91.1 LUNG NODULE: Primary | ICD-10-CM

## 2024-12-05 LAB — FUNGUS BLD CULT: NORMAL

## 2024-12-06 ENCOUNTER — TELEPHONE (OUTPATIENT)
Dept: INTERVENTIONAL RADIOLOGY/VASCULAR | Facility: HOSPITAL | Age: 64
End: 2024-12-06
Payer: MEDICARE

## 2024-12-06 NOTE — NURSING
Attempted to reach pt at [phone], no answer.  Left VM.  Called [phone] listed in alt contact.  No answer.  No VM

## 2024-12-10 ENCOUNTER — TELEPHONE (OUTPATIENT)
Dept: INTERVENTIONAL RADIOLOGY/VASCULAR | Facility: HOSPITAL | Age: 64
End: 2024-12-10
Payer: MEDICARE

## 2024-12-13 ENCOUNTER — TELEPHONE (OUTPATIENT)
Dept: INFECTIOUS DISEASES | Facility: CLINIC | Age: 64
End: 2024-12-13
Payer: MEDICARE

## 2024-12-13 NOTE — LETTER
December 13, 2024    Anne Terry  [address] [address]                 [facility]   [address] [place] [address]  Phone: [phone]   We have been unable to reach you by phone. Please contact our office at  [phone].            Sincerely,     [facility]

## 2024-12-13 NOTE — TELEPHONE ENCOUNTER
Tried calling pt, no answer, no option to leave voice mail. Will try again later. Will mail letter for pt to call office'   Also called [phone] (no answer, no option to leave voice mail)

## 2024-12-13 NOTE — TELEPHONE ENCOUNTER
Tried to contact patient.No answer. VM is full. Pt was scheduled for clinic yesterday, but no showed. He needs to start MAC treatment per Dr. Sanford, but was no one has been able to reach the patient. Please send try to reach out to patient and maybe send him a letter to contact us.

## 2024-12-23 DIAGNOSIS — R91.1 LUNG NODULE: Primary | ICD-10-CM

## 2024-12-23 DIAGNOSIS — B20 HIV DISEASE: ICD-10-CM

## 2024-12-24 ENCOUNTER — TELEPHONE (OUTPATIENT)
Dept: INTERVENTIONAL RADIOLOGY/VASCULAR | Facility: HOSPITAL | Age: 64
End: 2024-12-24
Payer: MEDICARE

## 2024-12-27 ENCOUNTER — TELEPHONE (OUTPATIENT)
Dept: INTERVENTIONAL RADIOLOGY/VASCULAR | Facility: HOSPITAL | Age: 64
End: 2024-12-27
Payer: MEDICARE

## 2024-12-27 NOTE — NURSING
Attempted to reach pt regarding procedure scheduled for 01/02/25.  No answer and VM full at this time.  Pt will be removed from the schedule.

## 2025-01-02 ENCOUNTER — TELEPHONE (OUTPATIENT)
Dept: INFECTIOUS DISEASES | Facility: CLINIC | Age: 65
End: 2025-01-02
Payer: MEDICARE

## 2025-01-02 NOTE — LETTER
January 2, 2025    Anne Terry  [address] [address]               [facility] [address] [address]  Phone: [phone]   We have been unable to reach you by phone regarding your recent lab results. Please contact our office at  [phone].            Sincerely,     [facility]

## 2025-01-02 NOTE — TELEPHONE ENCOUNTER
Patient no showed for his ID visit with Romi Maria NP and has not answered his phone to reschedule another visit. Additionally he has not been in contact with IR to schedule his lung biopsy for a suspicious mass that may be malignancy or OI pathogen.     Noted patient's AFB blood cx from admission now show growth of MAC.    Patient needs to resume ART and start MAC treatment ASAP. This cannot be done until he re-establishes care with ID clinic.    Will need to start on the following in clinic:  - Truvada/Prezista/norvir/Abacavir; noted [id number] per previous HIV GT.   - Atovaquone 1500mg PO daily for OI ppx  - MAC treatment as follows:   - Azithromycin 500mg PO daily   - Ethambutol 15mg/kg PO daily   - Rifabutin 150mg PO daily    --> Dose reduced as it is boosted by norvir.    --> Have to avoid TAF with Rifabutin, therefore above ART regimen adjusted to Truvada   - While on MAC therapy patient needs monthly labs (CBC, CMP)   - While on Ethambutol patient needs baseline eye exam with follow up testing q2-3 months. Will need referral to ophthalmology    Please reattempt to call the patient to get him scheduled with NP.    Sg Sanford MD  Infectious Diseases Faculty   of Medicine     Yes

## 2025-01-02 NOTE — TELEPHONE ENCOUNTER
Tried calling pt, no answer, no option to leave voice mail (mailbox is full). Will try again later.   Also called pt contact [place] (call cannot be completed at this time). Will mail letter again for pt to call office

## 2025-01-03 NOTE — TELEPHONE ENCOUNTER
Patient informed of results and providers recommendations. All questions answered. Patient verbalized understanding. Patient can't come into office until Tuesday, scheduled pt for in office appt Tuesday at 11:00, Left message with [facility] IR dept [phone]) to get pt rescheduled for lung biopsy.

## 2025-01-03 NOTE — TELEPHONE ENCOUNTER
Received call back from Lanre (IR) pt is scheduled for biopsy on 01/31/25 arrive at 7:30 for 9:30 appt  [place] [facility], NPO after midnight, stop blood thinners (if any) even baby aspirin 5 days before appt. Will need a  due to sedation. Will give pt this information when he comes for appt

## 2025-01-07 ENCOUNTER — OFFICE VISIT (OUTPATIENT)
Dept: INFECTIOUS DISEASES | Facility: CLINIC | Age: 65
End: 2025-01-07

## 2025-01-07 VITALS
RESPIRATION RATE: 18 BRPM | HEART RATE: 67 BPM | SYSTOLIC BLOOD PRESSURE: 142 MMHG | TEMPERATURE: 98 F | BODY MASS INDEX: 21.93 KG/M2 | HEIGHT: 72 IN | WEIGHT: 161.94 LBS | DIASTOLIC BLOOD PRESSURE: 74 MMHG

## 2025-01-07 DIAGNOSIS — B37.81 CANDIDIASIS OF MOUTH AND ESOPHAGUS: ICD-10-CM

## 2025-01-07 DIAGNOSIS — Z12.5 ENCOUNTER FOR SCREENING PROSTATE SPECIFIC ANTIGEN (PSA) MEASUREMENT: ICD-10-CM

## 2025-01-07 DIAGNOSIS — Z11.3 ROUTINE SCREENING FOR STI (SEXUALLY TRANSMITTED INFECTION): ICD-10-CM

## 2025-01-07 DIAGNOSIS — R91.8 RIGHT LOWER LOBE LUNG MASS: ICD-10-CM

## 2025-01-07 DIAGNOSIS — I10 HYPERTENSION, UNSPECIFIED TYPE: ICD-10-CM

## 2025-01-07 DIAGNOSIS — B37.0 CANDIDIASIS OF MOUTH AND ESOPHAGUS: ICD-10-CM

## 2025-01-07 DIAGNOSIS — D50.9 IRON DEFICIENCY ANEMIA, UNSPECIFIED IRON DEFICIENCY ANEMIA TYPE: ICD-10-CM

## 2025-01-07 DIAGNOSIS — A31.0 MYCOBACTERIUM AVIUM-INTRACELLULARE COMPLEX: ICD-10-CM

## 2025-01-07 DIAGNOSIS — B20 AIDS (ACQUIRED IMMUNODEFICIENCY SYNDROME), CD4 <=200/<=14%: Primary | ICD-10-CM

## 2025-01-07 PROCEDURE — 99417 PROLNG OP E/M EACH 15 MIN: CPT | Mod: S$PBB,,, | Performed by: NURSE PRACTITIONER

## 2025-01-07 PROCEDURE — 99215 OFFICE O/P EST HI 40 MIN: CPT | Mod: PBBFAC | Performed by: NURSE PRACTITIONER

## 2025-01-07 RX ORDER — ATOVAQUONE 750 MG/5ML
750 SUSPENSION ORAL 2 TIMES DAILY
Qty: 210 ML | Refills: 2 | Status: SHIPPED | OUTPATIENT
Start: 2025-01-07

## 2025-01-07 RX ORDER — FERROUS SULFATE 325(65) MG
325 TABLET, DELAYED RELEASE (ENTERIC COATED) ORAL DAILY
Qty: 90 TABLET | Refills: 0 | Status: SHIPPED | OUTPATIENT
Start: 2025-01-07

## 2025-01-07 RX ORDER — NYSTATIN 100000 [USP'U]/ML
6 SUSPENSION ORAL 4 TIMES DAILY
Qty: 504 ML | Refills: 0 | Status: SHIPPED | OUTPATIENT
Start: 2025-01-07 | End: 2025-01-28

## 2025-01-07 RX ORDER — RIFABUTIN 150 MG/1
150 CAPSULE ORAL DAILY
Qty: 30 CAPSULE | Refills: 1 | Status: SHIPPED | OUTPATIENT
Start: 2025-01-07

## 2025-01-07 RX ORDER — AZITHROMYCIN 500 MG/1
500 TABLET, FILM COATED ORAL DAILY
Qty: 30 TABLET | Refills: 1 | Status: SHIPPED | OUTPATIENT
Start: 2025-01-07

## 2025-01-07 RX ORDER — ETHAMBUTOL HYDROCHLORIDE 400 MG/1
400 TABLET, FILM COATED ORAL DAILY
Qty: 30 TABLET | Refills: 5 | Status: SHIPPED | OUTPATIENT
Start: 2025-01-07 | End: 2026-01-07

## 2025-01-07 NOTE — PROGRESS NOTES
Patient ID: Anne Terry 64 y.o.     Chief Complaint:   Chief Complaint   Patient presents with    Follow-up     B20/MAC        HPI:    Anne Terry is a 63 y/o AAM here for advanced HIV f/u. Last visit in clinic was 11/8/21. Dx 3/7/15.  MSF, HLA  neg, G6PD neg. Pt has been off his ART meds since 2021. He states he is unable to get any medications until his new insurance, [employer] starts on 1/13/25.  Last regimen was Ziagen, Descovy, Prezista, and Norvir. Pt was hospitalized at [facility] 11/19/24-11/26/24 with primary complaints of fatigue and weight loss.  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 He was scheduled a follow up appt, but did not attend. Clinic staff has been actively trying to locate pateint and get him into the clinic. Pt denies fever, headache, chills, visual problems, N/V/D, SOB, cough, chest pain or abd pain. Complains of some swelling to bilateral ankles that has been going on for about 1 month, hiccups for one month., and a 60 pound weight gain since 11/2024. Pt has been off of all of his medications and cannot recall what he was previously taking. He will need updated labs today, but our lab does not accept his current insurance [employer]. Pt has agreed to go to [facility] to have blood work done. Once labs have resulted, I will start a fluid pill for blood pressure. Reviewed past labs from 11/21/24 HIV ,696, CD4 378/8%, fungitell 301, PJP positive, mycobacteria culture positive for MAC.  Labs will need to be updated today. During hospital stay, CT chest and abdomen done 11/19/24 showed right lower lung lobe large irregular defined consolidation with areas of necrosis may be secondary to infection though concern for associated malignancy. Left lower lung lobe nodules could be metastatic. Pt is scheduled a lung biopsy in IR 1/31/25.  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 Discussed need to start treatment for MAC before HIV meds can be restarted. Also discussed the need for an eye exam ASAP since patient will be taking Ethambutol for an extended period of time as it is part of the MAC treatment. MAC disease process and treat plan discussed extensively with the patient. Pt is aware that this is a long treatment and he will need to take medications daily and attend all appts as scheduled. He verbalized understanding.              History reviewed. No pertinent past medical history.     Past Surgical History:   Procedure Laterality Date    COLONOSCOPY  05/08/2015        Social History     Socioeconomic History    Marital status: Single   Tobacco Use    Smoking status: Never    Smokeless tobacco: Never   Substance and Sexual Activity    Alcohol use: Not Currently    Drug use: Yes     Types: Marijuana    Sexual activity: Not Currently     Partners: Female     Social Drivers of Health     Financial Resource Strain: Low Risk  (11/20/2024)    Overall Financial Resource Strain (CARDIA)     Difficulty of Paying Living Expenses: Not hard at all   Food Insecurity: No Food Insecurity (11/20/2024)    Hunger Vital Sign     Worried About Running Out of Food in the Last Year: Never true     Ran Out of Food in the Last Year: Never true   Transportation Needs: No Transportation Needs (11/20/2024)    TRANSPORTATION NEEDS     Transportation : No   Stress: No Stress Concern Present (11/20/2024)    [employer] - Occupational Stress Questionnaire     Feeling of Stress : Not at all   Housing Stability: Low Risk  (11/20/2024)    Housing Stability Vital Sign     Unable to Pay for Housing in the Last Year: No     Homeless in the Last Year: No        Family History   Problem Relation Name Age of Onset    No Known Problems Mother      No Known Problems Father      Cancer Sister      No Known Problems Brother          Review of patient's  allergies indicates:  No Known Allergies     Immunization History   Administered Date(s) Administered    Influenza - Quadrivalent 11/16/2020, 11/08/2021    Influenza - Quadrivalent - PF (6-35 months) 12/12/2018    Influenza - Quadrivalent - PF *Preferred* (6 months and older) 11/07/2019    Influenza - Trivalent - Fluarix, Flulaval, Fluzone, Afluria - PF 10/09/2017    Meningococcal Conjugate (MCV4P) 09/11/2018, 05/07/2019    Pneumococcal Polysaccharide - 23 Valent 01/19/2021    Zoster Recombinant 11/16/2020, 07/28/2021        Review of Systems   Constitutional: Negative.    HENT: Negative.     Eyes: Negative.    Respiratory: Negative.     Cardiovascular:  Positive for leg swelling.   Gastrointestinal: Negative.    Genitourinary: Negative.    Musculoskeletal: Negative.    Skin: Negative.    Neurological: Negative.    Endo/Heme/Allergies: Negative.    Psychiatric/Behavioral: Negative.     All other systems reviewed and are negative.         Objective:      BP (!) 142/74   Pulse 67   Temp 98 °F (36.7 °C) (Oral)   Resp 18   Ht 6' (1.829 m)   Wt 73.5 kg (161 lb 14.9 oz)   BMI 21.96 kg/m²      Physical Exam  Vitals reviewed.   Constitutional:       General: He is not in acute distress.     Appearance: Normal appearance.   HENT:      Head: Normocephalic.      Right Ear: External ear normal.      Left Ear: External ear normal.      Nose: Nose normal.      Mouth/Throat:      Mouth: Mucous membranes are moist.      Tongue: No lesions. Tongue does not deviate from midline.      Palate: No mass and lesions.      Pharynx: Oropharynx is clear. Uvula midline.      Comments: Thrush noted   Eyes:      General: No scleral icterus.     Extraocular Movements: Extraocular movements intact.      Conjunctiva/sclera: Conjunctivae normal.      Pupils: Pupils are equal, round, and reactive to light.   Cardiovascular:      Rate and Rhythm: Normal rate and regular rhythm.      Pulses: Normal pulses.      Heart sounds: Normal heart sounds.    Pulmonary:      Effort: Pulmonary effort is normal. No respiratory distress.      Breath sounds: Normal breath sounds.   Abdominal:      General: Bowel sounds are normal. There is no distension.      Palpations: Abdomen is soft. There is no mass.      Tenderness: There is no abdominal tenderness. There is no right CVA tenderness or left CVA tenderness.      Hernia: No hernia is present.   Musculoskeletal:         General: No tenderness or signs of injury. Normal range of motion.      Cervical back: Normal range of motion and neck supple.      Right lower leg: No edema.      Left lower leg: No edema.   Lymphadenopathy:      Cervical: No cervical adenopathy.   Skin:     General: Skin is warm and dry.      Capillary Refill: Capillary refill takes less than 2 seconds.      Findings: No erythema or lesion.   Neurological:      General: No focal deficit present.      Mental Status: He is alert and oriented to person, place, and time. Mental status is at baseline.   Psychiatric:         Mood and Affect: Mood normal.         Behavior: Behavior normal.         Thought Content: Thought content normal.         Judgment: Judgment normal.          Labs:   Lab Results   Component Value Date    WBC 3.75 (L) 11/26/2024    HGB 7.4 (L) 11/26/2024    HCT 23.1 (L) 11/26/2024    MCV 88.2 11/26/2024     11/26/2024       CMP  Sodium   Date Value Ref Range Status   11/26/2024 138 136 - 145 mmol/L Final     Potassium   Date Value Ref Range Status   11/26/2024 4.4 3.5 - 5.1 mmol/L Final     Chloride   Date Value Ref Range Status   11/26/2024 105 98 - 107 mmol/L Final     CO2   Date Value Ref Range Status   11/26/2024 30 23 - 31 mmol/L Final     Blood Urea Nitrogen   Date Value Ref Range Status   11/26/2024 12.6 8.4 - 25.7 mg/dL Final     Creatinine   Date Value Ref Range Status   11/26/2024 0.92 0.72 - 1.25 mg/dL Final     Calcium   Date Value Ref Range Status   11/26/2024 7.9 (L) 8.8 - 10.0 mg/dL Final     Albumin   Date Value Ref  Range Status   11/26/2024 1.7 (L) 3.4 - 4.8 g/dL Final     Bilirubin Total   Date Value Ref Range Status   11/26/2024 0.2 <=1.5 mg/dL Final     ALP   Date Value Ref Range Status   11/26/2024 235 (H) 40 - 150 unit/L Final     AST   Date Value Ref Range Status   11/26/2024 56 (H) 5 - 34 unit/L Final     ALT   Date Value Ref Range Status   11/26/2024 33 0 - 55 unit/L Final     eGFR   Date Value Ref Range Status   11/26/2024 >60 mL/min/1.73/m2 Final     Lab Results   Component Value Date    TSH 1.8365 01/19/2021     Hep C Ab Interp   Date Value Ref Range Status   11/19/2024 Nonreactive Nonreactive Final     Syphilis Antibody   Date Value Ref Range Status   11/20/2024 Nonreactive Nonreactive, Equivocal Final     Cholesterol Total   Date Value Ref Range Status   11/08/2021 198 <<=200 mg/dL Final     HDL Cholesterol   Date Value Ref Range Status   11/08/2021 45 35 - 60 mg/dL Final     Triglyceride   Date Value Ref Range Status   11/08/2021 137 34 - 140 mg/dL Final     Cholesterol/HDL Ratio   Date Value Ref Range Status   11/08/2021 4 0 - 5 Final     Very Low Density Lipoprotein   Date Value Ref Range Status   11/08/2021 27  Final     LDL Cholesterol   Date Value Ref Range Status   11/08/2021 126.00 50.00 - 140.00 mg/dL Final     Vitamin D   Date Value Ref Range Status   11/08/2021 33.3 30.0 - 80.0 ng/mL Final     Results for orders placed or performed in visit on 11/08/21   CD4 Lymphocytes   Result Value Ref Range    WBC Absolute 5,300.0 4,500.0 - 11,500.0 /mm3    Lymph Percent 23 (L) 28 - 48 %    CD4 % 12.1 (L) 31.0 - 59.0 %    Lymph Absolute 1,219 (L) 1,260 - 5,520 unit/L    CD4 Absolute 147 (L) 589 - 1,505 unit/L     No results found for this or any previous visit.  Results for orders placed or performed during the hospital encounter of 11/19/24   Quantiferon Gold TB   Result Value Ref Range    QuantiFERON-Tb Gold Plus Result Negative Negative    TB1 Ag minus Nil Result 0.01 IU/mL    TB2 Ag minus Nil Result 0.00 IU/mL     Mitogen minus Nil Result 0.87 IU/mL    Nil Result 0.10 IU/mL     Results for orders placed or performed during the hospital encounter of 11/19/24   C.trach/N.gonor AMP RNA    Specimen: Throat   Result Value Ref Range    Source THROAT     Chlamydia trachomatis amplified RNA Negative Negative    Neisseria gonorrhoeae amplified RNA Negative Negative    SOURCE: THROAT      No results found for this or any previous visit.    Imaging: Reviewed most recent relevant imaging studies available, notable results highlighted in this note    Medications:     Current Outpatient Medications   Medication Instructions    atovaquone (MEPRON) 750 mg, Oral, 2 times daily    azithromycin (ZITHROMAX) 500 mg, Oral, Daily    ethambutoL (MYAMBUTOL) 400 mg, Oral, Daily    ferrous sulfate 325 mg, Oral, Daily    metroNIDAZOLE (FLAGYL) 500 mg, Oral, Every 12 hours    nystatin (MYCOSTATIN) 600,000 Units, Oral, 4 times daily    rifabutin (MYCOBUTIN) 150 mg, Oral, Daily       Assessment:       Problem List Items Addressed This Visit    None  Visit Diagnoses       AIDS (acquired immunodeficiency syndrome), CD4 <=200/<=14%    -  Primary    Relevant Medications    atovaquone (MEPRON) 750 mg/5 mL Susp oral liquid    Other Relevant Orders    CD4 Lymphocytes    Fungitell Assay For (1.3)-B-D-Glucans    HIV-1 RNA, Quantitative, PCR with Reflex to Genotype    TSH    Vitamin D    CD4 T-[place] Cells    CBC Auto Differential    Comprehensive Metabolic Panel    Lipid Panel    Quantiferon Gold TB    Ambulatory referral/consult to Ophthalmology    Ambulatory referral/consult to Internal Medicine    Iron and TIBC    Ferritin    Reticulocytes    Mycobacterium avium-intracellulare complex        Relevant Medications    azithromycin (ZITHROMAX) 500 MG tablet    rifabutin (MYCOBUTIN) 150 mg Cap    ethambutoL (MYAMBUTOL) 400 MG Tab    Other Relevant Orders    Ambulatory referral/consult to Internal Medicine    Right lower lobe lung mass        Relevant Orders     Ambulatory referral/consult to Internal Medicine    Routine screening for STI (sexually transmitted infection)        Relevant Orders    Sexually-Transmitted Infections (STIs) Increased Risk Panel    Hepatitis Panel, Acute    SYPHILIS ANTIBODY (WITH REFLEX RPR)    Encounter for screening prostate specific antigen (PSA) measurement        Relevant Orders    PSA, Screening    Hypertension, unspecified type        Iron deficiency anemia, unspecified iron deficiency anemia type        Relevant Medications    ferrous sulfate 325 (65 FE) MG EC tablet    Candidiasis of mouth and esophagus        Relevant Medications    nystatin (MYCOSTATIN) 100,000 unit/mL suspension               Plan:      AIDS (acquired immunodeficiency syndrome), CD4 <=200/<=14%  -     CD4 Lymphocytes; Future; Expected date: 01/07/2025  -     Fungitell Assay For (1.3)-B-D-Glucans; Future; Expected date: 01/07/2025  -     HIV-1 RNA, Quantitative, PCR with Reflex to Genotype; Future; Expected date: 01/07/2025  -     TSH; Future; Expected date: 01/07/2025  -     Vitamin D; Future; Expected date: 01/07/2025  -     CD4 T-[place] Cells; Future; Expected date: 01/07/2025  -     CBC Auto Differential; Future; Expected date: 01/07/2025  -     Comprehensive Metabolic Panel; Future; Expected date: 01/07/2025  -     Lipid Panel; Future; Expected date: 01/07/2025  -     Quantiferon Gold TB; Future; Expected date: 01/07/2025  -     Ambulatory referral/consult to Ophthalmology; Future; Expected date: 01/14/2025  -     Ambulatory referral/consult to Internal Medicine; Future; Expected date: 01/14/2025  -     Iron and TIBC; Future; Expected date: 01/07/2025  -     Ferritin; Future; Expected date: 01/07/2025  -     Reticulocytes; Future; Expected date: 01/07/2025  -     atovaquone (MEPRON) 750 mg/5 mL Susp oral liquid; Take 5 mLs (750 mg total) by mouth 2 (two) times daily.  Dispense: 210 mL; Refill: 2  Extensive education provided regarding adherence, sexual health,  medication management, attending scheduled visits, risks and benefits of medication.  Use condoms for all sexual encounters.  Consider complete abstinence until virally suppressed.  Notify sexual partner(s) of disease status.   Uncontrolled HIV can cause not only a weakened immune system & leave one susceptible to opportunistic infections, but can also lead to renal, cardiac, neurological, cardiovascular, and hepatic dysfunction as a result of chronic inflammation.  Take all medications as prescribed and keep follow-up with providers as scheduled.   Incorrect use of HIV medications can lead to developing resistance and loss of control of virus.  This can also limit future treatment options.   Notify our office for any concerns that may emerge, particularly if you are having trouble with obtaining medication or changes in insurance status so that we may assist you.           Dx 3/7/15  Initial HIV VL 1,886,297 and CD4 bree 29 on 3/30/15  OI hx: MAC, oroesophageal candidiasis, disseminated histoplasmosis, cryptococcus    Reviewed all past HIV GT:   3/30/15- No resistance noted   5/4/15- phenosense sensitive   1/6/16-phenosense resistance noted to elvitagravir and raltegravir   1/6/16- M184V mutation noted with resistance to Emtricitabine and lamivudine  3/7/16-phenosense resistance noted to elvitagravir and raltegravir, partial resistance to dolutegravir  6/7/16 M184V mutation noted with resistance to Emtricitabine and lamivudine    Adherence and sexual health counseling done.  Use condoms for all sexual encounters.  Blood precautions.   Hold ART meds until patient has been on MAC treatment for 2 weeks  Restart Atovaquone 750 mg BID   Labs today to be [facility] at [facility]  RT 2 weeks with Romi for an in office visit      HIV Wellness:  Urine CT/GC: ordered   RPR: ordered   Ophth: ordered   DEXA: I will order at next visit   Colon Cancer Screen: Plan to refer once HIV VL is controlled   PSA: ordered     Mycobacterium  avium-intracellulare complex  -     Ambulatory referral/consult to Internal Medicine; Future; Expected date: 01/14/2025  -     azithromycin (ZITHROMAX) 500 MG tablet; Take 1 tablet (500 mg total) by mouth once daily.  Dispense: 30 tablet; Refill: 1  -     rifabutin (MYCOBUTIN) 150 mg Cap; Take 1 capsule (150 mg total) by mouth once daily.  Dispense: 30 capsule; Refill: 1  -     ethambutoL (MYAMBUTOL) 400 MG Tab; Take 1 tablet (400 mg total) by mouth once daily.  Dispense: 30 tablet; Refill: 5    - Azithromycin 500mg PO daily              - Ethambutol 15mg/kg PO daily              - Rifabutin 150mg PO daily                          --> Dose reduced as it is boosted by norvir.                          --> Have to avoid TAF with Rifabutin, therefore above I    If ART regimen remains the same Descovy will need to be adjusted to [place]. While on MAC therapy patient needs monthly labs (CBC, CMP). While on Ethambutol patient needs baseline eye exam with follow up testing q2-3 months.   Referred to ophthalmology     Right lower lobe lung mass  -     Ambulatory referral/consult to Internal Medicine; Future; Expected date: 01/14/2025  Lung biopsy scheduled with IR 1/31/25    Routine screening for STI (sexually transmitted infection)  -     Sexually-Transmitted Infections (STIs) Increased Risk Panel; Future; Expected date: 01/07/2025  -     Hepatitis Panel, Acute; Future; Expected date: 01/07/2025  -     SYPHILIS ANTIBODY (WITH REFLEX RPR); Future; Expected date: 01/07/2025    Encounter for screening prostate specific antigen (PSA) measurement  -     PSA, Screening; Future; Expected date: 01/07/2025    Hypertension, unspecified type  BP goal <130/80. Monitor BP at home & keep log of readings.  Low sodium diet, max 2 grams per day.  Weight control recommended.  Avoid illicit drug use and excessive alcohol intake.  Increase exercise activity, goal 30 minutes moderate exertion 3-5 times per week.  Stress reduction strategies  such as meditation, deep breathing, stretching, prayer, social support system.      Iron deficiency anemia, unspecified iron deficiency anemia type  -     ferrous sulfate 325 (65 FE) MG EC tablet; Take 1 tablet (325 mg total) by mouth once daily.  Dispense: 90 tablet; Refill: 0  Repeat iron studies     Candidiasis of mouth and esophagus  -     nystatin (MYCOSTATIN) 100,000 unit/mL suspension; Take 6 mLs (600,000 Units total) by mouth 4 (four) times daily. for 21 days  Dispense: 504 mL; Refill: 0         104 minutes of total time spent on the encounter, which includes face to face time and non-face to face time preparing to see the patient (eg, review of tests), Obtaining and/or reviewing separately obtained history, Documenting clinical information in the electronic or other health record, Independently interpreting results (not separately reported) and communicating results to the patient/family/caregiver, or Care coordination (not separately reported).

## 2025-01-08 ENCOUNTER — TELEPHONE (OUTPATIENT)
Dept: INFECTIOUS DISEASES | Facility: CLINIC | Age: 65
End: 2025-01-08

## 2025-01-08 NOTE — LETTER
January 10, 2025    Anne Terry  [address] [place] [id number]               [facility] [address] [place] [address]  Phone: [phone]   We have been unable to reach you by phone. Please contact our office at  [phone].            Sincerely,     [facility]

## 2025-01-09 NOTE — TELEPHONE ENCOUNTER
Romi informed and gave orders to place refills on hold until pt completes labs, called pharmacy and gave orders to Tram, she will have to d/c meds and we'll have to reorder when needed.

## 2025-01-10 NOTE — TELEPHONE ENCOUNTER
Tried calling pt, no answer, no option to leave voice mail. Will mail letter for pt to call office.

## 2025-01-17 ENCOUNTER — LAB VISIT (OUTPATIENT)
Dept: LAB | Facility: HOSPITAL | Age: 65
End: 2025-01-17
Attending: NURSE PRACTITIONER
Payer: MEDICARE

## 2025-01-17 DIAGNOSIS — R91.1 LUNG NODULE: ICD-10-CM

## 2025-01-17 DIAGNOSIS — B20 HIV DISEASE: ICD-10-CM

## 2025-01-17 DIAGNOSIS — B20 AIDS (ACQUIRED IMMUNODEFICIENCY SYNDROME), CD4 <=200/<=14%: ICD-10-CM

## 2025-01-17 DIAGNOSIS — Z12.5 ENCOUNTER FOR SCREENING PROSTATE SPECIFIC ANTIGEN (PSA) MEASUREMENT: ICD-10-CM

## 2025-01-17 DIAGNOSIS — Z11.3 ROUTINE SCREENING FOR STI (SEXUALLY TRANSMITTED INFECTION): ICD-10-CM

## 2025-01-17 LAB
25(OH)D3+25(OH)D2 SERPL-MCNC: 15 NG/ML (ref 30–80)
ALBUMIN SERPL-MCNC: 2.6 G/DL (ref 3.4–4.8)
ALBUMIN/GLOB SERPL: 0.4 RATIO (ref 1.1–2)
ALP SERPL-CCNC: 319 UNIT/L (ref 40–150)
ALT SERPL-CCNC: 15 UNIT/L (ref 0–55)
ANION GAP SERPL CALC-SCNC: 7 MEQ/L
AST SERPL-CCNC: 36 UNIT/L (ref 5–34)
BASOPHILS # BLD AUTO: 0.02 X10(3)/MCL
BASOPHILS NFR BLD AUTO: 0.5 %
BILIRUB SERPL-MCNC: 0.3 MG/DL
BUN SERPL-MCNC: 11.8 MG/DL (ref 8.4–25.7)
CALCIUM SERPL-MCNC: 8.3 MG/DL (ref 8.8–10)
CHLORIDE SERPL-SCNC: 108 MMOL/L (ref 98–107)
CHOLEST SERPL-MCNC: 162 MG/DL
CHOLEST/HDLC SERPL: 6 {RATIO} (ref 0–5)
CO2 SERPL-SCNC: 27 MMOL/L (ref 23–31)
CREAT SERPL-MCNC: 1.01 MG/DL (ref 0.72–1.25)
CREAT/UREA NIT SERPL: 12
EOSINOPHIL # BLD AUTO: 0.44 X10(3)/MCL (ref 0–0.9)
EOSINOPHIL NFR BLD AUTO: 11.3 %
ERYTHROCYTE [DISTWIDTH] IN BLOOD BY AUTOMATED COUNT: 15.3 % (ref 11.5–17)
FERRITIN SERPL-MCNC: 1169.21 NG/ML (ref 21.81–274.66)
GFR SERPLBLD CREATININE-BSD FMLA CKD-EPI: >60 ML/MIN/1.73/M2
GLOBULIN SER-MCNC: 6.4 GM/DL (ref 2.4–3.5)
GLUCOSE SERPL-MCNC: 82 MG/DL (ref 82–115)
HAV IGM SERPL QL IA: NONREACTIVE
HBV CORE IGM SERPL QL IA: NONREACTIVE
HBV SURFACE AG SERPL QL IA: NONREACTIVE
HCT VFR BLD AUTO: 30.1 % (ref 42–52)
HCV AB SERPL QL IA: NONREACTIVE
HDLC SERPL-MCNC: 25 MG/DL (ref 35–60)
HGB BLD-MCNC: 9.6 G/DL (ref 14–18)
IMM GRANULOCYTES # BLD AUTO: 0.02 X10(3)/MCL (ref 0–0.04)
IMM GRANULOCYTES NFR BLD AUTO: 0.5 %
INR PPP: 1
IRON SATN MFR SERPL: 22 % (ref 20–50)
IRON SERPL-MCNC: 35 UG/DL (ref 65–175)
LDLC SERPL CALC-MCNC: 77 MG/DL (ref 50–140)
LYMPHOCYTES # BLD AUTO: 0.59 X10(3)/MCL (ref 0.6–4.6)
LYMPHOCYTES NFR BLD AUTO: 15.1 %
MCH RBC QN AUTO: 29.8 PG (ref 27–31)
MCHC RBC AUTO-ENTMCNC: 31.9 G/DL (ref 33–36)
MCV RBC AUTO: 93.5 FL (ref 80–94)
MONOCYTES # BLD AUTO: 0.31 X10(3)/MCL (ref 0.1–1.3)
MONOCYTES NFR BLD AUTO: 7.9 %
NEUTROPHILS # BLD AUTO: 2.53 X10(3)/MCL (ref 2.1–9.2)
NEUTROPHILS NFR BLD AUTO: 64.7 %
NRBC BLD AUTO-RTO: 0 %
PLATELET # BLD AUTO: 266 X10(3)/MCL (ref 130–400)
PMV BLD AUTO: 9.6 FL (ref 7.4–10.4)
POTASSIUM SERPL-SCNC: 3.6 MMOL/L (ref 3.5–5.1)
PROT SERPL-MCNC: 9 GM/DL (ref 5.8–7.6)
PROTHROMBIN TIME: 13.7 SECONDS (ref 11.4–14)
PSA SERPL-MCNC: 0.2 NG/ML
RBC # BLD AUTO: 3.22 X10(6)/MCL (ref 4.7–6.1)
RET# (OHS): 0.03 X10E6/UL (ref 0.03–0.1)
RETICULOCYTE COUNT AUTOMATED (OLG): 1.02 % (ref 1.1–2.1)
SODIUM SERPL-SCNC: 142 MMOL/L (ref 136–145)
T PALLIDUM AB SER QL: NONREACTIVE
TIBC SERPL-MCNC: 126 UG/DL (ref 60–240)
TIBC SERPL-MCNC: 161 UG/DL (ref 250–450)
TRANSFERRIN SERPL-MCNC: 144 MG/DL (ref 163–344)
TRIGL SERPL-MCNC: 300 MG/DL (ref 34–140)
TSH SERPL-ACNC: 4.12 UIU/ML (ref 0.35–4.94)
VLDLC SERPL CALC-MCNC: 60 MG/DL
WBC # BLD AUTO: 3.91 X10(3)/MCL (ref 4.5–11.5)

## 2025-01-17 PROCEDURE — 80053 COMPREHEN METABOLIC PANEL: CPT

## 2025-01-17 PROCEDURE — 84443 ASSAY THYROID STIM HORMONE: CPT

## 2025-01-17 PROCEDURE — 86361 T CELL ABSOLUTE COUNT: CPT

## 2025-01-17 PROCEDURE — 82306 VITAMIN D 25 HYDROXY: CPT

## 2025-01-17 PROCEDURE — 36415 COLL VENOUS BLD VENIPUNCTURE: CPT

## 2025-01-17 PROCEDURE — 86480 TB TEST CELL IMMUN MEASURE: CPT

## 2025-01-17 PROCEDURE — 87449 NOS EACH ORGANISM AG IA: CPT

## 2025-01-17 PROCEDURE — 86359 T CELLS TOTAL COUNT: CPT

## 2025-01-17 PROCEDURE — 84153 ASSAY OF PSA TOTAL: CPT

## 2025-01-17 PROCEDURE — 87536 HIV-1 QUANT&REVRSE TRNSCRPJ: CPT

## 2025-01-17 PROCEDURE — 82728 ASSAY OF FERRITIN: CPT

## 2025-01-17 PROCEDURE — 80074 ACUTE HEPATITIS PANEL: CPT

## 2025-01-17 PROCEDURE — 0219U NFCT AGT HIV GNRJ SEQ ALYS: CPT

## 2025-01-17 PROCEDURE — 85025 COMPLETE CBC W/AUTO DIFF WBC: CPT

## 2025-01-17 PROCEDURE — 86780 TREPONEMA PALLIDUM: CPT

## 2025-01-17 PROCEDURE — 85610 PROTHROMBIN TIME: CPT

## 2025-01-17 PROCEDURE — 83550 IRON BINDING TEST: CPT

## 2025-01-17 PROCEDURE — 85045 AUTOMATED RETICULOCYTE COUNT: CPT

## 2025-01-17 PROCEDURE — 80061 LIPID PANEL: CPT

## 2025-01-18 LAB — HIV1 RNA # PLAS NAA DL=20: ABNORMAL COPIES/ML

## 2025-01-20 LAB
1,3 BETA GLUCAN SER QL: POSITIVE
1,3 BETA GLUCAN SER-MCNC: 115 PG/ML
AGE: 62
CD3+CD4+ CELLS # SPEC: 39 UNIT/L (ref 589–1505)
CD3+CD4+ CELLS NFR BLD: 6.7 %
LYMPHOCYTES # BLD AUTO: 586.5 X10(3)/MCL (ref 1260–5520)
LYMPHOCYTES NFR LN MANUAL: 15 % (ref 28–48)
LYMPHOMA - T-CELL MARKERS SPEC-IMP: ABNORMAL
WBC # BLD AUTO: 3910 /MM3 (ref 4500–11500)

## 2025-01-21 ENCOUNTER — TELEPHONE (OUTPATIENT)
Dept: INFECTIOUS DISEASES | Facility: CLINIC | Age: 65
End: 2025-01-21
Payer: MEDICARE

## 2025-01-21 NOTE — TELEPHONE ENCOUNTER
Tried to contact the patient. No answer. I would like to schedule him to be seen Friday at 1100 if we can get in touch with him. He had an appt today, but did not attend.

## 2025-01-24 ENCOUNTER — TELEPHONE (OUTPATIENT)
Dept: INFECTIOUS DISEASES | Facility: CLINIC | Age: 65
End: 2025-01-24
Payer: MEDICARE

## 2025-01-24 NOTE — TELEPHONE ENCOUNTER
OK. We just need to continue trying to reach him so we can get him rescheduled for next week if possible. I will overbook if I need to.    Romi Maria, FNP-BC

## 2025-01-24 NOTE — TELEPHONE ENCOUNTER
Will mail letter again for pt to return call office been trying to reach pt since 01/08/25 also mailed letter on 01/10/25 for pt to return call.

## 2025-01-24 NOTE — LETTER
January 24, 2025    Anne Terry  [address] [place] [id number]                   [facility] [address] [place] [address]  Phone: [phone]   We have been unable to reach you by phone. Please contact our office at  [phone].            Sincerely,     [facility]

## 2025-01-24 NOTE — TELEPHONE ENCOUNTER
----- Message from MARCE Blood sent at 1/23/2025  1:18 PM CST -----  I tried to call patient today and was unable to get through. Please call him first thing in the morning and see if he can come to clinic tomorrow at 1050 am.    Thank you,   MARCE Blood-BC  ----- Message -----  From: Lab, Background User  Sent: 1/17/2025   9:36 AM CST  To: MARCE Robertson

## 2025-01-27 ENCOUNTER — TELEPHONE (OUTPATIENT)
Dept: INFECTIOUS DISEASES | Facility: CLINIC | Age: 65
End: 2025-01-27
Payer: MEDICARE

## 2025-01-27 LAB
GAMMA INTERFERON BACKGROUND BLD IA-ACNC: 0.42 IU/ML
HIV 1 RNA RT + PR + IN MUT DET PLAS SEQ: NORMAL
M TB IFN-G BLD-IMP: NEGATIVE
M TB IFN-G CD4+ BCKGRND COR BLD-ACNC: 0.06 IU/ML
M TB IFN-G CD4+CD8+ BCKGRND COR BLD-ACNC: 0.01 IU/ML
MITOGEN IGNF BCKGRD COR BLD-ACNC: 0.78 IU/ML

## 2025-01-27 NOTE — TELEPHONE ENCOUNTER
I called him and sen letter on Friday and also called him this morning and this afternoon, still not able to reach him.

## 2025-01-27 NOTE — TELEPHONE ENCOUNTER
Have you been able to reach [facility]? I think I remember you saying you sent a letter to the patient Friday. We really need to get him into the clinic ASAP for evaluation.

## 2025-01-28 NOTE — TELEPHONE ENCOUNTER
Tried calling pt, no answer, no option to leave voice mail.  Also called pt contact- no option to leave voice mail

## 2025-02-06 ENCOUNTER — TELEPHONE (OUTPATIENT)
Dept: INFECTIOUS DISEASES | Facility: CLINIC | Age: 65
End: 2025-02-06
Payer: MEDICARE

## 2025-02-06 NOTE — TELEPHONE ENCOUNTER
I've sent a few letters for pt to contact office and called all numbers on pt contact list still no reply.Tried calling pt again today, no answer, no option to leave voice mail.

## 2025-02-07 ENCOUNTER — TELEPHONE (OUTPATIENT)
Dept: INTERNAL MEDICINE | Facility: CLINIC | Age: 65
End: 2025-02-07
Payer: MEDICARE

## 2025-02-07 NOTE — TELEPHONE ENCOUNTER
Patient unable to be reached by ID and IR multiple times despite calling multiple phone numbers within chart. Attempted to reach patient, friend of patient, son, and son's girlfriend also with no response.

## 2025-02-27 ENCOUNTER — HOSPITAL ENCOUNTER (INPATIENT)
Facility: HOSPITAL | Age: 65
LOS: 4 days | Discharge: HOME | DRG: 975 | End: 2025-03-03
Payer: MEDICARE

## 2025-02-27 ENCOUNTER — OFFICE VISIT (OUTPATIENT)
Dept: INFECTIOUS DISEASES | Facility: CLINIC | Age: 65
End: 2025-02-27
Payer: MEDICARE

## 2025-02-27 VITALS
TEMPERATURE: 98 F | SYSTOLIC BLOOD PRESSURE: 137 MMHG | WEIGHT: 158.31 LBS | HEART RATE: 65 BPM | RESPIRATION RATE: 14 BRPM | BODY MASS INDEX: 21.44 KG/M2 | HEIGHT: 72 IN | DIASTOLIC BLOOD PRESSURE: 69 MMHG

## 2025-02-27 DIAGNOSIS — R91.8 RIGHT LOWER LOBE LUNG MASS: ICD-10-CM

## 2025-02-27 DIAGNOSIS — A31.0 MYCOBACTERIUM AVIUM-INTRACELLULARE COMPLEX: ICD-10-CM

## 2025-02-27 DIAGNOSIS — D50.9 IRON DEFICIENCY ANEMIA, UNSPECIFIED IRON DEFICIENCY ANEMIA TYPE: ICD-10-CM

## 2025-02-27 DIAGNOSIS — B20 AIDS (ACQUIRED IMMUNODEFICIENCY SYNDROME), CD4 <=200/<=14%: Primary | ICD-10-CM

## 2025-02-27 DIAGNOSIS — B20 AIDS: ICD-10-CM

## 2025-02-27 DIAGNOSIS — B59 PNEUMONIA DUE TO PNEUMOCYSTIS JIROVECII, UNSPECIFIED LATERALITY, UNSPECIFIED PART OF LUNG: ICD-10-CM

## 2025-02-27 DIAGNOSIS — B20 SYMPTOMATIC HIV INFECTION: ICD-10-CM

## 2025-02-27 DIAGNOSIS — Z21 HIV INFECTION, UNSPECIFIED SYMPTOM STATUS: Primary | ICD-10-CM

## 2025-02-27 DIAGNOSIS — R07.9 CHEST PAIN: ICD-10-CM

## 2025-02-27 LAB
ALBUMIN SERPL-MCNC: 2.5 G/DL (ref 3.4–4.8)
ALBUMIN/GLOB SERPL: 0.4 RATIO (ref 1.1–2)
ALP SERPL-CCNC: 171 UNIT/L (ref 40–150)
ALT SERPL-CCNC: 9 UNIT/L (ref 0–55)
ANION GAP SERPL CALC-SCNC: 7 MEQ/L
AST SERPL-CCNC: 22 UNIT/L (ref 5–34)
BASOPHILS # BLD AUTO: 0.02 X10(3)/MCL
BASOPHILS NFR BLD AUTO: 0.5 %
BILIRUB SERPL-MCNC: 0.3 MG/DL
BUN SERPL-MCNC: 12.5 MG/DL (ref 8.4–25.7)
CALCIUM SERPL-MCNC: 8.3 MG/DL (ref 8.8–10)
CHLORIDE SERPL-SCNC: 109 MMOL/L (ref 98–107)
CO2 SERPL-SCNC: 26 MMOL/L (ref 23–31)
CREAT SERPL-MCNC: 1.13 MG/DL (ref 0.72–1.25)
CREAT/UREA NIT SERPL: 11
EOSINOPHIL # BLD AUTO: 0.47 X10(3)/MCL (ref 0–0.9)
EOSINOPHIL NFR BLD AUTO: 12.1 %
ERYTHROCYTE [DISTWIDTH] IN BLOOD BY AUTOMATED COUNT: 14 % (ref 11.5–17)
GFR SERPLBLD CREATININE-BSD FMLA CKD-EPI: >60 ML/MIN/1.73/M2
GLOBULIN SER-MCNC: 6 GM/DL (ref 2.4–3.5)
GLUCOSE SERPL-MCNC: 76 MG/DL (ref 82–115)
HCT VFR BLD AUTO: 29.3 % (ref 42–52)
HGB BLD-MCNC: 9.2 G/DL (ref 14–18)
IMM GRANULOCYTES # BLD AUTO: 0.01 X10(3)/MCL (ref 0–0.04)
IMM GRANULOCYTES NFR BLD AUTO: 0.3 %
INR PPP: 1.1
LYMPHOCYTES # BLD AUTO: 0.77 X10(3)/MCL (ref 0.6–4.6)
LYMPHOCYTES NFR BLD AUTO: 19.9 %
MCH RBC QN AUTO: 29.2 PG (ref 27–31)
MCHC RBC AUTO-ENTMCNC: 31.4 G/DL (ref 33–36)
MCV RBC AUTO: 93 FL (ref 80–94)
MONOCYTES # BLD AUTO: 0.36 X10(3)/MCL (ref 0.1–1.3)
MONOCYTES NFR BLD AUTO: 9.3 %
NEUTROPHILS # BLD AUTO: 2.24 X10(3)/MCL (ref 2.1–9.2)
NEUTROPHILS NFR BLD AUTO: 57.9 %
NRBC BLD AUTO-RTO: 0 %
PLATELET # BLD AUTO: 257 X10(3)/MCL (ref 130–400)
PMV BLD AUTO: 10.2 FL (ref 7.4–10.4)
POTASSIUM SERPL-SCNC: 4.3 MMOL/L (ref 3.5–5.1)
PROT SERPL-MCNC: 8.5 GM/DL (ref 5.8–7.6)
PROTHROMBIN TIME: 14.1 SECONDS (ref 11.4–14)
RBC # BLD AUTO: 3.15 X10(6)/MCL (ref 4.7–6.1)
SODIUM SERPL-SCNC: 142 MMOL/L (ref 136–145)
WBC # BLD AUTO: 3.87 X10(3)/MCL (ref 4.5–11.5)

## 2025-02-27 PROCEDURE — 80053 COMPREHEN METABOLIC PANEL: CPT | Performed by: RADIOLOGY

## 2025-02-27 PROCEDURE — 36415 COLL VENOUS BLD VENIPUNCTURE: CPT | Performed by: RADIOLOGY

## 2025-02-27 PROCEDURE — 11000001 HC ACUTE MED/SURG PRIVATE ROOM

## 2025-02-27 PROCEDURE — 25500020 PHARM REV CODE 255

## 2025-02-27 PROCEDURE — 99223 1ST HOSP IP/OBS HIGH 75: CPT | Mod: GC,,,

## 2025-02-27 PROCEDURE — 85610 PROTHROMBIN TIME: CPT | Performed by: RADIOLOGY

## 2025-02-27 PROCEDURE — 99213 OFFICE O/P EST LOW 20 MIN: CPT | Mod: PBBFAC | Performed by: NURSE PRACTITIONER

## 2025-02-27 PROCEDURE — 25000003 PHARM REV CODE 250

## 2025-02-27 PROCEDURE — 85025 COMPLETE CBC W/AUTO DIFF WBC: CPT | Performed by: RADIOLOGY

## 2025-02-27 RX ORDER — ENOXAPARIN SODIUM 100 MG/ML
40 INJECTION SUBCUTANEOUS DAILY
Status: DISCONTINUED | OUTPATIENT
Start: 2025-02-28 | End: 2025-03-03 | Stop reason: HOSPADM

## 2025-02-27 RX ORDER — DARUNAVIR 800 MG/1
800 TABLET, FILM COATED ORAL
Status: DISCONTINUED | OUTPATIENT
Start: 2025-02-28 | End: 2025-03-03 | Stop reason: HOSPADM

## 2025-02-27 RX ORDER — NYSTATIN 100000 [USP'U]/ML
500000 SUSPENSION ORAL 4 TIMES DAILY
Status: DISCONTINUED | OUTPATIENT
Start: 2025-02-27 | End: 2025-03-03 | Stop reason: HOSPADM

## 2025-02-27 RX ORDER — NALOXONE HCL 0.4 MG/ML
0.02 VIAL (ML) INJECTION
Status: DISCONTINUED | OUTPATIENT
Start: 2025-02-27 | End: 2025-03-03 | Stop reason: HOSPADM

## 2025-02-27 RX ORDER — TENOFOVIR DISOPROXIL FUMARATE 300 MG/1
300 TABLET, FILM COATED ORAL DAILY
Status: DISCONTINUED | OUTPATIENT
Start: 2025-02-28 | End: 2025-03-03 | Stop reason: HOSPADM

## 2025-02-27 RX ORDER — GLUCAGON 1 MG
1 KIT INJECTION
Status: DISCONTINUED | OUTPATIENT
Start: 2025-02-27 | End: 2025-03-03 | Stop reason: HOSPADM

## 2025-02-27 RX ORDER — ATOVAQUONE 750 MG/5ML
1500 SUSPENSION ORAL DAILY
Status: DISCONTINUED | OUTPATIENT
Start: 2025-02-28 | End: 2025-02-28

## 2025-02-27 RX ORDER — RITONAVIR 100 MG/1
100 TABLET ORAL DAILY
Status: DISCONTINUED | OUTPATIENT
Start: 2025-02-28 | End: 2025-03-03 | Stop reason: HOSPADM

## 2025-02-27 RX ORDER — SODIUM CHLORIDE 0.9 % (FLUSH) 0.9 %
10 SYRINGE (ML) INJECTION EVERY 12 HOURS PRN
Status: DISCONTINUED | OUTPATIENT
Start: 2025-02-27 | End: 2025-03-03 | Stop reason: HOSPADM

## 2025-02-27 RX ORDER — EMTRICITABINE AND TENOFOVIR DISOPROXIL FUMARATE 200; 300 MG/1; MG/1
1 TABLET, FILM COATED ORAL DAILY
Status: DISCONTINUED | OUTPATIENT
Start: 2025-02-28 | End: 2025-02-27

## 2025-02-27 RX ORDER — IBUPROFEN 200 MG
16 TABLET ORAL
Status: DISCONTINUED | OUTPATIENT
Start: 2025-02-27 | End: 2025-03-03 | Stop reason: HOSPADM

## 2025-02-27 RX ORDER — EMTRICITABINE 200 MG/1
200 CAPSULE ORAL DAILY
Status: DISCONTINUED | OUTPATIENT
Start: 2025-02-28 | End: 2025-03-03 | Stop reason: HOSPADM

## 2025-02-27 RX ORDER — LANOLIN ALCOHOL/MO/W.PET/CERES
1 CREAM (GRAM) TOPICAL DAILY
Status: DISCONTINUED | OUTPATIENT
Start: 2025-02-28 | End: 2025-03-03 | Stop reason: HOSPADM

## 2025-02-27 RX ORDER — ETHAMBUTOL HYDROCHLORIDE 400 MG/1
400 TABLET, FILM COATED ORAL DAILY
Status: DISCONTINUED | OUTPATIENT
Start: 2025-02-28 | End: 2025-02-28

## 2025-02-27 RX ORDER — ETHAMBUTOL HYDROCHLORIDE 400 MG/1
400 TABLET, FILM COATED ORAL DAILY
Status: DISCONTINUED | OUTPATIENT
Start: 2025-02-28 | End: 2025-02-27

## 2025-02-27 RX ORDER — RIFABUTIN 150 MG/1
150 CAPSULE ORAL DAILY
Status: DISCONTINUED | OUTPATIENT
Start: 2025-02-28 | End: 2025-03-03 | Stop reason: HOSPADM

## 2025-02-27 RX ORDER — IBUPROFEN 200 MG
24 TABLET ORAL
Status: DISCONTINUED | OUTPATIENT
Start: 2025-02-27 | End: 2025-03-03 | Stop reason: HOSPADM

## 2025-02-27 RX ORDER — ABACAVIR 300 MG/1
300 TABLET ORAL DAILY
Status: DISCONTINUED | OUTPATIENT
Start: 2025-02-28 | End: 2025-03-03 | Stop reason: HOSPADM

## 2025-02-27 RX ADMIN — NYSTATIN 500000 UNITS: 100000 SUSPENSION ORAL at 10:02

## 2025-02-27 RX ADMIN — IOHEXOL 100 ML: 350 INJECTION, SOLUTION INTRAVENOUS at 09:02

## 2025-02-27 NOTE — PROGRESS NOTES
Patient ID: Anne Terry 64 y.o.     Chief Complaint:   Chief Complaint   Patient presents with    Follow-up     B20        HPI:    2/27/25  nAne Terry is a 65 y/o AAM here for advanced HIV and MAC f/u. Last visit was 1/7/25. Dx 3/7/15.  MSF, HLA  neg, G6PD neg. Pt has been off his ART meds since 2021. Last regimen was Ziagen, Descovy, Prezista, and Norvir.     Past HIV GT hx:   3/30/15 HIV GT Magdalena V118I, V179 D/V, R211K, F214L, L33I, L63P, I64V with no resistance to any classes   5/4/15 HIV Phenosense Integrase sensitive  1/6/16 HIV GT [place]dalena M184V with resistance to FTC/3TC  1/6/16 Phenosense Integrase resistance to elvitegravir and isentress  3/17/16 Phenosense Integrase Tivicay partially sensitive, resistance to elvitegravir and isentress  6/7/16 HIV GT Magdalena M184V with resistance to FTC/3TC    Pt was hospitalized at [facility] 11/19/24-11/26/24 with primary complaints of fatigue and weight loss.  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 Pt missed several appts after his admission and was not seen in clinic until 1/7/25. At that time he was started on MAC treatment ( Zithromax, Ethambutol, and Rifabutin), but patient did not do his labs before leaving the clinic. He was phone several times regarding this and finally on 1/17/25 went to have labs drawn.  1/17/25 HIV , 000, CD4 39, Fungitell 115, Hep Panel neg, Vit D 15. After blood work was obtained, I tried to contact patient multiple times to no avail. Written letter was sent as well. Pt did not attend baseline eye appt. He was scheduled for a lung bx with [facility] IR on 1/31/25 which he did not attend or reschedule.  Pt presents today with complaints of cough, fatigue, and some shortness of breath.  He has had minimal weight loss since last visit. Denies fever, headache, chills, visual problems, N/V/D, chest pain or abd pain.  Pt medical hx is extensive. Hx of cryptococcus  and histoplasmosis in 2015. Colonoscopy completed in 2015 showed fungal colitis as well. At this juncture, I feel that patient should be admitted for further workup so we can rule out any other OI or infection and start patient on HIV meds. Case discussed with Dr. Sanford who is in agreement.  Pt is agreeable to admission.      1/7/25  Anne Terry is a 65 y/o AAM here for advanced HIV f/u. Last visit in clinic was 11/8/21. Dx 3/7/15.  MSF, HLA  neg, G6PD neg. Pt has been off his ART meds since 2021. He states he is unable to get any medications until his new insurance, [employer] starts on 1/13/25.  Last regimen was Ziagen, Descovy, Prezista, and Norvir. Pt was hospitalized at [facility] 11/19/24-11/26/24 with primary complaints of fatigue and weight loss.  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 He was scheduled a follow up appt, but did not attend. Clinic staff has been actively trying to locate pateint and get him into the clinic. Pt denies fever, headache, chills, visual problems, N/V/D, SOB, cough, chest pain or abd pain. Complains of some swelling to bilateral ankles that has been going on for about 1 month, hiccups for one month., and a 60 pound weight gain since 11/2024. Pt has been off of all of his medications and cannot recall what he was previously taking. He will need updated labs today, but our lab does not accept his current insurance [employer]. Pt has agreed to go to [facility] to have blood work done. Once labs have resulted, I will start a fluid pill for blood pressure. Reviewed past labs from 11/21/24 HIV ,696, CD4 378/8%, fungitell 301, PJP positive, mycobacteria culture positive for MAC.  Labs will need to be updated today. During hospital stay, CT chest and abdomen done 11/19/24 showed right lower lung lobe large irregular defined consolidation with areas of necrosis may be secondary to infection though  concern for associated malignancy. Left lower lung lobe nodules could be metastatic. Pt is scheduled a lung biopsy in IR 1/31/25.  Co-morbidities include HTN, MAC, history of disseminated histoplasmosis 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. Discussed need to start treatment for MAC before HIV meds can be restarted. Also discussed the need for an eye exam ASAP since patient will be taking Ethambutol for an extended period of time as it is part of the MAC treatment. MAC disease process and treat plan discussed extensively with the patient. Pt is aware that this is a long treatment and he will need to take medications daily and attend all appts as scheduled. He verbalized understanding.         History reviewed. No pertinent past medical history.     Past Surgical History:   Procedure Laterality Date    COLONOSCOPY  05/08/2015        Social History     Socioeconomic History    Marital status: Single   Tobacco Use    Smoking status: Never    Smokeless tobacco: Never   Substance and Sexual Activity    Alcohol use: Not Currently    Drug use: Yes     Types: Marijuana    Sexual activity: Not Currently     Partners: Female     Social Drivers of Health     Financial Resource Strain: Low Risk  (11/20/2024)    Overall Financial Resource Strain (CARDIA)     Difficulty of Paying Living Expenses: Not hard at all   Food Insecurity: No Food Insecurity (11/20/2024)    Hunger Vital Sign     Worried About Running Out of Food in the Last Year: Never true     Ran Out of Food in the Last Year: Never true   Transportation Needs: No Transportation Needs (11/20/2024)    TRANSPORTATION NEEDS     Transportation : No   Stress: No Stress Concern Present (11/20/2024)    [employer] - Occupational Stress Questionnaire     Feeling of Stress : Not at all   Housing Stability: Unknown (11/20/2024)    Housing Stability Vital Sign     Unable to Pay for Housing in the Last Year: No     Homeless in the Last Year: No         Family History   Problem Relation Name Age of Onset    No Known Problems Mother      No Known Problems Father      Cancer Sister      No Known Problems Brother          Review of patient's allergies indicates:  No Known Allergies     Immunization History   Administered Date(s) Administered    Influenza - Quadrivalent 11/16/2020, 11/08/2021    Influenza - Quadrivalent - PF (6-35 months) 12/12/2018    Influenza - Quadrivalent - PF *Preferred* (6 months and older) 11/07/2019    Influenza - Trivalent - Fluarix, Flulaval, Fluzone, Afluria - PF 10/09/2017    Meningococcal Conjugate (MCV4P) 09/11/2018, 05/07/2019    Pneumococcal Polysaccharide - 23 Valent 01/19/2021    Zoster Recombinant 11/16/2020, 07/28/2021        Review of Systems   Constitutional:  Positive for weight loss.   HENT: Negative.     Eyes: Negative.    Respiratory:  Positive for cough and shortness of breath.    Cardiovascular: Negative.    Gastrointestinal: Negative.    Genitourinary: Negative.    Musculoskeletal: Negative.    Skin: Negative.    Neurological: Negative.    Endo/Heme/Allergies: Negative.    Psychiatric/Behavioral: Negative.     All other systems reviewed and are negative.         Objective:      /69   Pulse 65   Temp 98.2 °F (36.8 °C) (Oral)   Resp 14   Ht 6' (1.829 m)   Wt 71.8 kg (158 lb 4.6 oz)   BMI 21.47 kg/m²      Physical Exam  Vitals reviewed.   Constitutional:       General: He is not in acute distress.     Appearance: Normal appearance.   HENT:      Head: Normocephalic.      Right Ear: External ear normal.      Left Ear: External ear normal.      Nose: Nose normal.      Mouth/Throat:      Mouth: Mucous membranes are moist.      Pharynx: Oropharynx is clear.   Eyes:      General: No scleral icterus.     Extraocular Movements: Extraocular movements intact.      Conjunctiva/sclera: Conjunctivae normal.      Pupils: Pupils are equal, round, and reactive to light.   Cardiovascular:      Rate and Rhythm: Normal rate and  regular rhythm.      Pulses: Normal pulses.      Heart sounds: Normal heart sounds.   Pulmonary:      Effort: No respiratory distress.      Breath sounds: Rhonchi present.   Abdominal:      General: Bowel sounds are normal. There is no distension.      Palpations: Abdomen is soft. There is no mass.      Tenderness: There is no abdominal tenderness. There is no right CVA tenderness or left CVA tenderness.      Hernia: No hernia is present.   Musculoskeletal:         General: No tenderness or signs of injury. Normal range of motion.      Cervical back: Normal range of motion and neck supple.      Right lower leg: No edema.      Left lower leg: No edema.   Lymphadenopathy:      Cervical: No cervical adenopathy.   Skin:     General: Skin is warm and dry.      Capillary Refill: Capillary refill takes less than 2 seconds.      Findings: No erythema or lesion.   Neurological:      General: No focal deficit present.      Mental Status: He is alert and oriented to person, place, and time. Mental status is at baseline.   Psychiatric:         Mood and Affect: Mood normal.         Behavior: Behavior normal.         Thought Content: Thought content normal.         Judgment: Judgment normal.          Labs:   Lab Results   Component Value Date    WBC 3.91 (L) 01/17/2025    HGB 9.6 (L) 01/17/2025    HCT 30.1 (L) 01/17/2025    MCV 93.5 01/17/2025     01/17/2025       CMP  Sodium   Date Value Ref Range Status   01/17/2025 142 136 - 145 mmol/L Final     Potassium   Date Value Ref Range Status   01/17/2025 3.6 3.5 - 5.1 mmol/L Final     Chloride   Date Value Ref Range Status   01/17/2025 108 (H) 98 - 107 mmol/L Final     CO2   Date Value Ref Range Status   01/17/2025 27 23 - 31 mmol/L Final     Blood Urea Nitrogen   Date Value Ref Range Status   01/17/2025 11.8 8.4 - 25.7 mg/dL Final     Creatinine   Date Value Ref Range Status   01/17/2025 1.01 0.72 - 1.25 mg/dL Final     Calcium   Date Value Ref Range Status   01/17/2025 8.3  (L) 8.8 - 10.0 mg/dL Final     Albumin   Date Value Ref Range Status   01/17/2025 2.6 (L) 3.4 - 4.8 g/dL Final     Bilirubin Total   Date Value Ref Range Status   01/17/2025 0.3 <=1.5 mg/dL Final     ALP   Date Value Ref Range Status   01/17/2025 319 (H) 40 - 150 unit/L Final     AST   Date Value Ref Range Status   01/17/2025 36 (H) 5 - 34 unit/L Final     ALT   Date Value Ref Range Status   01/17/2025 15 0 - 55 unit/L Final     eGFR   Date Value Ref Range Status   01/17/2025 >60 mL/min/1.73/m2 Final     Lab Results   Component Value Date    TSH 4.120 01/17/2025     Hep C Ab Interp   Date Value Ref Range Status   01/17/2025 Nonreactive Nonreactive Final     Syphilis Antibody   Date Value Ref Range Status   01/17/2025 Nonreactive Nonreactive, Equivocal Final     Cholesterol Total   Date Value Ref Range Status   01/17/2025 162 <=200 mg/dL Final     HDL Cholesterol   Date Value Ref Range Status   01/17/2025 25 (L) 35 - 60 mg/dL Final     Triglyceride   Date Value Ref Range Status   01/17/2025 300 (H) 34 - 140 mg/dL Final     Cholesterol/HDL Ratio   Date Value Ref Range Status   01/17/2025 6 (H) 0 - 5 Final     Very Low Density Lipoprotein   Date Value Ref Range Status   01/17/2025 60  Final     LDL Cholesterol   Date Value Ref Range Status   01/17/2025 77.00 50.00 - 140.00 mg/dL Final     Vitamin D   Date Value Ref Range Status   01/17/2025 15 (L) 30 - 80 ng/mL Final     Results for orders placed or performed in visit on 01/17/25   CD4 Lymphocytes   Result Value Ref Range    Patient Age 64     WBC Absolute 3,910 (L) 4,500 - 11,500 /mm3    Lymph Percent 15 (L) 28 - 48 %    Lymph Absolute 586.5 (L) 1,260 - 5,520 x10(3)/mcL    CD4 % 6.7 %    CD4 Absolute 39 (L) 589 - 1,505 unit/L    T Cell Interp       Normal absolute lymphocyte count with normal absolute CD4+ lymphocyte count.    Matt Ruiz M.D.     Narrative    This test was developed and its performance characteristics determined by [facility]  [facility]. It has not been cleared or approved by the US Food and Drug Administration. The FDA does not require this test to go through premarket FDA review. This test is used for clinical purposes. It should not be regarded as investigational or for research. This laboratory is certified under the Clinical Laboratory Improvement Amendments (CLIA) as qualified to perform high complexity clinical laboratory testing.     Results for orders placed or performed in visit on 01/17/25   HIV-1 RNA, Quantitative, PCR with Reflex to Genotype   Result Value Ref Range    HIV-1 RNA Detect/Quant, P 619939 (A) Undetected copies/mL     Results for orders placed or performed in visit on 01/17/25   Quantiferon Gold TB   Result Value Ref Range    QuantiFERON-Tb Gold Plus Result Negative Negative    TB1 Ag minus Nil Result 0.06 IU/mL    TB2 Ag minus Nil Result 0.01 IU/mL    Mitogen minus Nil Result 0.78 IU/mL    Nil Result 0.42 IU/mL     Results for orders placed or performed during the hospital encounter of 11/19/24   C.trach/N.gonor AMP RNA    Specimen: Throat   Result Value Ref Range    Source THROAT     Chlamydia trachomatis amplified RNA Negative Negative    Neisseria gonorrhoeae amplified RNA Negative Negative    SOURCE: THROAT      No results found for this or any previous visit.    Imaging: Reviewed most recent relevant imaging studies available, notable results highlighted in this note    Medications:     Current Outpatient Medications   Medication Instructions    atovaquone (MEPRON) 750 mg, Oral, 2 times daily    azithromycin (ZITHROMAX) 500 mg, Oral, Daily    ethambutoL (MYAMBUTOL) 400 mg, Oral, Daily    ferrous sulfate 325 mg, Oral, Daily    rifabutin (MYCOBUTIN) 150 mg, Oral, Daily       Assessment:       1. AIDS (acquired immunodeficiency syndrome), CD4 <=200/<=14%    2. Mycobacterium avium-intracellulare complex    3. 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  Right lower lobe lung mass    5. Iron deficiency anemia, unspecified iron deficiency anemia type           Plan:      AIDS (acquired immunodeficiency syndrome), CD4 <=200/<=14%  Extensive education provided regarding adherence, sexual health, medication management, attending scheduled visits, risks and benefits of medication.  Use condoms for all sexual encounters.  Consider complete abstinence until virally suppressed.  Notify sexual partner(s) of disease status.   Uncontrolled HIV can cause not only a weakened immune system & leave one susceptible to opportunistic infections, but can also lead to renal, cardiac, neurological, cardiovascular, and hepatic dysfunction as a result of chronic inflammation.  Take all medications as prescribed and keep follow-up with providers as scheduled.   Incorrect use of HIV medications can lead to developing resistance and loss of control of virus.  This can also limit future treatment options.   Notify our office for any concerns that may emerge, particularly if you are having trouble with obtaining medication or changes in insurance status so that we may assist you.           Dx 3/7/15  Initial HIV VL 1,886,297 and CD4 bree 29 on 3/30/15  OI hx: MAC, oroesophageal candidiasis, disseminated histoplasmosis, cryptococcus     Reviewed all past HIV GT:   3/30/15- No resistance noted   5/4/15- phenosense sensitive   1/6/16-phenosense resistance noted to elvitagravir and raltegravir   1/6/16- M184V mutation noted with resistance to Emtricitabine and lamivudine  3/7/16-phenosense resistance noted to elvitagravir and raltegravir, partial resistance to dolutegravir  6/7/16 M184V mutation noted with resistance to Emtricitabine and lamivudine     Admit for further workup. Case discussed with Dr. Sanford.      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, unspecified laterality, unspecified  part of lung    Right lower lobe lung mass    Iron deficiency anemia, unspecified iron deficiency anemia type      61 minutes of total time spent on the encounter, which includes face to face time and non-face to face time preparing to see the patient (eg, review of tests), Obtaining and/or reviewing separately obtained history, Documenting clinical information in the electronic or other health record, Independently interpreting results (not separately reported) and communicating results to the patient/family/caregiver, or Care coordination (not separately reported).

## 2025-02-27 NOTE — H&P
[facility] Medicine Wards   History & Physical Note     Resident Team: [facility] Medicine List 4  Attending Physician: Shady Jo*  Resident: Aditya Bermudez MD  Intern: Twin Alvarez DO    Date of Admit: 2/27/2025    Chief Complaint:     No chief complaint on file.       Subjective:      History of Present Illness:  Anne Terry is a 64 y.o. male with a history of HIV, cryptococcus, and disseminated histoplasmosis, fungal colitis, PJP, MAC, HTN who presented to Infectious Disease clinic  on 2/27/2025  for follow up of advanced HIV and MAC and has not been compliant with any treatments. Patient was recently diagnosed with PJP, oral candidiasis, right lung mass and after discharge culture grew MAC. The patient is being followed by ID clinic; however, the patient had missed several appointments. He was previously started on MAC treatment of Zithromax, Ethambutol, and Rifabutin and there is questionable compliance due to the lack of all the follow up appointments. Patient was also told to have an eye examination due to the use of ethambutol, which he has not done. Patient denies fever chills, nausea, vomiting. He denies any recent travel. He does endorse a productive cough with clear colored phlegm. Internal Medicine was consulted for further work up of possible opportunistic infections and to restart HIV medications.     Past Medical History:  HIV, cryptococcus, and disseminated histoplasmosis, fungal colitis, PJP, MAC, HTN     Past Surgical History:  Past Surgical History:   Procedure Laterality Date    COLONOSCOPY  05/08/2015        Family History:  Family History   Problem Relation Name Age of Onset    No Known Problems Mother      No Known Problems Father      Cancer Sister      No Known Problems Brother          Social History:  Social History     Socioeconomic History    Marital status: Single   Tobacco Use    Smoking status: Never    Smokeless tobacco: Never   Substance and Sexual Activity    Alcohol  use: Not Currently    Drug use: Yes     Types: Marijuana    Sexual activity: Not Currently     Partners: Female     Social Drivers of Health     Financial Resource Strain: Low Risk  (2/27/2025)    Overall Financial Resource Strain (CARDIA)     Difficulty of Paying Living Expenses: Not very hard   Food Insecurity: No Food Insecurity (2/27/2025)    Hunger Vital Sign     Worried About Running Out of Food in the Last Year: Never true     Ran Out of Food in the Last Year: Never true   Transportation Needs: No Transportation Needs (11/20/2024)    TRANSPORTATION NEEDS     Transportation : No   Stress: No Stress Concern Present (2/27/2025)    [employer] - Occupational Stress Questionnaire     Feeling of Stress : Only a little   Housing Stability: Low Risk  (2/27/2025)    Housing Stability Vital Sign     Unable to Pay for Housing in the Last Year: No     Homeless in the Last Year: No        Allergies:  has no known allergies.     Home Medications:  Prior to Admission medications    Medication Sig Start Date End Date Taking? Authorizing Provider   atovaquone (MEPRON) 750 mg/5 mL Susp oral liquid Take 5 mLs (750 mg total) by mouth 2 (two) times daily. 1/7/25   Romi Maria FNP   azithromycin (ZITHROMAX) 500 MG tablet Take 1 tablet (500 mg total) by mouth once daily. 1/7/25   Romi Maria FNP   ethambutoL (MYAMBUTOL) 400 MG Tab Take 1 tablet (400 mg total) by mouth once daily. 1/7/25 1/7/26  Romi Maria FNP   ferrous sulfate 325 (65 FE) MG EC tablet Take 1 tablet (325 mg total) by mouth once daily. 1/7/25   Romi Maria FNP   rifabutin (MYCOBUTIN) 150 mg Cap Take 1 capsule (150 mg total) by mouth once daily. 1/7/25   Romi Maria FNP   metroNIDAZOLE (FLAGYL) 500 MG tablet Take 1 tablet (500 mg total) by mouth every 12 (twelve) hours.  Patient not taking: Reported on 1/7/2025 11/26/24 2/27/25  Jarod Best MD       Review of Systems:  Negative unless states above.         Objective:     Vital Signs (Most Recent):  Temp: 98.8 °F (37.1 °C) (02/27/25 1640)  Pulse: (!) 57 (02/27/25 1647)  Resp: 18 (02/27/25 1640)  BP: (!) 162/69 (02/27/25 1647)  SpO2: 98 % (02/27/25 1640) Vital Signs (24h Range):  Temp:  [98.2 °F (36.8 °C)-98.8 °F (37.1 °C)] 98.8 °F (37.1 °C)  Pulse:  [57-67] 57  Resp:  [14-18] 18  SpO2:  [98 %] 98 %  BP: (137-177)/(69-78) 162/69       Physical Examination:  GEN: A&Ox4. No acute distress.   HEENT: normocephalic, atraumatic. PERRLA. EMOI bilaterally. Sclera, conjunctiva clear. Nares patents. Oropharyngeal was notable for oral thrush. Neck supple without LAD   CARDIAC: S1 S2, no MRG. Radial pulses full, no LE edema   RESPI: Chest wall rise symmetric, atraumatic. no wheezing or rhonchi. Diminished breath sounds on lower b/l lung fields.   ABDOMEN: soft, nontender. No herniation, masses, HSM  : deferred   MSK: ROM grossly intact.   NEURO: Motor and sensation grossly intact. CN grossly intact. No cerebellar deficits noted. No gait abnormalities noted.   PSYCH: Memory and thought process appear intact. Appropriate mood and affect.     CBC  Recent Labs   Lab 02/27/25  1434   WBC 3.87*   RBC 3.15*   HGB 9.2*   HCT 29.3*   MCV 93.0   MCH 29.2   MCHC 31.4*   RDW 14.0      MPV 10.2       CMP   Recent Labs   Lab 02/27/25  1434      K 4.3   CO2 26   BUN 12.5   CREATININE 1.13   CALCIUM 8.3*   ALBUMIN 2.5*   ALKPHOS 171*   ALT 9   AST 22   BILITOT 0.3          Microbiology Data:  Microbiology Results (last 7 days)       ** No results found for the last 168 hours. **            Cardiac Data:  No echocardiogram results found for the past 14 days.    No results found for this or any previous visit.     Radiology:         Lines/Drains/Airways       Peripheral Intravenous Line  Duration                  Peripheral IV - Single Lumen 02/27/25 1404 22 G Left;Posterior Forearm <1 day                     Assessment & Plan:   HIV  MAC  Hx of cryptococcus  Hx of disseminated  histoplasmosis  Hx of fungal colitis  Hx of PJP  - AFB blood cx from previous admission showed growth of MAC.  - Patient will need to resume ART and start MAC treatment.   Per previous ID recommendations, will start:  - Truvada/Prezista/norvir/Abacavir  - Atovaquone 1500mg PO daily for Opportunistic infection ppx  - MAC treatment as follows: Azithromycin 500mg PO daily, Ethambutol 15mg/kg PO daily, Rifabutin 150mg PO daily  - While on Ethambutol patient needs baseline eye exam. Will consult ophthalmology  - Will consult ID and appreciate all recommendations.     Suspicious Lung Mass  Possible Malignancy   - Previous CT chest/abdomen/pelvis(11/2024) showed: RLL large irregular defined consolidation with areas of necrosis left lower lung lobe nodules could be metastatic.   - Repeat CT chest/abdomen/pelvis ordered  - CXR ordered   - Will need to consult IR to schedule his lung biopsy    Oral Thrush  - Nystatin 500,000 units QID for 7 days   - Bedside swallow study ordered     Normocytic anemia   - Continue to monitor      CODE STATUS:  Full  Access:  PIV  Antibiotics:  Azithromycin, Ethambutol, Rifabutin   Diet:  Regular  DVT Prophylaxis:  Lovenox  GI Prophylaxis:  Na  Fluids:  Na    Dispo: Patient admitted for further workup of opportunistic infections and further treatment of HIV. Will consult ID in the morning. Will discharge patient when medically stable.     Twin Alvarez,    Internal Medicine - PGY-1

## 2025-02-28 LAB
ALBUMIN SERPL-MCNC: 2.3 G/DL (ref 3.4–4.8)
ALBUMIN/GLOB SERPL: 0.4 RATIO (ref 1.1–2)
ALP SERPL-CCNC: 160 UNIT/L (ref 40–150)
ALT SERPL-CCNC: 10 UNIT/L (ref 0–55)
ANION GAP SERPL CALC-SCNC: 6 MEQ/L
AST SERPL-CCNC: 21 UNIT/L (ref 5–34)
BASOPHILS # BLD AUTO: 0.01 X10(3)/MCL
BASOPHILS NFR BLD AUTO: 0.2 %
BILIRUB SERPL-MCNC: 0.3 MG/DL
BUN SERPL-MCNC: 11.7 MG/DL (ref 8.4–25.7)
CALCIUM SERPL-MCNC: 8.1 MG/DL (ref 8.8–10)
CHLORIDE SERPL-SCNC: 108 MMOL/L (ref 98–107)
CO2 SERPL-SCNC: 28 MMOL/L (ref 23–31)
CREAT SERPL-MCNC: 1.13 MG/DL (ref 0.72–1.25)
CREAT/UREA NIT SERPL: 10
CRYPTOC AG SER QL IA.RAPID: NEGATIVE
EOSINOPHIL # BLD AUTO: 0.54 X10(3)/MCL (ref 0–0.9)
EOSINOPHIL NFR BLD AUTO: 12.7 %
ERYTHROCYTE [DISTWIDTH] IN BLOOD BY AUTOMATED COUNT: 13.9 % (ref 11.5–17)
GFR SERPLBLD CREATININE-BSD FMLA CKD-EPI: >60 ML/MIN/1.73/M2
GLOBULIN SER-MCNC: 5.7 GM/DL (ref 2.4–3.5)
GLUCOSE SERPL-MCNC: 75 MG/DL (ref 82–115)
HAPTOGLOB SERPL-MCNC: 135 MG/DL (ref 40–368)
HAV AB SER QL IA: NONREACTIVE
HBV SURFACE AB SER-ACNC: 0.93 MIU/ML
HBV SURFACE AB SERPL IA-ACNC: NONREACTIVE M[IU]/ML
HCT VFR BLD AUTO: 25.4 % (ref 42–52)
HGB BLD-MCNC: 8 G/DL (ref 14–18)
HOLD SPECIMEN: NORMAL
IGA SERPL-MCNC: 806 MG/DL (ref 101–645)
IGG SERPL-MCNC: 2703 MG/DL (ref 540–1822)
IGM SERPL-MCNC: 209 MG/DL (ref 22–240)
IMM GRANULOCYTES # BLD AUTO: 0.03 X10(3)/MCL (ref 0–0.04)
IMM GRANULOCYTES NFR BLD AUTO: 0.7 %
LDH SERPL-CCNC: 353 U/L (ref 125–220)
LYMPHOCYTES # BLD AUTO: 0.61 X10(3)/MCL (ref 0.6–4.6)
LYMPHOCYTES NFR BLD AUTO: 14.4 %
MAGNESIUM SERPL-MCNC: 1.8 MG/DL (ref 1.6–2.6)
MCH RBC QN AUTO: 28.4 PG (ref 27–31)
MCHC RBC AUTO-ENTMCNC: 31.5 G/DL (ref 33–36)
MCV RBC AUTO: 90.1 FL (ref 80–94)
MONOCYTES # BLD AUTO: 0.42 X10(3)/MCL (ref 0.1–1.3)
MONOCYTES NFR BLD AUTO: 9.9 %
NEUTROPHILS # BLD AUTO: 2.64 X10(3)/MCL (ref 2.1–9.2)
NEUTROPHILS NFR BLD AUTO: 62.1 %
NRBC BLD AUTO-RTO: 0 %
PHOSPHATE SERPL-MCNC: 3.4 MG/DL (ref 2.3–4.7)
PLATELET # BLD AUTO: 265 X10(3)/MCL (ref 130–400)
PMV BLD AUTO: 10.1 FL (ref 7.4–10.4)
POTASSIUM SERPL-SCNC: 3.7 MMOL/L (ref 3.5–5.1)
PROT SERPL-MCNC: 8 GM/DL (ref 5.8–7.6)
RBC # BLD AUTO: 2.82 X10(6)/MCL (ref 4.7–6.1)
SODIUM SERPL-SCNC: 142 MMOL/L (ref 136–145)
WBC # BLD AUTO: 4.25 X10(3)/MCL (ref 4.5–11.5)

## 2025-02-28 PROCEDURE — 85025 COMPLETE CBC W/AUTO DIFF WBC: CPT

## 2025-02-28 PROCEDURE — 83615 LACTATE (LD) (LDH) ENZYME: CPT

## 2025-02-28 PROCEDURE — 63600175 PHARM REV CODE 636 W HCPCS

## 2025-02-28 PROCEDURE — 87899 AGENT NOS ASSAY W/OPTIC: CPT | Performed by: NURSE PRACTITIONER

## 2025-02-28 PROCEDURE — 84100 ASSAY OF PHOSPHORUS: CPT

## 2025-02-28 PROCEDURE — 36415 COLL VENOUS BLD VENIPUNCTURE: CPT | Performed by: NURSE PRACTITIONER

## 2025-02-28 PROCEDURE — 80053 COMPREHEN METABOLIC PANEL: CPT

## 2025-02-28 PROCEDURE — 86361 T CELL ABSOLUTE COUNT: CPT

## 2025-02-28 PROCEDURE — 0219U NFCT AGT HIV GNRJ SEQ ALYS: CPT

## 2025-02-28 PROCEDURE — 87116 MYCOBACTERIA CULTURE: CPT | Performed by: NURSE PRACTITIONER

## 2025-02-28 PROCEDURE — 83010 ASSAY OF HAPTOGLOBIN QUANT: CPT

## 2025-02-28 PROCEDURE — 63700000 PHARM REV CODE 250 ALT 637 W/O HCPCS

## 2025-02-28 PROCEDURE — 86706 HEP B SURFACE ANTIBODY: CPT | Performed by: NURSE PRACTITIONER

## 2025-02-28 PROCEDURE — 36415 COLL VENOUS BLD VENIPUNCTURE: CPT

## 2025-02-28 PROCEDURE — 83735 ASSAY OF MAGNESIUM: CPT

## 2025-02-28 PROCEDURE — 86708 HEPATITIS A ANTIBODY: CPT | Performed by: NURSE PRACTITIONER

## 2025-02-28 PROCEDURE — 84165 PROTEIN E-PHORESIS SERUM: CPT

## 2025-02-28 PROCEDURE — 25000003 PHARM REV CODE 250

## 2025-02-28 PROCEDURE — 87040 BLOOD CULTURE FOR BACTERIA: CPT | Performed by: NURSE PRACTITIONER

## 2025-02-28 PROCEDURE — 11000001 HC ACUTE MED/SURG PRIVATE ROOM

## 2025-02-28 PROCEDURE — 83521 IG LIGHT CHAINS FREE EACH: CPT

## 2025-02-28 PROCEDURE — 87536 HIV-1 QUANT&REVRSE TRNSCRPJ: CPT

## 2025-02-28 PROCEDURE — 82784 ASSAY IGA/IGD/IGG/IGM EACH: CPT

## 2025-02-28 RX ORDER — LABETALOL HCL 20 MG/4 ML
10 SYRINGE (ML) INTRAVENOUS EVERY 6 HOURS PRN
Status: DISCONTINUED | OUTPATIENT
Start: 2025-02-28 | End: 2025-03-03 | Stop reason: HOSPADM

## 2025-02-28 RX ORDER — ETHAMBUTOL HYDROCHLORIDE 400 MG/1
800 TABLET, FILM COATED ORAL DAILY
Status: DISCONTINUED | OUTPATIENT
Start: 2025-02-28 | End: 2025-03-03 | Stop reason: HOSPADM

## 2025-02-28 RX ORDER — HYDRALAZINE HYDROCHLORIDE 20 MG/ML
10 INJECTION INTRAMUSCULAR; INTRAVENOUS EVERY 6 HOURS PRN
Status: DISCONTINUED | OUTPATIENT
Start: 2025-02-28 | End: 2025-03-03 | Stop reason: HOSPADM

## 2025-02-28 RX ORDER — SULFAMETHOXAZOLE AND TRIMETHOPRIM 800; 160 MG/1; MG/1
1 TABLET ORAL DAILY
Status: DISCONTINUED | OUTPATIENT
Start: 2025-02-28 | End: 2025-03-03 | Stop reason: HOSPADM

## 2025-02-28 RX ADMIN — NYSTATIN 500000 UNITS: 100000 SUSPENSION ORAL at 08:02

## 2025-02-28 RX ADMIN — TENOFOVIR DISOPROXIL FUMARATE 300 MG: 300 TABLET, FILM COATED ORAL at 09:02

## 2025-02-28 RX ADMIN — SULFAMETHOXAZOLE AND TRIMETHOPRIM 1 TABLET: 800; 160 TABLET ORAL at 02:02

## 2025-02-28 RX ADMIN — ABACAVIR SULFATE 300 MG: 300 TABLET, FILM COATED ORAL at 08:02

## 2025-02-28 RX ADMIN — ETHAMBUTOL HYDROCHLORIDE 800 MG: 400 TABLET ORAL at 08:02

## 2025-02-28 RX ADMIN — NYSTATIN 500000 UNITS: 100000 SUSPENSION ORAL at 05:02

## 2025-02-28 RX ADMIN — AZITHROMYCIN MONOHYDRATE 500 MG: 500 INJECTION, POWDER, LYOPHILIZED, FOR SOLUTION INTRAVENOUS at 09:02

## 2025-02-28 RX ADMIN — RITONAVIR 100 MG: 100 TABLET, FILM COATED ORAL at 08:02

## 2025-02-28 RX ADMIN — FERROUS SULFATE TAB 325 MG (65 MG ELEMENTAL FE) 1 EACH: 325 (65 FE) TAB at 08:02

## 2025-02-28 RX ADMIN — HYDRALAZINE HYDROCHLORIDE 10 MG: 20 INJECTION INTRAMUSCULAR; INTRAVENOUS at 05:02

## 2025-02-28 RX ADMIN — ATOVAQUONE 1500 MG: 750 SUSPENSION ORAL at 08:02

## 2025-02-28 RX ADMIN — DARUNAVIR 800 MG: 800 TABLET, FILM COATED ORAL at 09:02

## 2025-02-28 RX ADMIN — NYSTATIN 500000 UNITS: 100000 SUSPENSION ORAL at 02:02

## 2025-02-28 RX ADMIN — EMTRICITABINE 200 MG: 200 CAPSULE ORAL at 09:02

## 2025-02-28 RX ADMIN — ENOXAPARIN SODIUM 40 MG: 40 INJECTION SUBCUTANEOUS at 08:02

## 2025-02-28 RX ADMIN — NYSTATIN 500000 UNITS: 100000 SUSPENSION ORAL at 09:02

## 2025-02-28 RX ADMIN — RIFABUTIN 150 MG: 150 CAPSULE ORAL at 08:02

## 2025-02-28 NOTE — PROGRESS NOTES
Inpatient Nutrition Evaluation    Admit Date: 2/27/2025   Total duration of encounter: 1 day   Patient Age: 64 y.o.    Nutrition Recommendation/Prescription     Regular diet  Boost Plus (provides 360 kcal, 14 g protein per serving) TID  Monitor Weight Weekly     Nutrition Assessment     Chart Review    Reason Seen: malnutrition screening tool (MST)    Malnutrition Screening Tool Results   Have you recently lost weight without trying?: Unsure  Have you been eating poorly because of a decreased appetite?: No   MST Score: 2   Diagnosis:  HIV, MAC, Suspicious lung mass, possible malignancy, oral thrush, normocytic anemia    Relevant Medical History: HIV, Cryptococcus, disseminated histoplasmosis, fungal colitis, PJP, MAC, HTN    Scheduled Medications:  abacavir, 300 mg, Daily  atovaquone, 1,500 mg, Daily  azithromycin, 500 mg, Q24H  darunavir, 800 mg, Daily with breakfast  emtricitabine, 200 mg, Daily  enoxparin, 40 mg, Daily  ethambutoL, 800 mg, Daily  ferrous sulfate, 1 tablet, Daily  nystatin, 500,000 Units, QID  rifabutin, 150 mg, Daily  ritonavir, 100 mg, Daily  tenofovir disoproxil fumarate, 300 mg, Daily    Continuous Infusions:   PRN Medications:   Current Facility-Administered Medications:     dextrose 50%, 12.5 g, Intravenous, PRN    dextrose 50%, 25 g, Intravenous, PRN    glucagon (human recombinant), 1 mg, Intramuscular, PRN    glucose, 16 g, Oral, PRN    glucose, 24 g, Oral, PRN    naloxone, 0.02 mg, Intravenous, PRN    sodium chloride 0.9%, 10 mL, Intravenous, Q12H PRN    Recent Labs   Lab 02/27/25  1434 02/28/25  0413    142   K 4.3 3.7   CALCIUM 8.3* 8.1*   PHOS  --  3.4   MG  --  1.80   CO2 26 28   BUN 12.5 11.7   CREATININE 1.13 1.13   EGFRNORACEVR >60 >60   GLUCOSE 76* 75*   BILITOT 0.3 0.3   ALKPHOS 171* 160*   ALT 9 10   AST 22 21   ALBUMIN 2.5* 2.3*   WBC 3.87* 4.25*   HGB 9.2* 8.0*   HCT 29.3* 25.4*     Nutrition Orders:  Diet Adult Regular  Dietary nutrition supplements TID; Boost Plus  Nutritional Drink - Very Vanilla    Appetite/Oral Intake: good/% of meals  Factors Affecting Nutritional Intake: painful swallowing  Social Needs Impacting Access to Food: none identified  Food/Jewish/Cultural Preferences: none reported  Food Allergies: no known food allergies  Last Bowel Movement: 25  Wound(s):  skin intact    Comments    25 -- Pt reports good appetite & po intake; reports of swallowing difficulty related to oral thrush, pt reports painful but no difficulty swallowing or eating, Nystatin ordered per MD ; pt reports weight loss however unsure amount or timeframe, most recent EMR weight history indicates ~4% weight loss over the last 1-2 months, pt agreeable to Boost in Vanilla flavor to supplement nutrition    Anthropometrics    Height: 6' (182.9 cm), Height Method: Stated  Last Weight: 70.3 kg (155 lb) (25 1059), Weight Method: Bed Scale  BMI (Calculated): 21  BMI Classification: normal (BMI 18.5-24.9)        Ideal Body Weight (IBW), Male: 178 lb     % Ideal Body Weight, Male (lb): 88.93 %                 Usual Body Weight (UBW), k.2 kg  % Usual Body Weight: 91.26  % Weight Change From Usual Weight: -8.93 %  Usual Weight Provided By: EMR weight history    Wt Readings from Last 5 Encounters:   25 70.3 kg (155 lb)   25 71.8 kg (158 lb 4.6 oz)   25 73.5 kg (161 lb 14.9 oz)   24 58 kg (127 lb 13.9 oz)   21 90.9 kg (200 lb 4.6 oz)     Weight Change(s) Since Admission: new admit  Wt Readings from Last 1 Encounters:   25 1059 70.3 kg (155 lb)   25 1433 71.8 kg (158 lb 4.6 oz)   Admit Weight: 71.8 kg (158 lb 4.6 oz) (25 1433), Weight Method: Bed Scale    Patient Education     Not applicable.    Nutrition Goals & Monitoring     Dietitian will monitor: food and beverage intake and weight  Discharge planning: continue regular diet with boost plus or equivalent oral supplements  Nutrition Risk/Follow-Up: low (follow-up in 5-7  days)  Patients assigned 'low nutrition risk' status do not qualify for a full nutritional assessment but will be monitored and re-evaluated in a 5-7 day time period. Please consult if re-evaluation needed sooner.

## 2025-02-28 NOTE — CONSULTS
[facility]   Inpatient Infectious Diseases Consultation    Physician requesting consultation: Shady Jo*  Service requesting consultation: Internal Medicine  Reason for consultation:  MAC    Historian: Patient and Electronic Medical Record    Isolation Status: No active isolations     HPI:     Anne Terry is a 64 y.o. male with PMH of advanced HIV (last CD4 39, VLD [id number]), cryptococcus, disseminated histoplamsosis, fungal colitis, PJP pneumonia, MAC, and HTN who was admitted 02/27/25 from ID clinic. Was hospitalized at [facility] 11/19/24-11/26/24 for PJP, oral candidiasis, R lung mass. Culture resulted with MAC post discharge. Missed several clinic follow up appointments post admission. Seen in clinc on 01/07/25 and started on MAC treatment (azithromycin, ethambutol, rifabutin). Did not draw labs as advised until 01/17/25. Scheduled for lung biopsy with [facility] on 01/31/25 but did not attend. At follow up in ID clinic on 02/28/25, decision made to admit to rule out other OI and start patient on HIV regimen. At time of admission, denied fever, chills, headache, visual changes, N/V/D, chest or abdominal pain. Complained of cough, fatigue, mild shortness of breath. Denied MAI.      On initial consult, states that he has been taking atovaquone and the MAC regimen. Reports cough productive of clear sputum x 2 months and fatigue and shortness of breath in the same timeframe. Reports bilateral LE pain x 2 months along with a bilateral rash on his lower legs, ankles, and feet. Denies hematemesis, hematochezia, melena, abdominal pain, chest pain. No other acute complaints.      HIV history:  Diagnosed 03/7/15 - CD4 29 and VL 1,886,[id number] at diagnosis   History OI - MAC, Oroesophageal candidiasis, disseminated histoplasmosis, PJP  Prior ART:                 - Ziagen, Descovy, Prezista, Norvir  Genotype testing :  3/30/15- No resistance noted   5/4/15- phenosense sensitive    1/6/16-phenosense resistance noted to elvitagravir and raltegravir   1/6/16- M184V mutation noted with resistance to Emtricitabine and lamivudine  3/7/16-phenosense resistance noted to elvitagravir and raltegravir, partial resistance to dolutegravir  6/7/16 M184V mutation noted with resistance to Emtricitabine and lamivudine  HLA- testing Negative  G6PD testing Negative        Antibiotic / Antiviral / Antifungal history:  Azithromycin 500 mg PO daily Start: 01/07/25  Ethambutol 15 mg/kg suspension daily Start: 01/07/25  Rifabutin 150 mg PO daily Start: 01/07/25  Atovaquone 1500 mg PO daily Start: 01/07/25 End: 02/28/25      Past Medical History/Past Surgical History/Social History     No past medical history on file.    Past Surgical History:   Procedure Laterality Date    COLONOSCOPY  05/08/2015        FAMILY HISTORY:  family history includes Cancer in his sister; No Known Problems in his brother, father, and mother.     SOCIAL HISTORY:   Social History     Socioeconomic History    Marital status: Single   Tobacco Use    Smoking status: Never    Smokeless tobacco: Never   Substance and Sexual Activity    Alcohol use: Not Currently    Drug use: Yes     Types: Marijuana    Sexual activity: Not Currently     Partners: Female     Social Drivers of Health     Financial Resource Strain: Low Risk  (2/28/2025)    Overall Financial Resource Strain (CARDIA)     Difficulty of Paying Living Expenses: Not very hard   Food Insecurity: No Food Insecurity (2/28/2025)    Hunger Vital Sign     Worried About Running Out of Food in the Last Year: Never true     Ran Out of Food in the Last Year: Never true   Transportation Needs: No Transportation Needs (11/20/2024)    TRANSPORTATION NEEDS     Transportation : No   Physical Activity: Inactive (2/28/2025)    Exercise Vital Sign     Days of Exercise per Week: 0 days     Minutes of Exercise per Session: 0 min   Stress: No Stress Concern Present (2/28/2025)    [employer] [place] [employer] - Occupational Stress Questionnaire     Feeling of Stress : Only a little   Housing Stability: Low Risk  (2/28/2025)    Housing Stability Vital Sign     Unable to Pay for Housing in the Last Year: No     Homeless in the Last Year: No        ALLERGIES: Review of patient's allergies indicates:  No Known Allergies      Review of Systems     Review of Systems   Constitutional:  Positive for malaise/fatigue. Negative for chills, fever and weight loss.   Respiratory:  Positive for cough.    Skin:  Positive for rash.   All other systems reviewed and are negative.    MEDICATIONS:   Scheduled Meds:   abacavir  300 mg Oral Daily    azithromycin  500 mg Intravenous Q24H    darunavir  800 mg Oral Daily with breakfast    emtricitabine  200 mg Oral Daily    enoxparin  40 mg Subcutaneous Daily    ethambutoL  800 mg Oral Daily    ferrous sulfate  1 tablet Oral Daily    nystatin  500,000 Units Oral QID    rifabutin  150 mg Oral Daily    ritonavir  100 mg Oral Daily    sulfamethoxazole-trimethoprim 800-160mg  1 tablet Oral Daily    tenofovir disoproxil fumarate  300 mg Oral Daily     Continuous Infusions:  PRN Meds:.  Current Facility-Administered Medications:     dextrose 50%, 12.5 g, Intravenous, PRN    dextrose 50%, 25 g, Intravenous, PRN    glucagon (human recombinant), 1 mg, Intramuscular, PRN    glucose, 16 g, Oral, PRN    glucose, 24 g, Oral, PRN    naloxone, 0.02 mg, Intravenous, PRN    sodium chloride 0.9%, 10 mL, Intravenous, Q12H PRN    Physical exam:     Temp:  [98 °F (36.7 °C)-98.8 °F (37.1 °C)] 98.4 °F (36.9 °C)  Pulse:  [52-72] 72  Resp:  [18-20] 20  SpO2:  [98 %-100 %] 100 %  BP: (140-177)/(69-90) 155/90     GENERAL: A&Ox3, NAD,   HEENT: atraumatic, normocephalic, anicteric, moist oral mucosa without lesions, exudate, or erythema  LUNGS: breathing unlabored, lungs CTA bilateral  HEART: RRR; no murmur, rub, or gallop  ABDOMEN: abdomen soft, nondistended, BS noted x 4 quadrants, no tympany, no  rebound, guarding, or organomegaly  : no CVA tenderness  EXTREMITIES: no edema, clubbing, or cyanosis. Hyperpigmented plaque-appearing lesions on anteromedial, and lateral aspects of bilateral leg, ankle, and dorsal aspects of feet. Focal areas within rash of hypopigmented lesions, likely post-inflammatory hypopigmentation  SKIN: as above  NEURO: speech fluent and intact, facial symmetry preserved, no tremor  PSYCH: cooperative, normal mood and affect  LINES: PIV 20G L Posterior Wrist       LABS:     I have personally reviewed patient's labs.  Pertinent results noted below.    CBC  Recent Labs     02/27/25  1434 02/28/25  0413   WBC 3.87* 4.25*   HGB 9.2* 8.0*   HCT 29.3* 25.4*    265       Differential  Recent Labs   Lab 02/28/25  0413   WBC 4.25*   HGB 8.0*   HCT 25.4*           Basic Metabolic Panel  Recent Labs     02/27/25  1434 02/28/25  0413    142   K 4.3 3.7   * 108*   CO2 26 28   BUN 12.5 11.7   CREATININE 1.13 1.13        Hepatic Panel  Lab Results   Component Value Date    ALT 10 02/28/2025    AST 21 02/28/2025    ALKPHOS 160 (H) 02/28/2025    BILITOT 0.3 02/28/2025        Urinalysis:  Results for orders placed or performed during the hospital encounter of 11/19/24   Urinalysis, Reflex to Urine Culture    Specimen: Urine   Result Value Ref Range    Color, UA Yellow Yellow, Light-Yellow, Dark Yellow, Taylor, Straw    Appearance, UA Extra Turbid (A) Clear    Specific Gravity, UA 1.011 1.005 - 1.030    pH, UA 6.5 5.0 - 8.5    Protein, UA 2+ (A) Negative    Glucose, UA Normal Negative, Normal    Ketones, UA Negative Negative    Blood, UA 1+ (A) Negative    Bilirubin, UA Negative Negative    Urobilinogen, UA Normal 0.2, 1.0, Normal    Nitrites, UA Negative Negative    Leukocyte Esterase,  (A) Negative    RBC, UA 6-10 (A) None Seen, 0-2, 3-5, 0-5 /HPF    WBC, UA >100 (A) None Seen, 0-2, 3-5, 0-5 /HPF    Bacteria, UA Many (A) None Seen, Rare, Occasional /HPF    Squamous  Epithelial Cells, UA Many /HPF    Trichomonas, UA Many (A) None Seen /HPF    Hyaline Casts, UA None Seen /lpf       Estimated Creatinine Clearance: 67.4 mL/min (based on SCr of 1.13 mg/dL).     ESR:  Results for orders placed or performed during the hospital encounter of 11/19/24   Sedimentation Rate   Result Value Ref Range    Sed Rate 60 (H) 0 - 15 mm/hr      CRP:  Results for orders placed or performed during the hospital encounter of 11/19/24   C-Reactive Protein   Result Value Ref Range    CRP 16.40 (H) <5.00 mg/L           MICRO AND PATHOLOGY:   Colonization:  No results found for this or any previous visit.    Microbiology Results (last 7 days)       Procedure Component Value Units Date/Time    Cryptococcal antigen, blood [[id number]] Collected: 02/28/25 1328    Order Status: Sent Specimen: Blood, Venous Updated: 02/28/25 1328    Blood Culture [[id number]] Collected: 02/28/25 1306    Order Status: Sent Specimen: Blood from Antecubital, Right Updated: 02/28/25 1328    Blood Culture [[id number]] Collected: 02/28/25 1317    Order Status: Sent Specimen: Blood from Hand, Right Updated: 02/28/25 1328          No results found for the last 90 days.      IMAGING:     I have personally reviewed patient's imaging. Pertinent results noted below.  CT Chest Abdomen Pelvis With IV Contrast (XPD) NO Oral Contrast   Final Result      1. Improved aeration of the right lower lobe compared to prior with some mild residual nodular opacities in both lungs likely infectious or inflammatory.  Recommend a follow-up chest CT in 6 months to monitor.   2. No acute abdominopelvic findings.         Electronically signed by: Wan Deluca   Date:    02/28/2025   Time:    09:18      X-Ray Chest 1 View   Final Result      Chronic changes seen no acute process         Electronically signed by: Ankush Gordon   Date:    02/27/2025   Time:    18:07            IMPRESSION     Advanced HIV with noncompliance since 2021 (known resistances: M184V and  INSTI)  Disseminated Mycobacterium Avium Complex   OI Prophylaxis  Hyperpigmented, plaque-like skin lesions on bilateral LE  Hx of Cryptococcus  Hx of disseminated histoplasmosis  Hx of fungal colitis  Hx of PJP     RECOMMENDATIONS:  Disseminated MAC infection  Will need therapy for at least 12 months (D53/365).  Continue Rifabutin 150 mg daily  Continue Ethambutol 800 mg daily  Continue Azithromycin 500 mg daily  Recommend LFT monitoring for azithromycin toxicity; monitor for signs of uveitis and optic neuritis  Recommend optho consult for baseline visual acuity and color vision testing for ethambutol toxicity. Monitor for changes in vision.  Recommend following to monitor MAC response to therapy:  - AFB Smear, Sputum x1  - AFB Smear, Blood  - AFB Culture x 1  Recommend the following to assess for other OI:  - Fungitell Assay  - Cryptococcal Ag, Blood  - CMV PCR  - Histoplasma/Blastomyces Urine Ag  Recommend Hepatitis A, Ig  Recommend Hep BsAb  Agree with restarting ART now given patient endorsing compliance and interval CT consistent with improvement, monitoring for signs of IRIS.  Recommend the following to evaluate HIV disease advancement:                 -CD4 Lymphocytes                 -HIV RNA PCR  Current ART regiment:  Truvada (emtricitabine-TDF) 200-300 mg daily  Abacavir 300 mg daily  Darunavir 800 mg daily  Ritonavir 100 mg daily  For his OI prophylaxis, given risk of significant drug-drug interactions with atovaquone and rifabutin/ritonavir, recommend switch from Atovaquone to Bactrim DS once daily  Recommend punch biopsy of skin lesions - pathology and microbiology (AFB and fungal)     Thank you for the consult. We will follow along with you. Please do not hesitate to call with any questions or concerns.      Ivelisse Green, MS3  [facility]  -----  Pietro Galvez MD  Internal Medicine - PGY-3  Infectious Disease - [facility]

## 2025-02-28 NOTE — PLAN OF CARE
Problem: Wound  Goal: Optimal Coping  2/28/2025 0537 by Mira Ruiz RN  Outcome: Progressing  2/28/2025 0307 by Mira Ruiz RN  Outcome: Progressing  Goal: Optimal Functional Ability  2/28/2025 0537 by Mira Ruiz RN  Outcome: Progressing  2/28/2025 0307 by Mira Ruiz RN  Outcome: Progressing  Goal: Absence of Infection Signs and Symptoms  2/28/2025 0537 by Mira Ruiz RN  Outcome: Progressing  2/28/2025 0307 by Mira Ruiz RN  Outcome: Progressing  Goal: Improved Oral Intake  2/28/2025 0537 by Mira Ruiz RN  Outcome: Progressing  2/28/2025 0307 by Mira Ruiz RN  Outcome: Progressing  Goal: Optimal Pain Control and Function  2/28/2025 0537 by Mira Ruiz RN  Outcome: Progressing  2/28/2025 0307 by Mira Ruiz RN  Outcome: Progressing  Goal: Skin Health and Integrity  2/28/2025 0537 by Mira Ruiz RN  Outcome: Progressing  2/28/2025 0307 by Mira Ruiz RN  Outcome: Progressing  Goal: Optimal Wound Healing  2/28/2025 0537 by Mira Ruiz RN  Outcome: Progressing  2/28/2025 0307 by Mira Ruiz RN  Outcome: Progressing     Problem: Adult Inpatient Plan of Care  Goal: Plan of Care Review  2/28/2025 0537 by Mira Ruiz RN  Outcome: Progressing  2/28/2025 0307 by Mira Ruiz RN  Outcome: Progressing  Goal: Patient-Specific Goal (Individualized)  2/28/2025 0537 by Mira Ruiz RN  Outcome: Progressing  2/28/2025 0307 by Mira Ruiz RN  Outcome: Progressing  Goal: Absence of Hospital-Acquired Illness or Injury  2/28/2025 0537 by Mira Ruiz RN  Outcome: Progressing  2/28/2025 0307 by Mira Ruiz, RN  Outcome: Progressing  Goal: Optimal Comfort and Wellbeing  2/28/2025 0537 by Mira Ruiz, RN  Outcome: Progressing  2/28/2025 0307 by  Mira Ruiz, RN  Outcome: Progressing  Goal: Readiness for Transition of Care  2/28/2025 0537 by Mira Ruiz, RN  Outcome: Progressing  2/28/2025 0307 by Mira Ruiz, RN  Outcome: Progressing

## 2025-02-28 NOTE — NURSING
Patient participated in bedside swallow study. Although he was able to swallow without choking or coughing, he reported that he had to swallow really slowly. Patient stated that the white film present on his tongue was the cause of his swallowing difficulties. He stated that he was given a medication during his last hospital admission that cleared up the film and allowed him to swallow without difficulty.

## 2025-02-28 NOTE — PROGRESS NOTES
[facility]   Progress Note     Resident Team: [facility] Medicine List 4  Attending Physician: Shady Jo*  Resident: Aditya Bermudez MD  Intern: Twin Alvarez DO  Date of Admit: 2/27/2025    Subjective:      Brief HPI:  Anne Terry is a 64 y.o. male with a history of HIV, cryptococcus, and disseminated histoplasmosis, fungal colitis, PJP, MAC, HTN who presented to Infectious Disease clinic  on 2/27/2025  for follow up of advanced HIV and MAC and has not been compliant with any treatments. Patient was recently diagnosed with PJP, oral candidiasis, right lung mass and after discharge culture grew MAC. The patient is being followed by ID clinic; however, the patient had missed several appointments. He was previously started on MAC treatment of Zithromax, Ethambutol, and Rifabutin and there is questionable compliance due to the lack of all the follow up appointments. Patient was also told to have an eye examination due to the use of ethambutol, which he has not done. Patient denies fever chills, nausea, vomiting. He denies any recent travel. He does endorse a productive cough with clear colored phlegm. Internal Medicine was consulted for further work up of possible opportunistic infections and to restart HIV medications.        Interval History:   NAEON. VSS. Patient has no complaints today. CT abdomen and pelvis showed a resolved improved aeration of the right lower lobe compared to prior. Will no longer likely need a lung biopsy due to the improvement. Will need to discuss case with Pulmonologist on Monday. Patient started on ART and MAC therapy. ID consulted. Will need to consult Ophthalmology for eye exam due to patient being on ethambutol. H/H was lower today at 8/25.4. Will need to continue to monitor.     Review of Systems:  12 point review of symptoms negative unless otherwise stated above       Objective:     Vital Signs (Most Recent):  Temp: 98.2 °F (36.8 °C) (02/28/25 0733)  Pulse: (!)  52 (02/28/25 0733)  Resp: 20 (02/28/25 0733)  BP: (!) 140/74 (02/28/25 0733)  SpO2: 98 % (02/28/25 0733) Vital Signs (24h Range):  Temp:  [98 °F (36.7 °C)-98.8 °F (37.1 °C)] 98.2 °F (36.8 °C)  Pulse:  [52-67] 52  Resp:  [18-20] 20  SpO2:  [98 %-100 %] 98 %  BP: (140-177)/(69-78) 140/74      Physical Exam:  GEN: A&Ox4. No acute distress   HEENT: normocephalic, atraumatic. PERRLA. EMOI bilaterally. Sclera, conjunctiva clear. Nares patents. Oropharyngeal mucosa moist, non-erythematous, non-edematous, uvula midline. Neck supple without LAD   CARDIAC: S1 S2, no MRG. Radial pulses full, no LE edema   RESPI: Chest wall rise symmetric, atraumatic. Lungs CTAB, no wheezing or rhonchi   ABDOMEN: soft, nontender. No herniation, masses, HSM  : deferred   MSK: ROM grossly intact.   DERM: Lower extremities have wounds bilaterally.   NEURO: Motor and sensation grossly intact. CN grossly intact. No cerebellar deficits noted. No gait abnormalities noted.   PSYCH: Memory and thought process appear intact. Appropriate mood and affect.       Laboratory    CBC  Recent Labs   Lab 02/27/25  1434 02/28/25  0413   WBC 3.87* 4.25*   RBC 3.15* 2.82*   HGB 9.2* 8.0*   HCT 29.3* 25.4*   MCV 93.0 90.1   MCH 29.2 28.4   MCHC 31.4* 31.5*   RDW 14.0 13.9    265   MPV 10.2 10.1       CMP   Recent Labs   Lab 02/27/25  1434 02/28/25  0413    142   K 4.3 3.7   CO2 26 28   BUN 12.5 11.7   CREATININE 1.13 1.13   CALCIUM 8.3* 8.1*   MG  --  1.80   ALBUMIN 2.5* 2.3*   ALKPHOS 171* 160*   ALT 9 10   AST 22 21   BILITOT 0.3 0.3        Microbiology:  Microbiology Results (last 7 days)       ** No results found for the last 168 hours. **            Cardiac Data:  No echocardiogram results found for the past 14 days.    No results found for this or any previous visit.     Radiology:      Current Medications:     Infusions:       Scheduled:   abacavir  300 mg Oral Daily    atovaquone  1,500 mg Oral Daily    azithromycin  500 mg Intravenous Q24H     darunavir  800 mg Oral Daily with breakfast    emtricitabine  200 mg Oral Daily    enoxparin  40 mg Subcutaneous Daily    ethambutoL  800 mg Oral Daily    ferrous sulfate  1 tablet Oral Daily    nystatin  500,000 Units Oral QID    rifabutin  150 mg Oral Daily    ritonavir  100 mg Oral Daily    tenofovir disoproxil fumarate  300 mg Oral Daily        PRN:    Current Facility-Administered Medications:     dextrose 50%, 12.5 g, Intravenous, PRN    dextrose 50%, 25 g, Intravenous, PRN    glucagon (human recombinant), 1 mg, Intramuscular, PRN    glucose, 16 g, Oral, PRN    glucose, 24 g, Oral, PRN    naloxone, 0.02 mg, Intravenous, PRN    sodium chloride 0.9%, 10 mL, Intravenous, Q12H PRN    Antibiotics and Day Number of Therapy:  Azithromycin, Ethambutol, Rifabutin    No intake or output data in the 24 hours ending 02/28/25 1104    Lines/Drains/Airways       Peripheral Intravenous Line  Duration                  Peripheral IV - Single Lumen 02/27/25 1404 22 G Left;Posterior Forearm <1 day         Peripheral IV - Single Lumen 02/27/25 2100 20 G No Left;Posterior Wrist <1 day                      Assessment & Plan:   HIV  MAC  Hx of cryptococcus  Hx of disseminated histoplasmosis  Hx of fungal colitis  Hx of PJP  - AFB blood cx from previous admission showed growth of MAC.  - Patient will need to resume ART and start MAC treatment.   Per previous ID recommendations, will start:  - Truvada/Prezista/norvir/Abacavir  - Atovaquone 1500mg PO daily for Opportunistic infection ppx  - MAC treatment as follows: Azithromycin 500mg PO daily, Ethambutol 15mg/kg PO daily, Rifabutin 150mg PO daily  - While on Ethambutol patient needs baseline eye exam. Will consult ophthalmology  - Consulted ID and appreciate all recommendations.   - Previous CD4 37. Repeat ordered and pending.   - Blood cultures, hepatitis panel, Fungitell, CMV, Mycobacterial culture, cryptococcus pending     Suspicious Lung Mass  Possible Malignancy   - Previous CT  chest/abdomen/pelvis(11/2024) showed: RLL large irregular defined consolidation with areas of necrosis left lower lung lobe nodules could be metastatic.   - Repeat CT chest/abdomen/pelvis showed significant improvement of the right lower lobe.   - CXR ordered   - Will no longer need to consult IR for lung biopsy at this time.   - Will discuss case with Pulmonology Monday.      Oral Thrush  - Nystatin 500,000 units QID for 7 days   - Bedside swallow study ordered      Normocytic anemia   - Continue to monitor        CODE STATUS:  Full  Access:  PIV  Antibiotics:  Azithromycin, Ethambutol, Rifabutin   Diet:  Regular  DVT Prophylaxis:  Lovenox  GI Prophylaxis:  Na  Fluids:  Na     Dispo: Patient admitted for further workup of opportunistic infections and further treatment of HIV. Consulted ID. Will discharge patient when medically stable.     Twin Alvarez,   Internal Medicine - PGY-1

## 2025-02-28 NOTE — MEDICAL/APP STUDENT
[facility]   Inpatient Infectious Diseases Consultation    Physician requesting consultation: Shady Jo*  Service requesting consultation: Internal Medicine  Reason for consultation:  MAC    Historian: Patient    Isolation Status: No active isolations     HPI:     Anne Terry is a 64 y.o. male with PMH of advanced HIV (last CD4 39, VLD [id number]), cryptococcus, disseminated histoplamsosis, fungal colitis, PJP pneumonia, MAC, and HTN who was admitted 02/27/25 from ID clinic. Was hospitalized at [facility] 11/19/24-11/26/24 for PJP, oral candidiasis, R lung mass. Culture resulted with MAC post discharge. Missed several clinic follow up appointments post admission. Seen in clinc on 01/07/25 and started on MAC treatment (azithromycin, ethambutol, rifabutin). Did not draw labs as advised until 01/17/25. Scheduled for lung biopsy with [facility] on 01/31/25 but did not attend. At follow up in ID clinic on 02/28/25, decision made to admit to rule out other OI and start patient on HIV regimen. At time of admission, denied fever, chills, headache, visual changes, N/V/D, chest or abdominal pain. Complained of cough, fatigue, mild shortness of breath. Denied MAI.     On initial consult, states that he has been taking atovaquone and the MAC regimen. Reports cough productive of clear sputum x 2 months and fatigue and shortness of breath in the same timeframe. Reports bilateral LE pain x 2 months along with a bilateral rash on his lower legs, ankles, and feet. Denies hematemesis, hematochezia, melena, abdominal pain, chest pain. No other acute complaints.     HIV history:  Diagnosed 03/7/15 - CD4 29 and VL [id number] at diagnosis   History OI - MAC, Oroesophageal candidiasis, disseminated histoplasmosis, PJP  Prior ART:   - Ziagen, Descovy, Prezista, Norvir  Genotype testing :  3/30/15- No resistance noted   5/4/15- phenosense sensitive   1/6/16-phenosense resistance noted to elvitagravir and  raltegravir   1/6/16- M184V mutation noted with resistance to Emtricitabine and lamivudine  3/7/16-phenosense resistance noted to elvitagravir and raltegravir, partial resistance to dolutegravir  6/7/16 M184V mutation noted with resistance to Emtricitabine and lamivudine  HLA- testing Negative  G6PD testing Negative      Antibiotic / Antiviral / Antifungal history:  Azithromycin 500 mg PO daily Start: 01/07/25  Ethambutol 15 mg/kg suspension daily Start: 01/07/25  Rifabutin 150 mg PO daily Start: 01/07/25  Atovaquone 1500 mg PO daily Start: 01/07/25 End: 02/28/25      Past Medical History/Past Surgical History/Social History     No past medical history on file.    Past Surgical History:   Procedure Laterality Date    COLONOSCOPY  05/08/2015        FAMILY HISTORY:  family history includes Cancer in his sister; No Known Problems in his brother, father, and mother.     SOCIAL HISTORY:   Social History     Socioeconomic History    Marital status: Single   Tobacco Use    Smoking status: Never    Smokeless tobacco: Never   Substance and Sexual Activity    Alcohol use: Not Currently    Drug use: Yes     Types: Marijuana    Sexual activity: Not Currently     Partners: Female     Social Drivers of Health     Financial Resource Strain: Low Risk  (2/27/2025)    Overall Financial Resource Strain (CARDIA)     Difficulty of Paying Living Expenses: Not very hard   Food Insecurity: No Food Insecurity (2/27/2025)    Hunger Vital Sign     Worried About Running Out of Food in the Last Year: Never true     Ran Out of Food in the Last Year: Never true   Transportation Needs: No Transportation Needs (11/20/2024)    TRANSPORTATION NEEDS     Transportation : No   Stress: No Stress Concern Present (2/27/2025)    [employer] - Occupational Stress Questionnaire     Feeling of Stress : Only a little   Housing Stability: Low Risk  (2/27/2025)    Housing Stability Vital Sign     Unable to Pay for Housing in the  Last Year: No     Homeless in the Last Year: No        ALLERGIES: Review of patient's allergies indicates:  No Known Allergies      Review of Systems     Review of Systems   Constitutional:  Positive for malaise/fatigue. Negative for chills, fever and weight loss.   Respiratory:  Positive for cough.    Skin:  Positive for rash.   All other systems reviewed and are negative.        MEDICATIONS:   Scheduled Meds:   abacavir  300 mg Oral Daily    atovaquone  1,500 mg Oral Daily    azithromycin  500 mg Intravenous Q24H    darunavir  800 mg Oral Daily with breakfast    emtricitabine  200 mg Oral Daily    enoxparin  40 mg Subcutaneous Daily    ethambutoL  800 mg Oral Daily    ferrous sulfate  1 tablet Oral Daily    nystatin  500,000 Units Oral QID    rifabutin  150 mg Oral Daily    ritonavir  100 mg Oral Daily    tenofovir disoproxil fumarate  300 mg Oral Daily     Continuous Infusions:  PRN Meds:.  Current Facility-Administered Medications:     dextrose 50%, 12.5 g, Intravenous, PRN    dextrose 50%, 25 g, Intravenous, PRN    glucagon (human recombinant), 1 mg, Intramuscular, PRN    glucose, 16 g, Oral, PRN    glucose, 24 g, Oral, PRN    naloxone, 0.02 mg, Intravenous, PRN    sodium chloride 0.9%, 10 mL, Intravenous, Q12H PRN    Physical exam:     Temp:  [98 °F (36.7 °C)-98.8 °F (37.1 °C)] 98.2 °F (36.8 °C)  Pulse:  [52-67] 52  Resp:  [18-20] 20  SpO2:  [98 %-100 %] 98 %  BP: (140-177)/(69-78) 140/74     GENERAL: A&Ox3, NAD,   HEENT: atraumatic, normocephalic, anicteric, moist oral mucosa without lesions, exudate, or erythema  LUNGS: breathing unlabored, lungs CTA bilateral  HEART: RRR; no murmur, rub, or gallop  ABDOMEN: abdomen soft, nondistended, BS noted x 4 quadrants, no tympany, no rebound, guarding, or organomegaly  : no CVA tenderness  EXTREMITIES: no edema, clubbing, or cyanosis. Hyperpigmented plaques on anterior, medial, and lateral aspects of bilateral leg, ankle, and dorsal aspects of feet. Focal areas  within rash of erythematous lesions.  SKIN: no rashes or lesions  NEURO: speech fluent and intact, facial symmetry preserved, no tremor  PSYCH: cooperative, normal mood and affect  LINES: PIV 20G L Posterior Wrist       LABS:     I have personally reviewed patient's labs.  Pertinent results noted below.    CBC  Recent Labs     02/27/25  1434 02/28/25  0413   WBC 3.87* 4.25*   HGB 9.2* 8.0*   HCT 29.3* 25.4*    265       Differential  Recent Labs   Lab 02/28/25  0413   WBC 4.25*   HGB 8.0*   HCT 25.4*           Basic Metabolic Panel  Recent Labs     02/27/25  1434 02/28/25  0413    142   K 4.3 3.7   * 108*   CO2 26 28   BUN 12.5 11.7   CREATININE 1.13 1.13        Hepatic Panel  Lab Results   Component Value Date    ALT 10 02/28/2025    AST 21 02/28/2025    ALKPHOS 160 (H) 02/28/2025    BILITOT 0.3 02/28/2025        Urinalysis:  Results for orders placed or performed during the hospital encounter of 11/19/24   Urinalysis, Reflex to Urine Culture    Specimen: Urine   Result Value Ref Range    Color, UA Yellow Yellow, Light-Yellow, Dark Yellow, Taylor, Straw    Appearance, UA Extra Turbid (A) Clear    Specific Gravity, UA 1.011 1.005 - 1.030    pH, UA 6.5 5.0 - 8.5    Protein, UA 2+ (A) Negative    Glucose, UA Normal Negative, Normal    Ketones, UA Negative Negative    Blood, UA 1+ (A) Negative    Bilirubin, UA Negative Negative    Urobilinogen, UA Normal 0.2, 1.0, Normal    Nitrites, UA Negative Negative    Leukocyte Esterase,  (A) Negative    RBC, UA 6-10 (A) None Seen, 0-2, 3-5, 0-5 /HPF    WBC, UA >100 (A) None Seen, 0-2, 3-5, 0-5 /HPF    Bacteria, UA Many (A) None Seen, Rare, Occasional /HPF    Squamous Epithelial Cells, UA Many /HPF    Trichomonas, UA Many (A) None Seen /HPF    Hyaline Casts, UA None Seen /lpf       Estimated Creatinine Clearance: 68.8 mL/min (based on SCr of 1.13 mg/dL).     ESR:  Results for orders placed or performed during the hospital encounter of 11/19/24    Sedimentation Rate   Result Value Ref Range    Sed Rate 60 (H) 0 - 15 mm/hr      CRP:  Results for orders placed or performed during the hospital encounter of 11/19/24   C-Reactive Protein   Result Value Ref Range    CRP 16.40 (H) <5.00 mg/L           MICRO AND PATHOLOGY:   Colonization:  No results found for this or any previous visit.    Mycobacterium Culture Sputum/Blood Positive 11/21/24    IMAGING:     I have personally reviewed patient's imaging. Pertinent results noted below.  CT Chest Abdomen Pelvis With IV Contrast (XPD) NO Oral Contrast   Final Result      1. Improved aeration of the right lower lobe compared to prior with some mild residual nodular opacities in both lungs likely infectious or inflammatory.  Recommend a follow-up chest CT in 6 months to monitor.   2. No acute abdominopelvic findings.         Electronically signed by: Wan Deluca   Date:    02/28/2025   Time:    09:18      X-Ray Chest 1 View   Final Result      Chronic changes seen no acute process         Electronically signed by: Ankush Gordon   Date:    02/27/2025   Time:    18:07            IMPRESSION   Advanced HIV with noncompliance since 2021. 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  Disseminated Mycobacterium Avium Complex   OI Prophylaxis  Hyperpigmented skin lesion on bilateral LE  Hx of Cryptococcus  Hx of disseminated histoplasmosis  Hx of fungal colitis  Hx of PJP    RECOMMENDATIONS:  Disseminated MAC infection  Will need therapy for at least 12 months (D53/365).  Continue Rifabutin 150 mg daily  Continue Ethambutol 800 mg daily  Continue Azithromycin 500 mg daily  Recommend LFT monitoring for azithromycin toxicity; monitor for signs of uveitis and optic neuritis  Recommend optho consult for baseline visual acuity and color vision testing for ethambutol toxicity. Monitor for changes in vision.  Recommend following to monitor MAC response to therapy:  - AFB Smear, Sputum  - AFB Smear, Blood  - AFB Culture x  1  Recommend the following to assess for other OI:  - Fungitell Assay  - Cryptococcal Ag, Blood  - CMV PCR  - Histoplasma/Blastomyces Urine Ag    Recommend Hepatitis A, Ig  Recommend Hep BsAb    Significantly advanced HIV disease.  Recommend restarting ART, monitoring for signs of IRIS.  Recommend the following to evaluate HIV disease advancement:   -CD4 Lymphocytes   -HIV RNA PCR  Truvada (emtricitabine-TDF) 200-300 mg daily  Abacavir 300 mg daily  Darunavir 800 mg daily  Ritonavir 100 mg daily    Significantly advanced HIV disease necessitates for OI prophylaxis   Concern for significant drug-drug interactions with atovaquone and rifabutin/ritonavir.  Stop Atovaquone.  Start Bactrim DS 1 tablet daily    Hyperpigmented plaque bilateral lower extremities. Concerning for potential bacterial or fungal dermatosis.  AFB & Fungal Culture of Bilateral Skin Lesions  Send to path for evaluation of dermatoses    Thank you for the consult. We will follow along with you. Please do not hesitate to call with any questions or concerns.     Ivelisse Green, MS3  [facility]

## 2025-02-28 NOTE — PLAN OF CARE
02/28/25 1032   Discharge Assessment   Assessment Type Discharge Planning Assessment   Confirmed/corrected address, phone number and insurance Yes   Confirmed Demographics Correct on Facesheet   Source of Information patient   When was your last doctors appointment?   (Shady Jo)   Reason For Admission B20 AIDS   People in Home alone   Do you expect to return to your current living situation? Yes   Do you have help at home or someone to help you manage your care at home? Yes   Who are your caregiver(s) and their phone number(s)? (sister) Megan Terry ( pt can't recall contact information)   Prior to hospitilization cognitive status: Unable to Assess   Current cognitive status: Alert/Oriented;No Deficits   Walking or Climbing Stairs Difficulty no   Dressing/Bathing Difficulty no   Equipment Currently Used at Home none   Readmission within 30 days? No   Patient currently being followed by outpatient case management? No   Do you currently have service(s) that help you manage your care at home? No   Do you take prescription medications? Yes   Do you have prescription coverage? Yes   Coverage [employer]   Do you have any problems affording any of your prescribed medications? No   Is the patient taking medications as prescribed? no   Who is going to help you get home at discharge? family   How do you get to doctors appointments? family or friend will provide   Are you on dialysis? No   Discharge Plan A Home   DME Needed Upon Discharge  none   Discharge Plan discussed with: Patient   Transition of Care Barriers Does not adhere to care plan   Physical Activity   On average, how many days per week do you engage in moderate to strenuous exercise (like a brisk walk)? 0 days   On average, how many minutes do you engage in exercise at this level? 0 min   Financial Resource Strain   How hard is it for you to pay for the very basics like food, housing, medical care, and heating? Not very   Housing Stability    In the last 12 months, was there a time when you were not able to pay the mortgage or rent on time? N   At any time in the past 12 months, were you homeless or living in a shelter (including now)? N   Transportation Needs   Has the lack of transportation kept you from medical appointments, meetings, work or from getting things needed for daily living? No   Food Insecurity   Within the past 12 months, you worried that your food would run out before you got the money to buy more. Never true   Within the past 12 months, the food you bought just didn't last and you didn't have money to get more. Never true   Stress   Do you feel stress - tense, restless, nervous, or anxious, or unable to sleep at night because your mind is troubled all the time - these days? Only a littl   Social Isolation   How often do you feel lonely or isolated from those around you?  Never   Alcohol Use   Q1: How often do you have a drink containing alcohol? Never   Q2: How many drinks containing alcohol do you have on a typical day when you are drinking? None   Q3: How often do you have six or more drinks on one occasion? Never   Utilities   In the past 12 months has the electric, gas, oil, or water company threatened to shut off services in your home? No   Health Literacy   How often do you need to have someone help you when you read instructions, pamphlets, or other written material from your doctor or pharmacy? Never

## 2025-02-28 NOTE — CONSULTS
Patient is coming in today at 1:10 Patient is seen at bedside after consult to evaluate BLE. Pt is noted with significant hyperpigmentation to BLE that appears chronic in nature. However, patient claims that this just started about a month ago. No open lesions noted except for areas of dry superficial excoriation from patient scratching. Pt discouraged from scratching as open wounds increase infection risk. Will discuss treatment of itching with primary team. As far as etiology there are many differentials especially considering his immunocompromised state. I would recommend biopsy for further work up. Pt denies any other wounds. ID also following. Wound care remains available as needed.

## 2025-03-01 LAB
ALBUMIN SERPL-MCNC: 2.2 G/DL (ref 3.4–4.8)
ALBUMIN/GLOB SERPL: 0.4 RATIO (ref 1.1–2)
ALP SERPL-CCNC: 190 UNIT/L (ref 40–150)
ALT SERPL-CCNC: 16 UNIT/L (ref 0–55)
ANION GAP SERPL CALC-SCNC: 4 MEQ/L
AST SERPL-CCNC: 31 UNIT/L (ref 5–34)
BASOPHILS # BLD AUTO: 0.01 X10(3)/MCL
BASOPHILS NFR BLD AUTO: 0.3 %
BILIRUB SERPL-MCNC: 0.2 MG/DL
BUN SERPL-MCNC: 14.4 MG/DL (ref 8.4–25.7)
CALCIUM SERPL-MCNC: 8.1 MG/DL (ref 8.8–10)
CHLORIDE SERPL-SCNC: 108 MMOL/L (ref 98–107)
CO2 SERPL-SCNC: 28 MMOL/L (ref 23–31)
CREAT SERPL-MCNC: 1.09 MG/DL (ref 0.72–1.25)
CREAT/UREA NIT SERPL: 13
EOSINOPHIL # BLD AUTO: 0.48 X10(3)/MCL (ref 0–0.9)
EOSINOPHIL NFR BLD AUTO: 14 %
ERYTHROCYTE [DISTWIDTH] IN BLOOD BY AUTOMATED COUNT: 13.8 % (ref 11.5–17)
GFR SERPLBLD CREATININE-BSD FMLA CKD-EPI: >60 ML/MIN/1.73/M2
GLOBULIN SER-MCNC: 5.5 GM/DL (ref 2.4–3.5)
GLUCOSE SERPL-MCNC: 93 MG/DL (ref 82–115)
HCT VFR BLD AUTO: 26.8 % (ref 42–52)
HGB BLD-MCNC: 8.6 G/DL (ref 14–18)
HOLD SPECIMEN: NORMAL
IMM GRANULOCYTES # BLD AUTO: 0.02 X10(3)/MCL (ref 0–0.04)
IMM GRANULOCYTES NFR BLD AUTO: 0.6 %
LYMPHOCYTES # BLD AUTO: 0.6 X10(3)/MCL (ref 0.6–4.6)
LYMPHOCYTES NFR BLD AUTO: 17.5 %
MAGNESIUM SERPL-MCNC: 1.8 MG/DL (ref 1.6–2.6)
MCH RBC QN AUTO: 29.4 PG (ref 27–31)
MCHC RBC AUTO-ENTMCNC: 32.1 G/DL (ref 33–36)
MCV RBC AUTO: 91.5 FL (ref 80–94)
MONOCYTES # BLD AUTO: 0.33 X10(3)/MCL (ref 0.1–1.3)
MONOCYTES NFR BLD AUTO: 9.6 %
NEUTROPHILS # BLD AUTO: 1.99 X10(3)/MCL (ref 2.1–9.2)
NEUTROPHILS NFR BLD AUTO: 58 %
NRBC BLD AUTO-RTO: 0 %
PHOSPHATE SERPL-MCNC: 3.2 MG/DL (ref 2.3–4.7)
PLATELET # BLD AUTO: 232 X10(3)/MCL (ref 130–400)
PMV BLD AUTO: 10.5 FL (ref 7.4–10.4)
POTASSIUM SERPL-SCNC: 3.5 MMOL/L (ref 3.5–5.1)
PROT SERPL-MCNC: 7.7 GM/DL (ref 5.8–7.6)
RBC # BLD AUTO: 2.93 X10(6)/MCL (ref 4.7–6.1)
SODIUM SERPL-SCNC: 140 MMOL/L (ref 136–145)
WBC # BLD AUTO: 3.43 X10(3)/MCL (ref 4.5–11.5)

## 2025-03-01 PROCEDURE — 87070 CULTURE OTHR SPECIMN AEROBIC: CPT

## 2025-03-01 PROCEDURE — 83735 ASSAY OF MAGNESIUM: CPT

## 2025-03-01 PROCEDURE — 87449 NOS EACH ORGANISM AG IA: CPT | Performed by: NURSE PRACTITIONER

## 2025-03-01 PROCEDURE — 85025 COMPLETE CBC W/AUTO DIFF WBC: CPT

## 2025-03-01 PROCEDURE — 87449 NOS EACH ORGANISM AG IA: CPT

## 2025-03-01 PROCEDURE — 25000003 PHARM REV CODE 250

## 2025-03-01 PROCEDURE — 87116 MYCOBACTERIA CULTURE: CPT

## 2025-03-01 PROCEDURE — 63600175 PHARM REV CODE 636 W HCPCS

## 2025-03-01 PROCEDURE — 11000001 HC ACUTE MED/SURG PRIVATE ROOM

## 2025-03-01 PROCEDURE — 0HBNXZX EXCISION OF LEFT FOOT SKIN, EXTERNAL APPROACH, DIAGNOSTIC: ICD-10-PCS | Performed by: STUDENT IN AN ORGANIZED HEALTH CARE EDUCATION/TRAINING PROGRAM

## 2025-03-01 PROCEDURE — 84100 ASSAY OF PHOSPHORUS: CPT

## 2025-03-01 PROCEDURE — 63700000 PHARM REV CODE 250 ALT 637 W/O HCPCS

## 2025-03-01 PROCEDURE — 80053 COMPREHEN METABOLIC PANEL: CPT

## 2025-03-01 PROCEDURE — 36415 COLL VENOUS BLD VENIPUNCTURE: CPT

## 2025-03-01 RX ORDER — LIDOCAINE HYDROCHLORIDE AND EPINEPHRINE 10; 10 UG/ML; MG/ML
10 INJECTION, SOLUTION INFILTRATION; PERINEURAL ONCE
Status: COMPLETED | OUTPATIENT
Start: 2025-03-01 | End: 2025-03-01

## 2025-03-01 RX ORDER — DIPHENHYDRAMINE HCL 25 MG
25 CAPSULE ORAL EVERY 6 HOURS PRN
Status: DISCONTINUED | OUTPATIENT
Start: 2025-03-01 | End: 2025-03-03 | Stop reason: HOSPADM

## 2025-03-01 RX ADMIN — ABACAVIR SULFATE 300 MG: 300 TABLET, FILM COATED ORAL at 09:03

## 2025-03-01 RX ADMIN — NYSTATIN 500000 UNITS: 100000 SUSPENSION ORAL at 08:03

## 2025-03-01 RX ADMIN — EMTRICITABINE 200 MG: 200 CAPSULE ORAL at 09:03

## 2025-03-01 RX ADMIN — FERROUS SULFATE TAB 325 MG (65 MG ELEMENTAL FE) 1 EACH: 325 (65 FE) TAB at 09:03

## 2025-03-01 RX ADMIN — DARUNAVIR 800 MG: 800 TABLET, FILM COATED ORAL at 07:03

## 2025-03-01 RX ADMIN — ETHAMBUTOL HYDROCHLORIDE 800 MG: 400 TABLET ORAL at 09:03

## 2025-03-01 RX ADMIN — ENOXAPARIN SODIUM 40 MG: 40 INJECTION SUBCUTANEOUS at 09:03

## 2025-03-01 RX ADMIN — RIFABUTIN 150 MG: 150 CAPSULE ORAL at 09:03

## 2025-03-01 RX ADMIN — AZITHROMYCIN MONOHYDRATE 500 MG: 500 INJECTION, POWDER, LYOPHILIZED, FOR SOLUTION INTRAVENOUS at 09:03

## 2025-03-01 RX ADMIN — NYSTATIN 500000 UNITS: 100000 SUSPENSION ORAL at 09:03

## 2025-03-01 RX ADMIN — LIDOCAINE HYDROCHLORIDE,EPINEPHRINE BITARTRATE 10 ML: 10; .01 INJECTION, SOLUTION INFILTRATION; PERINEURAL at 10:03

## 2025-03-01 RX ADMIN — NYSTATIN 500000 UNITS: 100000 SUSPENSION ORAL at 06:03

## 2025-03-01 RX ADMIN — TENOFOVIR DISOPROXIL FUMARATE 300 MG: 300 TABLET, FILM COATED ORAL at 09:03

## 2025-03-01 RX ADMIN — SULFAMETHOXAZOLE AND TRIMETHOPRIM 1 TABLET: 800; 160 TABLET ORAL at 09:03

## 2025-03-01 RX ADMIN — DIPHENHYDRAMINE HYDROCHLORIDE 25 MG: 25 CAPSULE ORAL at 01:03

## 2025-03-01 RX ADMIN — NYSTATIN 500000 UNITS: 100000 SUSPENSION ORAL at 01:03

## 2025-03-01 RX ADMIN — RITONAVIR 100 MG: 100 TABLET, FILM COATED ORAL at 09:03

## 2025-03-01 NOTE — PROGRESS NOTES
[facility]   Progress Note     Resident Team: [facility] Medicine List 4  Attending Physician: Shady Jo*  Resident: Aditya Bermudez MD  Intern: Twin Alvarez DO  Date of Admit: 2/27/2025    Subjective:      Brief HPI:  Anne Terry is a 64 y.o. male with a history of HIV, cryptococcus, and disseminated histoplasmosis, fungal colitis, PJP, MAC, HTN who presented to Infectious Disease clinic  on 2/27/2025  for follow up of advanced HIV and MAC and has not been compliant with any treatments. Patient was recently diagnosed with PJP, oral candidiasis, right lung mass and after discharge culture grew MAC. The patient is being followed by ID clinic; however, the patient had missed several appointments. He was previously started on MAC treatment of Zithromax, Ethambutol, and Rifabutin and there is questionable compliance due to the lack of all the follow up appointments. Patient was also told to have an eye examination due to the use of ethambutol, which he has not done. Patient denies fever chills, nausea, vomiting. He denies any recent travel. He does endorse a productive cough with clear colored phlegm. Internal Medicine was consulted for further work up of possible opportunistic infections and to restart HIV medications.        Interval History:   NAEON. VSS. Patient doing well. He has no acute complaints. Denies any excessive itching of BLE. CBC with leukopenia and normocytic anemia. CMP essentially stable.     Review of Systems:  12 point review of symptoms negative unless otherwise stated above       Objective:     Vital Signs (Most Recent):  Temp: 98.8 °F (37.1 °C) (03/01/25 0733)  Pulse: (!) 59 (03/01/25 0733)  Resp: 16 (03/01/25 0733)  BP: 133/66 (03/01/25 0733)  SpO2: 99 % (03/01/25 0733) Vital Signs (24h Range):  Temp:  [98.2 °F (36.8 °C)-98.8 °F (37.1 °C)] 98.8 °F (37.1 °C)  Pulse:  [59-72] 59  Resp:  [16-20] 16  SpO2:  [99 %-100 %] 99 %  BP: (131-171)/(66-90) 133/66      Physical  Exam:  GEN: A&Ox4. No acute distress   HEENT: normocephalic, atraumatic. PERRL. EOMI bilaterally. Sclera, conjunctiva clear. Nares patents. Oropharyngeal mucosa moist, non-erythematous, non-edematous, uvula midline. Neck supple without LAD   CARDIAC: S1 S2, no MRG. Radial pulses full, no LE edema   RESPI: Chest wall rise symmetric, atraumatic. Lungs CTAB, no wheezing or rhonchi   ABDOMEN: soft, nontender. No herniation, masses, HSM  : deferred   MSK: ROM grossly intact.   DERM: Lower extremities have hyperpigmentation - likely chronic venous stasis with some areas of friability noted.   NEURO: Motor and sensation grossly intact. CN grossly intact. No cerebellar deficits noted. No gait abnormalities noted.   PSYCH: Memory and thought process appear intact. Appropriate mood and affect.       Laboratory    CBC  Recent Labs   Lab 02/27/25  1434 02/28/25  0413 03/01/25  0355   WBC 3.87* 4.25* 3.43*   RBC 3.15* 2.82* 2.93*   HGB 9.2* 8.0* 8.6*   HCT 29.3* 25.4* 26.8*   MCV 93.0 90.1 91.5   MCH 29.2 28.4 29.4   MCHC 31.4* 31.5* 32.1*   RDW 14.0 13.9 13.8    265 232   MPV 10.2 10.1 10.5*       CMP   Recent Labs   Lab 02/27/25  1434 02/28/25  0413 03/01/25  0355    142 140   K 4.3 3.7 3.5   CO2 26 28 28   BUN 12.5 11.7 14.4   CREATININE 1.13 1.13 1.09   CALCIUM 8.3* 8.1* 8.1*   MG  --  1.80 1.80   ALBUMIN 2.5* 2.3* 2.2*   ALKPHOS 171* 160* 190*   ALT 9 10 16   AST 22 21 31   BILITOT 0.3 0.3 0.2        Microbiology:  Microbiology Results (last 7 days)       Procedure Component Value Units Date/Time    AFB Smear [[id number]]     Order Status: Sent Specimen: Sputum     Mycobacteria and Nocardia Culture [[id number]]     Order Status: Sent Specimen: Respiratory     Cryptococcal antigen, blood [[id number]]  (Normal) Collected: 02/28/25 1328    Order Status: Completed Specimen: Blood, Venous Updated: 02/28/25 1402     Cryptococcal Antigen, Serum Negative    Blood Culture [[id number]] Collected: 02/28/25 1306    Order  Status: Resulted Specimen: Blood from Antecubital, Right Updated: 02/28/25 1328    Blood Culture [[id number]] Collected: 02/28/25 1317    Order Status: Resulted Specimen: Blood from Hand, Right Updated: 02/28/25 1328            Cardiac Data:  No echocardiogram results found for the past 14 days.    No results found for this or any previous visit.     Radiology:      Current Medications:     Infusions:       Scheduled:   abacavir  300 mg Oral Daily    azithromycin  500 mg Intravenous Q24H    darunavir  800 mg Oral Daily with breakfast    emtricitabine  200 mg Oral Daily    enoxparin  40 mg Subcutaneous Daily    ethambutoL  800 mg Oral Daily    ferrous sulfate  1 tablet Oral Daily    LIDOcaine-EPINEPHrine 1%-1:100,000  10 mL Intradermal Once    nystatin  500,000 Units Oral QID    rifabutin  150 mg Oral Daily    ritonavir  100 mg Oral Daily    sulfamethoxazole-trimethoprim 800-160mg  1 tablet Oral Daily    tenofovir disoproxil fumarate  300 mg Oral Daily        PRN:    Current Facility-Administered Medications:     dextrose 50%, 12.5 g, Intravenous, PRN    dextrose 50%, 25 g, Intravenous, PRN    glucagon (human recombinant), 1 mg, Intramuscular, PRN    glucose, 16 g, Oral, PRN    glucose, 24 g, Oral, PRN    hydrALAZINE, 10 mg, Intravenous, Q6H PRN    labetalol, 10 mg, Intravenous, Q6H PRN    naloxone, 0.02 mg, Intravenous, PRN    sodium chloride 0.9%, 10 mL, Intravenous, Q12H PRN    Antibiotics and Day Number of Therapy:  Azithromycin, Ethambutol, Rifabutin      Intake/Output Summary (Last 24 hours) at 3/1/2025 1023  Last data filed at 3/1/2025 1010  Gross per 24 hour   Intake 1887.13 ml   Output --   Net 1887.13 ml       Lines/Drains/Airways       Peripheral Intravenous Line  Duration                  Peripheral IV - Single Lumen 02/27/25 1404 22 G Left;Posterior Forearm 1 day         Peripheral IV - Single Lumen 02/27/25 2100 20 G No Left;Posterior Wrist 1 day                      Assessment & Plan:   HIV  MAC  Hx of  cryptococcus  Hx of disseminated histoplasmosis  Hx of fungal colitis  Hx of PJP  - AFB blood cx from previous admission showed growth of MAC.  - Patient will need to resume ART and start MAC treatment.   - Appreciate ID recommendations:  - Current ART regiment:  Truvada (emtricitabine-TDF) 200-300 mg daily  Abacavir 300 mg daily  Darunavir 800 mg daily  Ritonavir 100 mg daily  - Will need treatment for dMAC for at least 12 months (D54/365)    Azithromycin 500mg PO daily,   Ethambutol 800 mg PO daily,   Rifabutin 150mg PO daily  - While on Ethambutol patient needs baseline eye exam. Will consult ophthalmology on Monday.   - Previous CD4 37. Repeat ordered and pending.   - Crypto Ag, CMV, Hep b Ab and Hep A Ab all normal.   - Blood cultures, Fungitell,  Mycobacterial culture blood, AFB smear sputum, urine histo/blasto Ag pending.   - Continue with Bactrim DS one tab daily for OI prophylaxis  - will attempt punch biopsy of skin lesions of BLE today.      Suspicious Lung Mass  Possible Malignancy   - Previous CT chest/abdomen/pelvis(11/2024) showed: RLL large irregular defined consolidation with areas of necrosis left lower lung lobe nodules could be metastatic.   - Repeat CT chest/abdomen/pelvis showed significant improvement of the right lower lobe.   - Will no longer need to consult IR for lung biopsy at this time.   - Will discuss case with Pulmonology on Monday.      Oral Thrush  - Nystatin 500,000 units QID for 7 days      Normocytic anemia   - Continue to monitor        CODE STATUS:  Full  Access:  PIV  Antibiotics:  Azithromycin, Ethambutol, Rifabutin   Diet:  Regular  DVT Prophylaxis:  Lovenox  GI Prophylaxis:  Na  Fluids:  Na     Dispo: Patient admitted for further workup of opportunistic infections and further treatment of HIV. Consulted ID. Punch biopsy today. Will discharge patient possibly Monday.    Aditya Bermudez MD  Internal Medicine - PGY-2

## 2025-03-01 NOTE — PLAN OF CARE
Problem: Wound  Goal: Optimal Coping  2/28/2025 1816 by Turner Bell LPN  Outcome: Progressing  2/28/2025 1731 by Turner Bell LPN  Outcome: Progressing  Goal: Optimal Functional Ability  2/28/2025 1816 by Turner Bell LPN  Outcome: Progressing  2/28/2025 1731 by Turner Bell LPN  Outcome: Progressing  Goal: Absence of Infection Signs and Symptoms  2/28/2025 1816 by Turner Bell LPN  Outcome: Progressing  2/28/2025 1731 by Turner Bell LPN  Outcome: Progressing  Goal: Improved Oral Intake  2/28/2025 1816 by Turner Bell LPN  Outcome: Progressing  2/28/2025 1731 by Turner Bell LPN  Outcome: Progressing  Goal: Optimal Pain Control and Function  2/28/2025 1816 by Turner Bell LPN  Outcome: Progressing  2/28/2025 1731 by Turner Bell LPN  Outcome: Progressing  Goal: Skin Health and Integrity  2/28/2025 1816 by Turner Bell LPN  Outcome: Progressing  2/28/2025 1731 by Turner Bell LPN  Outcome: Progressing  Goal: Optimal Wound Healing  2/28/2025 1816 by Turner Bell LPN  Outcome: Progressing  2/28/2025 1731 by Turner Bell LPN  Outcome: Progressing     Problem: Adult Inpatient Plan of Care  Goal: Plan of Care Review  2/28/2025 1816 by Turner Bell LPN  Outcome: Progressing  2/28/2025 1731 by Turner Bell LPN  Outcome: Progressing  Goal: Patient-Specific Goal (Individualized)  2/28/2025 1816 by Turner Bell LPN  Outcome: Progressing  2/28/2025 1731 by Turner Bell LPN  Outcome: Progressing  Goal: Absence of Hospital-Acquired Illness or Injury  2/28/2025 1816 by Turner Bell LPN  Outcome: Progressing  2/28/2025 1731 by Turner Bell LPN  Outcome: Progressing  Goal: Optimal Comfort and Wellbeing  2/28/2025 1816 by Turner Bell LPN  Outcome: Progressing  2/28/2025 1731 by Turner Bell LPN  Outcome: Progressing  Goal: Readiness for Transition of Care  2/28/2025 1816 by Turner Bell LPN  Outcome: Progressing  2/28/2025 1731 by Arabella  Keyanni, LPN  Outcome: Progressing

## 2025-03-01 NOTE — PLAN OF CARE
Problem: Wound  Goal: Optimal Coping  3/1/2025 0454 by Mira Ruiz RN  Outcome: Progressing  3/1/2025 0113 by Mira Ruiz RN  Outcome: Progressing  Goal: Optimal Functional Ability  3/1/2025 0454 by Mira Ruiz RN  Outcome: Progressing  3/1/2025 0113 by Mira Ruiz RN  Outcome: Progressing  Goal: Absence of Infection Signs and Symptoms  3/1/2025 0454 by Mira Ruiz RN  Outcome: Progressing  3/1/2025 0113 by Mira Ruiz RN  Outcome: Progressing  Goal: Improved Oral Intake  3/1/2025 0454 by Mira Ruiz RN  Outcome: Progressing  3/1/2025 0113 by Mira Ruiz RN  Outcome: Progressing  Goal: Optimal Pain Control and Function  3/1/2025 0454 by Mira Ruiz RN  Outcome: Progressing  3/1/2025 0113 by Mira Ruiz RN  Outcome: Progressing  Goal: Skin Health and Integrity  3/1/2025 0454 by Mira Ruiz RN  Outcome: Progressing  3/1/2025 0113 by Mira Ruiz RN  Outcome: Progressing  Goal: Optimal Wound Healing  3/1/2025 0454 by Mira Ruiz RN  Outcome: Progressing  3/1/2025 0113 by Mira Ruiz RN  Outcome: Progressing

## 2025-03-02 LAB
ALBUMIN SERPL-MCNC: 2.3 G/DL (ref 3.4–4.8)
ALBUMIN/GLOB SERPL: 0.4 RATIO (ref 1.1–2)
ALP SERPL-CCNC: 199 UNIT/L (ref 40–150)
ALT SERPL-CCNC: 15 UNIT/L (ref 0–55)
ANION GAP SERPL CALC-SCNC: 6 MEQ/L
AST SERPL-CCNC: 30 UNIT/L (ref 5–34)
BASOPHILS # BLD AUTO: 0.02 X10(3)/MCL
BASOPHILS NFR BLD AUTO: 0.4 %
BILIRUB SERPL-MCNC: 0.3 MG/DL
BUN SERPL-MCNC: 13.1 MG/DL (ref 8.4–25.7)
CALCIUM SERPL-MCNC: 8 MG/DL (ref 8.8–10)
CHLORIDE SERPL-SCNC: 111 MMOL/L (ref 98–107)
CO2 SERPL-SCNC: 24 MMOL/L (ref 23–31)
CREAT SERPL-MCNC: 1.3 MG/DL (ref 0.72–1.25)
CREAT/UREA NIT SERPL: 10
EOSINOPHIL # BLD AUTO: 0.64 X10(3)/MCL (ref 0–0.9)
EOSINOPHIL NFR BLD AUTO: 13.3 %
ERYTHROCYTE [DISTWIDTH] IN BLOOD BY AUTOMATED COUNT: 14 % (ref 11.5–17)
GFR SERPLBLD CREATININE-BSD FMLA CKD-EPI: >60 ML/MIN/1.73/M2
GLOBULIN SER-MCNC: 5.6 GM/DL (ref 2.4–3.5)
GLUCOSE SERPL-MCNC: 99 MG/DL (ref 82–115)
HCT VFR BLD AUTO: 27.6 % (ref 42–52)
HGB BLD-MCNC: 8.8 G/DL (ref 14–18)
IMM GRANULOCYTES # BLD AUTO: 0.02 X10(3)/MCL (ref 0–0.04)
IMM GRANULOCYTES NFR BLD AUTO: 0.4 %
LYMPHOCYTES # BLD AUTO: 0.96 X10(3)/MCL (ref 0.6–4.6)
LYMPHOCYTES NFR BLD AUTO: 19.9 %
MAGNESIUM SERPL-MCNC: 2 MG/DL (ref 1.6–2.6)
MCH RBC QN AUTO: 28.7 PG (ref 27–31)
MCHC RBC AUTO-ENTMCNC: 31.9 G/DL (ref 33–36)
MCV RBC AUTO: 89.9 FL (ref 80–94)
MONOCYTES # BLD AUTO: 0.36 X10(3)/MCL (ref 0.1–1.3)
MONOCYTES NFR BLD AUTO: 7.5 %
NEUTROPHILS # BLD AUTO: 2.82 X10(3)/MCL (ref 2.1–9.2)
NEUTROPHILS NFR BLD AUTO: 58.5 %
NRBC BLD AUTO-RTO: 0 %
PHOSPHATE SERPL-MCNC: 3.5 MG/DL (ref 2.3–4.7)
PLATELET # BLD AUTO: 235 X10(3)/MCL (ref 130–400)
PMV BLD AUTO: 10.4 FL (ref 7.4–10.4)
POTASSIUM SERPL-SCNC: 3.8 MMOL/L (ref 3.5–5.1)
PROT SERPL-MCNC: 7.9 GM/DL (ref 5.8–7.6)
RBC # BLD AUTO: 3.07 X10(6)/MCL (ref 4.7–6.1)
SODIUM SERPL-SCNC: 141 MMOL/L (ref 136–145)
WBC # BLD AUTO: 4.82 X10(3)/MCL (ref 4.5–11.5)

## 2025-03-02 PROCEDURE — 25000003 PHARM REV CODE 250

## 2025-03-02 PROCEDURE — 85025 COMPLETE CBC W/AUTO DIFF WBC: CPT

## 2025-03-02 PROCEDURE — 63600175 PHARM REV CODE 636 W HCPCS

## 2025-03-02 PROCEDURE — 80053 COMPREHEN METABOLIC PANEL: CPT

## 2025-03-02 PROCEDURE — 87206 SMEAR FLUORESCENT/ACID STAI: CPT

## 2025-03-02 PROCEDURE — 25000242 PHARM REV CODE 250 ALT 637 W/ HCPCS

## 2025-03-02 PROCEDURE — 11000001 HC ACUTE MED/SURG PRIVATE ROOM

## 2025-03-02 PROCEDURE — 83735 ASSAY OF MAGNESIUM: CPT

## 2025-03-02 PROCEDURE — 84100 ASSAY OF PHOSPHORUS: CPT

## 2025-03-02 PROCEDURE — 63700000 PHARM REV CODE 250 ALT 637 W/O HCPCS

## 2025-03-02 PROCEDURE — 36415 COLL VENOUS BLD VENIPUNCTURE: CPT

## 2025-03-02 RX ORDER — SODIUM CHLORIDE FOR INHALATION 3 %
4 VIAL, NEBULIZER (ML) INHALATION ONCE
Status: COMPLETED | OUTPATIENT
Start: 2025-03-02 | End: 2025-03-02

## 2025-03-02 RX ADMIN — SODIUM CHLORIDE, POTASSIUM CHLORIDE, SODIUM LACTATE AND CALCIUM CHLORIDE 1000 ML: 600; 310; 30; 20 INJECTION, SOLUTION INTRAVENOUS at 09:03

## 2025-03-02 RX ADMIN — DIPHENHYDRAMINE HYDROCHLORIDE 25 MG: 25 CAPSULE ORAL at 06:03

## 2025-03-02 RX ADMIN — RIFABUTIN 150 MG: 150 CAPSULE ORAL at 09:03

## 2025-03-02 RX ADMIN — ENOXAPARIN SODIUM 40 MG: 40 INJECTION SUBCUTANEOUS at 09:03

## 2025-03-02 RX ADMIN — TENOFOVIR DISOPROXIL FUMARATE 300 MG: 300 TABLET, FILM COATED ORAL at 09:03

## 2025-03-02 RX ADMIN — NYSTATIN 500000 UNITS: 100000 SUSPENSION ORAL at 05:03

## 2025-03-02 RX ADMIN — RITONAVIR 100 MG: 100 TABLET, FILM COATED ORAL at 09:03

## 2025-03-02 RX ADMIN — FERROUS SULFATE TAB 325 MG (65 MG ELEMENTAL FE) 1 EACH: 325 (65 FE) TAB at 09:03

## 2025-03-02 RX ADMIN — NYSTATIN 500000 UNITS: 100000 SUSPENSION ORAL at 09:03

## 2025-03-02 RX ADMIN — ETHAMBUTOL HYDROCHLORIDE 800 MG: 400 TABLET ORAL at 09:03

## 2025-03-02 RX ADMIN — NYSTATIN 500000 UNITS: 100000 SUSPENSION ORAL at 02:03

## 2025-03-02 RX ADMIN — SODIUM CHLORIDE 30 MG/ML INHALATION SOLUTION 4 ML: 30 SOLUTION INHALANT at 07:03

## 2025-03-02 RX ADMIN — SULFAMETHOXAZOLE AND TRIMETHOPRIM 1 TABLET: 800; 160 TABLET ORAL at 09:03

## 2025-03-02 RX ADMIN — ABACAVIR SULFATE 300 MG: 300 TABLET, FILM COATED ORAL at 09:03

## 2025-03-02 RX ADMIN — NYSTATIN 500000 UNITS: 100000 SUSPENSION ORAL at 08:03

## 2025-03-02 RX ADMIN — EMTRICITABINE 200 MG: 200 CAPSULE ORAL at 09:03

## 2025-03-02 RX ADMIN — AZITHROMYCIN MONOHYDRATE 500 MG: 500 INJECTION, POWDER, LYOPHILIZED, FOR SOLUTION INTRAVENOUS at 10:03

## 2025-03-02 RX ADMIN — DARUNAVIR 800 MG: 800 TABLET, FILM COATED ORAL at 09:03

## 2025-03-02 NOTE — PROGRESS NOTES
[facility] Medicine Wards   Progress Note     Resident Team: [facility] Medicine List 4  Attending Physician: Shady Jo*  Resident: Aditya Bermudez MD  Intern: Twin Alvarez DO  Date of Admit: 2/27/2025    Subjective:      Brief HPI:  Anne Terry is a 64 y.o. male with a history of HIV, cryptococcus, and disseminated histoplasmosis, fungal colitis, PJP, MAC, HTN who presented to Infectious Disease clinic  on 2/27/2025  for follow up of advanced HIV and MAC and has not been compliant with any treatments. Patient was recently diagnosed with PJP, oral candidiasis, right lung mass and after discharge culture grew MAC. The patient is being followed by ID clinic; however, the patient had missed several appointments. He was previously started on MAC treatment of Zithromax, Ethambutol, and Rifabutin and there is questionable compliance due to the lack of all the follow up appointments. Patient was also told to have an eye examination due to the use of ethambutol, which he has not done. Patient denies fever chills, nausea, vomiting. He denies any recent travel. He does endorse a productive cough with clear colored phlegm. Internal Medicine was consulted for further work up of possible opportunistic infections and to restart HIV medications.        Interval History:   NAEON. VSS. Patient doing well. He has no acute complaints. Denies any excessive itching of BLE. Punch biopsy was done yesterday. Patient had a slight TAYLOR. 1L bolus LR ordered today.    Review of Systems:  12 point review of symptoms negative unless otherwise stated above       Objective:     Vital Signs (Most Recent):  Temp: 97.8 °F (36.6 °C) (03/02/25 0748)  Pulse: 72 (03/02/25 0748)  Resp: 18 (03/02/25 0748)  BP: 129/75 (03/02/25 0748)  SpO2: (!) 92 % (03/02/25 0748) Vital Signs (24h Range):  Temp:  [97.8 °F (36.6 °C)-99.2 °F (37.3 °C)] 97.8 °F (36.6 °C)  Pulse:  [65-81] 72  Resp:  [17-18] 18  SpO2:  [92 %-100 %] 92 %  BP: (129-148)/(72-83) 129/75       Physical Exam:  GEN: A&Ox4. No acute distress   HEENT: normocephalic, atraumatic. PERRL. EOMI bilaterally. Sclera, conjunctiva clear. Nares patents. Oropharyngeal mucosa moist, non-erythematous, non-edematous, uvula midline. Neck supple without LAD   CARDIAC: S1 S2, no MRG. Radial pulses full, no LE edema   RESPI: Chest wall rise symmetric, atraumatic. Lungs CTAB, no wheezing or rhonchi   ABDOMEN: soft, nontender. No herniation, masses, HSM  : deferred   MSK: ROM grossly intact.   DERM: Lower extremities have hyperpigmentation - likely chronic venous stasis with some areas of friability noted.   NEURO: Motor and sensation grossly intact. CN grossly intact. No cerebellar deficits noted. No gait abnormalities noted.   PSYCH: Memory and thought process appear intact. Appropriate mood and affect.       Laboratory    CBC  Recent Labs   Lab 02/28/25  0413 03/01/25  0355 03/02/25  0339   WBC 4.25* 3.43* 4.82   RBC 2.82* 2.93* 3.07*   HGB 8.0* 8.6* 8.8*   HCT 25.4* 26.8* 27.6*   MCV 90.1 91.5 89.9   MCH 28.4 29.4 28.7   MCHC 31.5* 32.1* 31.9*   RDW 13.9 13.8 14.0    232 235   MPV 10.1 10.5* 10.4       CMP   Recent Labs   Lab 02/28/25  0413 03/01/25  0355 03/02/25  0339    140 141   K 3.7 3.5 3.8   CO2 28 28 24   BUN 11.7 14.4 13.1   CREATININE 1.13 1.09 1.30*   CALCIUM 8.1* 8.1* 8.0*   MG 1.80 1.80 2.00   ALBUMIN 2.3* 2.2* 2.3*   ALKPHOS 160* 190* 199*   ALT 10 16 15   AST 21 31 30   BILITOT 0.3 0.2 0.3        Microbiology:  Microbiology Results (last 7 days)       Procedure Component Value Units Date/Time    Tissue Culture - Aerobic [[id number]] Collected: 03/01/25 1356    Order Status: Completed Specimen: Tissue from Foot, Left Updated: 03/02/25 0723     Tissue - Aerobic Culture No Growth At 24 Hours    Blood Culture [[id number]]  (Normal) Collected: 02/28/25 1306    Order Status: Completed Specimen: Blood from Antecubital, Right Updated: 03/01/25 1600     Blood Culture No Growth At 24 Hours    Blood  Culture [[id number]]  (Normal) Collected: 02/28/25 1317    Order Status: Completed Specimen: Blood from Hand, Right Updated: 03/01/25 1600     Blood Culture No Growth At 24 Hours    Mycobacteria and Nocardia Culture [[id number]] Collected: 03/01/25 1148    Order Status: Sent Specimen: Respiratory Updated: 03/01/25 1451    Fungal Culture [[id number]] Collected: 03/01/25 1356    Order Status: Sent Specimen: Wound from Foot, Left     AFB Smear [[id number]] Collected: 03/01/25 1356    Order Status: Sent Specimen: Wound from Foot, Left     AFB Smear [[id number]]     Order Status: Sent Specimen: Sputum     Cryptococcal antigen, blood [[id number]]  (Normal) Collected: 02/28/25 1328    Order Status: Completed Specimen: Blood, Venous Updated: 02/28/25 1402     Cryptococcal Antigen, Serum Negative            Cardiac Data:  No echocardiogram results found for the past 14 days.    No results found for this or any previous visit.     Radiology:      Current Medications:     Infusions:       Scheduled:   abacavir  300 mg Oral Daily    azithromycin  500 mg Intravenous Q24H    darunavir  800 mg Oral Daily with breakfast    emtricitabine  200 mg Oral Daily    enoxparin  40 mg Subcutaneous Daily    ethambutoL  800 mg Oral Daily    ferrous sulfate  1 tablet Oral Daily    lactated ringers  1,000 mL Intravenous Once    nystatin  500,000 Units Oral QID    rifabutin  150 mg Oral Daily    ritonavir  100 mg Oral Daily    sulfamethoxazole-trimethoprim 800-160mg  1 tablet Oral Daily    tenofovir disoproxil fumarate  300 mg Oral Daily        PRN:    Current Facility-Administered Medications:     dextrose 50%, 12.5 g, Intravenous, PRN    dextrose 50%, 25 g, Intravenous, PRN    diphenhydrAMINE, 25 mg, Oral, Q6H PRN    glucagon (human recombinant), 1 mg, Intramuscular, PRN    glucose, 16 g, Oral, PRN    glucose, 24 g, Oral, PRN    hydrALAZINE, 10 mg, Intravenous, Q6H PRN    labetalol, 10 mg, Intravenous, Q6H PRN    naloxone, 0.02 mg, Intravenous,  PRN    sodium chloride 0.9%, 10 mL, Intravenous, Q12H PRN    Antibiotics and Day Number of Therapy:  Azithromycin, Ethambutol, Rifabutin      Intake/Output Summary (Last 24 hours) at 3/2/2025 0759  Last data filed at 3/1/2025 1154  Gross per 24 hour   Intake 728.53 ml   Output --   Net 728.53 ml       Lines/Drains/Airways       Peripheral Intravenous Line  Duration                  Peripheral IV - Single Lumen 02/27/25 1404 22 G Left;Posterior Forearm 2 days                      Assessment & Plan:   HIV  MAC  Hx of cryptococcus  Hx of disseminated histoplasmosis  Hx of fungal colitis  Hx of PJP  - AFB blood cx from previous admission showed growth of MAC.  - Patient will need to resume ART and start MAC treatment.   - Appreciate ID recommendations:  - Current ART regiment:  Truvada (emtricitabine-TDF) 200-300 mg daily  Abacavir 300 mg daily  Darunavir 800 mg daily  Ritonavir 100 mg daily  - Will need treatment for dMAC for at least 12 months (D54/365)    Azithromycin 500mg PO daily,   Ethambutol 800 mg PO daily,   Rifabutin 150mg PO daily  - While on Ethambutol patient needs baseline eye exam. Will consult ophthalmology on Monday.   - Previous CD4 37. Repeat ordered and pending.   - Crypto Ag, CMV, Hep b Ab and Hep A Ab all normal.   - Blood cultures, Fungitell,  Mycobacterial culture blood, AFB smear sputum, urine histo/blasto Ag pending.   - Continue with Bactrim DS one tab daily for OI prophylaxis  - punch biopsy of skin lesions of BLE completed yesterday. Tissue culture, AFB, fungal culture, Mycobacteria pending.     TAYLOR  Slightly elevated creatinine. 1L LR bolus ordered  Continue to monitor.      Suspicious Lung Mass  Possible Malignancy   - Previous CT chest/abdomen/pelvis(11/2024) showed: RLL large irregular defined consolidation with areas of necrosis left lower lung lobe nodules could be metastatic.   - Repeat CT chest/abdomen/pelvis showed significant improvement of the right lower lobe.   - Will no longer  need to consult IR for lung biopsy at this time.   - Will discuss case with Pulmonology on Monday.      Oral Thrush  - Nystatin 500,000 units QID for 7 days      Normocytic anemia   - Continue to monitor        CODE STATUS:  Full  Access:  PIV  Antibiotics:  Azithromycin, Ethambutol, Rifabutin   Diet:  Regular  DVT Prophylaxis:  Lovenox  GI Prophylaxis:  Na  Fluids:  Na     Dispo: Patient admitted for further workup of opportunistic infections and further treatment of HIV. Consulted ID. Punch biopsy today. Will discharge patient possibly Monday.    Twin Alvarez,   Internal Medicine - PGY-1

## 2025-03-03 VITALS
OXYGEN SATURATION: 90 % | WEIGHT: 155 LBS | DIASTOLIC BLOOD PRESSURE: 78 MMHG | TEMPERATURE: 98 F | RESPIRATION RATE: 18 BRPM | HEART RATE: 68 BPM | SYSTOLIC BLOOD PRESSURE: 147 MMHG | HEIGHT: 72 IN | BODY MASS INDEX: 20.99 KG/M2

## 2025-03-03 LAB
AGE: 62
ALBUMIN SERPL-MCNC: 2.3 G/DL (ref 3.4–4.8)
ALBUMIN/GLOB SERPL: 0.4 RATIO (ref 1.1–2)
ALP SERPL-CCNC: 182 UNIT/L (ref 40–150)
ALT SERPL-CCNC: 14 UNIT/L (ref 0–55)
ANION GAP SERPL CALC-SCNC: 6 MEQ/L
AST SERPL-CCNC: 26 UNIT/L (ref 5–34)
BASOPHILS # BLD AUTO: 0.01 X10(3)/MCL
BASOPHILS NFR BLD AUTO: 0.2 %
BILIRUB SERPL-MCNC: 0.3 MG/DL
BUN SERPL-MCNC: 12.4 MG/DL (ref 8.4–25.7)
CALCIUM SERPL-MCNC: 8.2 MG/DL (ref 8.8–10)
CD3+CD4+ CELLS # SPEC: 37 UNIT/L (ref 589–1505)
CD3+CD4+ CELLS NFR BLD: 5.9 %
CHLORIDE SERPL-SCNC: 113 MMOL/L (ref 98–107)
CO2 SERPL-SCNC: 24 MMOL/L (ref 23–31)
CREAT SERPL-MCNC: 1.19 MG/DL (ref 0.72–1.25)
CREAT/UREA NIT SERPL: 10
EOSINOPHIL # BLD AUTO: 0.63 X10(3)/MCL (ref 0–0.9)
EOSINOPHIL NFR BLD AUTO: 12.9 %
ERYTHROCYTE [DISTWIDTH] IN BLOOD BY AUTOMATED COUNT: 14.1 % (ref 11.5–17)
GFR SERPLBLD CREATININE-BSD FMLA CKD-EPI: >60 ML/MIN/1.73/M2
GLOBULIN SER-MCNC: 5.5 GM/DL (ref 2.4–3.5)
GLUCOSE SERPL-MCNC: 74 MG/DL (ref 82–115)
HCT VFR BLD AUTO: 26.2 % (ref 42–52)
HGB BLD-MCNC: 8.2 G/DL (ref 14–18)
IMM GRANULOCYTES # BLD AUTO: 0.03 X10(3)/MCL (ref 0–0.04)
IMM GRANULOCYTES NFR BLD AUTO: 0.6 %
KAPPA LC FREE SER NEPH-MCNC: 35.8 MG/DL (ref 0.33–1.94)
KAPPA LC FREE/LAMBDA FREE SER NEPH: 2.06 {RATIO} (ref 0.26–1.65)
LAMBDA LC FREE SERPL-MCNC: 17.4 MG/DL (ref 0.57–2.63)
LYMPHOCYTES # BLD AUTO: 0.83 X10(3)/MCL (ref 0.6–4.6)
LYMPHOCYTES # BLD AUTO: 632.4 X10(3)/MCL (ref 1260–5520)
LYMPHOCYTES NFR BLD AUTO: 17 %
LYMPHOCYTES NFR LN MANUAL: 17 % (ref 28–48)
LYMPHOMA - T-CELL MARKERS SPEC-IMP: ABNORMAL
MAGNESIUM SERPL-MCNC: 2 MG/DL (ref 1.6–2.6)
MCH RBC QN AUTO: 28.6 PG (ref 27–31)
MCHC RBC AUTO-ENTMCNC: 31.3 G/DL (ref 33–36)
MCV RBC AUTO: 91.3 FL (ref 80–94)
MONOCYTES # BLD AUTO: 0.38 X10(3)/MCL (ref 0.1–1.3)
MONOCYTES NFR BLD AUTO: 7.8 %
NEUTROPHILS # BLD AUTO: 2.99 X10(3)/MCL (ref 2.1–9.2)
NEUTROPHILS NFR BLD AUTO: 61.5 %
NRBC BLD AUTO-RTO: 0 %
PHOSPHATE SERPL-MCNC: 3.2 MG/DL (ref 2.3–4.7)
PLATELET # BLD AUTO: 252 X10(3)/MCL (ref 130–400)
PMV BLD AUTO: 10.6 FL (ref 7.4–10.4)
POTASSIUM SERPL-SCNC: 4 MMOL/L (ref 3.5–5.1)
PROT SERPL-MCNC: 7.8 GM/DL (ref 5.8–7.6)
RBC # BLD AUTO: 2.87 X10(6)/MCL (ref 4.7–6.1)
SODIUM SERPL-SCNC: 143 MMOL/L (ref 136–145)
WBC # BLD AUTO: 3720 /MM3 (ref 4500–11500)
WBC # BLD AUTO: 4.87 X10(3)/MCL (ref 4.5–11.5)

## 2025-03-03 PROCEDURE — 25000003 PHARM REV CODE 250

## 2025-03-03 PROCEDURE — 85025 COMPLETE CBC W/AUTO DIFF WBC: CPT

## 2025-03-03 PROCEDURE — 80053 COMPREHEN METABOLIC PANEL: CPT

## 2025-03-03 PROCEDURE — 99233 SBSQ HOSP IP/OBS HIGH 50: CPT | Mod: GC,,,

## 2025-03-03 PROCEDURE — 63600175 PHARM REV CODE 636 W HCPCS

## 2025-03-03 PROCEDURE — 84100 ASSAY OF PHOSPHORUS: CPT

## 2025-03-03 PROCEDURE — 63700000 PHARM REV CODE 250 ALT 637 W/O HCPCS

## 2025-03-03 PROCEDURE — 36415 COLL VENOUS BLD VENIPUNCTURE: CPT

## 2025-03-03 PROCEDURE — 83735 ASSAY OF MAGNESIUM: CPT

## 2025-03-03 RX ORDER — NYSTATIN 100000 [USP'U]/ML
500000 SUSPENSION ORAL 4 TIMES DAILY
Qty: 60 ML | Refills: 0 | Status: SHIPPED | OUTPATIENT
Start: 2025-03-03 | End: 2025-03-06

## 2025-03-03 RX ORDER — AZITHROMYCIN 500 MG/1
500 TABLET, FILM COATED ORAL DAILY
Qty: 90 TABLET | Refills: 0 | Status: SHIPPED | OUTPATIENT
Start: 2025-03-03

## 2025-03-03 RX ORDER — RIFABUTIN 150 MG/1
150 CAPSULE ORAL DAILY
Qty: 90 CAPSULE | Refills: 0 | Status: SHIPPED | OUTPATIENT
Start: 2025-03-03

## 2025-03-03 RX ORDER — FERROUS SULFATE 325(65) MG
325 TABLET, DELAYED RELEASE (ENTERIC COATED) ORAL DAILY
Qty: 90 TABLET | Refills: 0 | Status: SHIPPED | OUTPATIENT
Start: 2025-03-03

## 2025-03-03 RX ORDER — ETHAMBUTOL HYDROCHLORIDE 400 MG/1
800 TABLET, FILM COATED ORAL DAILY
Qty: 180 TABLET | Refills: 0 | Status: SHIPPED | OUTPATIENT
Start: 2025-03-04

## 2025-03-03 RX ORDER — ABACAVIR 300 MG/1
300 TABLET ORAL DAILY
Qty: 90 TABLET | Refills: 0 | Status: SHIPPED | OUTPATIENT
Start: 2025-03-04 | End: 2025-06-02

## 2025-03-03 RX ORDER — RITONAVIR 100 MG/1
100 TABLET ORAL DAILY
Qty: 90 TABLET | Refills: 0 | Status: SHIPPED | OUTPATIENT
Start: 2025-03-04 | End: 2025-06-02

## 2025-03-03 RX ORDER — EMTRICITABINE AND TENOFOVIR DISOPROXIL FUMARATE 200; 300 MG/1; MG/1
1 TABLET, FILM COATED ORAL DAILY
Qty: 90 TABLET | Refills: 0 | Status: SHIPPED | OUTPATIENT
Start: 2025-03-03 | End: 2025-06-01

## 2025-03-03 RX ORDER — SULFAMETHOXAZOLE AND TRIMETHOPRIM 800; 160 MG/1; MG/1
1 TABLET ORAL DAILY
Qty: 90 TABLET | Refills: 0 | Status: SHIPPED | OUTPATIENT
Start: 2025-03-04 | End: 2025-06-02

## 2025-03-03 RX ORDER — AZITHROMYCIN 250 MG/1
500 TABLET, FILM COATED ORAL DAILY
Status: DISCONTINUED | OUTPATIENT
Start: 2025-03-04 | End: 2025-03-03 | Stop reason: HOSPADM

## 2025-03-03 RX ORDER — DARUNAVIR 800 MG/1
800 TABLET, FILM COATED ORAL
Qty: 90 TABLET | Refills: 0 | Status: SHIPPED | OUTPATIENT
Start: 2025-03-04 | End: 2025-06-02

## 2025-03-03 RX ADMIN — NYSTATIN 500000 UNITS: 100000 SUSPENSION ORAL at 08:03

## 2025-03-03 RX ADMIN — AZITHROMYCIN MONOHYDRATE 500 MG: 500 INJECTION, POWDER, LYOPHILIZED, FOR SOLUTION INTRAVENOUS at 08:03

## 2025-03-03 RX ADMIN — RITONAVIR 100 MG: 100 TABLET, FILM COATED ORAL at 08:03

## 2025-03-03 RX ADMIN — ENOXAPARIN SODIUM 40 MG: 40 INJECTION SUBCUTANEOUS at 08:03

## 2025-03-03 RX ADMIN — TENOFOVIR DISOPROXIL FUMARATE 300 MG: 300 TABLET, FILM COATED ORAL at 08:03

## 2025-03-03 RX ADMIN — ABACAVIR SULFATE 300 MG: 300 TABLET, FILM COATED ORAL at 08:03

## 2025-03-03 RX ADMIN — FERROUS SULFATE TAB 325 MG (65 MG ELEMENTAL FE) 1 EACH: 325 (65 FE) TAB at 08:03

## 2025-03-03 RX ADMIN — DIPHENHYDRAMINE HYDROCHLORIDE 25 MG: 25 CAPSULE ORAL at 12:03

## 2025-03-03 RX ADMIN — ETHAMBUTOL HYDROCHLORIDE 800 MG: 400 TABLET ORAL at 08:03

## 2025-03-03 RX ADMIN — DARUNAVIR 800 MG: 800 TABLET, FILM COATED ORAL at 08:03

## 2025-03-03 RX ADMIN — NYSTATIN 500000 UNITS: 100000 SUSPENSION ORAL at 12:03

## 2025-03-03 RX ADMIN — EMTRICITABINE 200 MG: 200 CAPSULE ORAL at 08:03

## 2025-03-03 RX ADMIN — SULFAMETHOXAZOLE AND TRIMETHOPRIM 1 TABLET: 800; 160 TABLET ORAL at 08:03

## 2025-03-03 RX ADMIN — RIFABUTIN 150 MG: 150 CAPSULE ORAL at 08:03

## 2025-03-03 NOTE — CONSULTS
Spoke with pt, agrees with home health, no preference indicated. Referral submitted to & accepted by [facility] ([place]) via [facility].

## 2025-03-03 NOTE — CONSULTS
Reason for consult: ethambutol use eye exam     CC: no visual complaints    HPI: Anne Terry is a 64 y.o. male with history of advanced HIV/aids and cryptococcus, and disseminated histoplasmosis, fungal colitis, PJP, MAC, HTN who was admitted to hospital medicine on 2/27/25 from ID clinic due to noncompliance with treatment. Patient has been prescribed ethambutol for MAC however unclear if patient has been compliant. Ophthalmology consulted for baseline eye exam while inpatient since patient has poor adherences to outpatient follow up. Denies any eye history or vision changes/complaints.      POH: denies    Gtts: none      No past medical history on file.      Family History   Problem Relation Name Age of Onset    No Known Problems Mother      No Known Problems Father      Cancer Sister      No Known Problems Brother           Current Medications[1]      Review of patient's allergies indicates:  No Known Allergies      Social History[2]      Base Eye Exam       Visual Acuity (Snellen - Linear)         Right Left    Dist sc 20/30 20/50    Dist ph sc 20/25 20/25              Tonometry (Tonopen, 4:43 PM)         Right Left    Pressure 14 11              Pupils         Pupils Dark Light Shape React APD    Right PERRL 5 3 Round Brisk None    Left PERRL 5 3 Round Brisk None              Visual Fields         Right Left     Full Full              Extraocular Movement         Right Left     0 0 0   0  0   0 0 0    0 0 0   0  0   0 0 0                 Dilation       Both eyes: 1% Mydriacyl, 2.5% Phenylephrine @ 4:44 PM                  Slit Lamp and Fundus Exam       External Exam         Right Left    External Normal Normal              Slit Lamp Exam         Right Left    Lids/Lashes Normal Normal    Conjunctiva/Sclera White and quiet White and quiet    Cornea Clear Clear    Anterior Chamber Deep and formed Deep and formed    Iris Round and reactive Round and reactive    Lens NSC NSC    Anterior Vitreous PVD PVD       "        Fundus Exam         Right Left    Disc Pink and sharp Pink and sharp    C/D Ratio 0.3 0.3    Macula Flat and attached Flat and attached    Vessels Normal caliber and crossings Normal caliber and crossings    Periphery Flat with no holes, tears, or detachments. 1 CWS superior to nerve. Flat with no holes, tears, or detachments    Poor view to periphery 2/2 limited participation                      Plan   A/P: Anne Terry is a 64 y.o. male    HIV retinopathy OD // HIV without retinopathy OS  - 1 cotton wool spot in retina of right eye  - recommend following up in 1 week for repeat dilated exam to rule out early CMV retinitis given cd4 ct below 50. Poor view to far periphery due to limited participation in exam   - will message schedulers to arrange outpatient follow up in 1 week (pt left AMA shortly after eye exam)    Ethambutol use  - unclear if patient has been compliant with ethambutol treatment  - vision good ou (PH to 20/25 ou), bedside exam without evidence of toxicity though would be unable to detect subtle changes  - can complete formal testing at clinic appt, already scheduled for April 2025      Follow up in 1 week for DFE/optos photos - staff messaged  Follow up in 1 month as scheduled for ethambutol testing    Patient best contact #: [phone]     Boris Hirsch MD (Brad)  [facility] PGY-3       [1]   Current Facility-Administered Medications:     abacavir tablet 300 mg, 300 mg, Oral, Daily, Antonio, Archa, DO, 300 mg at 03/03/25 0834    [START ON 3/4/2025] azithromycin tablet 500 mg, 500 mg, Oral, Daily, Shady Jo MD    darunavir tablet 800 mg, 800 mg, Oral, Daily with breakfast, Antonio, Joba, DO, 800 mg at 03/03/25 0834    dextrose 50% injection 12.5 g, 12.5 g, Intravenous, PRN, Antonio Archa, DO    dextrose 50% injection 25 g, 25 g, Intravenous, PRN, Antonio Archa, DO    diphenhydrAMINE capsule 25 mg, 25 mg, Oral, Q6H PRN, Aditya Bermudez MD, 25 mg at " 03/03/25 0045    emtricitabine capsule 200 mg, 200 mg, Oral, Daily, Antonio, Twin, DO, 200 mg at 03/03/25 0834    enoxaparin injection 40 mg, 40 mg, Subcutaneous, Daily, Twin Alvarez, DO, 40 mg at 03/03/25 0835    ethambutoL tablet 800 mg, 800 mg, Oral, Daily, Antonio, Tiwn, DO, 800 mg at 03/03/25 0834    ferrous sulfate tablet 1 each, 1 tablet, Oral, Daily, Antonio, Joba, DO, 1 each at 03/03/25 0834    glucagon (human recombinant) injection 1 mg, 1 mg, Intramuscular, PRN, Twin Alvarez DO    glucose chewable tablet 16 g, 16 g, Oral, PRN, Twin Alvarez, DO    glucose chewable tablet 24 g, 24 g, Oral, PRN, Twin Alvarez DO    hydrALAZINE injection 10 mg, 10 mg, Intravenous, Q6H PRN, Aditya Bermudez MD, 10 mg at 02/28/25 1752    labetalol 20 mg/4 mL (5 mg/mL) IV syring, 10 mg, Intravenous, Q6H PRN, Aditya Bermudez MD    naloxone 0.4 mg/mL injection 0.02 mg, 0.02 mg, Intravenous, PRN, Twin Alvarez DO    nystatin 100,000 unit/mL suspension 500,000 Units, 500,000 Units, Oral, QID, Twin Alvarez DO, 500,000 Units at 03/03/25 1249    rifabutin capsule 150 mg, 150 mg, Oral, Daily, Antonio, Joba, DO, 150 mg at 03/03/25 0834    ritonavir tablet 100 mg, 100 mg, Oral, Daily, Antonio, Joba, DO, 100 mg at 03/03/25 0834    sodium chloride 0.9% flush 10 mL, 10 mL, Intravenous, Q12H PRN, Twin Alvarez DO    sulfamethoxazole-trimethoprim 800-160mg per tablet 1 tablet, 1 tablet, Oral, Daily, Twin Alvarez, DO, 1 tablet at 03/03/25 0834    tenofovir disoproxil fumarate tablet 300 mg, 300 mg, Oral, Daily, Twin Alvarez DO, 300 mg at 03/03/25 0834  [2]   Social History  Tobacco Use    Smoking status: Never    Smokeless tobacco: Never   Substance Use Topics    Alcohol use: Not Currently    Drug use: Yes     Types: Marijuana

## 2025-03-03 NOTE — PROGRESS NOTES
[facility] Medicine Wards   Progress Note     Resident Team: [facility] Medicine List 4  Attending Physician: Shady Jo*  Resident: Aditya Bermudez MD  Intern: Twin Alvarez DO  Date of Admit: 2/27/2025    Subjective:      Brief HPI:  Anne Terry is a 64 y.o. male with a history of HIV, cryptococcus, and disseminated histoplasmosis, fungal colitis, PJP, MAC, HTN who presented to Infectious Disease clinic  on 2/27/2025  for follow up of advanced HIV and MAC and has not been compliant with any treatments. Patient was recently diagnosed with PJP, oral candidiasis, right lung mass and after discharge culture grew MAC. The patient is being followed by ID clinic; however, the patient had missed several appointments. He was previously started on MAC treatment of Zithromax, Ethambutol, and Rifabutin and there is questionable compliance due to the lack of all the follow up appointments. Patient was also told to have an eye examination due to the use of ethambutol, which he has not done. Patient denies fever chills, nausea, vomiting. He denies any recent travel. He does endorse a productive cough with clear colored phlegm. Internal Medicine was consulted for further work up of possible opportunistic infections and to restart HIV medications.        Interval History:   NAEON. VSS. Patient doing well. He has no acute complaints. Denies any excessive itching of BLE. Ophthalmology consulted.     Review of Systems:  12 point review of symptoms negative unless otherwise stated above       Objective:     Vital Signs (Most Recent):  Temp: 98.3 °F (36.8 °C) (03/03/25 1122)  Pulse: 69 (03/03/25 1122)  Resp: 18 (03/03/25 1122)  BP: 135/71 (03/03/25 1122)  SpO2: 97 % (03/03/25 1122) Vital Signs (24h Range):  Temp:  [98.3 °F (36.8 °C)-99 °F (37.2 °C)] 98.3 °F (36.8 °C)  Pulse:  [65-75] 69  Resp:  [18] 18  SpO2:  [97 %-100 %] 97 %  BP: (128-153)/(68-87) 135/71      Physical Exam:  GEN: A&Ox4. No acute distress   HEENT:  normocephalic, atraumatic. PERRL. EOMI bilaterally. Sclera, conjunctiva clear. Nares patents. Oropharyngeal mucosa moist, non-erythematous, non-edematous, uvula midline. Neck supple without LAD   CARDIAC: S1 S2, no MRG. Radial pulses full, no LE edema   RESPI: Chest wall rise symmetric, atraumatic. Lungs CTAB, no wheezing or rhonchi   ABDOMEN: soft, nontender. No herniation, masses, HSM  : deferred   MSK: ROM grossly intact.   DERM: Lower extremities have hyperpigmentation - likely chronic venous stasis with some areas of friability noted.   NEURO: Motor and sensation grossly intact. CN grossly intact. No cerebellar deficits noted. No gait abnormalities noted.   PSYCH: Memory and thought process appear intact. Appropriate mood and affect.       Laboratory    CBC  Recent Labs   Lab 03/01/25  0355 03/02/25  0339 03/03/25 0315   WBC 3.43* 4.82 4.87   RBC 2.93* 3.07* 2.87*   HGB 8.6* 8.8* 8.2*   HCT 26.8* 27.6* 26.2*   MCV 91.5 89.9 91.3   MCH 29.4 28.7 28.6   MCHC 32.1* 31.9* 31.3*   RDW 13.8 14.0 14.1    235 252   MPV 10.5* 10.4 10.6*       CMP   Recent Labs   Lab 03/01/25  0355 03/02/25  0339 03/03/25  0315    141 143   K 3.5 3.8 4.0   CO2 28 24 24   BUN 14.4 13.1 12.4   CREATININE 1.09 1.30* 1.19   CALCIUM 8.1* 8.0* 8.2*   MG 1.80 2.00 2.00   ALBUMIN 2.2* 2.3* 2.3*   ALKPHOS 190* 199* 182*   ALT 16 15 14   AST 31 30 26   BILITOT 0.2 0.3 0.3        Microbiology:  Microbiology Results (last 7 days)       Procedure Component Value Units Date/Time    Mycobacteria and Nocardia Culture [[id number]] Collected: 03/03/25 0848    Order Status: Sent Specimen: Tissue from Foot, Left Updated: 03/03/25 0903    Fungal Culture [[id number]] Collected: 03/01/25 1356    Order Status: Sent Specimen: Wound from Foot, Left Updated: 03/03/25 0842    AFB Smear [[id number]] Collected: 03/01/25 1356    Order Status: Sent Specimen: Wound from Foot, Left Updated: 03/03/25 0842    Mycobacteria and Nocardia Culture [[id number]]      Order Status: Canceled Specimen: Tissue     Tissue Culture - Aerobic [[id number]] Collected: 03/01/25 1356    Order Status: Completed Specimen: Tissue from Foot, Left Updated: 03/03/25 0655     Tissue - Aerobic Culture Normal Chrissy    Blood Culture [[id number]]  (Normal) Collected: 02/28/25 1306    Order Status: Completed Specimen: Blood from Antecubital, Right Updated: 03/02/25 1600     Blood Culture No Growth At 48 Hours    Blood Culture [[id number]]  (Normal) Collected: 02/28/25 1317    Order Status: Completed Specimen: Blood from Hand, Right Updated: 03/02/25 1600     Blood Culture No Growth At 48 Hours    AFB Smear [[id number]] Collected: 03/02/25 0829    Order Status: Sent Specimen: Sputum, Induced Updated: 03/02/25 0855    AFB Smear [[id number]] Collected: 03/02/25 0830    Order Status: Canceled Specimen: Sputum, Expectorated     Mycobacteria and Nocardia Culture [[id number]] Collected: 03/01/25 1148    Order Status: Canceled Specimen: Respiratory Updated: 03/01/25 1451    Cryptococcal antigen, blood [[id number]]  (Normal) Collected: 02/28/25 1328    Order Status: Completed Specimen: Blood, Venous Updated: 02/28/25 1402     Cryptococcal Antigen, Serum Negative            Cardiac Data:  No echocardiogram results found for the past 14 days.    No results found for this or any previous visit.     Radiology:      Current Medications:     Infusions:       Scheduled:   abacavir  300 mg Oral Daily    [START ON 3/4/2025] azithromycin  500 mg Oral Daily    darunavir  800 mg Oral Daily with breakfast    emtricitabine  200 mg Oral Daily    enoxparin  40 mg Subcutaneous Daily    ethambutoL  800 mg Oral Daily    ferrous sulfate  1 tablet Oral Daily    nystatin  500,000 Units Oral QID    rifabutin  150 mg Oral Daily    ritonavir  100 mg Oral Daily    sulfamethoxazole-trimethoprim 800-160mg  1 tablet Oral Daily    tenofovir disoproxil fumarate  300 mg Oral Daily        PRN:    Current Facility-Administered Medications:      dextrose 50%, 12.5 g, Intravenous, PRN    dextrose 50%, 25 g, Intravenous, PRN    diphenhydrAMINE, 25 mg, Oral, Q6H PRN    glucagon (human recombinant), 1 mg, Intramuscular, PRN    glucose, 16 g, Oral, PRN    glucose, 24 g, Oral, PRN    hydrALAZINE, 10 mg, Intravenous, Q6H PRN    labetalol, 10 mg, Intravenous, Q6H PRN    naloxone, 0.02 mg, Intravenous, PRN    sodium chloride 0.9%, 10 mL, Intravenous, Q12H PRN    Antibiotics and Day Number of Therapy:  Azithromycin, Ethambutol, Rifabutin      Intake/Output Summary (Last 24 hours) at 3/3/2025 1255  Last data filed at 3/3/2025 0853  Gross per 24 hour   Intake 1737.5 ml   Output --   Net 1737.5 ml       Lines/Drains/Airways       Peripheral Intravenous Line  Duration                  Peripheral IV - Single Lumen 02/27/25 1404 22 G Left;Posterior Forearm 3 days                      Assessment & Plan:   HIV  MAC  Hx of cryptococcus  Hx of disseminated histoplasmosis  Hx of fungal colitis  Hx of PJP  - AFB blood cx from previous admission showed growth of MAC.  - Patient will need to resume ART and start MAC treatment.   - Appreciate ID recommendations:  - Current ART regiment:  Truvada (emtricitabine-TDF) 200-300 mg daily  Abacavir 300 mg daily  Darunavir 800 mg daily  Ritonavir 100 mg daily  - Will need treatment for dMAC for at least 12 months (D54/365)    Azithromycin 500mg PO daily,   Ethambutol 800 mg PO daily,   Rifabutin 150mg PO daily  - While on Ethambutol patient needs baseline eye exam. Consulted ophthalmology.  - Previous CD4 37. Repeat ordered and pending.   - Crypto Ag, CMV, Hep b Ab and Hep A Ab all normal.   - Blood cultures, Fungitell,  Mycobacterial culture blood, AFB smear sputum, urine histo/blasto Ag pending.   - Continue with Bactrim DS one tab daily for OI prophylaxis  - punch biopsy of skin lesions of BLE completed yesterday. Tissue culture, AFB, fungal culture, Mycobacteria pending.     TAYLOR  Slightly elevated creatinine. 1L LR bolus  ordered  Continue to monitor.      Suspicious Lung Mass  Possible Malignancy   - Previous CT chest/abdomen/pelvis(11/2024) showed: RLL large irregular defined consolidation with areas of necrosis left lower lung lobe nodules could be metastatic.   - Repeat CT chest/abdomen/pelvis showed significant improvement of the right lower lobe.   - Will no longer need to consult IR for lung biopsy at this time.      Oral Thrush  - Nystatin 500,000 units QID for 7 days      Normocytic anemia   - Continue to monitor        CODE STATUS:  Full  Access:  PIV  Antibiotics:  Azithromycin, Ethambutol, Rifabutin   Diet:  Regular  DVT Prophylaxis:  Lovenox  GI Prophylaxis:  Na  Fluids:  Na     Dispo: Patient admitted for further workup of opportunistic infections and further treatment of HIV. Consulted ID. Punch biopsy today. Will discharge patient once eye exam completed.     Twin Alvarez, DO  Internal Medicine - PGY-1

## 2025-03-03 NOTE — DISCHARGE SUMMARY
[facility] Internal Medicine Discharge Summary    Admitting Physician: Shady Jo MD  Attending Physician: Shady Jo*  Date of Admit: 2/27/2025  Date of Discharge: 3/3/2025    Condition: Stable  Outcome: Condition has improved and patient is now ready for discharge.  DISPOSITION: Home or Self Care          Discharge Diagnoses     Problem List[1]    Principal Problem:  Mycobacterium avium complex    Consultants and Procedures     Consultants:  IP CONSULT TO INFECTIOUS DISEASES  WOUND CARE CONSULT  IP CONSULT TO OPHTHALMOLOGY  IP CONSULT TO SOCIAL WORK/CASE MANAGEMENT    Procedures:   * No surgery found *     Brief Admission History      Anne Terry is a 64 y.o. male with a history of HIV, cryptococcus, and disseminated histoplasmosis, fungal colitis, PJP, MAC, HTN who presented to Infectious Disease clinic  on 2/27/2025  for follow up of advanced HIV and MAC and has not been compliant with any treatments. Patient was recently diagnosed with PJP, oral candidiasis, right lung mass and after discharge culture grew MAC. The patient is being followed by ID clinic; however, the patient had missed several appointments. He was previously started on MAC treatment of Zithromax, Ethambutol, and Rifabutin and there is questionable compliance due to the lack of all the follow up appointments. Patient was also told to have an eye examination due to the use of ethambutol, which he has not done. Patient denies fever chills, nausea, vomiting. He denies any recent travel. He does endorse a productive cough with clear colored phlegm. Internal Medicine was consulted for further work up of possible opportunistic infections and to restart HIV medications.        Hospital Course with Pertinent Findings     Patient throughout his admission has received treatment for his HIV and MAC. Infectious disease had been consulted. Patient received a CT chest, abdomen, and pelvis which showed improvement of his right  lower lobe compared to prior. Patient had a punch biopsy done for his b/l lower extremities due to concern for Kaposi Sarcoma. Ophthalmology had been consulted due to need of an eye exam after starting on ethambutol. 1 cotton wool spot in retina of right eye. Ophthalmology recommend following up in 1 week for repeat dilated exam to rule out early CMV retinitis. Patient is stable and ready for discharge.     Discharge physical exam:  Vitals  BP: (!) 147/78  Temp: 98.2 °F (36.8 °C)  Temp Source: Oral  Pulse: 68  Resp: 18  SpO2: (!) 90 %  Height: 6' (182.9 cm)  Weight: 70.3 kg (155 lb)    Physical Exam:  GEN: A&Ox4. No acute distress   HEENT: normocephalic, atraumatic. PERRL. EOMI bilaterally. Sclera, conjunctiva clear. Nares patents. Oropharyngeal mucosa moist, non-erythematous, non-edematous, uvula midline. Neck supple without LAD   CARDIAC: S1 S2, no MRG. Radial pulses full, no LE edema   RESPI: Chest wall rise symmetric, atraumatic. Lungs CTAB, no wheezing or rhonchi   ABDOMEN: soft, nontender. No herniation, masses, HSM  : deferred   MSK: ROM grossly intact.   DERM: Lower extremities have hyperpigmentation - likely chronic venous stasis with some areas of friability noted.   NEURO: Motor and sensation grossly intact. CN grossly intact. No cerebellar deficits noted. No gait abnormalities noted.   PSYCH: Memory and thought process appear intact. Appropriate mood and affect.        TIME SPENT ON DISCHARGE: 60 minutes    Discharge Medications        Medication List        START taking these medications      abacavir 300 mg Tab  Commonly known as: ZIAGEN  Take 1 tablet (300 mg total) by mouth once daily.  Start taking on: March 4, 2025     darunavir 800 mg Tab  Commonly known as: PREZISTA  Take 1 tablet (800 mg total) by mouth daily with breakfast.  Start taking on: March 4, 2025     emtricitabine-tenofovir 200-300 mg 200-300 mg Tab  Commonly known as: TRUVADA  Take 1 tablet by mouth once daily.     nystatin 100,000  unit/mL suspension  Commonly known as: MYCOSTATIN  Take 5 mLs (500,000 Units total) by mouth 4 (four) times daily. for 3 days     ritonavir 100 mg Tab tablet  Commonly known as: NORVIR  Take 1 tablet (100 mg total) by mouth once daily.  Start taking on: March 4, 2025     sulfamethoxazole-trimethoprim 800-160mg 800-160 mg Tab  Commonly known as: BACTRIM DS  Take 1 tablet by mouth once daily.  Start taking on: March 4, 2025            CHANGE how you take these medications      ethambutoL 400 MG Tab  Commonly known as: MYAMBUTOL  Take 2 tablets (800 mg total) by mouth once daily.  Start taking on: March 4, 2025  What changed: how much to take            CONTINUE taking these medications      azithromycin 500 MG tablet  Commonly known as: ZITHROMAX  Take 1 tablet (500 mg total) by mouth once daily.     ferrous sulfate 325 (65 FE) MG EC tablet  Take 1 tablet (325 mg total) by mouth once daily.     rifabutin 150 mg Cap  Commonly known as: MYCOBUTIN  Take 1 capsule (150 mg total) by mouth once daily.            STOP taking these medications      atovaquone 750 mg/5 mL Susp oral liquid  Commonly known as: MEPRON               Where to Get Your Medications        These medications were sent to [address] [address]      Phone: [phone]   abacavir 300 mg Tab  azithromycin 500 MG tablet  darunavir 800 mg Tab  emtricitabine-tenofovir 200-300 mg 200-300 mg Tab  ethambutoL 400 MG Tab  ferrous sulfate 325 (65 FE) MG EC tablet  nystatin 100,000 unit/mL suspension  rifabutin 150 mg Cap  ritonavir 100 mg Tab tablet  sulfamethoxazole-trimethoprim 800-160mg 800-160 mg Tab         Discharge Information:   Anne Terry is being discharged .    Discharge Procedure Orders   Ambulatory referral/consult to Internal Medicine   Standing Status: Future   Referral Priority: Routine Referral Type: Consultation   Referral Reason: Specialty  Services Required   Requested Specialty: Internal Medicine   Number of Visits Requested: 1     Ambulatory referral/consult to Infectious Disease   Standing Status: Future   Referral Priority: Routine Referral Type: Consultation   Referral Reason: Specialty Services Required   Requested Specialty: Infectious Diseases   Number of Visits Requested: 1        Follow-Up Appointments:   Contact information for after-discharge care       Home Medical Care       [facility] .    Service: Home Health Services  Contact information:  [address]  [facility] [address]  [phone]                                   To address at follow-up  Will need to follow up on all labs:   - Fungitell Assay  - CMV PCR  - Histoplasma/Blastomyces Urine Ag  - AFB smears  - punch biopsy pathology   - CD4 and HIV RNA PCR  Patient will need to have close follow up with Infectious Disease outpatient  Patient will need to see ophthalmology outpatient due to remaining on ethambutol, patient has an appointment scheduled for April 3rd. needs peripheral vision and color testing every 3 months while on ethambutol   Patient will need to remain on MAC therapy:  Rifabutin 150 mg daily, Ethambutol 800 mg daily, and Azithromycin 500 mg daily for 12 months.   Patient will need to continue ART therapy: Truvada (emtricitabine-TDF) 200-300 mg daily, Abacavir 300 mg daily, Darunavir 800 mg daily, Ritonavir 100 mg daily  Patient will need to continue Bactrim DS one tab daily    Will need to follow up with Ophthalmology in 1 week.    The above information was discussed with the patient in clear terms. Patient was able to repeat the instructions to me in their own words. All questions answered. ED precautions provided.    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

## 2025-03-03 NOTE — PROGRESS NOTES
[facility] INFECTIOUS DISEASES CONSULT PROGRESS NOTE      Reason for consultation     HIV management      Interval history     - Remains afebrile without leukocytosis  - Tolerating MAC regimen and ART regimen  - OI workup completed on admission, mostly pending although there is low suspicion for these organisms at this point  - Underwent punch bx of leg lesion. Have contacted micro to have them setup plates for AFB and fungal as this did not seem to be completed over the weekend  - Today he reports feeling well, voices that he wishes to go home as he has no complaints. Denies fever, chills, N/V/D, chest pain, SOB, diarrhea, abdominal pain, dysuria.    Antibiotic history:    Azithromycin 500 mg PO daily Start: 01/07/25  Ethambutol 15 mg/kg suspension daily Start: 01/07/25  Rifabutin 150 mg PO daily Start: 01/07/25  Atovaquone 1500 mg PO daily Start: 01/07/25 End: 02/28/25    Medications     Scheduled Meds:   abacavir  300 mg Oral Daily    azithromycin  500 mg Intravenous Q24H    darunavir  800 mg Oral Daily with breakfast    emtricitabine  200 mg Oral Daily    enoxparin  40 mg Subcutaneous Daily    ethambutoL  800 mg Oral Daily    ferrous sulfate  1 tablet Oral Daily    nystatin  500,000 Units Oral QID    rifabutin  150 mg Oral Daily    ritonavir  100 mg Oral Daily    sulfamethoxazole-trimethoprim 800-160mg  1 tablet Oral Daily    tenofovir disoproxil fumarate  300 mg Oral Daily     Continuous Infusions:  PRN Meds:.  Current Facility-Administered Medications:     dextrose 50%, 12.5 g, Intravenous, PRN    dextrose 50%, 25 g, Intravenous, PRN    diphenhydrAMINE, 25 mg, Oral, Q6H PRN    glucagon (human recombinant), 1 mg, Intramuscular, PRN    glucose, 16 g, Oral, PRN    glucose, 24 g, Oral, PRN    hydrALAZINE, 10 mg, Intravenous, Q6H PRN    labetalol, 10 mg, Intravenous, Q6H PRN    naloxone, 0.02 mg, Intravenous, PRN    sodium chloride 0.9%, 10 mL, Intravenous, Q12H PRN    Allergies: Review of patient's allergies  "indicates:  No Known Allergies    Physical exam:     /72   Pulse 68   Temp 98.6 °F (37 °C) (Oral)   Resp 18   Ht 6' (1.829 m)   Wt 70.3 kg (155 lb)   SpO2 99%   BMI 21.02 kg/m²   Wt Readings from Last 1 Encounters:   02/28/25 70.3 kg (155 lb)     Body mass index is 21.02 kg/m².    GENERAL: A&Ox3, NAD, pleasant  HEENT: NC/AT, anicteric, moist oral mucosa without lesions, exudate, or erythema  LUNGS: CTA b/l, no labored breathing  HEART: RRR; no murmur, rub, or gallop  ABDOMEN: +BS, no tympany, soft, NT/ND, no rebound, guarding, or organomegaly  EXTREMITIES: no edema, clubbing, or cyanosis   SKIN: dark, circumferential, b/l plaques on b/l LE  NEURO: speech fluent and intact, facial symmetry preserved, no tremor, CN II-XII grossly intact   PSYCH: cooperative, normal mood and affect  LINES: PIV       LABS:     I have personally reviewed patient's labs.  Pertinent results noted below.    CBC  Recent Labs     03/01/25  0355 03/02/25 0339 03/03/25  0315   WBC 3.43* 4.82 4.87   HGB 8.6* 8.8* 8.2*   HCT 26.8* 27.6* 26.2*    235 252       Differential  No results for input(s): "NEUTOPHILPCT", "LYMPHOPCT", "MONOPCT", "EOSPCT", "BASOPCT", "NEUTROABS", "LYMPHSABS", "MONOSABS", "EOSABS" in the last 72 hours.    Basic Metabolic Panel  Recent Labs     03/01/25  0355 03/02/25  0339 03/03/25  0315    141 143   K 3.5 3.8 4.0   * 111* 113*   CO2 28 24 24   BUN 14.4 13.1 12.4   CREATININE 1.09 1.30* 1.19        Hepatic Panel  Recent Labs     03/01/25  0355 03/02/25  0339 03/03/25  0315   ALT 16 15 14   AST 31 30 26   ALKPHOS 190* 199* 182*   BILITOT 0.2 0.3 0.3   ALBUMIN 2.2* 2.3* 2.3*       Urinalysis:  No results for input(s): "GLUCOSEU", "PROTEINUA", "LABPH", "KETONESU", "NITRITE", "LEUKOCYTESUR", "LABSPEC", "COLORU", "WBCU", "RBCUA", "BACTERIA", "HYST", "SQUAMOUSEPIT", "MUCUS", "GRANULARCAST", "CRYST" in the last 72 hours.      Imaging     CT Chest Abdomen Pelvis With IV Contrast (XPD) NO Oral " "Contrast   Final Result      1. Improved aeration of the right lower lobe compared to prior with some mild residual nodular opacities in both lungs likely infectious or inflammatory.  Recommend a follow-up chest CT in 6 months to monitor.   2. No acute abdominopelvic findings.         Electronically signed by: Wan Deluca   Date:    02/28/2025   Time:    09:18      X-Ray Chest 1 View   Final Result      Chronic changes seen no acute process         Electronically signed by: Ankush Gordon   Date:    02/27/2025   Time:    18:07            Micro/Path   Colonization:  No components found for: "MRSMSSSCRFLD", "MRSSCRPCRSWB", "SCRCULT"    11/20/24 AFB blood cx: MAC  11/21/24 AFB sputum cx 3/3: MAC      2/28/25 AFB blood cx: pending  3/1/25 Tissue bx from L leg:   - Aerobic cx: NGTD   - Fungal cx: pending   - AFB cx: pending   - Path: pending    IMPRESSION     Advanced HIV with noncompliance since 2021 (known resistances: M184V and INSTI)  Disseminated Mycobacterium Avium Complex   Hyperpigmented, plaque-like skin lesions on bilateral LE  Hx of Cryptococcus  Hx of disseminated histoplasmosis  Hx of fungal colitis  Hx of PJP    CrCl: Estimated Creatinine Clearance: 64 mL/min (based on SCr of 1.19 mg/dL).    RECOMMENDATIONS:    - Patient is asymptomatic at this point, no indication to keep hospitalized as the majority of his workup can be followed up in the outpatient setting  - Continue current MAC regimen, planning for 1 year timed from clearance of sputums. ID has asked to have these collected prior to discharge   - Azithromycin 500mg PO daily   - Ethambutol 800mg PO daily   - Rifabutin 150mg PO daily (dose reduced due to co-administration of ART)  - Continue current ART, has been adjusted due to co-administration with MAC regimen:   - Truvada 1 tab PO daily   - Abacavir 300mg PO daily   - Darunavir 800mg PO daily   - Ritonavir 100mg PO daily  - Continue Bactrim DS 1 tab PO daily for OI ppx   - Patient has h/o of " previous bactrim intolerance, although he has no issues thus far in his hospitalization  - Follow up LLE punch bx pathology and cultures  - Follow up ophthalmology consult, needs peripheral vision and color testing every 3 months while on ethambutol  - OI testing can be followed up in ID clinic.   - No need to further pursue IR lung bx or bronchoscopy as repeated CT chest shows significantly improved RLL consolidation  - No indication to resume PJP treatment at this point as CT chest does not show any evidence of GGO and patient is asymptomatic    ID will follow along with you.     Sg Sanford MD  Infectious Diseases Faculty

## 2025-03-03 NOTE — PROGRESS NOTES
Pharmacist Intervention IV to PO Note    Anne Terry is a 64 y.o. male being treated with IV medication azithromycin    Patient Data:    Vital Signs (Most Recent):  Temp: 98.6 °F (37 °C) (03/03/25 0746)  Pulse: 68 (03/03/25 0746)  Resp: 18 (03/03/25 0746)  BP: 135/72 (03/03/25 0746)  SpO2: 99 % (03/03/25 0746) Vital Signs (72h Range):  Temp:  [97.8 °F (36.6 °C)-99.2 °F (37.3 °C)]   Pulse:  [59-81]   Resp:  [16-20]   BP: (128-171)/(66-90)   SpO2:  [95 %-100 %]      CBC:  Recent Labs   Lab 03/01/25 0355 03/02/25 0339 03/03/25 0315   WBC 3.43* 4.82 4.87   RBC 2.93* 3.07* 2.87*   HGB 8.6* 8.8* 8.2*   HCT 26.8* 27.6* 26.2*    235 252   MCV 91.5 89.9 91.3   MCH 29.4 28.7 28.6   MCHC 32.1* 31.9* 31.3*     CMP:     Recent Labs   Lab 03/01/25  0355 03/02/25 0339 03/03/25 0315   CALCIUM 8.1* 8.0* 8.2*   ALBUMIN 2.2* 2.3* 2.3*    141 143   K 3.5 3.8 4.0   CO2 28 24 24   * 111* 113*   BUN 14.4 13.1 12.4   CREATININE 1.09 1.30* 1.19   ALKPHOS 190* 199* 182*   ALT 16 15 14   AST 31 30 26   BILITOT 0.2 0.3 0.3       Dietary Orders:  Diet Orders            Dietary nutrition supplements TID; Boost Plus Nutritional Drink - Very Vanilla starting at 02/28 1100    Diet Adult Regular: Regular starting at 02/27 1654            Based on the following criteria, this patient qualifies for intravenous to oral conversion:  [x] The patients gastrointestinal tract is functioning (tolerating medications via oral or enteral route for 24 hours and tolerating food or enteral feeds for 24 hours).  [x] The patient is hemodynamically stable for 24 hours (heart rate <100 beats per minute, systolic blood pressure >99 mm Hg, and respiratory rate <20 breaths per minute).  [x] The patient shows clinical improvement (afebrile for at least 24 hours and white blood cell count downtrending or normalized). Additionally, the patient must be non-neutropenic (absolute neutrophil count >500 cells/mm3).  [x] For antimicrobials, the  patient has received IV therapy for at least 24 hours.    IV medication azithromycin will be changed to oral medication azithromycin    Pharmacist's Name: Niko Alamo  Pharmacist's Extension: [phone]

## 2025-03-04 LAB
1,3 BETA GLUCAN SER QL: POSITIVE
1,3 BETA GLUCAN SER-MCNC: 123 PG/ML
BACTERIA TISS AEROBE CULT: NORMAL
HIV1 RNA # SERPL NAA+PROBE: ABNORMAL COPIES/ML
HIV1 RNA SERPL NAA+PROBE-LOG#: DETECTED {LOG_COPIES}/ML
M AVIUM PARATB TISS QL ZN STN: NORMAL

## 2025-03-05 ENCOUNTER — TELEPHONE (OUTPATIENT)
Dept: INFECTIOUS DISEASES | Facility: CLINIC | Age: 65
End: 2025-03-05
Payer: MEDICARE

## 2025-03-05 LAB
ALBUMIN % SPEP (OHS): 31.69 (ref 48.1–59.5)
ALBUMIN SERPL-MCNC: 2.3 G/DL (ref 3.4–4.8)
ALBUMIN/GLOB SERPL: 0.5 RATIO (ref 1.1–2)
ALPHA 1 GLOB (OHS): 0.25 GM/DL (ref 0–0.4)
ALPHA 1 GLOB% (OHS): 3.49 (ref 2.3–4.9)
ALPHA 2 GLOB % (OHS): 8.38 (ref 6.9–13)
ALPHA 2 GLOB (OHS): 0.6 GM/DL (ref 0.4–1)
BACTERIA BLD CULT: NORMAL
BACTERIA BLD CULT: NORMAL
BETA GLOB (OHS): 1.24 GM/DL (ref 0.7–1.3)
BETA GLOB% (OHS): 17.17 (ref 13.8–19.7)
CMV DNA SERPL NAA+PROBE-ACNC: 239 IU/ML
GAMMA GLOBULIN % (OHS): 39.26 (ref 10.1–21.9)
GAMMA GLOBULIN (OHS): 2.83 GM/DL (ref 0.4–1.8)
GLOBULIN SER-MCNC: 4.9 GM/DL (ref 2.4–3.5)
M AVIUM PARATB TISS QL ZN STN: NORMAL
M SPIKE % (OHS): ABNORMAL
M SPIKE (OHS): ABNORMAL
PATH REV: NORMAL
PROT SERPL-MCNC: 7.2 GM/DL (ref 5.8–7.6)

## 2025-03-06 LAB
M HISTOPLASMA/BLASTOMYCES AG RESULT, U: NOT DETECTED
M HISTOPLASMA/BLASTOMYCES AG VALUE, U: NOT DETECTED NG/ML

## 2025-03-10 ENCOUNTER — TELEPHONE (OUTPATIENT)
Dept: INFECTIOUS DISEASES | Facility: CLINIC | Age: 65
End: 2025-03-10
Payer: MEDICARE

## 2025-03-10 NOTE — TELEPHONE ENCOUNTER
----- Message from Zoila sent at 3/10/2025  2:30 PM CDT -----    ----- Message -----  From: Sg Sanford MD  Sent: 3/3/2025   1:10 PM CDT  To: MARCE Robertson; Zo[address]    Please schedule for follow up with Romi in 2-4 weeks. Of note patient reports issues with transportation as his barrier to coming to clinic visits. It may be beneficial to have Vangie look into alternative means of transportation as he seems to have issues calling the transport service supplied by medicaid.Thanks

## 2025-03-10 NOTE — TELEPHONE ENCOUNTER
I called the pt to try to set up transportation. There was NA and VM box is full. I will continue to try to reach the pt to set up transport.

## 2025-03-11 LAB
DHEA SERPL-MCNC: NORMAL
ESTROGEN SERPL-MCNC: NORMAL PG/ML
INSULIN SERPL-ACNC: NORMAL U[IU]/ML
LAB AP CLINICAL INFORMATION: NORMAL
LAB AP GROSS DESCRIPTION: NORMAL
LAB AP REPORT FOOTNOTES: NORMAL
T3RU NFR SERPL: NORMAL %

## 2025-03-28 ENCOUNTER — TELEPHONE (OUTPATIENT)
Dept: INFECTIOUS DISEASES | Facility: HOSPITAL | Age: 65
End: 2025-03-28
Payer: MEDICARE

## 2025-03-28 ENCOUNTER — TELEPHONE (OUTPATIENT)
Dept: INFECTIOUS DISEASES | Facility: CLINIC | Age: 65
End: 2025-03-28
Payer: MEDICARE

## 2025-03-28 NOTE — TELEPHONE ENCOUNTER
"Received call from Cecy in lab with critical result on this patient of Romi's  she states JUAN reported to them  "few AFB seen"  "

## 2025-06-21 ENCOUNTER — HOSPITAL ENCOUNTER (INPATIENT)
Facility: HOSPITAL | Age: 65
LOS: 17 days | Discharge: ANOTHER HOSPITAL | DRG: 974 | End: 2025-07-08
Attending: INTERNAL MEDICINE | Admitting: INTERNAL MEDICINE
Payer: MEDICARE

## 2025-06-21 DIAGNOSIS — R07.9 CHEST PAIN: ICD-10-CM

## 2025-06-21 DIAGNOSIS — G93.49 HIV ENCEPHALOPATHY: ICD-10-CM

## 2025-06-21 DIAGNOSIS — B20 HIV ENCEPHALOPATHY: ICD-10-CM

## 2025-06-21 DIAGNOSIS — F07.81 TRAUMATIC ENCEPHALOPATHY: ICD-10-CM

## 2025-06-21 LAB
ALBUMIN SERPL-MCNC: 2.2 G/DL (ref 3.4–4.8)
ALBUMIN/GLOB SERPL: 0.4 RATIO (ref 1.1–2)
ALP SERPL-CCNC: 883 UNIT/L (ref 40–150)
ALT SERPL-CCNC: 23 UNIT/L (ref 0–55)
ANION GAP SERPL CALC-SCNC: 7 MEQ/L
AST SERPL-CCNC: 48 UNIT/L (ref 11–45)
BASOPHILS # BLD AUTO: 0.01 X10(3)/MCL
BASOPHILS NFR BLD AUTO: 0.3 %
BILIRUB SERPL-MCNC: 0.4 MG/DL
BUN SERPL-MCNC: 19.6 MG/DL (ref 8.4–25.7)
CALCIUM SERPL-MCNC: 7.5 MG/DL (ref 8.8–10)
CHLORIDE SERPL-SCNC: 108 MMOL/L (ref 98–107)
CO2 SERPL-SCNC: 24 MMOL/L (ref 23–31)
CREAT SERPL-MCNC: 1.04 MG/DL (ref 0.72–1.25)
CREAT/UREA NIT SERPL: 19
EOSINOPHIL # BLD AUTO: 0.12 X10(3)/MCL (ref 0–0.9)
EOSINOPHIL NFR BLD AUTO: 3.3 %
ERYTHROCYTE [DISTWIDTH] IN BLOOD BY AUTOMATED COUNT: 15.5 % (ref 11.5–17)
GFR SERPLBLD CREATININE-BSD FMLA CKD-EPI: >60 ML/MIN/1.73/M2
GLOBULIN SER-MCNC: 5.4 GM/DL (ref 2.4–3.5)
GLUCOSE SERPL-MCNC: 84 MG/DL (ref 82–115)
HCT VFR BLD AUTO: 25.7 % (ref 42–52)
HGB BLD-MCNC: 8.1 G/DL (ref 14–18)
IMM GRANULOCYTES # BLD AUTO: 0.03 X10(3)/MCL (ref 0–0.04)
IMM GRANULOCYTES NFR BLD AUTO: 0.8 %
LYMPHOCYTES # BLD AUTO: 0.36 X10(3)/MCL (ref 0.6–4.6)
LYMPHOCYTES NFR BLD AUTO: 9.8 %
MAGNESIUM SERPL-MCNC: 1.7 MG/DL (ref 1.6–2.6)
MCH RBC QN AUTO: 27.5 PG (ref 27–31)
MCHC RBC AUTO-ENTMCNC: 31.5 G/DL (ref 33–36)
MCV RBC AUTO: 87.1 FL (ref 80–94)
MONOCYTES # BLD AUTO: 0.22 X10(3)/MCL (ref 0.1–1.3)
MONOCYTES NFR BLD AUTO: 6 %
NEUTROPHILS # BLD AUTO: 2.94 X10(3)/MCL (ref 2.1–9.2)
NEUTROPHILS NFR BLD AUTO: 79.8 %
NRBC BLD AUTO-RTO: 0 %
PHOSPHATE SERPL-MCNC: 3.4 MG/DL (ref 2.3–4.7)
PLATELET # BLD AUTO: 117 X10(3)/MCL (ref 130–400)
PLATELETS.RETICULATED NFR BLD AUTO: 2.4 % (ref 0.9–11.2)
PMV BLD AUTO: 10.9 FL (ref 7.4–10.4)
POTASSIUM SERPL-SCNC: 4.2 MMOL/L (ref 3.5–5.1)
PROT SERPL-MCNC: 7.6 GM/DL (ref 5.8–7.6)
RBC # BLD AUTO: 2.95 X10(6)/MCL (ref 4.7–6.1)
SODIUM SERPL-SCNC: 139 MMOL/L (ref 136–145)
WBC # BLD AUTO: 3.68 X10(3)/MCL (ref 4.5–11.5)

## 2025-06-21 PROCEDURE — 36415 COLL VENOUS BLD VENIPUNCTURE: CPT | Performed by: NURSE PRACTITIONER

## 2025-06-21 PROCEDURE — 11000001 HC ACUTE MED/SURG PRIVATE ROOM

## 2025-06-21 PROCEDURE — 63600175 PHARM REV CODE 636 W HCPCS: Performed by: NURSE PRACTITIONER

## 2025-06-21 PROCEDURE — 93005 ELECTROCARDIOGRAM TRACING: CPT

## 2025-06-21 PROCEDURE — 93010 ELECTROCARDIOGRAM REPORT: CPT | Mod: ,,, | Performed by: INTERNAL MEDICINE

## 2025-06-21 PROCEDURE — 80053 COMPREHEN METABOLIC PANEL: CPT | Performed by: NURSE PRACTITIONER

## 2025-06-21 PROCEDURE — 85025 COMPLETE CBC W/AUTO DIFF WBC: CPT | Performed by: NURSE PRACTITIONER

## 2025-06-21 PROCEDURE — 87040 BLOOD CULTURE FOR BACTERIA: CPT | Performed by: INTERNAL MEDICINE

## 2025-06-21 PROCEDURE — 84100 ASSAY OF PHOSPHORUS: CPT | Performed by: NURSE PRACTITIONER

## 2025-06-21 PROCEDURE — 36415 COLL VENOUS BLD VENIPUNCTURE: CPT | Performed by: INTERNAL MEDICINE

## 2025-06-21 PROCEDURE — 83735 ASSAY OF MAGNESIUM: CPT | Performed by: NURSE PRACTITIONER

## 2025-06-21 RX ORDER — ACETAMINOPHEN 325 MG/1
650 TABLET ORAL EVERY 4 HOURS PRN
Status: DISCONTINUED | OUTPATIENT
Start: 2025-06-21 | End: 2025-06-21

## 2025-06-21 RX ORDER — MUPIROCIN 20 MG/G
OINTMENT TOPICAL 2 TIMES DAILY
Status: DISPENSED | OUTPATIENT
Start: 2025-06-21 | End: 2025-06-26

## 2025-06-21 RX ORDER — NYSTATIN 100000 [USP'U]/ML
500000 SUSPENSION ORAL
Status: DISCONTINUED | OUTPATIENT
Start: 2025-06-22 | End: 2025-06-21

## 2025-06-21 RX ORDER — SODIUM CHLORIDE, SODIUM LACTATE, POTASSIUM CHLORIDE, CALCIUM CHLORIDE 600; 310; 30; 20 MG/100ML; MG/100ML; MG/100ML; MG/100ML
INJECTION, SOLUTION INTRAVENOUS CONTINUOUS
Status: DISCONTINUED | OUTPATIENT
Start: 2025-06-21 | End: 2025-06-22

## 2025-06-21 RX ORDER — IBUPROFEN 200 MG
16 TABLET ORAL
Status: DISCONTINUED | OUTPATIENT
Start: 2025-06-21 | End: 2025-07-08 | Stop reason: HOSPADM

## 2025-06-21 RX ORDER — GLUCAGON 1 MG
1 KIT INJECTION
Status: DISCONTINUED | OUTPATIENT
Start: 2025-06-21 | End: 2025-07-08 | Stop reason: HOSPADM

## 2025-06-21 RX ORDER — NALOXONE HCL 0.4 MG/ML
0.02 VIAL (ML) INJECTION
Status: DISCONTINUED | OUTPATIENT
Start: 2025-06-21 | End: 2025-07-08 | Stop reason: HOSPADM

## 2025-06-21 RX ORDER — LANOLIN ALCOHOL/MO/W.PET/CERES
1 CREAM (GRAM) TOPICAL DAILY
Status: DISCONTINUED | OUTPATIENT
Start: 2025-06-22 | End: 2025-07-08 | Stop reason: HOSPADM

## 2025-06-21 RX ORDER — PROCHLORPERAZINE EDISYLATE 5 MG/ML
5 INJECTION INTRAMUSCULAR; INTRAVENOUS EVERY 6 HOURS PRN
Status: DISCONTINUED | OUTPATIENT
Start: 2025-06-21 | End: 2025-07-05

## 2025-06-21 RX ORDER — IBUPROFEN 200 MG
24 TABLET ORAL
Status: DISCONTINUED | OUTPATIENT
Start: 2025-06-21 | End: 2025-07-08 | Stop reason: HOSPADM

## 2025-06-21 RX ORDER — FLUCONAZOLE 200 MG/1
200 TABLET ORAL ONCE
Status: COMPLETED | OUTPATIENT
Start: 2025-06-21 | End: 2025-06-22

## 2025-06-21 RX ORDER — ACETAMINOPHEN 325 MG/1
650 TABLET ORAL EVERY 6 HOURS PRN
Status: DISCONTINUED | OUTPATIENT
Start: 2025-06-21 | End: 2025-07-08 | Stop reason: HOSPADM

## 2025-06-21 RX ORDER — ACETAMINOPHEN 500 MG
1000 TABLET ORAL EVERY 6 HOURS PRN
Status: DISCONTINUED | OUTPATIENT
Start: 2025-06-21 | End: 2025-06-21

## 2025-06-21 RX ORDER — FLUCONAZOLE 100 MG/1
100 TABLET ORAL DAILY
Status: DISCONTINUED | OUTPATIENT
Start: 2025-06-22 | End: 2025-06-24

## 2025-06-21 RX ORDER — SODIUM CHLORIDE 0.9 % (FLUSH) 0.9 %
10 SYRINGE (ML) INJECTION
Status: DISCONTINUED | OUTPATIENT
Start: 2025-06-21 | End: 2025-07-08 | Stop reason: HOSPADM

## 2025-06-21 RX ORDER — ONDANSETRON HYDROCHLORIDE 2 MG/ML
4 INJECTION, SOLUTION INTRAVENOUS EVERY 4 HOURS PRN
Status: DISCONTINUED | OUTPATIENT
Start: 2025-06-21 | End: 2025-07-08 | Stop reason: HOSPADM

## 2025-06-21 RX ADMIN — SODIUM CHLORIDE, POTASSIUM CHLORIDE, SODIUM LACTATE AND CALCIUM CHLORIDE: 600; 310; 30; 20 INJECTION, SOLUTION INTRAVENOUS at 04:06

## 2025-06-22 LAB
ALBUMIN SERPL-MCNC: 2.2 G/DL (ref 3.4–4.8)
ALBUMIN/GLOB SERPL: 0.4 RATIO (ref 1.1–2)
ALP SERPL-CCNC: 941 UNIT/L (ref 40–150)
ALT SERPL-CCNC: 24 UNIT/L (ref 0–55)
ANION GAP SERPL CALC-SCNC: 7 MEQ/L
AST SERPL-CCNC: 55 UNIT/L (ref 11–45)
BASOPHILS # BLD AUTO: 0.02 X10(3)/MCL
BASOPHILS NFR BLD AUTO: 0.5 %
BILIRUB SERPL-MCNC: 0.4 MG/DL
BUN SERPL-MCNC: 20.5 MG/DL (ref 8.4–25.7)
CALCIUM SERPL-MCNC: 8 MG/DL (ref 8.8–10)
CHLORIDE SERPL-SCNC: 108 MMOL/L (ref 98–107)
CO2 SERPL-SCNC: 23 MMOL/L (ref 23–31)
CREAT SERPL-MCNC: 1.01 MG/DL (ref 0.72–1.25)
CREAT/UREA NIT SERPL: 20
EOSINOPHIL # BLD AUTO: 0.12 X10(3)/MCL (ref 0–0.9)
EOSINOPHIL NFR BLD AUTO: 2.8 %
ERYTHROCYTE [DISTWIDTH] IN BLOOD BY AUTOMATED COUNT: 15.8 % (ref 11.5–17)
GFR SERPLBLD CREATININE-BSD FMLA CKD-EPI: >60 ML/MIN/1.73/M2
GLOBULIN SER-MCNC: 6 GM/DL (ref 2.4–3.5)
GLUCOSE SERPL-MCNC: 70 MG/DL (ref 82–115)
HCT VFR BLD AUTO: 27.7 % (ref 42–52)
HGB BLD-MCNC: 8.6 G/DL (ref 14–18)
IMM GRANULOCYTES # BLD AUTO: 0.04 X10(3)/MCL (ref 0–0.04)
IMM GRANULOCYTES NFR BLD AUTO: 0.9 %
LYMPHOCYTES # BLD AUTO: 0.36 X10(3)/MCL (ref 0.6–4.6)
LYMPHOCYTES NFR BLD AUTO: 8.4 %
MCH RBC QN AUTO: 27.7 PG (ref 27–31)
MCHC RBC AUTO-ENTMCNC: 31 G/DL (ref 33–36)
MCV RBC AUTO: 89.1 FL (ref 80–94)
MONOCYTES # BLD AUTO: 0.24 X10(3)/MCL (ref 0.1–1.3)
MONOCYTES NFR BLD AUTO: 5.6 %
NEUTROPHILS # BLD AUTO: 3.49 X10(3)/MCL (ref 2.1–9.2)
NEUTROPHILS NFR BLD AUTO: 81.8 %
NRBC BLD AUTO-RTO: 0 %
OHS QRS DURATION: 76 MS
OHS QTC CALCULATION: 450 MS
PLATELET # BLD AUTO: 117 X10(3)/MCL (ref 130–400)
PLATELETS.RETICULATED NFR BLD AUTO: 4 % (ref 0.9–11.2)
PMV BLD AUTO: 12.4 FL (ref 7.4–10.4)
POCT GLUCOSE: 107 MG/DL (ref 70–110)
POCT GLUCOSE: 61 MG/DL (ref 70–110)
POTASSIUM SERPL-SCNC: 4.2 MMOL/L (ref 3.5–5.1)
PROT SERPL-MCNC: 8.2 GM/DL (ref 5.8–7.6)
RBC # BLD AUTO: 3.11 X10(6)/MCL (ref 4.7–6.1)
SODIUM SERPL-SCNC: 138 MMOL/L (ref 136–145)
WBC # BLD AUTO: 4.27 X10(3)/MCL (ref 4.5–11.5)

## 2025-06-22 PROCEDURE — 63700000 PHARM REV CODE 250 ALT 637 W/O HCPCS: Performed by: INTERNAL MEDICINE

## 2025-06-22 PROCEDURE — 25000003 PHARM REV CODE 250: Performed by: INTERNAL MEDICINE

## 2025-06-22 PROCEDURE — 63600175 PHARM REV CODE 636 W HCPCS: Performed by: NURSE PRACTITIONER

## 2025-06-22 PROCEDURE — 85025 COMPLETE CBC W/AUTO DIFF WBC: CPT | Performed by: NURSE PRACTITIONER

## 2025-06-22 PROCEDURE — 80053 COMPREHEN METABOLIC PANEL: CPT | Performed by: NURSE PRACTITIONER

## 2025-06-22 PROCEDURE — 11000001 HC ACUTE MED/SURG PRIVATE ROOM

## 2025-06-22 PROCEDURE — 36415 COLL VENOUS BLD VENIPUNCTURE: CPT | Performed by: NURSE PRACTITIONER

## 2025-06-22 RX ORDER — RIFAMPIN 300 MG/1
300 CAPSULE ORAL DAILY
Status: DISCONTINUED | OUTPATIENT
Start: 2025-06-22 | End: 2025-07-08 | Stop reason: HOSPADM

## 2025-06-22 RX ORDER — AZITHROMYCIN 250 MG/1
500 TABLET, FILM COATED ORAL DAILY
Status: DISPENSED | OUTPATIENT
Start: 2025-06-22 | End: 2025-07-06

## 2025-06-22 RX ORDER — ETHAMBUTOL HYDROCHLORIDE 400 MG/1
800 TABLET, FILM COATED ORAL DAILY
Status: DISPENSED | OUTPATIENT
Start: 2025-06-22 | End: 2025-07-06

## 2025-06-22 RX ORDER — SULFAMETHOXAZOLE AND TRIMETHOPRIM 800; 160 MG/1; MG/1
1 TABLET ORAL 2 TIMES DAILY
Status: DISCONTINUED | OUTPATIENT
Start: 2025-06-22 | End: 2025-06-30

## 2025-06-22 RX ORDER — ENOXAPARIN SODIUM 100 MG/ML
40 INJECTION SUBCUTANEOUS EVERY 24 HOURS
Status: DISCONTINUED | OUTPATIENT
Start: 2025-06-23 | End: 2025-07-08 | Stop reason: HOSPADM

## 2025-06-22 RX ADMIN — RIFAMPIN 300 MG: 300 CAPSULE ORAL at 10:06

## 2025-06-22 RX ADMIN — SODIUM CHLORIDE, POTASSIUM CHLORIDE, SODIUM LACTATE AND CALCIUM CHLORIDE: 600; 310; 30; 20 INJECTION, SOLUTION INTRAVENOUS at 06:06

## 2025-06-22 RX ADMIN — MUPIROCIN: 20 OINTMENT TOPICAL at 12:06

## 2025-06-22 RX ADMIN — FLUCONAZOLE 100 MG: 100 TABLET ORAL at 08:06

## 2025-06-22 RX ADMIN — Medication 1 EACH: at 08:06

## 2025-06-22 RX ADMIN — AZITHROMYCIN DIHYDRATE 500 MG: 250 TABLET ORAL at 10:06

## 2025-06-22 RX ADMIN — MUPIROCIN: 20 OINTMENT TOPICAL at 08:06

## 2025-06-22 RX ADMIN — SULFAMETHOXAZOLE AND TRIMETHOPRIM 1 TABLET: 800; 160 TABLET ORAL at 10:06

## 2025-06-22 RX ADMIN — SULFAMETHOXAZOLE AND TRIMETHOPRIM 1 TABLET: 800; 160 TABLET ORAL at 08:06

## 2025-06-22 RX ADMIN — FLUCONAZOLE 200 MG: 200 TABLET ORAL at 12:06

## 2025-06-22 NOTE — H&P
[facility] Hospital Medicine History & Physical Examination       Patient Name: Anne Terry  MRN: [id number]  Patient Class: IP- Inpatient   Admission Date: 6/21/2025   Admitting Physician:  Service   Length of Stay: 0  Attending Physician: Dara Horne MD  Primary Care Provider: No primary care provider on file.  Face-to-Face encounter date: 06/21/2025  Code Status:Full  Chief Complaint: No chief complaint on file.      Screening for Social Drivers for health:  Patient screened for food insecurity, housing instability, transportation needs, utility difficulties, and interpersonal safety (select all that apply as identified as concern)  [x]Housing or Food  []Transportation Needs  []Utility Difficulties  []Interpersonal safety  []None      Patient information was obtained from patient, patient's family, past medical records and ER records.  ED records were reviewed in detail and documented below    HISTORY OF PRESENT ILLNESS:   Anne Terry is a 65 y.o. 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 The patient presented to [facility] on 6/21/2025 with a primary complaint of failure to thrive. Patient was brought to OSH ED found walking around naked and confused. He also likely has dementia. Patient says that he lives by himself in [place] but is unable to explain why he went to the ER or why he is now in the hospital but say he had no choice. He has not been taking his medications. Pt had a CT scans done at OSH, results to be uploaded. He had a hospital admission in March 2025 for treatment of HIV and MAC, a punch biopsy of BLLE that was negative for malignancy, and an Ophthalmology evaluation. He has a history of non-compliance and missing several follow up appointments. Denies fevers, chills, sweats, SOB, N/V/D, dysuria, new rashes. Does have oral thrush.    PAST MEDICAL HISTORY:   No past medical history  on file.    PAST SURGICAL HISTORY:     Past Surgical History:   Procedure Laterality Date    COLONOSCOPY  05/08/2015       ALLERGIES:   Patient has no known allergies.    FAMILY HISTORY:   Reviewed and negative    SOCIAL HISTORY:     Social History     Tobacco Use    Smoking status: Never    Smokeless tobacco: Never   Substance Use Topics    Alcohol use: Not Currently        HOME MEDICATIONS:     Prior to Admission medications    Medication Sig Start Date End Date Taking? Authorizing Provider   abacavir (ZIAGEN) 300 mg Tab Take 1 tablet (300 mg total) by mouth once daily. 3/4/25 6/2/25  Twin Alvarez DO   azithromycin (ZITHROMAX) 500 MG tablet Take 1 tablet (500 mg total) by mouth once daily. 3/3/25   Twin Alvarez DO   darunavir (PREZISTA) 800 mg Tab Take 1 tablet (800 mg total) by mouth daily with breakfast. 3/4/25 6/2/25  Twin Alvarez DO   emtricitabine-tenofovir 200-300 mg (TRUVADA) 200-300 mg Tab Take 1 tablet by mouth once daily. 3/3/25 6/1/25  Twin Alvarez DO   ethambutoL (MYAMBUTOL) 400 MG Tab Take 2 tablets (800 mg total) by mouth once daily. 3/4/25   Twin Alvarez DO   ferrous sulfate 325 (65 FE) MG EC tablet Take 1 tablet (325 mg total) by mouth once daily. 3/3/25   Twin Alvarez DO   rifabutin (MYCOBUTIN) 150 mg Cap Take 1 capsule (150 mg total) by mouth once daily. 3/3/25   Twin Alvarez DO   ritonavir (NORVIR) 100 mg Tab tablet Take 1 tablet (100 mg total) by mouth once daily. 3/4/25 6/2/25  Twin Alvarez DO       REVIEW OF SYSTEMS:   Except as documented, all other systems reviewed and negative     PHYSICAL EXAM:     VITAL SIGNS: 24 HRS MIN & MAX LAST   Temp  Min: 97.4 °F (36.3 °C)  Max: 98.1 °F (36.7 °C) 98.1 °F (36.7 °C)   BP  Min: 143/76  Max: 151/85 (!) 143/76   Pulse  Min: 56  Max: 67  (!) 56   Resp  Min: 20  Max: 20 20   SpO2  Min: 98 %  Max: 99 % 98 %     General appearance: Well-developed, malnourished male, in no acute distress.  HENT: Atraumatic head. Whitish patches on  tongue  Eyes: Normal extraocular movements.   Neck: Supple.   Lungs: Clear to auscultation bilaterally. No wheezing present.   Heart: Regular rate and rhythm. S1 and S2 present with no murmurs/gallop/rub. No pedal edema. No JVD present.   Abdomen: Soft, non-distended, non-tender. No rebound tenderness/guarding. Bowel sounds are normal.   Extremities: No cyanosis, clubbing, or edema.  Skin: No Rash.   Neuro: Motor and sensory exams grossly intact. Good tone. Muscle strength 5/5 in all 4 extremities  Psych/mental status: Confused with limited responses to questions    LABS AND IMAGING:     Recent Labs   Lab 06/21/25  1608   WBC 3.68*   RBC 2.95*   HGB 8.1*   HCT 25.7*   MCV 87.1   MCH 27.5   MCHC 31.5*   RDW 15.5   *   MPV 10.9*       Recent Labs   Lab 06/21/25  1609      K 4.2   *   CO2 24   BUN 19.6   CREATININE 1.04   GLU 84   CALCIUM 7.5*   MG 1.70   ALBUMIN 2.2*   PROT 7.6   ALKPHOS 883*   ALT 23   AST 48*   BILITOT 0.4       Microbiology Results (last 7 days)       ** No results found for the last 168 hours. **             CT Chest Abdomen Pelvis With IV Contrast (XPD) NO Oral Contrast  Narrative: EXAMINATION:  CT CHEST ABDOMEN PELVIS WITH IV CONTRAST (XPD)    CLINICAL HISTORY:  AIDS.  Cough.MAC;    TECHNIQUE:  Helical acquisition from the thoracic inlet through the ischia with  IV contrast. Three plane reconstructions made available for review.  mGycm. Automatic exposure control, adjustment of mA/kV or iterative reconstruction technique was used to reduce radiation.    COMPARISON:  19 November 2024    FINDINGS:  Chest.    The heart is not significantly enlarged.  No pericardial effusion.    Similar prominent subcarinal lymph node on image 62 series 2 measuring 17 mm short axis.  No newly enlarged thoracic lymph nodes.    There is no pleural effusion.  Previously visualized right lower lobe consolidation has largely resolved with mild residual linear opacities and a few small nodular  opacities for example image 88 series 4 measures 8 mm.  Mild nodular opacities towards the left base improved as well.    Abdomen and pelvis.    There is no significant abnormality of the liver, gallbladder, spleen, pancreas or adrenals.  No hydronephrosis or suspicious renal lesion.    No bowel obstruction.  No significant inflammatory changes of the bowel.  There is colonic diverticulosis.    Urinary bladder not well distended.  No pelvic free fluid.  Abdominal aorta normal in caliber.  There is mild atherosclerotic disease.  Similar retroperitoneal low-attenuation structure posterior to the liver on image 111 series 2.  Similar scattered small retroperitoneal lymph nodes.    There are no acute osseous findings.  Some prominent inguinal lymph nodes are nonspecific and may be reactive.  Impression: 1. Improved aeration of the right lower lobe compared to prior with some mild residual nodular opacities in both lungs likely infectious or inflammatory.  Recommend a follow-up chest CT in 6 months to monitor.  2. No acute abdominopelvic findings.    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    - Will consult ID for assistance in restarting MAC treatment, ART and work up for opportunistic infections  - Will start oral nystatin for thrush  - He will need MRI brain to eval encephalopathy  - Would benefit from a Psychiatry evaluation when clinically improved, also given prolonged history of non-compliance without family support and cognitive decline will need to consider Palliative care input during this admission as well    VTE Prophylaxis: will be placed on SCD for DVT prophylaxis and will be advised to be as mobile as possible and sit in a chair as tolerated    Patient  condition:  Fair  __________________________________________________________________________  INPATIENT LIST OF MEDICATIONS     Scheduled Meds:   mupirocin   Nasal BID     Continuous Infusions:   lactated ringers   Intravenous Continuous 75 mL/hr at 06/21/25 1600 New Bag at 06/21/25 1600     PRN Meds:.  Current Facility-Administered Medications:     acetaminophen, 1,000 mg, Oral, Q6H PRN    acetaminophen, 650 mg, Oral, Q4H PRN    dextrose 50%, 12.5 g, Intravenous, PRN    dextrose 50%, 25 g, Intravenous, PRN    glucagon (human recombinant), 1 mg, Intramuscular, PRN    glucose, 16 g, Oral, PRN    glucose, 24 g, Oral, PRN    naloxone, 0.02 mg, Intravenous, PRN    ondansetron, 4 mg, Intravenous, Q4H PRN    prochlorperazine, 5 mg, Intravenous, Q6H PRN    sodium chloride 0.9%, 10 mL, Intravenous, PRN      I, _ NP/PA have reviewed and discussed the case with  _   Please see the following addendum for further assessment and plan from there attending MD.    06/21/2025    ________________________________________________________________________________    MD Addendum:  Dr. DARIEN ---assumed care of this patient today at ---am/pm  For the patient encounter, I performed the substantive portion of the visit, I reviewed the NP/PA documentation, treatment plan, and medical decision making.  I had face to face time with this patient     A. History:    B. Physical exam:    C. Medical decision making:    Discharge Planning and Disposition: No mobility needs. Ambulating well. Good social support system.   Anticipated discharge    If patient was admitted under observational status it is with my approval/permission.        All diagnosis and differential diagnosis have been reviewed; assessment and plan has been documented; I have personally reviewed the labs and test results that are presently available; I have reviewed the patients medication list; I have reviewed the consulting providers response and recommendations. I have reviewed or  attempted to review medical records based upon their availability.    All of the patient and family questions have been addressed and answered. Patient's is agreeable to the above stated plan. I will continue to monitor closely and make adjustments to medical management as needed.    If patient was admitted under observational status it is with my approval/permission.      Dara Horne MD   06/21/2025

## 2025-06-22 NOTE — CONSULTS
[facility] - 5th Floor Med Surg  Infectious Disease  Consult Note    Patient Name: Anne Terry  MRN: [id number]  Admission Date: 6/21/2025  Hospital Length of Stay: 1 days  Attending Physician: Dara Horne MD  Primary Care Provider: No primary care provider on file.     Isolation Status: No active isolations    Reason for consultation  HIV management    Inpatient consult to Infectious Diseases  Consult performed by: Supa Eubanks DO  Consult ordered by: Joo Garcia FNP        Assessment/Plan:     There are no hospital problems to display for this patient.              Supa Eubanks DO  Infectious Disease  [facility] - 5th Floor Med Surg    Subjective:         HPI:     The Pt is a 65 y.o. 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 The patient presented to [facility] on 6/21/2025 with a primary complaint of failure to thrive. Patient was brought to OSH ED found walking around naked and confused. He also likely has dementia. Patient says that he lives by himself in [place] but is unable to explain why he went to the ER or why he is now in the hospital but say he had no choice. He has not been taking his medications. Pt had a CT scans done at OSH, results to be uploaded. He had a hospital admission in March 2025 for treatment of HIV and MAC, a punch biopsy of BLLE that was negative for malignancy, and an Ophthalmology evaluation. He has a history of non-compliance and missing several follow up appointments. Denies fevers, chills, sweats, SOB, N/V/D, dysuria, new rashes. Does have oral thrush.     No past medical history on file.    Past Surgical History:   Procedure Laterality Date    COLONOSCOPY  05/08/2015       Review of patient's allergies indicates:  No Known Allergies    Medications:  Medications Prior to Admission   Medication Sig    abacavir (ZIAGEN) 300 mg Tab Take 1 tablet (300 mg  total) by mouth once daily.    azithromycin (ZITHROMAX) 500 MG tablet Take 1 tablet (500 mg total) by mouth once daily.    darunavir (PREZISTA) 800 mg Tab Take 1 tablet (800 mg total) by mouth daily with breakfast.    emtricitabine-tenofovir 200-300 mg (TRUVADA) 200-300 mg Tab Take 1 tablet by mouth once daily.    ethambutoL (MYAMBUTOL) 400 MG Tab Take 2 tablets (800 mg total) by mouth once daily.    ferrous sulfate 325 (65 FE) MG EC tablet Take 1 tablet (325 mg total) by mouth once daily.    rifabutin (MYCOBUTIN) 150 mg Cap Take 1 capsule (150 mg total) by mouth once daily.    ritonavir (NORVIR) 100 mg Tab tablet Take 1 tablet (100 mg total) by mouth once daily.     Antibiotics (From admission, onward)      Start     Stop Route Frequency Ordered    06/21/25 2100  mupirocin 2 % ointment         06/26/25 2059 Nasl 2 times daily 06/21/25 1549          Antifungals (From admission, onward)      Start     Stop Route Frequency Ordered    06/22/25 0900  fluconazole tablet 100 mg         07/05/25 0859 Oral Daily 06/21/25 2229          Antivirals (From admission, onward)      None             Immunization History   Administered Date(s) Administered    Influenza - Quadrivalent 11/16/2020, 11/08/2021    Influenza - Quadrivalent - PF (6-35 months) 12/12/2018    Influenza - Quadrivalent - PF *Preferred* (6 months and older) 11/07/2019    Influenza - Trivalent - Fluarix, Flulaval, Fluzone, Afluria - PF 10/09/2017    Meningococcal Conjugate (MCV4P) 09/11/2018, 05/07/2019    Pneumococcal Polysaccharide - 23 Valent 01/19/2021    Zoster Recombinant 11/16/2020, 07/28/2021       Family History       Problem Relation (Age of Onset)    Cancer Sister    No Known Problems Mother, Father, Brother          Social History     Socioeconomic History    Marital status: Single   Tobacco Use    Smoking status: Never    Smokeless tobacco: Never   Substance and Sexual Activity    Alcohol use: Not Currently    Drug use: Yes     Types: Marijuana     Sexual activity: Not Currently     Partners: Female     Social Drivers of Health     Financial Resource Strain: Medium Risk (6/21/2025)    Overall Financial Resource Strain (CARDIA)     Difficulty of Paying Living Expenses: Somewhat hard   Food Insecurity: Food Insecurity Present (6/21/2025)    Hunger Vital Sign     Worried About Running Out of Food in the Last Year: Sometimes true     Ran Out of Food in the Last Year: Sometimes true   Transportation Needs: No Transportation Needs (6/21/2025)    PRAPARE - Transportation     Lack of Transportation (Medical): No     Lack of Transportation (Non-Medical): No   Physical Activity: Inactive (2/28/2025)    Exercise Vital Sign     Days of Exercise per Week: 0 days     Minutes of Exercise per Session: 0 min   Stress: No Stress Concern Present (6/21/2025)    [employer] [place] [employer] - Occupational Stress Questionnaire     Feeling of Stress : Only a little   Housing Stability: High Risk (6/21/2025)    Housing Stability Vital Sign     Unable to Pay for Housing in the Last Year: Yes     Homeless in the Last Year: No       Review of Systems:   General: Patient denies any unexplained weight loss, night sweats, fatigue malaise or lethargy.   HEENT: (Head, Eyes, Ears, Nose and Throat): Patient denies any visual changes, headaches, eye pain, double vision, sore throat, recent, trauma, ear pain, epistaxis, tinnitus, or sinusitis.   Neck: No signs of recent trauma, or swelling.   Heart: No signs of chest pain, palpitations, orthopnea of loss of consciousness.   Lungs: No cough, sputum, wheeze, hemoptysis, shortness of breath, exercise intolerance.   GI: No abdominal pain, unintentional weight loss, nausea/vomiting, diarrhea/constipation, hematemesis, bright red blood per rectum, or melena.   : No incontinence, dysuria, hematuria, nocturia, polyuria, discharge, increased frequency, or urgency.   Vascular: No signs of claudication, leg edema, varicose veins, or  thrombosis.   Neurologic: No loss of sensation, No numbness, No tingling, No Paralysis, No history of tremors or seizures.   Endocrine: No heat/cold intolerance, No excessive sweating, polyuria, polydipsia, or polyphagia.   Hematology: No anemia, easy bruising, petechiae or purpura Skin: No rashes, itching skin, open lesions, skin redness, hives or sensitivity to sun exposure, no changes in nail, hair or skin color.   Musculoskeletal: Patient denies joint swelling, decreased range of motion, crepitus, functional deficit, or arthritis.   Psychiatric: no signs of depression, anxiety or recent change in mood and sleep patterns.    Objective:     Vital Signs (Most Recent):  Temp: 97.3 °F (36.3 °C) (06/22/25 0835)  Pulse: 69 (06/22/25 0835)  Resp: 20 (06/22/25 0321)  BP: 118/63 (06/22/25 0835)  SpO2: 99 % (06/22/25 0835) Vital Signs (24h Range):  Temp:  [97.3 °F (36.3 °C)-98.1 °F (36.7 °C)] 97.3 °F (36.3 °C)  Pulse:  [56-69] 69  Resp:  [18-20] 20  SpO2:  [95 %-99 %] 99 %  BP: (118-158)/(62-86) 118/63     Weight: 71 kg (156 lb 8.4 oz)  Body mass index is 21.23 kg/m².    Estimated Creatinine Clearance: 75.2 mL/min (based on SCr of 1.01 mg/dL).    Physical Exam:   General Appearance: Patient is well nourished and developed adult in no acute distress. Alert and Oriented Times 3   HEENT (Head, Eyes, Ears, Nose and Throat): Normocephalic, Nontraumatic. Pupils equal, round, reactive to light, Accommodation present, Extraocular movements and muscles intact. No cervical Lymphadenopathy, Moist Mucus Membranes, No thyromegaly.   Neck: Soft, Supple, No signs of trauma, No carotid bruits.   Cardiovascular: Regular Rate and Rhythm, No murmurs, gallops, clicks or rubs.   Respiratory: Clear to auscultation bilaterally, No wheezing, rales or rhonchi.   Abdominal: Soft, Non-tender, Non-distention, No peritoneal signs, No rebound tenderness, Present Bowel sounds in all four quadrants, No ascites present.   Extremities: No clubbing, No  cyanosis, No edema.   Skin: No scars, No Rashes, Skin is Warm and Dry to the touch, Negative for Sacral Decubitus Ulcers.   Vascular Exam: Pulses 3 out of 4 in the brachial, radial and no JVD noticed Lymphatics: No Cervical, Supra/Infraclavicular Axillary, Trochlear, or Inguinal Adenopathy   Rectal Exam: Deferred at this time.   Neurological Exam: Limited Neurological Exam, patient response to physical and verbal stimuli          Physical Exam:   General Appearance: Patient is well nourished and developed adult in no acute distress. Alert and Oriented Times 3   HEENT (Head, Eyes, Ears, Nose and Throat): Normocephalic, Nontraumatic. Pupils equal, round, reactive to light, Accommodation present, Extraocular movements and muscles intact. No cervical Lymphadenopathy, Moist Mucus Membranes, No thyromegaly.   Neck: Soft, Supple, No signs of trauma, No carotid bruits.   Cardiovascular: Regular Rate and Rhythm, No murmurs, gallops, clicks or rubs.   Respiratory: Clear to auscultation bilaterally, No wheezing, rales or rhonchi.   Abdominal: Soft, Non-tender, Non-distention, No peritoneal signs, No rebound tenderness, Present Bowel sounds in all four quadrants, No ascites present.   Extremities: No clubbing, No cyanosis, No edema.   Skin: No scars, No Rashes, Skin is Warm and Dry to the touch, Negative for Sacral Decubitus Ulcers.   Vascular Exam: Pulses 3 out of 4 in the brachial, radial and no JVD noticed Lymphatics: No Cervical, Supra/Infraclavicular Axillary, Trochlear, or Inguinal Adenopathy   Rectal Exam: Deferred at this time.   Neurological Exam: Limited Neurological Exam, patient response to physical and verbal stimuli              Antibiotics (From admission, onward)      Start     Stop Route Frequency Ordered    06/21/25 2100  mupirocin 2 % ointment         06/26/25 2059 Nasl 2 times daily 06/21/25 1543           Microbiology Results (last 7 days)       Procedure Component Value Units Date/Time    Blood Culture  [[id number]] Collected: 06/21/25 2356    Order Status: Resulted Specimen: Blood Updated: 06/21/25 2359    Blood Culture [[id number]] Collected: 06/21/25 2356    Order Status: Resulted Specimen: Blood Updated: 06/21/25 2359            Vitals:    06/22/25 0835   BP: 118/63   Pulse: 69   Resp:    Temp: 97.3 °F (36.3 °C)            Assessments    Failure to thrive and generalized weakness /metabolic encephalopathy    HIV/AIDS w/a history of medical noncompliance  -antiviral therapy regimen unclear  -patient has not been taking medications for an extended period of time  -currently holding antiviral therapy to avoid IRIS  -CD4 count 37 as of 11.2024  -VL 122k as of 2.2025    Recent history of disseminated MAC  -currently on azithromycin rifabutin and ethambutol    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 therapy with amphotericin B and flucytosine currently on consolidation therapy with oral fluconazole      Past Medical Hx of HIV/AIDs, MAC, history of disseminated histoplasmosis, PJP,  and cryptococcous      Recommendations       The last CD4 count and viral loads within the past 12 months is a CD4 count of 37 and a viral load of greater than 122,000   currently holding antiviral therapy to avoid IRIS  Pending CD4 and Viral Load  Highly recommend lumbar puncture to help rule out cryptococcal meningitis  Reinitiate disseminated MAC medication treatment with azithromycin 500 mg daily ethambutol 15 mg/kg every 24 hours plus rifampin 300 mg every 24 hours /also continue with fluconazole for consolidation therapy due to recent cryptococcal meningitis      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        Supa Eubanks D.O., F.I.D.S.A.  Infectious Disease Physician   [facility]  Cell [phone]

## 2025-06-22 NOTE — PROGRESS NOTES
[facility]  Hospital Medicine Progress Note        Chief Complaint: Inpatient Follow-up for     HPI: Anne Terry is a 65 y.o. 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 The patient presented to [facility] on 6/21/2025 with a primary complaint of failure to thrive. Patient was brought to OSH ED found walking around naked and confused. He also likely has dementia. Patient says that he lives by himself in [place] but is unable to explain why he went to the ER or why he is now in the hospital but say he had no choice. He has not been taking his medications. Pt had a CT scans done at OSH, results to be uploaded. He had a hospital admission in March 2025 for treatment of HIV and MAC, a punch biopsy of BLLE that was negative for malignancy, and an Ophthalmology evaluation. He has a history of non-compliance and missing several follow up appointments. Denies fevers, chills, sweats, SOB, N/V/D, dysuria, new rashes. Does have oral thrush.    Interval Hx: Eating lunch. Has no complaints    Case was discussed with patient's nurse and  on the floor.    Objective/physical exam:  General: In no acute distress, afebrile  Chest: Clear to auscultation bilaterally  Heart: RRR, +S1, S2, no appreciable murmur  Abdomen: Soft, nontender, BS +  MSK: Warm, no lower extremity edema, no clubbing or cyanosis  Neurologic: Alert and oriented x4, Cranial nerve II-XII intact, Strength 5/5 in all 4 extremities    VITAL SIGNS: 24 HRS MIN & MAX LAST   Temp  Min: 97.3 °F (36.3 °C)  Max: 98.8 °F (37.1 °C) 98.8 °F (37.1 °C)   BP  Min: 118/63  Max: 164/85 (!) 164/85   Pulse  Min: 56  Max: 69  62   Resp  Min: 18  Max: 20 20   SpO2  Min: 95 %  Max: 100 % 100 %     I have reviewed the following labs:  Recent Labs   Lab 06/21/25  1608 06/22/25  0130   WBC 3.68* 4.27*   RBC 2.95* 3.11*   HGB 8.1* 8.6*   HCT 25.7* 27.7*   MCV 87.1 89.1   MCH 27.5  27.7   MCHC 31.5* 31.0*   RDW 15.5 15.8   * 117*   MPV 10.9* 12.4*     Recent Labs   Lab 06/21/25  1609 06/22/25  0130    138   K 4.2 4.2   * 108*   CO2 24 23   BUN 19.6 20.5   CREATININE 1.04 1.01   GLU 84 70*   CALCIUM 7.5* 8.0*   MG 1.70  --    ALBUMIN 2.2* 2.2*   PROT 7.6 8.2*   ALKPHOS 883* 941*   ALT 23 24   AST 48* 55*   BILITOT 0.4 0.4     Microbiology Results (last 7 days)       Procedure Component Value Units Date/Time    Blood Culture [[id number]] Collected: 06/21/25 2356    Order Status: Resulted Specimen: Blood Updated: 06/21/25 2359    Blood Culture [[id number]] Collected: 06/21/25 2356    Order Status: Resulted Specimen: Blood Updated: 06/21/25 2359             See below for Radiology    Assessment/Plan:  Advanced HIV  MAC Infection  Lung Mass  Oral Candidiasis  Elevated Alk Phos 800s  Likely dementia  Hx history of disseminated histoplasmosis, PJP,  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     ID consulted:  The last CD4 count and viral loads within the past 12 months is a CD4 count of 37 and a viral load of greater than 122,000   currently holding antiviral therapy to avoid IRIS  Pending CD4 and Viral Load  Highly recommend lumbar puncture to help rule out cryptococcal meningitis  Reinitiate disseminated MAC medication treatment with azithromycin 500 mg daily ethambutol 15 mg/kg every 24 hours plus rifampin 300 mg every 24 hours /also continue with fluconazole for consolidation therapy due to recent cryptococcal meningitis        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 /brain MRI would help confirm this diagnosis the only treatment for this is compliance with antiviral therapy     No additional changes from our standpoint today highly recommend CT chest abdominal pelvis or other acute infectious process     Also daily Bactrim DS for PCP/PJP and toxo prophylaxis     - He will need MRI brain to eval encephalopathy  - Would benefit from a Psychiatry evaluation  when clinically improved, also given prolonged history of non-compliance without family support and cognitive decline will need to consider Palliative care input during this admission as well    VTE prophylaxis: Lovenox    Patient condition:  Stable/Fair/Guarded/ Serious/ Critical    Anticipated discharge and Disposition:         All diagnosis and differential diagnosis have been reviewed; assessment and plan has been documented; I have personally reviewed the labs and test results that are presently available; I have reviewed the patients medication list; I have reviewed the consulting providers response and recommendations. I have reviewed or attempted to review medical records based upon their availability    All of the patient's questions have been  addressed and answered. Patient's is agreeable to the above stated plan. I will continue to monitor closely and make adjustments to medical management as needed.    Portions of this note dictated using EMR integrated voice recognition software, and may be subject to voice recognition errors not corrected at proofreading. Please contact writer for clarification if needed.   _____________________________________________________________________    Malnutrition Status:  Nutrition consulted. Most recent weight and BMI monitored-     Measurements:  Wt Readings from Last 1 Encounters:   06/21/25 71 kg (156 lb 8.4 oz)   Body mass index is 21.23 kg/m².    Patient has been screened and assessed by RD.    Malnutrition Type:  Context:    Level:      Malnutrition Characteristic Summary:       Interventions/Recommendations (treatment strategy):        Scheduled Med:   azithromycin  500 mg Oral Daily    ethambutoL  800 mg Oral Daily    ferrous sulfate  1 tablet Oral Daily    fluconazole  100 mg Oral Daily    mupirocin   Nasal BID    rifAMpin  300 mg Oral Daily    sulfamethoxazole-trimethoprim 800-160mg  1 tablet Oral BID      Continuous Infusions:   lactated ringers   Intravenous  Continuous 75 mL/hr at 06/22/25 0632 New Bag at 06/22/25 0632      PRN Meds:    Current Facility-Administered Medications:     acetaminophen, 650 mg, Oral, Q6H PRN    dextrose 50%, 12.5 g, Intravenous, PRN    dextrose 50%, 25 g, Intravenous, PRN    glucagon (human recombinant), 1 mg, Intramuscular, PRN    glucose, 16 g, Oral, PRN    glucose, 24 g, Oral, PRN    naloxone, 0.02 mg, Intravenous, PRN    ondansetron, 4 mg, Intravenous, Q4H PRN    prochlorperazine, 5 mg, Intravenous, Q6H PRN    sodium chloride 0.9%, 10 mL, Intravenous, PRN     Radiology:  I have personally reviewed the following imaging and agree with the radiologist.     CT Chest Abdomen Pelvis With IV Contrast (XPD) NO Oral Contrast  Narrative: EXAMINATION:  CT CHEST ABDOMEN PELVIS WITH IV CONTRAST (XPD)    CLINICAL HISTORY:  AIDS.  Cough.MAC;    TECHNIQUE:  Helical acquisition from the thoracic inlet through the ischia with  IV contrast. Three plane reconstructions made available for review.  mGycm. Automatic exposure control, adjustment of mA/kV or iterative reconstruction technique was used to reduce radiation.    COMPARISON:  19 November 2024    FINDINGS:  Chest.    The heart is not significantly enlarged.  No pericardial effusion.    Similar prominent subcarinal lymph node on image 62 series 2 measuring 17 mm short axis.  No newly enlarged thoracic lymph nodes.    There is no pleural effusion.  Previously visualized right lower lobe consolidation has largely resolved with mild residual linear opacities and a few small nodular opacities for example image 88 series 4 measures 8 mm.  Mild nodular opacities towards the left base improved as well.    Abdomen and pelvis.    There is no significant abnormality of the liver, gallbladder, spleen, pancreas or adrenals.  No hydronephrosis or suspicious renal lesion.    No bowel obstruction.  No significant inflammatory changes of the bowel.  There is colonic diverticulosis.    Urinary bladder not well  distended.  No pelvic free fluid.  Abdominal aorta normal in caliber.  There is mild atherosclerotic disease.  Similar retroperitoneal low-attenuation structure posterior to the liver on image 111 series 2.  Similar scattered small retroperitoneal lymph nodes.    There are no acute osseous findings.  Some prominent inguinal lymph nodes are nonspecific and may be reactive.  Impression: 1. Improved aeration of the right lower lobe compared to prior with some mild residual nodular opacities in both lungs likely infectious or inflammatory.  Recommend a follow-up chest CT in 6 months to monitor.  2. No acute abdominopelvic findings.    Electronically signed by: Wan Deluca  Date:    02/28/2025  Time:    09:18      Dara Horne MD  Department of Hospital Medicine   [facility]   06/22/2025

## 2025-06-23 LAB
ANION GAP SERPL CALC-SCNC: 6 MEQ/L
BACTERIA #/AREA URNS AUTO: ABNORMAL /HPF
BASOPHILS # BLD AUTO: 0.01 X10(3)/MCL
BASOPHILS NFR BLD AUTO: 0.3 %
BILIRUB UR QL STRIP.AUTO: NEGATIVE
BUN SERPL-MCNC: 19.1 MG/DL (ref 8.4–25.7)
CALCIUM SERPL-MCNC: 7.9 MG/DL (ref 8.8–10)
CHLORIDE SERPL-SCNC: 106 MMOL/L (ref 98–107)
CLARITY UR: CLEAR
CO2 SERPL-SCNC: 23 MMOL/L (ref 23–31)
COLOR UR AUTO: YELLOW
CREAT SERPL-MCNC: 1.21 MG/DL (ref 0.72–1.25)
CREAT/UREA NIT SERPL: 16
EOSINOPHIL # BLD AUTO: 0.09 X10(3)/MCL (ref 0–0.9)
EOSINOPHIL NFR BLD AUTO: 2.8 %
ERYTHROCYTE [DISTWIDTH] IN BLOOD BY AUTOMATED COUNT: 15.8 % (ref 11.5–17)
GFR SERPLBLD CREATININE-BSD FMLA CKD-EPI: >60 ML/MIN/1.73/M2
GLUCOSE SERPL-MCNC: 85 MG/DL (ref 82–115)
GLUCOSE UR QL STRIP: NORMAL
HCT VFR BLD AUTO: 26.3 % (ref 42–52)
HGB BLD-MCNC: 8.1 G/DL (ref 14–18)
HGB UR QL STRIP: NEGATIVE
IMM GRANULOCYTES # BLD AUTO: 0.04 X10(3)/MCL (ref 0–0.04)
IMM GRANULOCYTES NFR BLD AUTO: 1.3 %
KETONES UR QL STRIP: NEGATIVE
LEUKOCYTE ESTERASE UR QL STRIP: NEGATIVE
LYMPHOCYTES # BLD AUTO: 0.33 X10(3)/MCL (ref 0.6–4.6)
LYMPHOCYTES NFR BLD AUTO: 10.4 %
MCH RBC QN AUTO: 27.5 PG (ref 27–31)
MCHC RBC AUTO-ENTMCNC: 30.8 G/DL (ref 33–36)
MCV RBC AUTO: 89.2 FL (ref 80–94)
MONOCYTES # BLD AUTO: 0.16 X10(3)/MCL (ref 0.1–1.3)
MONOCYTES NFR BLD AUTO: 5 %
MUCOUS THREADS URNS QL MICRO: ABNORMAL /LPF
NEUTROPHILS # BLD AUTO: 2.55 X10(3)/MCL (ref 2.1–9.2)
NEUTROPHILS NFR BLD AUTO: 80.2 %
NITRITE UR QL STRIP: NEGATIVE
NRBC BLD AUTO-RTO: 0 %
PH UR STRIP: 7 [PH]
PLATELET # BLD AUTO: 115 X10(3)/MCL (ref 130–400)
PLATELETS.RETICULATED NFR BLD AUTO: 3 % (ref 0.9–11.2)
PMV BLD AUTO: 11.2 FL (ref 7.4–10.4)
POTASSIUM SERPL-SCNC: 3.9 MMOL/L (ref 3.5–5.1)
PROT UR QL STRIP: ABNORMAL
RBC # BLD AUTO: 2.95 X10(6)/MCL (ref 4.7–6.1)
RBC #/AREA URNS AUTO: ABNORMAL /HPF
SODIUM SERPL-SCNC: 135 MMOL/L (ref 136–145)
SP GR UR STRIP.AUTO: 1.01 (ref 1–1.03)
SQUAMOUS #/AREA URNS LPF: ABNORMAL /HPF
UROBILINOGEN UR STRIP-ACNC: NORMAL
WBC # BLD AUTO: 3.18 X10(3)/MCL (ref 4.5–11.5)
WBC #/AREA URNS AUTO: ABNORMAL /HPF

## 2025-06-23 PROCEDURE — 80048 BASIC METABOLIC PNL TOTAL CA: CPT | Performed by: INTERNAL MEDICINE

## 2025-06-23 PROCEDURE — 36415 COLL VENOUS BLD VENIPUNCTURE: CPT | Performed by: INTERNAL MEDICINE

## 2025-06-23 PROCEDURE — 63700000 PHARM REV CODE 250 ALT 637 W/O HCPCS: Performed by: INTERNAL MEDICINE

## 2025-06-23 PROCEDURE — 25500020 PHARM REV CODE 255: Performed by: INTERNAL MEDICINE

## 2025-06-23 PROCEDURE — 11000001 HC ACUTE MED/SURG PRIVATE ROOM

## 2025-06-23 PROCEDURE — 63600175 PHARM REV CODE 636 W HCPCS: Performed by: INTERNAL MEDICINE

## 2025-06-23 PROCEDURE — 87536 HIV-1 QUANT&REVRSE TRNSCRPJ: CPT | Performed by: INTERNAL MEDICINE

## 2025-06-23 PROCEDURE — 85025 COMPLETE CBC W/AUTO DIFF WBC: CPT | Performed by: INTERNAL MEDICINE

## 2025-06-23 PROCEDURE — 25000003 PHARM REV CODE 250: Performed by: INTERNAL MEDICINE

## 2025-06-23 PROCEDURE — 86359 T CELLS TOTAL COUNT: CPT | Performed by: INTERNAL MEDICINE

## 2025-06-23 PROCEDURE — 81015 MICROSCOPIC EXAM OF URINE: CPT | Performed by: INTERNAL MEDICINE

## 2025-06-23 RX ADMIN — ETHAMBUTOL HYDROCHLORIDE 800 MG: 400 TABLET ORAL at 08:06

## 2025-06-23 RX ADMIN — ENOXAPARIN SODIUM 40 MG: 40 INJECTION SUBCUTANEOUS at 06:06

## 2025-06-23 RX ADMIN — RIFAMPIN 300 MG: 300 CAPSULE ORAL at 08:06

## 2025-06-23 RX ADMIN — SULFAMETHOXAZOLE AND TRIMETHOPRIM 1 TABLET: 800; 160 TABLET ORAL at 08:06

## 2025-06-23 RX ADMIN — IOHEXOL 100 ML: 350 INJECTION, SOLUTION INTRAVENOUS at 09:06

## 2025-06-23 RX ADMIN — AZITHROMYCIN DIHYDRATE 500 MG: 250 TABLET ORAL at 08:06

## 2025-06-23 RX ADMIN — Medication 1 EACH: at 08:06

## 2025-06-23 RX ADMIN — FLUCONAZOLE 100 MG: 100 TABLET ORAL at 08:06

## 2025-06-23 RX ADMIN — MUPIROCIN: 20 OINTMENT TOPICAL at 08:06

## 2025-06-23 NOTE — PLAN OF CARE
06/23/25 1222   Discharge Assessment   Assessment Type Discharge Planning Assessment   Confirmed/corrected address, phone number and insurance Yes   Confirmed Demographics Correct on Facesheet   Source of Information patient   Communicated DAVID with patient/caregiver Date not available/Unable to determine   People in Home alone   Facility Arrived From: Home   Do you expect to return to your current living situation? Yes   Do you have help at home or someone to help you manage your care at home? No   Prior to hospitilization cognitive status: Unable to Assess   Current cognitive status: Alert/Oriented   Walking or Climbing Stairs Difficulty no   Dressing/Bathing Difficulty no   Home Accessibility wheelchair accessible   Home Layout Able to live on 1st floor   Equipment Currently Used at Home none   Patient currently being followed by outpatient case management? No   Do you currently have service(s) that help you manage your care at home? No   Do you take prescription medications? Yes   Do you have prescription coverage? Yes   Do you have any problems affording any of your prescribed medications? No   Is the patient taking medications as prescribed? yes   Who is going to help you get home at discharge? Friend, Sandra Zepeda [phone]   How do you get to doctors appointments? family or friend will provide;car, drives self   Are you on dialysis? No   Do you take coumadin? No   Discharge Plan A Home Health   Discharge Plan B Home   DME Needed Upon Discharge  none   Discharge Plan discussed with: Patient   Transition of Care Barriers No family/friends to help;Utilities     Assessment completed at bedside.  Patient does not work and can drive.  Lives alone.  His friend, , assists at times.   [facility]  [facility] [place]    No DME in the home  No HH services in the past    DC plans TBD at this time.     Will continue to follow

## 2025-06-23 NOTE — PROGRESS NOTES
[facility]  Hospital Medicine Progress Note        Chief Complaint: Inpatient Follow-up for     HPI: Anne Terry is a 65 y.o. 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 The patient presented to [facility] on 6/21/2025 with a primary complaint of failure to thrive. Patient was brought to OSH ED found walking around naked and confused. He also likely has dementia. Patient says that he lives by himself in [place] but is unable to explain why he went to the ER or why he is now in the hospital but say he had no choice. He has not been taking his medications. Pt had a CT scans done at OSH, results to be uploaded. He had a hospital admission in March 2025 for treatment of HIV and MAC, a punch biopsy of BLLE that was negative for malignancy, and an Ophthalmology evaluation. He has a history of non-compliance and missing several follow up appointments. Denies fevers, chills, sweats, SOB, N/V/D, dysuria, new rashes. Does have oral thrush.    Interval Hx: Much more alert today, when asking if he has ever thought of going into a NH he said that he's completely fine and doesn't need one    Case was discussed with patient's nurse and  on the floor.    Objective/physical exam:  General: In no acute distress, afebrile  Chest: Clear to auscultation bilaterally  Oral thrush improving  Heart: RRR, +S1, S2, no appreciable murmur  Abdomen: Soft, nontender, BS +  MSK: Warm, no lower extremity edema, no clubbing or cyanosis  Neurologic: Alert and oriented x4, Cranial nerve II-XII intact, Strength 5/5 in all 4 extremities    VITAL SIGNS: 24 HRS MIN & MAX LAST   Temp  Min: 97.8 °F (36.6 °C)  Max: 99.6 °F (37.6 °C) 98.1 °F (36.7 °C)   BP  Min: 133/81  Max: 170/92 (!) 170/92   Pulse  Min: 52  Max: 69  (!) 58   Resp  Min: 18  Max: 18 18   SpO2  Min: 92 %  Max: 100 % 100 %     I have reviewed the following labs:  Recent Labs   Lab  06/21/25  1608 06/22/25  0130 06/23/25  0849   WBC 3.68* 4.27* 3.18*   RBC 2.95* 3.11* 2.95*   HGB 8.1* 8.6* 8.1*   HCT 25.7* 27.7* 26.3*   MCV 87.1 89.1 89.2   MCH 27.5 27.7 27.5   MCHC 31.5* 31.0* 30.8*   RDW 15.5 15.8 15.8   * 117* 115*   MPV 10.9* 12.4* 11.2*     Recent Labs   Lab 06/21/25  1609 06/22/25  0130 06/23/25  0849    138 135*   K 4.2 4.2 3.9   * 108* 106   CO2 24 23 23   BUN 19.6 20.5 19.1   CREATININE 1.04 1.01 1.21   GLU 84 70* 85   CALCIUM 7.5* 8.0* 7.9*   MG 1.70  --   --    ALBUMIN 2.2* 2.2*  --    PROT 7.6 8.2*  --    ALKPHOS 883* 941*  --    ALT 23 24  --    AST 48* 55*  --    BILITOT 0.4 0.4  --      Microbiology Results (last 7 days)       Procedure Component Value Units Date/Time    Blood Culture [[id number]]  (Normal) Collected: 06/21/25 2356    Order Status: Completed Specimen: Blood Updated: 06/23/25 0204     Blood Culture No Growth At 24 Hours    Blood Culture [[id number]]  (Normal) Collected: 06/21/25 2356    Order Status: Completed Specimen: Blood Updated: 06/23/25 0204     Blood Culture No Growth At 24 Hours             See below for Radiology    Assessment/Plan:  Advanced HIV  MAC Infection  Lung Mass  Oral Candidiasis  Elevated Alk Phos 800s  Likely dementia  Hx history of disseminated histoplasmosis, PJP,  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     ID consulted:  The last CD4 count and viral loads within the past 12 months is a CD4 count of 37 and a viral load of greater than 122,000   currently holding antiviral therapy to avoid IRIS  Pending CD4 and Viral Load  Highly recommend lumbar puncture to help rule out cryptococcal meningitis  Reinitiate disseminated MAC medication treatment with azithromycin 500 mg daily ethambutol 15 mg/kg every 24 hours plus rifampin 300 mg every 24 hours /also continue with fluconazole for consolidation therapy due to recent cryptococcal meningitis        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 /brain MRI  would help confirm this diagnosis the only treatment for this is compliance with antiviral therapy     No additional changes from our standpoint today highly recommend CT chest abdominal pelvis or other acute infectious process     Also daily Bactrim DS for PCP/PJP and toxo prophylaxis     - He will need MRI brain to eval encephalopathy  - Would benefit from a Psychiatry evaluation when clinically improved, also given prolonged history of non-compliance without family support and cognitive decline will need to consider Palliative care input during this admission as well  - F/u CT C/A/P  - MRI brain wwo is pending    VTE prophylaxis: Lovenox    Patient condition:  Fair    Anticipated discharge and Disposition:         All diagnosis and differential diagnosis have been reviewed; assessment and plan has been documented; I have personally reviewed the labs and test results that are presently available; I have reviewed the patients medication list; I have reviewed the consulting providers response and recommendations. I have reviewed or attempted to review medical records based upon their availability    All of the patient's questions have been  addressed and answered. Patient's is agreeable to the above stated plan. I will continue to monitor closely and make adjustments to medical management as needed.    Portions of this note dictated using EMR integrated voice recognition software, and may be subject to voice recognition errors not corrected at proofreading. Please contact writer for clarification if needed.   _____________________________________________________________________    Malnutrition Status:  Nutrition consulted. Most recent weight and BMI monitored-     Measurements:  Wt Readings from Last 1 Encounters:   06/21/25 71 kg (156 lb 8.4 oz)   Body mass index is 21.23 kg/m².    Patient has been screened and assessed by RD.    Malnutrition Type:  Context:    Level:      Malnutrition Characteristic Summary:        Interventions/Recommendations (treatment strategy):        Scheduled Med:   azithromycin  500 mg Oral Daily    enoxparin  40 mg Subcutaneous Q24H (prophylaxis, 1700)    ethambutoL  800 mg Oral Daily    ferrous sulfate  1 tablet Oral Daily    fluconazole  100 mg Oral Daily    mupirocin   Nasal BID    rifAMpin  300 mg Oral Daily    sulfamethoxazole-trimethoprim 800-160mg  1 tablet Oral BID      Continuous Infusions:       PRN Meds:    Current Facility-Administered Medications:     acetaminophen, 650 mg, Oral, Q6H PRN    dextrose 50%, 12.5 g, Intravenous, PRN    dextrose 50%, 25 g, Intravenous, PRN    glucagon (human recombinant), 1 mg, Intramuscular, PRN    glucose, 16 g, Oral, PRN    glucose, 24 g, Oral, PRN    naloxone, 0.02 mg, Intravenous, PRN    ondansetron, 4 mg, Intravenous, Q4H PRN    prochlorperazine, 5 mg, Intravenous, Q6H PRN    sodium chloride 0.9%, 10 mL, Intravenous, PRN     Radiology:  I have personally reviewed the following imaging and agree with the radiologist.     CT Chest Abdomen Pelvis With IV Contrast (XPD) NO Oral Contrast  Narrative: EXAMINATION:  CT CHEST ABDOMEN PELVIS WITH IV CONTRAST (XPD)    CLINICAL HISTORY:  Sepsis;    Anne Terry is a 65 y.o. 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 The patient presented to [facility] on 6/21/2025 with a primary complaint of failure to thrive. Patient was brought to OSH ED found walking around naked and confused. He also likely has dementia. Patient says that he lives by himself in [place] but is unable to explain why he went to the ER or why he is now in the hospital but say he had no choice. He has not been taking his medications. Pt had a CT scans done at OSH, results to be uploaded. He had a hospital admission in March 2025 for treatment of HIV and MAC, a punch biopsy of BLLE that was negative for malignancy, and an Ophthalmology evaluation. He has a history of  non-compliance and missing several follow up appointments. Denies fevers, chills, sweats, SOB, N/V/D, dysuria, new rashes. Does have oral thrush    TECHNIQUE:  Helical axial images are acquired through the chest, abdomen and pelvis after the IV administration 100 mL of Omnipaque 350.  Coronal sagittal reconstructions were performed.  Dose automated exposure control, dose radiation lowering technique was utilized.    COMPARISON:  CT chest, abdomen pelvis from 02/27/2025    Outside CT of the chest from 06/19/2025    Outside CT of the abdomen and pelvis from 06/19/2025    FINDINGS:  CHEST:    Partially calcified right thyroid nodule measures 7 mm.  Otherwise, unremarkable thoracic inlet.  Heart size is mildly enlarged.  No filling defects in the central pulmonary arteries.  Mild mediastinal lymphadenopathy similar compared to 02/27/2025.    Appearance of the lungs is similar with scattered micronodular pattern at the dependent portion of both lungs, right more than left.  Tree-in-bud pattern is identified.  No interval change since 02/27/2025.    ABDOMEN/PELVIS:    Gallbladder is contracted.  The liver, spleen, pancreas, adrenal glands and kidneys appear normal.  No hydronephrosis.    Small hiatal hernia.  Small gastroesophageal varices are present.  Patent portal vein.    Mild abdominopelvic lymphadenopathy is present.  Abdominal lymphadenopathy is slightly worse compared to 02/27/2025.  Index right periaortic lymph node (image 124, series 3) measures 1.3 cm short axis dimension.  Lymph node previously measured 0.9 cm on the prior.    No ascites.  No suspicious peritoneal nodule.  Unremarkable bladder.    The bowel is nonobstructed.  Normal appendix is present.  Air and stool are present throughout the colon.  Mild atherosclerosis of the abdominal aorta and its branches.    BONES AND SOFT TISSUES:    No suspicious osteolytic or osteoblastic lesion.  Impression: 1. Multifocal micronodular pattern at the dependent  portion of both lungs, similar extents 02/27/2025.  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  2. Stable thoracic lymphadenopathy.  3. Slight interval increase in size and extent of abdominopelvic lymphadenopathy.    Electronically signed by: Sg Rogel MD  Date:    06/23/2025  Time:    10:20      Dara Horne MD  Department of Hospital Medicine   [facility]   06/23/2025

## 2025-06-23 NOTE — PLAN OF CARE
Problem: Adult Inpatient Plan of Care  Goal: Plan of Care Review  6/23/2025 0408 by Conor Hdez LPN  Outcome: Progressing  6/23/2025 0401 by Conor Hdez LPN  Outcome: Progressing  Goal: Patient-Specific Goal (Individualized)  6/23/2025 0408 by Conor Hdez LPN  Outcome: Progressing  6/23/2025 0401 by Conor Hdez LPN  Outcome: Progressing  Goal: Absence of Hospital-Acquired Illness or Injury  6/23/2025 0408 by Conor Hdez LPN  Outcome: Progressing  6/23/2025 0401 by Conor Hdez LPN  Outcome: Progressing  Goal: Optimal Comfort and Wellbeing  6/23/2025 0408 by Conor Hdez LPN  Outcome: Progressing  6/23/2025 0401 by Conor Hdez LPN  Outcome: Progressing  Goal: Readiness for Transition of Care  6/23/2025 0408 by Conor Hdez LPN  Outcome: Progressing  6/23/2025 0401 by Conor Hdez LPN  Outcome: Progressing     Problem: Wound  Goal: Optimal Coping  6/23/2025 0408 by Conor Hdez LPN  Outcome: Progressing  6/23/2025 0401 by Conor Hdez LPN  Outcome: Progressing  Goal: Optimal Functional Ability  6/23/2025 0408 by Conor Hdez LPN  Outcome: Progressing  6/23/2025 0401 by Conor Hdez LPN  Outcome: Progressing  Goal: Absence of Infection Signs and Symptoms  6/23/2025 0408 by Conor Hdez LPN  Outcome: Progressing  6/23/2025 0401 by Conor Hdez LPN  Outcome: Progressing  Goal: Improved Oral Intake  6/23/2025 0408 by Conor Hdez LPN  Outcome: Progressing  6/23/2025 0401 by Conor Hdez LPN  Outcome: Progressing  Goal: Optimal Pain Control and Function  6/23/2025 0408 by Conor Hdez LPN  Outcome: Progressing  6/23/2025 0401 by Conor Hdez LPN  Outcome: Progressing  Goal: Skin Health and Integrity  6/23/2025 0408 by Conor Hdez LPN  Outcome: Progressing  6/23/2025 0401 by Conor Hdez LPN  Outcome: Progressing  Goal: Optimal Wound Healing  6/23/2025 0408 by Conor Hdez,  LPN  Outcome: Progressing  6/23/2025 0401 by Conor Hdez LPN  Outcome: Progressing

## 2025-06-23 NOTE — PROGRESS NOTES
[facility] - 5th Floor Med Surg  Infectious Disease  Progress Note    Patient Name: Anne Terry  MRN: [id number]  Admission Date: 6/21/2025  Hospital Length of Stay: 2 days  Attending Physician: Dara Horne MD  Primary Care Provider: No primary care provider on file.     Isolation Status: No active isolations    Reason for consultation  HIV management      Assessment/Plan:       # Failure to thrive and generalized weakness /metabolic encephalopathy    # HIV/AIDS with history of medical noncompliance  -antiviral therapy regimen unclear  -patient has not been taking medications for an extended period of time  -currently holding antiviral therapy to avoid IRIS  -CD4 count 37 as of 11.2024  -VL 122k as of 2.2025      # Recent history of disseminated MAC  -currently on azithromycin rifabutin and ethambutol  - CT c/a/p- 6/23 with no new findings, but increased LDN    # 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 therapy with amphotericin B and flucytosine currently on consolidation therapy with oral fluconazole    # Past Medical Hx of HIV/AIDs, MAC, history of disseminated histoplasmosis, PJP,  and cryptococcous    Per chart review of ID NOTES- last one on 3/3/25:     Azithromycin 500 mg PO daily Start: 01/07/25  Ethambutol 15 mg/kg suspension daily Start: 01/07/25  Rifabutin 150 mg PO daily Start: 01/07/25  Atovaquone 1500 mg PO daily Start: 01/07/25 End: 02/28/25    -s/p punch bx of LE lesion in March- neg for malingnancy    Recommendations       Currently holding antiviral therapy to avoid IRIS  Pending CD4 and Viral Load, follow up blood cx   Recommend lumbar puncture to help assess if continues with cryptococcal meningitis- and needs to restart induction tx  In the meanwhile, continue disseminated MAC medication treatment with azithromycin 500 mg daily ethambutol 15 mg/kg every 24 hours plus rifampin 300 mg every 24 hours     Continue with fluconazole-  increase dose to 400 mg daily as previously for consolidation phase of recent cryptococcal meningitis- will more than likely restart AmBisome/flucytosine  Continue Bactrim DS for PCP/PJP and toxo prophylaxis    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  Said no to all the questions.    ______________________________________________    HPI:     The Pt is a 65 y.o. 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 The patient presented to [facility] on 6/21/2025 with a primary complaint of failure to thrive. Patient was brought to OSH ED found walking around naked and confused. He also likely has dementia. Patient says that he lives by himself in [place] but is unable to explain why he went to the ER or why he is now in the hospital but say he had no choice. He has not been taking his medications. Pt had a CT scans done at OSH, results to be uploaded. He had a hospital admission in March 2025 for treatment of HIV and MAC, a punch biopsy of BLLE that was negative for malignancy, and an Ophthalmology evaluation. He has a history of non-compliance and missing several follow up appointments. Denies fevers, chills, sweats, SOB, N/V/D, dysuria, new rashes. Does have oral thrush.     Medications:  Medications Prior to Admission   Medication Sig    abacavir (ZIAGEN) 300 mg Tab Take 1 tablet (300 mg total) by mouth once daily.    azithromycin (ZITHROMAX) 500 MG tablet Take 1 tablet (500 mg total) by mouth once daily.    darunavir (PREZISTA) 800 mg Tab Take 1 tablet (800 mg total) by mouth daily with  breakfast.    emtricitabine-tenofovir 200-300 mg (TRUVADA) 200-300 mg Tab Take 1 tablet by mouth once daily.    ethambutoL (MYAMBUTOL) 400 MG Tab Take 2 tablets (800 mg total) by mouth once daily.    ferrous sulfate 325 (65 FE) MG EC tablet Take 1 tablet (325 mg total) by mouth once daily.    rifabutin (MYCOBUTIN) 150 mg Cap Take 1 capsule (150 mg total) by mouth once daily.    ritonavir (NORVIR) 100 mg Tab tablet Take 1 tablet (100 mg total) by mouth once daily.     Antibiotics (From admission, onward)      Start     Stop Route Frequency Ordered    06/22/25 1015  rifAMpin capsule 300 mg         -- Oral Daily 06/22/25 0907    06/22/25 1015  azithromycin tablet 500 mg         07/06/25 0859 Oral Daily 06/22/25 0907    06/22/25 1015  ethambutoL tablet 800 mg         07/06/25 0859 Oral Daily 06/22/25 0907    06/22/25 1015  sulfamethoxazole-trimethoprim 800-160mg per tablet 1 tablet         07/06/25 0859 Oral 2 times daily 06/22/25 0908    06/21/25 2100  mupirocin 2 % ointment         06/26/25 2059 Nasl 2 times daily 06/21/25 1549          Antifungals (From admission, onward)      Start     Stop Route Frequency Ordered    06/22/25 0900  fluconazole tablet 100 mg         07/05/25 0859 Oral Daily 06/21/25 2229          Antivirals (From admission, onward)      None             Immunization History   Administered Date(s) Administered    Influenza - Quadrivalent 11/16/2020, 11/08/2021    Influenza - Quadrivalent - PF (6-35 months) 12/12/2018    Influenza - Quadrivalent - PF *Preferred* (6 months and older) 11/07/2019    Influenza - Trivalent - Fluarix, Flulaval, Fluzone, Afluria - PF 10/09/2017    Meningococcal Conjugate (MCV4P) 09/11/2018, 05/07/2019    Pneumococcal Polysaccharide - 23 Valent 01/19/2021    Zoster Recombinant 11/16/2020, 07/28/2021        Objective:     Vital Signs (Most Recent):  Temp: 97.8 °F (36.6 °C) (06/23/25 0749)  Pulse: 69 (06/23/25 0749)  Resp: 18 (06/23/25 0749)  BP: 133/81 (06/23/25 0749)  SpO2:  (!) 92 % (06/23/25 0749) Vital Signs (24h Range):  Temp:  [97.8 °F (36.6 °C)-99.6 °F (37.6 °C)] 97.8 °F (36.6 °C)  Pulse:  [52-69] 69  Resp:  [18-20] 18  SpO2:  [92 %-100 %] 92 %  BP: (133-164)/(74-92) 133/81  Arterial Line BP: (154)/(82) 154/82     Weight: 71 kg (156 lb 8.4 oz)  Body mass index is 21.23 kg/m².    Estimated Creatinine Clearance: 62.8 mL/min (based on SCr of 1.21 mg/dL).    Physical Exam:   General Appearance:  Emaciated, thin male, obtunded, not really answering questions.  HEENT (Head, Eyes, Ears, Nose and Throat): Normocephalic, Nontraumatic.   Neck:  Could not really assess, neck movement.  Slight bulging in the base of the cervical spine  Cardiovascular: Regular Rate and Rhythm  Respiratory:  On room air, in no distress

## 2025-06-24 LAB
ANION GAP SERPL CALC-SCNC: 8 MEQ/L
BASOPHILS # BLD AUTO: 0.01 X10(3)/MCL
BASOPHILS NFR BLD AUTO: 0.3 %
BUN SERPL-MCNC: 17.9 MG/DL (ref 8.4–25.7)
CALCIUM SERPL-MCNC: 7.8 MG/DL (ref 8.8–10)
CD3 CELLS # BLD: 262 CELLS/MCL (ref 550–2202)
CD3 CELLS NFR BLD: 88 % (ref 58–86)
CD3+CD4+ CELLS # BLD: 21 CELLS/MCL (ref 365–1437)
CD3+CD4+ CELLS NFR BLD: 7 % (ref 32–64)
CD3+CD4+ CELLS/CD3+CD8+ CLL BLD: 0.1 %
CD3+CD8+ CELLS # BLD: 230 CELLS/MCL (ref 80–846)
CD3+CD8+ CELLS NFR BLD: 78 % (ref 8–40)
CD45 CELLS # BLD: 0.3 THOU/MCL (ref 0.82–2.84)
CHLORIDE SERPL-SCNC: 106 MMOL/L (ref 98–107)
CO2 SERPL-SCNC: 24 MMOL/L (ref 23–31)
CREAT SERPL-MCNC: 1.19 MG/DL (ref 0.72–1.25)
CREAT/UREA NIT SERPL: 15
EOSINOPHIL # BLD AUTO: 0.11 X10(3)/MCL (ref 0–0.9)
EOSINOPHIL NFR BLD AUTO: 3 %
ERYTHROCYTE [DISTWIDTH] IN BLOOD BY AUTOMATED COUNT: 16 % (ref 11.5–17)
GFR SERPLBLD CREATININE-BSD FMLA CKD-EPI: >60 ML/MIN/1.73/M2
GLUCOSE SERPL-MCNC: 66 MG/DL (ref 82–115)
HCT VFR BLD AUTO: 23.5 % (ref 42–52)
HGB BLD-MCNC: 7.2 G/DL (ref 14–18)
HIV1 RNA # SERPL NAA+PROBE: ABNORMAL COPIES/ML
HIV1 RNA SERPL NAA+PROBE-LOG#: DETECTED {LOG_COPIES}/ML
IMM GRANULOCYTES # BLD AUTO: 0.03 X10(3)/MCL (ref 0–0.04)
IMM GRANULOCYTES NFR BLD AUTO: 0.8 %
LYMPHOCYTES # BLD AUTO: 0.42 X10(3)/MCL (ref 0.6–4.6)
LYMPHOCYTES NFR BLD AUTO: 11.6 %
MCH RBC QN AUTO: 27.2 PG (ref 27–31)
MCHC RBC AUTO-ENTMCNC: 30.6 G/DL (ref 33–36)
MCV RBC AUTO: 88.7 FL (ref 80–94)
MONOCYTES # BLD AUTO: 0.23 X10(3)/MCL (ref 0.1–1.3)
MONOCYTES NFR BLD AUTO: 6.4 %
NEUTROPHILS # BLD AUTO: 2.81 X10(3)/MCL (ref 2.1–9.2)
NEUTROPHILS NFR BLD AUTO: 77.9 %
NRBC BLD AUTO-RTO: 0 %
PLATELET # BLD AUTO: 121 X10(3)/MCL (ref 130–400)
PLATELETS.RETICULATED NFR BLD AUTO: 3.1 % (ref 0.9–11.2)
PMV BLD AUTO: 11.7 FL (ref 7.4–10.4)
POTASSIUM SERPL-SCNC: 4.2 MMOL/L (ref 3.5–5.1)
RBC # BLD AUTO: 2.65 X10(6)/MCL (ref 4.7–6.1)
SODIUM SERPL-SCNC: 138 MMOL/L (ref 136–145)
WBC # BLD AUTO: 3.61 X10(3)/MCL (ref 4.5–11.5)

## 2025-06-24 PROCEDURE — 63700000 PHARM REV CODE 250 ALT 637 W/O HCPCS: Performed by: INTERNAL MEDICINE

## 2025-06-24 PROCEDURE — 11000001 HC ACUTE MED/SURG PRIVATE ROOM

## 2025-06-24 PROCEDURE — 63600175 PHARM REV CODE 636 W HCPCS: Performed by: INTERNAL MEDICINE

## 2025-06-24 PROCEDURE — 80048 BASIC METABOLIC PNL TOTAL CA: CPT | Performed by: INTERNAL MEDICINE

## 2025-06-24 PROCEDURE — 25000003 PHARM REV CODE 250: Performed by: INTERNAL MEDICINE

## 2025-06-24 PROCEDURE — 85025 COMPLETE CBC W/AUTO DIFF WBC: CPT | Performed by: INTERNAL MEDICINE

## 2025-06-24 PROCEDURE — 63600175 PHARM REV CODE 636 W HCPCS: Performed by: NURSE PRACTITIONER

## 2025-06-24 PROCEDURE — 36415 COLL VENOUS BLD VENIPUNCTURE: CPT | Performed by: INTERNAL MEDICINE

## 2025-06-24 RX ORDER — FLUCONAZOLE 2 MG/ML
400 INJECTION, SOLUTION INTRAVENOUS
Status: DISCONTINUED | OUTPATIENT
Start: 2025-06-24 | End: 2025-06-29

## 2025-06-24 RX ORDER — POLYETHYLENE GLYCOL 3350 17 G/17G
17 POWDER, FOR SOLUTION ORAL DAILY
Status: DISCONTINUED | OUTPATIENT
Start: 2025-06-24 | End: 2025-06-27

## 2025-06-24 RX ADMIN — ENOXAPARIN SODIUM 40 MG: 40 INJECTION SUBCUTANEOUS at 02:06

## 2025-06-24 RX ADMIN — MUPIROCIN: 20 OINTMENT TOPICAL at 08:06

## 2025-06-24 RX ADMIN — AZITHROMYCIN DIHYDRATE 500 MG: 250 TABLET ORAL at 09:06

## 2025-06-24 RX ADMIN — SULFAMETHOXAZOLE AND TRIMETHOPRIM 1 TABLET: 800; 160 TABLET ORAL at 09:06

## 2025-06-24 RX ADMIN — ETHAMBUTOL HYDROCHLORIDE 800 MG: 400 TABLET ORAL at 09:06

## 2025-06-24 RX ADMIN — MUPIROCIN: 20 OINTMENT TOPICAL at 09:06

## 2025-06-24 RX ADMIN — ONDANSETRON 4 MG: 2 INJECTION INTRAMUSCULAR; INTRAVENOUS at 02:06

## 2025-06-24 RX ADMIN — FLUCONAZOLE 100 MG: 100 TABLET ORAL at 09:06

## 2025-06-24 RX ADMIN — RIFAMPIN 300 MG: 300 CAPSULE ORAL at 09:06

## 2025-06-24 RX ADMIN — Medication 1 EACH: at 09:06

## 2025-06-24 RX ADMIN — FLUCONAZOLE 400 MG: 2 INJECTION, SOLUTION INTRAVENOUS at 07:06

## 2025-06-24 RX ADMIN — SULFAMETHOXAZOLE AND TRIMETHOPRIM 1 TABLET: 800; 160 TABLET ORAL at 08:06

## 2025-06-24 NOTE — PROGRESS NOTES
Inpatient Nutrition Assessment    Admit Date: 6/21/2025   Total duration of encounter: 3 days   Patient Age: 65 y.o.    Nutrition Recommendation/Prescription     Continue oral diet as tolerated; Diet Adult Regular Standard Tray   Will trial Boost Plus once a day (360 kcal, 14 gm protein per container)    Communication of Recommendations: reviewed with patient    Nutrition Assessment     Malnutrition Assessment/Nutrition-Focused Physical Exam    Malnutrition Context: chronic illness (06/24/25 1445)  Malnutrition Level: severe (06/24/25 1445)     Weight Loss (Malnutrition): other (see comments) (Does not meet criteria) (06/24/25 1445)  Subcutaneous Fat (Malnutrition): mild depletion (06/24/25 1445)  Orbital Region (Subcutaneous Fat Loss): mild depletion        Muscle Mass (Malnutrition): moderate depletion (06/24/25 1445)  Latter day Region (Muscle Loss): moderate depletion  Clavicle Bone Region (Muscle Loss): moderate depletion  Clavicle and Acromion Bone Region (Muscle Loss): moderate depletion                          A minimum of two characteristics is recommended for diagnosis of either severe or non-severe malnutrition.    Chart Review    Reason Seen: malnutrition screening tool (MST)    Malnutrition Screening Tool Results   Have you recently lost weight without trying?: Unsure  Have you been eating poorly because of a decreased appetite?: No   MST Score: 2   Diagnosis:  Advanced HIV  MAC Infection  Lung Mass  Oral Candidiasis  Elevated Alk Phos 800s  Likely dementia    Relevant Medical History: HIV/AIDs, MAC, history of disseminated histoplasmosis, PJP, and cryptococcous     Scheduled Medications:  azithromycin, 500 mg, Daily  enoxparin, 40 mg, Q24H (prophylaxis, 1700)  ethambutoL, 800 mg, Daily  ferrous sulfate, 1 tablet, Daily  fluconazole, 100 mg, Daily  mupirocin, , BID  rifAMpin, 300 mg, Daily  sulfamethoxazole-trimethoprim 800-160mg, 1 tablet, BID    Continuous Infusions:   PRN Medications:  acetaminophen,  "650 mg, Q6H PRN  dextrose 50%, 12.5 g, PRN  dextrose 50%, 25 g, PRN  glucagon (human recombinant), 1 mg, PRN  glucose, 16 g, PRN  glucose, 24 g, PRN  naloxone, 0.02 mg, PRN  ondansetron, 4 mg, Q4H PRN  prochlorperazine, 5 mg, Q6H PRN  sodium chloride 0.9%, 10 mL, PRN    Calorie Containing IV Medications: no significant kcals from medications at this time    Recent Labs   Lab 06/21/25  1608 06/21/25  1609 06/22/25  0130 06/23/25  0849 06/24/25  0603   NA  --  139 138 135* 138   K  --  4.2 4.2 3.9 4.2   CALCIUM  --  7.5* 8.0* 7.9* 7.8*   PHOS  --  3.4  --   --   --    MG  --  1.70  --   --   --    CL  --  108* 108* 106 106   CO2  --  24 23 23 24   BUN  --  19.6 20.5 19.1 17.9   CREATININE  --  1.04 1.01 1.21 1.19   EGFRNORACEVR  --  >60 >60 >60 >60   GLU  --  84 70* 85 66*   BILITOT  --  0.4 0.4  --   --    ALKPHOS  --  883* 941*  --   --    ALT  --  23 24  --   --    AST  --  48* 55*  --   --    ALBUMIN  --  2.2* 2.2*  --   --    WBC 3.68*  --  4.27* 3.18* 3.61*   HGB 8.1*  --  8.6* 8.1* 7.2*   HCT 25.7*  --  27.7* 26.3* 23.5*     Nutrition Orders:  Diet Adult Regular Standard Tray      Appetite/Oral Intake: fair/50-75% of meals  Factors Affecting Nutritional Intake: socio-economic  Social Needs Impacting Access to Food: reports lack of access to food / food insecure - referred to case management  Food/Rastafarian/Cultural Preferences: unable to obtain  Food Allergies: no known food allergies  Last Bowel Movement: 06/23/25  Wound(s):  none noted    Comments    6/24/25 Pt eating lunch, difficulty obtaining hx from pt; reports no weight loss (appears stable in EMR since feb); declined oral supplements. Visible signs of fat and muscle loss present. Unsure if pt has adequate access to nutrition at home.    Anthropometrics    Height: 6' 0.01" (182.9 cm),    Last Weight: 71 kg (156 lb 8.4 oz) (06/21/25 1220), Weight Method: Bed Scale  BMI (Calculated): 21.2  BMI Classification: normal (BMI 18.5-24.9)        Ideal Body Weight " (IBW), Male: 178.06 lb     % Ideal Body Weight, Male (lb): 87.94 %                          Usual Weight Provided By: patient denies unintentional weight loss    Wt Readings from Last 5 Encounters:   06/21/25 71 kg (156 lb 8.4 oz)   02/28/25 70.3 kg (155 lb)   02/27/25 71.8 kg (158 lb 4.6 oz)   01/07/25 73.5 kg (161 lb 14.9 oz)   11/20/24 58 kg (127 lb 13.9 oz)     Weight Change(s) Since Admission:   Wt Readings from Last 1 Encounters:   06/21/25 1220 71 kg (156 lb 8.4 oz)   Admit Weight: 71 kg (156 lb 8.4 oz) (06/21/25 1220), Weight Method: Bed Scale    Estimated Needs    Weight Used For Calorie Calculations: 71 kg (156 lb 8.4 oz)  Energy Calorie Requirements (kcal): 1384-5433 kcal (30-35 kcal/kg)  Energy Need Method: Kcal/kg  Weight Used For Protein Calculations: 71 kg (156 lb 8.4 oz)  Protein Requirements: 106-120gm (1.5-1.7 gm/kg)  Fluid Requirements (mL): 2130 ml (30ml/kg)        Enteral Nutrition     Patient not receiving enteral nutrition at this time.    Parenteral Nutrition     Patient not receiving parenteral nutrition support at this time.    Evaluation of Received Nutrient Intake    Calories: not meeting estimated needs  Protein: not meeting estimated needs    Patient Education     Not applicable.    Nutrition Diagnosis         PES: Severe chronic disease or condition related malnutrition Related to chronic condition As Evidenced by:  - muscle mass depletion: 6 areas of moderate muscle loss (Trapezius, Deltoid, Pectoralis, Acromion, Temporalis, Clavicle) - loss of subcutaneous fat: 2 areas of mild fat loss (Buccal, Infraorbital) new    Nutrition Interventions     Intervention(s): commercial beverage and collaboration with other providers  Intervention(s): Oral diet/nutrient modifications    Goal: Consume % of meals/snacks by follow-up. (new)  Goal: Maintain weight throughout hospitalization. (new)    Nutrition Goals & Monitoring     Dietitian will monitor: food and beverage intake and weight  change  Discharge planning: continue regular diet and provided referral to  for food access needs  Nutrition Risk/Follow-Up: patient at increased nutrition risk; dietitian will follow-up twice weekly   Please consult if re-assessment needed sooner.

## 2025-06-24 NOTE — PROGRESS NOTES
[facility] - 5th Floor Med Surg  Infectious Disease  Progress Note    Patient Name: Anne Terry  MRN: [id number]  Admission Date: 6/21/2025  Hospital Length of Stay: 3 days  Attending Physician: Dara Horne MD  Primary Care Provider: No primary care provider on file.     Isolation Status: No active isolations    Reason for consultation  HIV management      Assessment/Plan:       # Failure to thrive and generalized weakness /metabolic encephalopathy    # HIV/AIDS with history of medical noncompliance  -antiviral therapy regimen unclear  -patient has not been taking medications for an extended period of time  -currently holding antiviral therapy to avoid IRIS  -CD4 count 37 as of 11.2024  -VL 122k as of 2.2025    # Recent history of disseminated MAC  -currently on azithromycin rifabutin and ethambutol  - CT c/a/p- 6/23 with no new findings, but increased LDN    # 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  In the meanwhile recommend getting Lumbar puncture to help assess if continues with cryptococcal meningitis- and needs to restart induction tx (CSF analysis, routine, AFB, and fungal cultures and stains. ME PCR panel, crypto ag. VDRL, LEAH)     Continue disseminated MAC medication treatment with azithromycin 500 mg daily ethambutol 15 mg/kg every 24 hours plus rifampin 300 mg every 24 hours-  patient is having a hard time tolerating the medications    Continue with fluconazole- we will change to IV, as patient unable to tolerate his oral medications.  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  Denies any headaches.  Denies any pain.  No shortness of breath.  Reports nausea.  Per sitter, patient with vomiting x2 and large stool today    ______________________________________________    HPI:     The Pt is a 65 y.o. male who a [facility] HIV/AIDs, MAC, history of disseminated histoplasmosis, PJP,  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 . The patient presented to [facility] on 6/21/2025 with a primary complaint of failure to thrive. Patient was brought to OSH ED found walking around naked and confused. He also likely has dementia. Patient says that he lives by himself in [place] but is unable to explain why he went to the ER or why he is now in the hospital but say he had no choice. He has not been taking his medications. Pt had a CT scans done at OSH, results to be uploaded. He had a hospital admission in March 2025 for treatment of HIV and MAC, a punch biopsy of BLLE that was negative for malignancy, and an Ophthalmology evaluation. He has a history of non-compliance and missing several follow up appointments. Denies fevers, chills, sweats, SOB, N/V/D, dysuria, new rashes.  Does have oral thrush.     Medications:  Medications Prior to Admission   Medication Sig    abacavir (ZIAGEN) 300 mg Tab Take 1 tablet (300 mg total) by mouth once daily.    azithromycin (ZITHROMAX) 500 MG tablet Take 1 tablet (500 mg total) by mouth once daily.    darunavir (PREZISTA) 800 mg Tab Take 1 tablet (800 mg total) by mouth daily with breakfast.    emtricitabine-tenofovir 200-300 mg (TRUVADA) 200-300 mg Tab Take 1 tablet by mouth once daily.    ethambutoL (MYAMBUTOL) 400 MG Tab Take 2 tablets (800 mg total) by mouth once daily.    ferrous sulfate 325 (65 FE) MG EC tablet Take 1 tablet (325 mg total) by mouth once daily.    rifabutin (MYCOBUTIN) 150 mg Cap Take 1 capsule (150 mg total) by mouth once daily.    ritonavir (NORVIR) 100 mg Tab tablet Take 1 tablet (100 mg total) by mouth once daily.     Antibiotics (From admission, onward)      Start     Stop Route Frequency Ordered    06/22/25 1015  rifAMpin capsule 300 mg         -- Oral Daily 06/22/25 0907    06/22/25 1015  azithromycin tablet 500 mg         07/06/25 0859 Oral Daily 06/22/25 0907    06/22/25 1015  ethambutoL tablet 800 mg         07/06/25 0859 Oral Daily 06/22/25 0907    06/22/25 1015  sulfamethoxazole-trimethoprim 800-160mg per tablet 1 tablet         07/06/25 0859 Oral 2 times daily 06/22/25 0908    06/21/25 2100  mupirocin 2 % ointment         06/26/25 2059 Nasl 2 times daily 06/21/25 1549          Antifungals (From admission, onward)      Start     Stop Route Frequency Ordered    06/22/25 0900  fluconazole tablet 100 mg         07/05/25 0859 Oral Daily 06/21/25 2229          Antivirals (From admission, onward)      None             Immunization History   Administered Date(s) Administered    Influenza - Quadrivalent 11/16/2020, 11/08/2021    Influenza - Quadrivalent - PF (6-35 months) 12/12/2018    Influenza - Quadrivalent - PF *Preferred* (6 months and older) 11/07/2019    Influenza - Trivalent - Fluarix, Flulaval, Fluzone, Afluria - PF  10/09/2017    Meningococcal Conjugate (MCV4P) 09/11/2018, 05/07/2019    Pneumococcal Polysaccharide - 23 Valent 01/19/2021    Zoster Recombinant 11/16/2020, 07/28/2021        Objective:     Vital Signs (Most Recent):  Temp: 98.1 °F (36.7 °C) (06/24/25 0802)  Pulse: 70 (06/24/25 0802)  Resp: 18 (06/24/25 0802)  BP: 126/70 (06/24/25 0802)  SpO2: 99 % (06/24/25 0802) Vital Signs (24h Range):  Temp:  [97.8 °F (36.6 °C)-99.3 °F (37.4 °C)] 98.1 °F (36.7 °C)  Pulse:  [58-70] 70  Resp:  [18] 18  SpO2:  [94 %-100 %] 99 %  BP: (126-170)/(70-92) 126/70     Weight: 71 kg (156 lb 8.4 oz)  Body mass index is 21.23 kg/m².    Estimated Creatinine Clearance: 63.8 mL/min (based on SCr of 1.19 mg/dL).    Physical Exam:   General Appearance:  Emaciated, thin male, more alert today. However, still not conversing, but nodding to some questions. One to one sitter in the room  HEENT (Head, Eyes, Ears, Nose and Throat): Normocephalic, Nontraumatic.   Neck:  normal neck movement.    Cardiovascular: Regular Rate and Rhythm  Respiratory:  On room air, in no distress

## 2025-06-24 NOTE — CONSULTS
"6/24/2025  Anne Terry   1960   [id number]            Psychiatry Initial Consult Note     Date of Admission: 6/21/2025 12:27 PM    Chief Complaint: Psychiatric consult for "depression and med management"    SUBJECTIVE:   History of Present Illness:   Anne Terry is a 65 y.o. male with a past medical history that includes HIV/AIDs, MAC, disseminated histoplasmosis, PJP, and cryptococcus who presented to [facility] on 06/21/125 with a complaint of failure to thrive. Had been found walking around naked and confused. He had reported that he lives by himself in [place] but was unable to explain why he went to the hospital. Being seen to day by psychiatry for concerns of depression.    Seen at the bedside with 1:1 sitter present. He is currently resting quietly in bed and in no apparent distress. Displays psychomotor retardation and poor eye-contact. Answers minimal questions and when asked where he is states "I don't know". Unable to report year either. Initially shakes head up and down when asked if he is feeling sad. However, later asked the same question and he shakes head side to side. Appears confused with impaired attention. Unable to get much history. Staff reports no behavioral concerns, that he has continuously appeared confused and inattentive, and that he has been sleeping well. No abnormal, involuntary movement noted and not observed to be responding to internal stimuli.         Past Psychiatric History:   Unable to assess    Current Medications:   Home Psychiatric Meds: None    Past Medical/Surgical History:   No past medical history on file.  Past Surgical History:   Procedure Laterality Date    COLONOSCOPY  05/08/2015       Family Psychiatric History:   Unable to assess     Allergies:   Review of patient's allergies indicates:  No Known Allergies    Substance Abuse History:   Unable to assess         Scheduled Meds:    azithromycin  500 mg Oral Daily    enoxparin  40 mg Subcutaneous Q24H " (prophylaxis, 1700)    ethambutoL  800 mg Oral Daily    ferrous sulfate  1 tablet Oral Daily    fluconazole  100 mg Oral Daily    mupirocin   Nasal BID    rifAMpin  300 mg Oral Daily    sulfamethoxazole-trimethoprim 800-160mg  1 tablet Oral BID      PRN Meds:   Current Facility-Administered Medications:     acetaminophen, 650 mg, Oral, Q6H PRN    dextrose 50%, 12.5 g, Intravenous, PRN    dextrose 50%, 25 g, Intravenous, PRN    glucagon (human recombinant), 1 mg, Intramuscular, PRN    glucose, 16 g, Oral, PRN    glucose, 24 g, Oral, PRN    naloxone, 0.02 mg, Intravenous, PRN    ondansetron, 4 mg, Intravenous, Q4H PRN    prochlorperazine, 5 mg, Intravenous, Q6H PRN    sodium chloride 0.9%, 10 mL, Intravenous, PRN   Psychotherapeutics (From admission, onward)      None              Social History:  Unable to assess       OBJECTIVE:     Vitals   Vitals:    06/24/25 1100   BP: 129/65   Pulse: 68   Resp: 18   Temp: 98.6 °F (37 °C)        Labs/Imaging/Studies:   Recent Results (from the past 48 hours)   HIV RNA, Quantitative, PCR    Collection Time: 06/23/25  5:01 AM   Result Value Ref Range    HIV-1 RNA, Qualitative Detected (A) Not Detected    HIV QUANTITATIVE RESULT 114,288 (H) <0 copies/mL   Urinalysis, Reflex to Urine Culture Urine, Clean Catch    Collection Time: 06/23/25  6:44 AM    Specimen: Urine   Result Value Ref Range    Color, UA Yellow Yellow, Light-Yellow, Colorless, Straw, Dark-Yellow    Appearance, UA Clear Clear    Specific Gravity, UA 1.011 1.005 - 1.030    pH, UA 7.0 5.0 - 8.5    Protein, UA Trace (A) Negative    Glucose, UA Normal Negative, Normal    Ketones, UA Negative Negative    Blood, UA Negative Negative    Bilirubin, UA Negative Negative    Urobilinogen, UA Normal 0.2, 1.0, Normal    Nitrites, UA Negative Negative    Leukocyte Esterase, UA Negative Negative    RBC, UA 0-5 None Seen, 0-2, 3-5, 0-5 /HPF    WBC, UA 0-5 None Seen, 0-2, 3-5, 0-5 /HPF    Bacteria, UA Occasional (A) None Seen, Trace  /HPF    Squamous Epithelial Cells, UA Trace None Seen, Trace /HPF    Mucous, UA Trace (A) None Seen /LPF   Basic Metabolic Panel    Collection Time: 06/23/25  8:49 AM   Result Value Ref Range    Sodium 135 (L) 136 - 145 mmol/L    Potassium 3.9 3.5 - 5.1 mmol/L    Chloride 106 98 - 107 mmol/L    CO2 23 23 - 31 mmol/L    Glucose 85 82 - 115 mg/dL    Blood Urea Nitrogen 19.1 8.4 - 25.7 mg/dL    Creatinine 1.21 0.72 - 1.25 mg/dL    BUN/Creatinine Ratio 16     Calcium 7.9 (L) 8.8 - 10.0 mg/dL    Anion Gap 6.0 mEq/L    eGFR >60 mL/min/1.73/m2   CBC with Differential    Collection Time: 06/23/25  8:49 AM   Result Value Ref Range    WBC 3.18 (L) 4.50 - 11.50 x10(3)/mcL    RBC 2.95 (L) 4.70 - 6.10 x10(6)/mcL    Hgb 8.1 (L) 14.0 - 18.0 g/dL    Hct 26.3 (L) 42.0 - 52.0 %    MCV 89.2 80.0 - 94.0 fL    MCH 27.5 27.0 - 31.0 pg    MCHC 30.8 (L) 33.0 - 36.0 g/dL    RDW 15.8 11.5 - 17.0 %    Platelet 115 (L) 130 - 400 x10(3)/mcL    MPV 11.2 (H) 7.4 - 10.4 fL    IPF 3.0 0.9 - 11.2 %    Neut % 80.2 %    Lymph % 10.4 %    Mono % 5.0 %    Eos % 2.8 %    Basophil % 0.3 %    Imm Grans % 1.3 %    Neut # 2.55 2.1 - 9.2 x10(3)/mcL    Lymph # 0.33 (L) 0.6 - 4.6 x10(3)/mcL    Mono # 0.16 0.1 - 1.3 x10(3)/mcL    Eos # 0.09 0 - 0.9 x10(3)/mcL    Baso # 0.01 <=0.2 x10(3)/mcL    Imm Gran # 0.04 0.00 - 0.04 x10(3)/mcL    NRBC% 0.0 %   Basic Metabolic Panel    Collection Time: 06/24/25  6:03 AM   Result Value Ref Range    Sodium 138 136 - 145 mmol/L    Potassium 4.2 3.5 - 5.1 mmol/L    Chloride 106 98 - 107 mmol/L    CO2 24 23 - 31 mmol/L    Glucose 66 (L) 82 - 115 mg/dL    Blood Urea Nitrogen 17.9 8.4 - 25.7 mg/dL    Creatinine 1.19 0.72 - 1.25 mg/dL    BUN/Creatinine Ratio 15     Calcium 7.8 (L) 8.8 - 10.0 mg/dL    Anion Gap 8.0 mEq/L    eGFR >60 mL/min/1.73/m2   CBC with Differential    Collection Time: 06/24/25  6:03 AM   Result Value Ref Range    WBC 3.61 (L) 4.50 - 11.50 x10(3)/mcL    RBC 2.65 (L) 4.70 - 6.10 x10(6)/mcL    Hgb 7.2 (L) 14.0 -  "18.0 g/dL    Hct 23.5 (L) 42.0 - 52.0 %    MCV 88.7 80.0 - 94.0 fL    MCH 27.2 27.0 - 31.0 pg    MCHC 30.6 (L) 33.0 - 36.0 g/dL    RDW 16.0 11.5 - 17.0 %    Platelet 121 (L) 130 - 400 x10(3)/mcL    MPV 11.7 (H) 7.4 - 10.4 fL    IPF 3.1 0.9 - 11.2 %    Neut % 77.9 %    Lymph % 11.6 %    Mono % 6.4 %    Eos % 3.0 %    Basophil % 0.3 %    Imm Grans % 0.8 %    Neut # 2.81 2.1 - 9.2 x10(3)/mcL    Lymph # 0.42 (L) 0.6 - 4.6 x10(3)/mcL    Mono # 0.23 0.1 - 1.3 x10(3)/mcL    Eos # 0.11 0 - 0.9 x10(3)/mcL    Baso # 0.01 <=0.2 x10(3)/mcL    Imm Gran # 0.03 0.00 - 0.04 x10(3)/mcL    NRBC% 0.0 %      No results found for: "PHENYTOIN", "PHENOBARB", "VALPROATE", "CBMZ"        Psychiatric Mental Status Exam:  General Appearance: appears stated age, dressed in hospital garb, lying in bed, in no acute distress  Arousal: alert  Behavior: minimal responses, poor eye contact  Movements and Motor Activity: no tics, no tremors, no akathisia, no dystonia, no evidence of tardive dyskinesia, +psychomotor retardation  Orientation: Not oriented to place or time  Speech: responds to questions minimally/briefly  Mood: Guarded  Affect: blunted  Thought Process: Unable to Assess  Associations: Unable to Assess  Thought Content and Perceptions: Unable to Assess  Recent and Remote Memory: Unable to Assess; per interview/observation with patient  Attention and Concentration: impaired; per interview/observation with patient  Fund of Knowledge: vocabulary appropriate; based on history, vocabulary, fund of knowledge, syntax, grammar, and content  Insight: limited; based on understanding of severity of illness and HPI  Judgment: limited; based on patient's behavior and HPI        ASSESSMENT/PLAN:   Diagnoses:  Unspecified cognitive disorder    -65 year-old male with no known psychiatric history per chart review who presents after being found walking around naked and confused. Upon evaluation he displays inattention, disorientation, and appears confused. " Suspect major neurocognitive disorder due HIV infection thought differential includes hypoactive delirium, major depressive disorder, or a psychotic disorder.       Problem lists and Management Plans:  No medication recommendations at this time.  Recommend delirium precautions  PEC not recommended at this time. Low risk of imminent harm to self or others.  Psychiatry will continue to follow      Sb Martel

## 2025-06-24 NOTE — PROGRESS NOTES
[facility]  Hospital Medicine Progress Note        Chief Complaint: Inpatient Follow-up for     HPI: Anne Terry is a 65 y.o. 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 The patient presented to [facility] on 6/21/2025 with a primary complaint of failure to thrive. Patient was brought to OSH ED found walking around naked and confused. He also likely has dementia. Patient says that he lives by himself in [place] but is unable to explain why he went to the ER or why he is now in the hospital but say he had no choice. He has not been taking his medications. Pt had a CT scans done at OSH, results to be uploaded. He had a hospital admission in March 2025 for treatment of HIV and MAC, a punch biopsy of BLLE that was negative for malignancy, and an Ophthalmology evaluation. He has a history of non-compliance and missing several follow up appointments. Denies fevers, chills, sweats, SOB, N/V/D, dysuria, new rashes. Does have oral thrush.    Interval Hx: Has not been taking his PO medications per RN, tolerating food.  Appears to be trying to disimpact himself    Case was discussed with patient's nurse and  on the floor.    Objective/physical exam:  General: In no acute distress, afebrile  Chest: Clear to auscultation bilaterally  Oral thrush improving  Heart: RRR, +S1, S2, no appreciable murmur  Abdomen: Soft, nontender, BS +  MSK: Warm, no lower extremity edema, no clubbing or cyanosis  Neurologic: Alert and oriented x4, Cranial nerve II-XII intact, Strength 5/5 in all 4 extremities    VITAL SIGNS: 24 HRS MIN & MAX LAST   Temp  Min: 97.5 °F (36.4 °C)  Max: 99.3 °F (37.4 °C) 97.5 °F (36.4 °C)   BP  Min: 126/70  Max: 151/91 (!) 149/83   Pulse  Min: 61  Max: 70  61   Resp  Min: 18  Max: 18 18   SpO2  Min: 94 %  Max: 100 % 100 %     I have reviewed the following labs:  Recent Labs   Lab 06/22/25  0957  06/23/25  0849 06/24/25  0603   WBC 4.27* 3.18* 3.61*   RBC 3.11* 2.95* 2.65*   HGB 8.6* 8.1* 7.2*   HCT 27.7* 26.3* 23.5*   MCV 89.1 89.2 88.7   MCH 27.7 27.5 27.2   MCHC 31.0* 30.8* 30.6*   RDW 15.8 15.8 16.0   * 115* 121*   MPV 12.4* 11.2* 11.7*     Recent Labs   Lab 06/21/25  1609 06/22/25  0130 06/23/25  0849 06/24/25  0603    138 135* 138   K 4.2 4.2 3.9 4.2   * 108* 106 106   CO2 24 23 23 24   BUN 19.6 20.5 19.1 17.9   CREATININE 1.04 1.01 1.21 1.19   GLU 84 70* 85 66*   CALCIUM 7.5* 8.0* 7.9* 7.8*   MG 1.70  --   --   --    ALBUMIN 2.2* 2.2*  --   --    PROT 7.6 8.2*  --   --    ALKPHOS 883* 941*  --   --    ALT 23 24  --   --    AST 48* 55*  --   --    BILITOT 0.4 0.4  --   --      Microbiology Results (last 7 days)       Procedure Component Value Units Date/Time    Blood Culture [[id number]]  (Normal) Collected: 06/21/25 2356    Order Status: Completed Specimen: Blood Updated: 06/24/25 0205     Blood Culture No Growth At 48 Hours    Blood Culture [[id number]]  (Normal) Collected: 06/21/25 2356    Order Status: Completed Specimen: Blood Updated: 06/24/25 0205     Blood Culture No Growth At 48 Hours             See below for Radiology    Assessment/Plan:  Advanced HIV  MAC Infection  Lung Mass  Oral Candidiasis  Elevated Alk Phos 800s  Likely dementia  Hx history of disseminated histoplasmosis, PJP,  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  In the meanwhile recommend getting Lumbar puncture to help assess if continues with cryptococcal meningitis- and needs to restart induction tx (CSF analysis, routine, AFB, and fungal cultures and stains. ME PCR panel, crypto ag. VDRL, LEAH)      Continue disseminated MAC medication treatment with azithromycin 500 mg daily ethambutol 15 mg/kg every 24 hours plus rifampin 300 mg every 24 hours- patient is having a hard time tolerating the medications     Continue with fluconazole- we will change to IV, as patient  unable to tolerate his oral medications.  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   Supportive measures and nutritional support  Poor overall prognosis and high risk of mortality due to underlying infections  Palliative care consultation     - He will need MRI brain to eval encephalopathy  - Would benefit from a Psychiatry evaluation when clinically improved, also given prolonged history of non-compliance without family support and cognitive decline will need to consider Palliative care input during this admission as well  - F/u CT C/A/P  - MRI brain wwo is pending    VTE prophylaxis: Lovenox    Patient condition:  Fair    Anticipated discharge and Disposition:         All diagnosis and differential diagnosis have been reviewed; assessment and plan has been documented; I have personally reviewed the labs and test results that are presently available; I have reviewed the patients medication list; I have reviewed the consulting providers response and recommendations. I have reviewed or attempted to review medical records based upon their availability    All of the patient's questions have been  addressed and answered. Patient's is agreeable to the above stated plan. I will continue to monitor closely and make adjustments to medical management as needed.    Portions of this note dictated using EMR integrated voice recognition software, and may be subject to voice recognition errors not corrected at proofreading. Please contact writer for clarification if needed.   _____________________________________________________________________    Malnutrition Status:  Nutrition consulted. Most recent weight and BMI  monitored-     Measurements:  Wt Readings from Last 1 Encounters:   06/21/25 71 kg (156 lb 8.4 oz)   Body mass index is 21.22 kg/m².    Patient has been screened and assessed by RD.    Malnutrition Type:  Context: chronic illness  Level: severe    Malnutrition Characteristic Summary:  Weight Loss (Malnutrition): other (see comments) (Does not meet criteria)  Subcutaneous Fat (Malnutrition): mild depletion  Muscle Mass (Malnutrition): moderate depletion    Interventions/Recommendations (treatment strategy):  Oral diet/nutrient modifications     Scheduled Med:   azithromycin  500 mg Oral Daily    enoxparin  40 mg Subcutaneous Q24H (prophylaxis, 1700)    ethambutoL  800 mg Oral Daily    ferrous sulfate  1 tablet Oral Daily    fluconazole (DIFLUCAN) IV (PEDS and ADULTS)  400 mg Intravenous Q24H    mupirocin   Nasal BID    rifAMpin  300 mg Oral Daily    sulfamethoxazole-trimethoprim 800-160mg  1 tablet Oral BID      Continuous Infusions:       PRN Meds:    Current Facility-Administered Medications:     acetaminophen, 650 mg, Oral, Q6H PRN    dextrose 50%, 12.5 g, Intravenous, PRN    dextrose 50%, 25 g, Intravenous, PRN    glucagon (human recombinant), 1 mg, Intramuscular, PRN    glucose, 16 g, Oral, PRN    glucose, 24 g, Oral, PRN    naloxone, 0.02 mg, Intravenous, PRN    ondansetron, 4 mg, Intravenous, Q4H PRN    prochlorperazine, 5 mg, Intravenous, Q6H PRN    sodium chloride 0.9%, 10 mL, Intravenous, PRN     Radiology:  I have personally reviewed the following imaging and agree with the radiologist.     CT Chest Abdomen Pelvis With IV Contrast (XPD) NO Oral Contrast  Narrative: EXAMINATION:  CT CHEST ABDOMEN PELVIS WITH IV CONTRAST (XPD)    CLINICAL HISTORY:  Sepsis;    Anne Terry is a 65 y.o. 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 The patient presented to [facility] on 6/21/2025 with a primary complaint of failure to thrive. Patient was  brought to OSH ED found walking around naked and confused. He also likely has dementia. Patient says that he lives by himself in [place] but is unable to explain why he went to the ER or why he is now in the hospital but say he had no choice. He has not been taking his medications. Pt had a CT scans done at OSH, results to be uploaded. He had a hospital admission in March 2025 for treatment of HIV and MAC, a punch biopsy of BLLE that was negative for malignancy, and an Ophthalmology evaluation. He has a history of non-compliance and missing several follow up appointments. Denies fevers, chills, sweats, SOB, N/V/D, dysuria, new rashes. Does have oral thrush    TECHNIQUE:  Helical axial images are acquired through the chest, abdomen and pelvis after the IV administration 100 mL of Omnipaque 350.  Coronal sagittal reconstructions were performed.  Dose automated exposure control, dose radiation lowering technique was utilized.    COMPARISON:  CT chest, abdomen pelvis from 02/27/2025    Outside CT of the chest from 06/19/2025    Outside CT of the abdomen and pelvis from 06/19/2025    FINDINGS:  CHEST:    Partially calcified right thyroid nodule measures 7 mm.  Otherwise, unremarkable thoracic inlet.  Heart size is mildly enlarged.  No filling defects in the central pulmonary arteries.  Mild mediastinal lymphadenopathy similar compared to 02/27/2025.    Appearance of the lungs is similar with scattered micronodular pattern at the dependent portion of both lungs, right more than left.  Tree-in-bud pattern is identified.  No interval change since 02/27/2025.    ABDOMEN/PELVIS:    Gallbladder is contracted.  The liver, spleen, pancreas, adrenal glands and kidneys appear normal.  No hydronephrosis.    Small hiatal hernia.  Small gastroesophageal varices are present.  Patent portal vein.    Mild abdominopelvic lymphadenopathy is present.  Abdominal lymphadenopathy is slightly worse compared to 02/27/2025.  Index right  periaortic lymph node (image 124, series 3) measures 1.3 cm short axis dimension.  Lymph node previously measured 0.9 cm on the prior.    No ascites.  No suspicious peritoneal nodule.  Unremarkable bladder.    The bowel is nonobstructed.  Normal appendix is present.  Air and stool are present throughout the colon.  Mild atherosclerosis of the abdominal aorta and its branches.    BONES AND SOFT TISSUES:    No suspicious osteolytic or osteoblastic lesion.  Impression: 1. Multifocal micronodular pattern at the dependent portion of both lungs, similar extents 02/27/2025.  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  2. Stable thoracic lymphadenopathy.  3. Slight interval increase in size and extent of abdominopelvic lymphadenopathy.    Electronically signed by: Sg Rogel MD  Date:    06/23/2025  Time:    10:20      Dara Horne MD  Department of Hospital Medicine   [facility]   06/24/2025

## 2025-06-25 LAB
ANION GAP SERPL CALC-SCNC: 5 MEQ/L
BASOPHILS # BLD AUTO: 0.01 X10(3)/MCL
BASOPHILS NFR BLD AUTO: 0.2 %
BUN SERPL-MCNC: 19.7 MG/DL (ref 8.4–25.7)
CALCIUM SERPL-MCNC: 8 MG/DL (ref 8.8–10)
CHLORIDE SERPL-SCNC: 109 MMOL/L (ref 98–107)
CO2 SERPL-SCNC: 23 MMOL/L (ref 23–31)
CREAT SERPL-MCNC: 1.38 MG/DL (ref 0.72–1.25)
CREAT/UREA NIT SERPL: 14
EOSINOPHIL # BLD AUTO: 0.07 X10(3)/MCL (ref 0–0.9)
EOSINOPHIL NFR BLD AUTO: 1.7 %
ERYTHROCYTE [DISTWIDTH] IN BLOOD BY AUTOMATED COUNT: 15.9 % (ref 11.5–17)
GFR SERPLBLD CREATININE-BSD FMLA CKD-EPI: 57 ML/MIN/1.73/M2
GLUCOSE SERPL-MCNC: 67 MG/DL (ref 82–115)
HCT VFR BLD AUTO: 25.6 % (ref 42–52)
HGB BLD-MCNC: 7.9 G/DL (ref 14–18)
IMM GRANULOCYTES # BLD AUTO: 0.07 X10(3)/MCL (ref 0–0.04)
IMM GRANULOCYTES NFR BLD AUTO: 1.7 %
INR PPP: 1
LYMPHOCYTES # BLD AUTO: 0.47 X10(3)/MCL (ref 0.6–4.6)
LYMPHOCYTES NFR BLD AUTO: 11.2 %
MCH RBC QN AUTO: 27.4 PG (ref 27–31)
MCHC RBC AUTO-ENTMCNC: 30.9 G/DL (ref 33–36)
MCV RBC AUTO: 88.9 FL (ref 80–94)
MONOCYTES # BLD AUTO: 0.2 X10(3)/MCL (ref 0.1–1.3)
MONOCYTES NFR BLD AUTO: 4.8 %
NEUTROPHILS # BLD AUTO: 3.37 X10(3)/MCL (ref 2.1–9.2)
NEUTROPHILS NFR BLD AUTO: 80.4 %
NRBC BLD AUTO-RTO: 0 %
PLATELET # BLD AUTO: 142 X10(3)/MCL (ref 130–400)
PLATELETS.RETICULATED NFR BLD AUTO: 2.3 % (ref 0.9–11.2)
PMV BLD AUTO: 11.1 FL (ref 7.4–10.4)
POTASSIUM SERPL-SCNC: 4.6 MMOL/L (ref 3.5–5.1)
PROTHROMBIN TIME: 13.2 SECONDS (ref 12.5–14.5)
RBC # BLD AUTO: 2.88 X10(6)/MCL (ref 4.7–6.1)
SODIUM SERPL-SCNC: 137 MMOL/L (ref 136–145)
WBC # BLD AUTO: 4.19 X10(3)/MCL (ref 4.5–11.5)

## 2025-06-25 PROCEDURE — 25000003 PHARM REV CODE 250: Performed by: INTERNAL MEDICINE

## 2025-06-25 PROCEDURE — 85610 PROTHROMBIN TIME: CPT | Performed by: INTERNAL MEDICINE

## 2025-06-25 PROCEDURE — 63600175 PHARM REV CODE 636 W HCPCS: Performed by: INTERNAL MEDICINE

## 2025-06-25 PROCEDURE — 99223 1ST HOSP IP/OBS HIGH 75: CPT | Mod: ,,, | Performed by: INTERNAL MEDICINE

## 2025-06-25 PROCEDURE — 11000001 HC ACUTE MED/SURG PRIVATE ROOM

## 2025-06-25 PROCEDURE — 85025 COMPLETE CBC W/AUTO DIFF WBC: CPT | Performed by: INTERNAL MEDICINE

## 2025-06-25 PROCEDURE — 36415 COLL VENOUS BLD VENIPUNCTURE: CPT | Performed by: INTERNAL MEDICINE

## 2025-06-25 PROCEDURE — 80048 BASIC METABOLIC PNL TOTAL CA: CPT | Performed by: INTERNAL MEDICINE

## 2025-06-25 PROCEDURE — 63700000 PHARM REV CODE 250 ALT 637 W/O HCPCS: Performed by: INTERNAL MEDICINE

## 2025-06-25 RX ADMIN — MUPIROCIN: 20 OINTMENT TOPICAL at 08:06

## 2025-06-25 RX ADMIN — SULFAMETHOXAZOLE AND TRIMETHOPRIM 1 TABLET: 800; 160 TABLET ORAL at 08:06

## 2025-06-25 RX ADMIN — Medication 1 EACH: at 09:06

## 2025-06-25 RX ADMIN — AZITHROMYCIN DIHYDRATE 500 MG: 250 TABLET ORAL at 09:06

## 2025-06-25 RX ADMIN — RIFAMPIN 300 MG: 300 CAPSULE ORAL at 09:06

## 2025-06-25 RX ADMIN — ENOXAPARIN SODIUM 40 MG: 40 INJECTION SUBCUTANEOUS at 05:06

## 2025-06-25 RX ADMIN — SULFAMETHOXAZOLE AND TRIMETHOPRIM 1 TABLET: 800; 160 TABLET ORAL at 09:06

## 2025-06-25 RX ADMIN — ETHAMBUTOL HYDROCHLORIDE 800 MG: 400 TABLET ORAL at 09:06

## 2025-06-25 RX ADMIN — FLUCONAZOLE 400 MG: 2 INJECTION, SOLUTION INTRAVENOUS at 05:06

## 2025-06-25 NOTE — PROGRESS NOTES
[facility] - 5th Floor Med Surg  Infectious Disease  Progress Note    Patient Name: Anne Terry  MRN: [id number]  Admission Date: 6/21/2025  Hospital Length of Stay: 4 days  Attending Physician: Dara Horne MD  Primary Care Provider: No primary care provider on file.     Isolation Status: No active isolations    Reason for consultation  HIV management      Assessment/Plan:       # Failure to thrive and generalized weakness /metabolic encephalopathy    # HIV/AIDS with history of medical noncompliance  -antiviral therapy regimen unclear  -patient has not been taking medications for an extended period of time  -currently holding antiviral therapy to avoid IRIS  -CD4 count 37 as of 11.2024  -VL 122k as of 2.2025    # Recent history of disseminated MAC  -currently on azithromycin rifabutin and ethambutol  - CT c/a/p- 6/23 with no new findings, but increased LDN    # 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  Negative for AFB    Recommendations     Still awaiting MRI brain and Lumbar puncture to help assess for persists cryptococcal meningitis- more than likely we will need to restart induction tx (CSF analysis, routine, AFB, and fungal cultures and stains. ME PCR panel, crypto ag. VDRL, LEAH)     Continue disseminated MAC medication treatment with azithromycin 500 mg daily ethambutol 15 mg/kg every 24 hours plus rifampin 300 mg every 24 hours-  patient is having a hard time tolerating the medications.  Hopefully patient can tolerate    Continue with fluconazole- IV, as patient unable to tolerate his oral medications.  Previously was on for consolidation phase of recent cryptococcal meningitis- will more than likely restart AmBisome/flucytosine    Continue Bactrim DS for PCP/PJP     Holding antiviral therapy to avoid IRIS  Pending CD4 and Viral Load, follow up blood cx     Supportive measures and nutritional support  Poor overall prognosis and high risk of mortality due to underlying infections  Appreciate Palliative care consultation- family have opted to continue with full care at this time, DNR DNI    ID will follow along      Travis Krause MD  Infectious Disease  [facility] - 5th Floor Med Surg    Subjective:   Did not have many answers today.  Denied with shaking head no to all questions.  Per caregiver, has been more quiet     ______________________________________________    HPI:     The Pt is a 65 y.o. 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 The patient presented to [facility] on 6/21/2025 with a primary complaint of failure to thrive. Patient was brought to OSH ED found walking around naked and confused. He also likely has dementia. Patient says that he lives by himself in [place] but is unable to explain why he went to the ER or why he is now in the hospital but say he had no choice. He has not been taking his medications. Pt had a CT scans done at OSH, results to be uploaded. He had a hospital admission in March 2025 for treatment of HIV and MAC, a punch biopsy of BLLE that was negative for malignancy, and an Ophthalmology evaluation. He has a history of non-compliance and missing several follow up appointments. Denies fevers, chills, sweats, SOB, N/V/D, dysuria, new rashes. Does have oral thrush.     Medications:  Medications Prior to Admission    Medication Sig    abacavir (ZIAGEN) 300 mg Tab Take 1 tablet (300 mg total) by mouth once daily.    azithromycin (ZITHROMAX) 500 MG tablet Take 1 tablet (500 mg total) by mouth once daily.    darunavir (PREZISTA) 800 mg Tab Take 1 tablet (800 mg total) by mouth daily with breakfast.    emtricitabine-tenofovir 200-300 mg (TRUVADA) 200-300 mg Tab Take 1 tablet by mouth once daily.    ethambutoL (MYAMBUTOL) 400 MG Tab Take 2 tablets (800 mg total) by mouth once daily.    ferrous sulfate 325 (65 FE) MG EC tablet Take 1 tablet (325 mg total) by mouth once daily.    rifabutin (MYCOBUTIN) 150 mg Cap Take 1 capsule (150 mg total) by mouth once daily.    ritonavir (NORVIR) 100 mg Tab tablet Take 1 tablet (100 mg total) by mouth once daily.     Antibiotics (From admission, onward)      Start     Stop Route Frequency Ordered    06/22/25 1015  rifAMpin capsule 300 mg         -- Oral Daily 06/22/25 0907    06/22/25 1015  azithromycin tablet 500 mg         07/06/25 0859 Oral Daily 06/22/25 0907    06/22/25 1015  ethambutoL tablet 800 mg         07/06/25 0859 Oral Daily 06/22/25 0907    06/22/25 1015  sulfamethoxazole-trimethoprim 800-160mg per tablet 1 tablet         07/06/25 0859 Oral 2 times daily 06/22/25 0908    06/21/25 2100  mupirocin 2 % ointment         06/26/25 2059 Nasl 2 times daily 06/21/25 1549          Antifungals (From admission, onward)      Start     Stop Route Frequency Ordered    06/24/25 1730  fluconazole (DIFLUCAN) IVPB 400 mg         -- IV Every 24 hours (non-standard times) 06/24/25 1615          Antivirals (From admission, onward)      None             Immunization History   Administered Date(s) Administered    Influenza - Quadrivalent 11/16/2020, 11/08/2021    Influenza - Quadrivalent - PF (6-35 months) 12/12/2018    Influenza - Quadrivalent - PF *Preferred* (6 months and older) 11/07/2019    Influenza - Trivalent - Fluarix, Flulaval, Fluzone, Afluria - PF 10/09/2017    Meningococcal Conjugate (MCV4P)  09/11/2018, 05/07/2019    Pneumococcal Polysaccharide - 23 Valent 01/19/2021    Zoster Recombinant 11/16/2020, 07/28/2021        Objective:     Vital Signs (Most Recent):  Temp: 98.4 °F (36.9 °C) (06/25/25 0727)  Pulse: 64 (06/25/25 0727)  Resp: 18 (06/25/25 0727)  BP: 133/73 (06/25/25 0727)  SpO2: 97 % (06/25/25 0727) Vital Signs (24h Range):  Temp:  [97.5 °F (36.4 °C)-99.8 °F (37.7 °C)] 98.4 °F (36.9 °C)  Pulse:  [58-68] 64  Resp:  [16-18] 18  SpO2:  [96 %-100 %] 97 %  BP: (129-149)/(65-83) 133/73     Weight: 71 kg (156 lb 8.4 oz)  Body mass index is 21.22 kg/m².    Estimated Creatinine Clearance: 55 mL/min (A) (based on SCr of 1.38 mg/dL (H)).    Physical Exam:   General Appearance:  Emaciated, thin male, lying in bed, not very interactive today.  Nods head to questions..  On 1 on 1 watch  HEENT (Head, Eyes, Ears, Nose and Throat): Normocephalic, Nontraumatic.  Dry oral mucosa  Cardiovascular: Regular Rate and Rhythm  Respiratory:  On room air, in no distress

## 2025-06-25 NOTE — PROGRESS NOTES
[facility]  Hospital Medicine Progress Note        Chief Complaint: Inpatient Follow-up for     HPI: Anne Terry is a 65 y.o. 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 The patient presented to [facility] on 6/21/2025 with a primary complaint of failure to thrive. Patient was brought to OSH ED found walking around naked and confused. He also likely has dementia. Patient says that he lives by himself in [place] but is unable to explain why he went to the ER or why he is now in the hospital but say he had no choice. He has not been taking his medications. Pt had a CT scans done at OSH, results to be uploaded. He had a hospital admission in March 2025 for treatment of HIV and MAC, a punch biopsy of BLLE that was negative for malignancy, and an Ophthalmology evaluation. He has a history of non-compliance and missing several follow up appointments. Denies fevers, chills, sweats, SOB, N/V/D, dysuria, new rashes. Does have oral thrush.    Interval Hx:  Today is about to eat lunch, says that the chicken smells good. When asked about LP and MRI brain and nods yes when asked if he would like to get one.      Case was discussed with patient's nurse and  on the floor.    Objective/physical exam:  General: In no acute distress, afebrile  Chest: Clear to auscultation bilaterally  Oral thrush improving  Heart: RRR, +S1, S2, no appreciable murmur  Abdomen: Soft, nontender, BS +  MSK: Warm, no lower extremity edema, no clubbing or cyanosis  Neurologic: Alert and oriented x4, Cranial nerve II-XII intact, Strength 5/5 in all 4 extremities    VITAL SIGNS: 24 HRS MIN & MAX LAST   Temp  Min: 97.6 °F (36.4 °C)  Max: 99.8 °F (37.7 °C) 99.1 °F (37.3 °C)   BP  Min: 125/63  Max: 148/76 125/63   Pulse  Min: 58  Max: 68  68   Resp  Min: 18  Max: 18 18   SpO2  Min: 96 %  Max: 100 % 100 %     I have reviewed the following  labs:  Recent Labs   Lab 06/23/25  0849 06/24/25  0603 06/25/25  0433   WBC 3.18* 3.61* 4.19*   RBC 2.95* 2.65* 2.88*   HGB 8.1* 7.2* 7.9*   HCT 26.3* 23.5* 25.6*   MCV 89.2 88.7 88.9   MCH 27.5 27.2 27.4   MCHC 30.8* 30.6* 30.9*   RDW 15.8 16.0 15.9   * 121* 142   MPV 11.2* 11.7* 11.1*     Recent Labs   Lab 06/21/25  1609 06/22/25  0130 06/23/25  0849 06/24/25  0603 06/25/25  0433    138 135* 138 137   K 4.2 4.2 3.9 4.2 4.6   * 108* 106 106 109*   CO2 24 23 23 24 23   BUN 19.6 20.5 19.1 17.9 19.7   CREATININE 1.04 1.01 1.21 1.19 1.38*   GLU 84 70* 85 66* 67*   CALCIUM 7.5* 8.0* 7.9* 7.8* 8.0*   MG 1.70  --   --   --   --    ALBUMIN 2.2* 2.2*  --   --   --    PROT 7.6 8.2*  --   --   --    ALKPHOS 883* 941*  --   --   --    ALT 23 24  --   --   --    AST 48* 55*  --   --   --    BILITOT 0.4 0.4  --   --   --      Microbiology Results (last 7 days)       Procedure Component Value Units Date/Time    Blood Culture [[id number]]  (Normal) Collected: 06/21/25 2356    Order Status: Completed Specimen: Blood Updated: 06/25/25 0204     Blood Culture No Growth At 72 Hours    Blood Culture [[id number]]  (Normal) Collected: 06/21/25 2356    Order Status: Completed Specimen: Blood Updated: 06/25/25 0204     Blood Culture No Growth At 72 Hours    Cryptococcal antigen, CSF [[id number]]     Order Status: Sent Specimen: CSF (Spinal Fluid)     AFB Smear [[id number]]     Order Status: Sent Specimen: CSF (Spinal Fluid) from Cerebrospinal Fluid     Mycobacteria and Nocardia Culture [[id number]]     Order Status: Sent Specimen: CSF (Spinal Fluid) from Cerebrospinal Fluid     Cerebrospinal Fluid Culture [[id number]]     Order Status: Sent Specimen: CSF (Spinal Fluid) from Cerebrospinal Fluid     Fungal Culture [[id number]]     Order Status: Sent Specimen: CSF (Spinal Fluid)              See below for Radiology    Assessment/Plan:  Advanced HIV  MAC Infection  Lung Mass  Oral Candidiasis  Elevated Alk Phos 800s  Likely  dementia  Hx history of disseminated histoplasmosis, PJP,  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  In the meanwhile recommend getting Lumbar puncture to help assess if continues with cryptococcal meningitis- and needs to restart induction tx (CSF analysis, routine, AFB, and fungal cultures and stains. ME PCR panel, crypto ag. VDRL, LEAH)      Continue disseminated MAC medication treatment with azithromycin 500 mg daily ethambutol 15 mg/kg every 24 hours plus rifampin 300 mg every 24 hours- patient is having a hard time tolerating the medications     Continue with fluconazole- we will change to IV, as patient unable to tolerate his oral medications.  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 They would like to make code status DNR and continue to pursue work up to look for treatable causes  - CD4 Ct is 21 HIV ,288 copies/mL  - MRI brain wwo is pending  - LP requested, labs ordered  - Will need PT/OT eval    VTE prophylaxis: Lovenox    Patient condition:  Fair    Anticipated discharge and Disposition:   Pending LP, MRI brain, likely will need placement after work up is complete      All diagnosis and differential diagnosis have been reviewed;  assessment and plan has been documented; I have personally reviewed the labs and test results that are presently available; I have reviewed the patients medication list; I have reviewed the consulting providers response and recommendations. I have reviewed or attempted to review medical records based upon their availability    All of the patient's questions have been  addressed and answered. Patient's is agreeable to the above stated plan. I will continue to monitor closely and make adjustments to medical management as needed.    Portions of this note dictated using EMR integrated voice recognition software, and may be subject to voice recognition errors not corrected at proofreading. Please contact writer for clarification if needed.   _____________________________________________________________________    Malnutrition Status:  Nutrition consulted. Most recent weight and BMI monitored-     Measurements:  Wt Readings from Last 1 Encounters:   06/21/25 71 kg (156 lb 8.4 oz)   Body mass index is 21.22 kg/m².    Patient has been screened and assessed by RD.    Malnutrition Type:  Context: chronic illness  Level: severe    Malnutrition Characteristic Summary:  Weight Loss (Malnutrition): other (see comments) (Does not meet criteria)  Subcutaneous Fat (Malnutrition): mild depletion  Muscle Mass (Malnutrition): moderate depletion    Interventions/Recommendations (treatment strategy):  Oral diet/nutrient modifications     Scheduled Med:   azithromycin  500 mg Oral Daily    enoxparin  40 mg Subcutaneous Q24H (prophylaxis, 1700)    ethambutoL  800 mg Oral Daily    ferrous sulfate  1 tablet Oral Daily    fluconazole (DIFLUCAN) IV (PEDS and ADULTS)  400 mg Intravenous Q24H    mupirocin   Nasal BID    polyethylene glycol  17 g Oral Daily    rifAMpin  300 mg Oral Daily    sulfamethoxazole-trimethoprim 800-160mg  1 tablet Oral BID      Continuous Infusions:       PRN Meds:    Current Facility-Administered Medications:      acetaminophen, 650 mg, Oral, Q6H PRN    dextrose 50%, 12.5 g, Intravenous, PRN    dextrose 50%, 25 g, Intravenous, PRN    glucagon (human recombinant), 1 mg, Intramuscular, PRN    glucose, 16 g, Oral, PRN    glucose, 24 g, Oral, PRN    naloxone, 0.02 mg, Intravenous, PRN    ondansetron, 4 mg, Intravenous, Q4H PRN    prochlorperazine, 5 mg, Intravenous, Q6H PRN    sodium chloride 0.9%, 10 mL, Intravenous, PRN     Radiology:  I have personally reviewed the following imaging and agree with the radiologist.     CT Head Without Contrast  Narrative: EXAMINATION:  CT HEAD WITHOUT CONTRAST    CLINICAL HISTORY:  Mental status change, unknown cause;    TECHNIQUE:  Multiple axial images were obtained from the base of the brain to the vertex without contrast administration.  Sagittal and coronal reconstructions were performed..Automatic exposure control is utilized to reduce patient radiation exposure.    COMPARISON:  06/09/2015    FINDINGS:  There is no intracranial mass or lesion seen.  No hemorrhage is seen.  No acute infarct is seen.  There is some cerebral atrophy seen.  There is some compensatory ventricular dilatation and periventricular white matter changes consistent with the patient's age.  Calvarium is intact.  The posterior fossa is unremarkable..  The visualized portions of the paranasal sinuses show no acute abnormality.  Impression: Chronic age-related changes.  No acute process    Electronically signed by: Ankush Gordon  Date:    06/24/2025  Time:    18:36      Dara Horne MD  Department of Hospital Medicine   [facility]   06/25/2025

## 2025-06-25 NOTE — PLAN OF CARE
Problem: Adult Inpatient Plan of Care  Goal: Plan of Care Review  Outcome: Progressing  Goal: Absence of Hospital-Acquired Illness or Injury  Outcome: Progressing  Goal: Optimal Comfort and Wellbeing  Outcome: Progressing  Goal: Readiness for Transition of Care  Outcome: Progressing     Problem: Wound  Goal: Optimal Coping  Outcome: Progressing  Goal: Optimal Functional Ability  Outcome: Progressing  Goal: Absence of Infection Signs and Symptoms  Outcome: Progressing  Goal: Improved Oral Intake  Outcome: Progressing  Goal: Optimal Pain Control and Function  Outcome: Progressing  Goal: Skin Health and Integrity  Outcome: Progressing  Goal: Optimal Wound Healing  Outcome: Progressing     Problem: Coping Ineffective  Goal: Effective Coping  Outcome: Progressing

## 2025-06-25 NOTE — PLAN OF CARE
Problem: Adult Inpatient Plan of Care  Goal: Plan of Care Review  Outcome: Progressing  Goal: Patient-Specific Goal (Individualized)  Outcome: Progressing  Goal: Absence of Hospital-Acquired Illness or Injury  Outcome: Progressing  Goal: Optimal Comfort and Wellbeing  Outcome: Progressing  Goal: Readiness for Transition of Care  Outcome: Progressing     Problem: Wound  Goal: Optimal Coping  Outcome: Progressing  Goal: Optimal Functional Ability  Outcome: Progressing  Goal: Absence of Infection Signs and Symptoms  Outcome: Progressing  Goal: Improved Oral Intake  Outcome: Progressing  Goal: Optimal Pain Control and Function  Outcome: Progressing  Goal: Skin Health and Integrity  Outcome: Progressing  Goal: Optimal Wound Healing  Outcome: Progressing     Problem: Coping Ineffective  Goal: Effective Coping  Outcome: Progressing

## 2025-06-25 NOTE — CONSULTS
Patient Name: Anne Terry   MRN: [id number]   Admission Date: 6/21/2025   Hospital Length of Stay: 4   Attending Provider: Dara Horne MD   Consulting Provider: Carlos Hanks M.D.  Reason for Consult: Goals of Care  Primary Care Physician: No primary care provider on file.     Principal Problem: <principal problem not specified>     Patient information was obtained from relative(s) and past medical records.      Final diagnoses:  [B20, G93.49] HIV encephalopathy         We reviewed the patient's current clinical status with the nurse. We reviewed clinical documentation, labs and imaging.       Advance Care Planning     Date: 06/25/2025    Code Status  In light of the patients advanced and life limiting illness,I engaged the the family in a voluntary conversation about the patient's preferences for care  at the very end of life. The patient wishes to have a natural, peaceful death.  Along those lines, the patient does not wish to have CPR or other invasive treatments performed when his heart and/or breathing stops. I communicated to the family that a DNR order would be placed in his medical record to reflect this preference.      [facility]  I engaged the family in a voluntary conversation about advance care planning and we specifically addressed what the goals of care would be moving forward, in light of the patient's change in clinical status, specifically advanced HIV with known opportunistic infections.  We did specifically address the patient's likely prognosis, which may be fair to poor.  We explored the patient's values and preferences for future care.  The family endorses that what is most important right now is to focus on symptom/pain control and improvement in condition but with limits to invasive therapies    Accordingly, we have decided that the best plan to meet the patient's goals includes continuing with treatment    A total of 80 min was spent on advance care planning, goals of care  discussion, emotional support, formulating and communicating prognosis and exploring burden/benefit of various approaches of treatment. This discussion occurred on a fully voluntary basis with the verbal consent of the patient and/or family.           Symptom review:    Patient denies pain including abdominal pain, chest pain, headache. Patient denies nausea, vomiting, diarrhea, constipation, SOB, or any discomfort. (Patient is awake and alert but not responding to most questioning, possible intentionally.)    Assessment and Plan:    Goals of care/counseling  Code Status  Advanced HIV with hx of multiple opportunistic infections   Medication noncompliance   -Patient was partially willing to respond/cooperate while awake and alert during exam this morning and was AAOx1. Unable to confirm decision making capacity  -Was able to talk with friend, , and Brother, Hari, today and obtain phone numbers for his four children: Luis F Rodríguez Jr., Jarimiah, and Zarina Terry. Some of his family report that the patient may have another child but that child is roughly 16 years old. Was able to talk to Luis F Rodríguez Jr and Zarina all at once on a conference call. Was able to discuss father's current status, discharge planning, goals of care, and what their father's wishes would be as far as code status among other things. 3 of the 4 known adult children were on this call and agreed that father's living conditions at his home were not suitable and that their father would benefit from eventually discharging to either a relatives home or to another healthcare/living facility. Children were aware of HIV status and also reported patient has history of a GI malignancy. The 3 adult children on call were also able to confirm that patient would want to continue treatment at this time and that he would want anesthesia in his state if recommended to complete any procedures or testing needed after discussing benefits, risks, and  alternatives of anesthesia. The 3 adult children also discussed code status. They were all in full agreement that father would not want resuscitative measures if a code event were to happen, would not want intubation/mechanical ventilation outside of a scheduled medical procedure, and would like to be DNR. Attempted to contact 4th son, Mary Lou. Spoke with Mary Lou's girlfriend since he is currently at work. She states Mary Lou frequently defer's to older brother for their father's medical decisions and that he is aware of his current status. Will continue to attempt to confirm with [place].  Family that has been contacted reports there is no known healthcare power of , living will, advanced directive.  -While on conference call with 3 of 4 adult children, all 3 confirmed that they would like Anne Fleming, the oldest son, to be the direct medical contact going forward. Plan to contact Mary Lou and continue discussion as well.   -Patient has advanced HIV with increased viral load, history of known Oi's, medication noncompliance, and was brought to [facility] with complaint of failure to thrive and confusion at home. Although not fully cooperative during exam, denies symptoms at this time. Continue with management per primary team recommendations     History of Present Illness:     Patient is a 65-year-old male with a past medical history of HIV/AIDS, mac, history of disseminated histoplasmosis, PJP, and cryptococcus.  Son reports that he found patient, who lives alone, at his home naked, confused, covered in feces.  Patient was brought to [address] [place] [address] 06/21/2025 with a primary complaint of failure to thrive.  Previously had a recent admission March 2025 for treatment of HIV and MAC.  He has a history of medication noncompliance in his missed multiple follow-up appointments.  Currently denies fevers, chills, sweats, SOB, nausea vomiting diarrhea, dysuria.  Palliative care was consulted for goals of care  "discussion.      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    Lung mass 02/27/2025     Resolved Ambulatory Problems     Diagnosis Date Noted    No Resolved Ambulatory Problems     No Additional Past Medical History        Past Surgical History:   Procedure Laterality Date    COLONOSCOPY  05/08/2015        Review of patient's allergies indicates:  No Known Allergies     Current Medications[1]       Current Facility-Administered Medications:     acetaminophen, 650 mg, Oral, Q6H PRN    dextrose 50%, 12.5 g, Intravenous, PRN    dextrose 50%, 25 g, Intravenous, PRN    glucagon (human recombinant), 1 mg, Intramuscular, PRN    glucose, 16 g, Oral, PRN    glucose, 24 g, Oral, PRN    naloxone, 0.02 mg, Intravenous, PRN    ondansetron, 4 mg, Intravenous, Q4H PRN    prochlorperazine, 5 mg, Intravenous, Q6H PRN    sodium chloride 0.9%, 10 mL, Intravenous, PRN     Family History   Problem Relation Name Age of Onset    No Known Problems Mother      No Known Problems Father      Cancer Sister      No Known Problems Brother            12 point review of systems conducted, negative except as stated in the history of present illness. See HPI for details.      Objective:   /81 (BP Location: Right arm, Patient Position: Lying)   Pulse (!) 58   Temp 98.4 °F (36.9 °C) (Oral)   Resp 18   Ht 6' 0.01" (1.829 m)   Wt 71 kg (156 lb 8.4 oz)   SpO2 97%   BMI 21.22 kg/m²      Physical Exam  Vitals reviewed.   Constitutional:       Appearance: He is ill-appearing.   HENT:      Head: Normocephalic.      Mouth/Throat:      Comments: Patient was not willing to open mouth during exam.  Eyes:      General: No " scleral icterus.  Neck:      Vascular: No carotid bruit.   Cardiovascular:      Rate and Rhythm: Normal rate and regular rhythm.      Heart sounds:      No friction rub.   Pulmonary:      Effort: Pulmonary effort is normal.      Comments: Coarse cough was noted, otherwise could not appreciate abnormal breath sounds.  Abdominal:      General: Abdomen is flat. Bowel sounds are normal.   Musculoskeletal:         General: No swelling. Normal range of motion.      Cervical back: Normal range of motion.      Right lower leg: No edema.      Left lower leg: No edema.   Skin:     General: Skin is warm and dry.      Coloration: Skin is not jaundiced.   Neurological:      Mental Status: He is alert.      Comments: Alert and oriented x1.  While awake and alert and able to confirm his name, patient was unable to confirm with me that he was aware that he was in the hospital, what his birthday was, what city we were in.   Psychiatric:         Speech: He is noncommunicative.      Comments: Patient was partially cooperative during exam.          Advance Care Planning   Advance Directives:   Living Will: No    [place]: No    Do Not Resuscitate: Yes, plan to discuss with attending physician and change code status to DNR.  Plan to also attempt to contact last adult child.    Medical Power of : No      Decision Making:  Family answered questions  Goals of Care: The family endorses that what is most important right now is to focus on improvement in condition but with limits to invasive therapies    Accordingly, we have decided that the best plan to meet the patient's goals includes continuing with treatment            Mahesh Bustos DO  [employer], Internal Medicine, PGY-I           [1]   Current Facility-Administered Medications:     acetaminophen tablet 650 mg, 650 mg, Oral, Q6H PRN, Dara Horne MD    azithromycin tablet 500 mg, 500 mg, Oral, Daily, Supa Eubanks DO, 500 mg at 06/25/25 0916    dextrose 50% injection 12.5 g,  12.5 g, Intravenous, PRN, Joo Garcia FNP    dextrose 50% injection 25 g, 25 g, Intravenous, PRN, Joo Garcia, GORDOP    enoxaparin injection 40 mg, 40 mg, Subcutaneous, Q24H (prophylaxis, 1700), Dara Horne MD, 40 mg at 06/24/25 1429    ethambutoL tablet 800 mg, 800 mg, Oral, Daily, Supa Eubanks DO, 800 mg at 06/25/25 0916    ferrous sulfate tablet 1 each, 1 tablet, Oral, Daily, Dara Horne MD, 1 each at 06/25/25 0917    fluconazole (DIFLUCAN) IVPB 400 mg, 400 mg, Intravenous, Q24H, Travis Krause MD, Stopped at 06/24/25 2113    glucagon (human recombinant) injection 1 mg, 1 mg, Intramuscular, PRN, Joo Garcia FNP    glucose chewable tablet 16 g, 16 g, Oral, PRN, Joo Garcia FNP    glucose chewable tablet 24 g, 24 g, Oral, PRN, Joo Garcia, GORDOP    mupirocin 2 % ointment, , Nasal, BID, Dara Honre MD, Given at 06/24/25 2049    naloxone 0.4 mg/mL injection 0.02 mg, 0.02 mg, Intravenous, PRN, Joo Garcia, MARCE    ondansetron injection 4 mg, 4 mg, Intravenous, Q4H PRN, Joo Garcia FNP, 4 mg at 06/24/25 1413    polyethylene glycol packet 17 g, 17 g, Oral, Daily, Dara Horne MD    prochlorperazine injection Soln 5 mg, 5 mg, Intravenous, Q6H PRN, Joo Garcia FNP    rifAMpin capsule 300 mg, 300 mg, Oral, Daily, Supa Eubanks DO, 300 mg at 06/25/25 0916    sodium chloride 0.9% flush 10 mL, 10 mL, Intravenous, PRN, Joo Garcia, GORDOP    sulfamethoxazole-trimethoprim 800-160mg per tablet 1 tablet, 1 tablet, Oral, BID, Supa Eubanks DO, 1 tablet at 06/25/25 0916

## 2025-06-26 LAB
ANION GAP SERPL CALC-SCNC: 6 MEQ/L
BASOPHILS # BLD AUTO: 0.01 X10(3)/MCL
BASOPHILS NFR BLD AUTO: 0.2 %
BUN SERPL-MCNC: 23.6 MG/DL (ref 8.4–25.7)
CALCIUM SERPL-MCNC: 7.9 MG/DL (ref 8.8–10)
CHLORIDE SERPL-SCNC: 108 MMOL/L (ref 98–107)
CO2 SERPL-SCNC: 22 MMOL/L (ref 23–31)
CREAT SERPL-MCNC: 1.67 MG/DL (ref 0.72–1.25)
CREAT/UREA NIT SERPL: 14
EOSINOPHIL # BLD AUTO: 0.04 X10(3)/MCL (ref 0–0.9)
EOSINOPHIL NFR BLD AUTO: 1 %
ERYTHROCYTE [DISTWIDTH] IN BLOOD BY AUTOMATED COUNT: 15.9 % (ref 11.5–17)
GFR SERPLBLD CREATININE-BSD FMLA CKD-EPI: 46 ML/MIN/1.73/M2
GLUCOSE SERPL-MCNC: 72 MG/DL (ref 82–115)
HCT VFR BLD AUTO: 23.7 % (ref 42–52)
HGB BLD-MCNC: 7.4 G/DL (ref 14–18)
IMM GRANULOCYTES # BLD AUTO: 0.08 X10(3)/MCL (ref 0–0.04)
IMM GRANULOCYTES NFR BLD AUTO: 2 %
LYMPHOCYTES # BLD AUTO: 0.46 X10(3)/MCL (ref 0.6–4.6)
LYMPHOCYTES NFR BLD AUTO: 11.3 %
MCH RBC QN AUTO: 27.9 PG (ref 27–31)
MCHC RBC AUTO-ENTMCNC: 31.2 G/DL (ref 33–36)
MCV RBC AUTO: 89.4 FL (ref 80–94)
MONOCYTES # BLD AUTO: 0.19 X10(3)/MCL (ref 0.1–1.3)
MONOCYTES NFR BLD AUTO: 4.7 %
NEUTROPHILS # BLD AUTO: 3.29 X10(3)/MCL (ref 2.1–9.2)
NEUTROPHILS NFR BLD AUTO: 80.8 %
NRBC BLD AUTO-RTO: 0 %
PLATELET # BLD AUTO: 132 X10(3)/MCL (ref 130–400)
PLATELETS.RETICULATED NFR BLD AUTO: 2.9 % (ref 0.9–11.2)
PMV BLD AUTO: 11.6 FL (ref 7.4–10.4)
POTASSIUM SERPL-SCNC: 4.5 MMOL/L (ref 3.5–5.1)
RBC # BLD AUTO: 2.65 X10(6)/MCL (ref 4.7–6.1)
SODIUM SERPL-SCNC: 136 MMOL/L (ref 136–145)
WBC # BLD AUTO: 4.07 X10(3)/MCL (ref 4.5–11.5)

## 2025-06-26 PROCEDURE — 85025 COMPLETE CBC W/AUTO DIFF WBC: CPT | Performed by: INTERNAL MEDICINE

## 2025-06-26 PROCEDURE — 25000003 PHARM REV CODE 250: Performed by: INTERNAL MEDICINE

## 2025-06-26 PROCEDURE — 63600175 PHARM REV CODE 636 W HCPCS: Performed by: INTERNAL MEDICINE

## 2025-06-26 PROCEDURE — 63700000 PHARM REV CODE 250 ALT 637 W/O HCPCS: Performed by: INTERNAL MEDICINE

## 2025-06-26 PROCEDURE — 11000001 HC ACUTE MED/SURG PRIVATE ROOM

## 2025-06-26 PROCEDURE — 36415 COLL VENOUS BLD VENIPUNCTURE: CPT | Performed by: INTERNAL MEDICINE

## 2025-06-26 PROCEDURE — 80048 BASIC METABOLIC PNL TOTAL CA: CPT | Performed by: INTERNAL MEDICINE

## 2025-06-26 RX ADMIN — SULFAMETHOXAZOLE AND TRIMETHOPRIM 1 TABLET: 800; 160 TABLET ORAL at 09:06

## 2025-06-26 RX ADMIN — FLUCONAZOLE 400 MG: 2 INJECTION, SOLUTION INTRAVENOUS at 05:06

## 2025-06-26 RX ADMIN — POLYETHYLENE GLYCOL 3350 17 G: 17 POWDER, FOR SOLUTION ORAL at 01:06

## 2025-06-26 RX ADMIN — AZITHROMYCIN DIHYDRATE 500 MG: 250 TABLET ORAL at 01:06

## 2025-06-26 RX ADMIN — ENOXAPARIN SODIUM 40 MG: 40 INJECTION SUBCUTANEOUS at 05:06

## 2025-06-26 RX ADMIN — SULFAMETHOXAZOLE AND TRIMETHOPRIM 1 TABLET: 800; 160 TABLET ORAL at 01:06

## 2025-06-26 RX ADMIN — Medication 1 EACH: at 01:06

## 2025-06-26 RX ADMIN — ETHAMBUTOL HYDROCHLORIDE 800 MG: 400 TABLET ORAL at 01:06

## 2025-06-26 RX ADMIN — RIFAMPIN 300 MG: 300 CAPSULE ORAL at 01:06

## 2025-06-26 NOTE — PLAN OF CARE
Problem: Adult Inpatient Plan of Care  Goal: Plan of Care Review  Outcome: Progressing  Goal: Patient-Specific Goal (Individualized)  Outcome: Progressing  Goal: Absence of Hospital-Acquired Illness or Injury  Outcome: Progressing  Goal: Optimal Comfort and Wellbeing  Outcome: Progressing     Problem: Wound  Goal: Optimal Coping  Outcome: Progressing  Goal: Improved Oral Intake  Outcome: Progressing  Goal: Optimal Pain Control and Function  Outcome: Progressing  Goal: Skin Health and Integrity  Outcome: Progressing     Problem: Coping Ineffective  Goal: Effective Coping  Outcome: Progressing

## 2025-06-26 NOTE — PROGRESS NOTES
"6/26/2025  Anne Terry   1960   [id number]        Psychiatry Progress Note     SUBJECTIVE:   Anne Terry is a 65 y.o. male with a past medical history that includes HIV/AIDs, MAC, disseminated histoplasmosis, PJP, and cryptococcus who presented to [facility] on 06/21/125 with a complaint of failure to thrive. Had been found walking around naked and confused. He had reported that he lives by himself in [place] but was unable to explain why he went to the hospital. Being seen to day by psychiatry for concerns of depression.     Seen at the bedside where he has a 1:1 sitter and he is awake, alert, and oriented to person and place. Appears to deflect and when asked what month it is states "that is a dumb question". Displays some irritability during interview but denies feeling depressed, sad, anxious, or worried. Registers 3/3 items but recalls 0/3 and reports not knowing why he is in the hospital. Denies SI, HI, auditory/visual hallucinations, or paranoia. Denies sleep impairments.        Current Medications:   Scheduled Meds:    azithromycin  500 mg Oral Daily    enoxparin  40 mg Subcutaneous Q24H (prophylaxis, 1700)    ethambutoL  800 mg Oral Daily    ferrous sulfate  1 tablet Oral Daily    fluconazole (DIFLUCAN) IV (PEDS and ADULTS)  400 mg Intravenous Q24H    mupirocin   Nasal BID    polyethylene glycol  17 g Oral Daily    rifAMpin  300 mg Oral Daily    sulfamethoxazole-trimethoprim 800-160mg  1 tablet Oral BID      PRN Meds:   Current Facility-Administered Medications:     acetaminophen, 650 mg, Oral, Q6H PRN    dextrose 50%, 12.5 g, Intravenous, PRN    dextrose 50%, 25 g, Intravenous, PRN    glucagon (human recombinant), 1 mg, Intramuscular, PRN    glucose, 16 g, Oral, PRN    glucose, 24 g, Oral, PRN    naloxone, 0.02 mg, Intravenous, PRN    ondansetron, 4 mg, Intravenous, Q4H PRN    prochlorperazine, 5 mg, Intravenous, Q6H PRN    sodium chloride 0.9%, 10 mL, Intravenous, PRN   Psychotherapeutics (From " admission, onward)      None            Allergies:   Review of patient's allergies indicates:  No Known Allergies     OBJECTIVE:   Vitals   Vitals:    06/26/25 0714   BP: 122/67   Pulse: (!) 58   Resp: 18   Temp: 97.9 °F (36.6 °C)        Labs/Imaging/Studies:   Recent Results (from the past 36 hours)   Basic Metabolic Panel    Collection Time: 06/25/25  4:33 AM   Result Value Ref Range    Sodium 137 136 - 145 mmol/L    Potassium 4.6 3.5 - 5.1 mmol/L    Chloride 109 (H) 98 - 107 mmol/L    CO2 23 23 - 31 mmol/L    Glucose 67 (L) 82 - 115 mg/dL    Blood Urea Nitrogen 19.7 8.4 - 25.7 mg/dL    Creatinine 1.38 (H) 0.72 - 1.25 mg/dL    BUN/Creatinine Ratio 14     Calcium 8.0 (L) 8.8 - 10.0 mg/dL    Anion Gap 5.0 mEq/L    eGFR 57 mL/min/1.73/m2   CBC with Differential    Collection Time: 06/25/25  4:33 AM   Result Value Ref Range    WBC 4.19 (L) 4.50 - 11.50 x10(3)/mcL    RBC 2.88 (L) 4.70 - 6.10 x10(6)/mcL    Hgb 7.9 (L) 14.0 - 18.0 g/dL    Hct 25.6 (L) 42.0 - 52.0 %    MCV 88.9 80.0 - 94.0 fL    MCH 27.4 27.0 - 31.0 pg    MCHC 30.9 (L) 33.0 - 36.0 g/dL    RDW 15.9 11.5 - 17.0 %    Platelet 142 130 - 400 x10(3)/mcL    MPV 11.1 (H) 7.4 - 10.4 fL    IPF 2.3 0.9 - 11.2 %    Neut % 80.4 %    Lymph % 11.2 %    Mono % 4.8 %    Eos % 1.7 %    Basophil % 0.2 %    Imm Grans % 1.7 %    Neut # 3.37 2.1 - 9.2 x10(3)/mcL    Lymph # 0.47 (L) 0.6 - 4.6 x10(3)/mcL    Mono # 0.20 0.1 - 1.3 x10(3)/mcL    Eos # 0.07 0 - 0.9 x10(3)/mcL    Baso # 0.01 <=0.2 x10(3)/mcL    Imm Gran # 0.07 (H) 0.00 - 0.04 x10(3)/mcL    NRBC% 0.0 %   Protime-INR    Collection Time: 06/25/25  3:07 PM   Result Value Ref Range    PT 13.2 12.5 - 14.5 seconds    INR 1.0 <=1.3   Basic Metabolic Panel    Collection Time: 06/26/25  4:04 AM   Result Value Ref Range    Sodium 136 136 - 145 mmol/L    Potassium 4.5 3.5 - 5.1 mmol/L    Chloride 108 (H) 98 - 107 mmol/L    CO2 22 (L) 23 - 31 mmol/L    Glucose 72 (L) 82 - 115 mg/dL    Blood Urea Nitrogen 23.6 8.4 - 25.7 mg/dL     Creatinine 1.67 (H) 0.72 - 1.25 mg/dL    BUN/Creatinine Ratio 14     Calcium 7.9 (L) 8.8 - 10.0 mg/dL    Anion Gap 6.0 mEq/L    eGFR 46 mL/min/1.73/m2   CBC with Differential    Collection Time: 06/26/25  4:04 AM   Result Value Ref Range    WBC 4.07 (L) 4.50 - 11.50 x10(3)/mcL    RBC 2.65 (L) 4.70 - 6.10 x10(6)/mcL    Hgb 7.4 (L) 14.0 - 18.0 g/dL    Hct 23.7 (L) 42.0 - 52.0 %    MCV 89.4 80.0 - 94.0 fL    MCH 27.9 27.0 - 31.0 pg    MCHC 31.2 (L) 33.0 - 36.0 g/dL    RDW 15.9 11.5 - 17.0 %    Platelet 132 130 - 400 x10(3)/mcL    MPV 11.6 (H) 7.4 - 10.4 fL    IPF 2.9 0.9 - 11.2 %    Neut % 80.8 %    Lymph % 11.3 %    Mono % 4.7 %    Eos % 1.0 %    Basophil % 0.2 %    Imm Grans % 2.0 %    Neut # 3.29 2.1 - 9.2 x10(3)/mcL    Lymph # 0.46 (L) 0.6 - 4.6 x10(3)/mcL    Mono # 0.19 0.1 - 1.3 x10(3)/mcL    Eos # 0.04 0 - 0.9 x10(3)/mcL    Baso # 0.01 <=0.2 x10(3)/mcL    Imm Gran # 0.08 (H) 0.00 - 0.04 x10(3)/mcL    NRBC% 0.0 %          Psychiatric Mental Status Exam:  General Appearance: appears stated age, dressed in hospital garb, lying in bed, in no acute distress  Arousal: alert  Behavior: cooperative, under good behavioral control, decreased eye-contact  Movements and Motor Activity: no tics, no tremors, no akathisia, no dystonia, no evidence of tardive dyskinesia  Orientation: oriented to person and place  Speech: coherent, increased latency of response  Mood: Euthymic  Affect: irritable  Thought Process: linear, goal-directed  Associations: no loosening of associations  Thought Content and Perceptions: no suicidal or homicidal ideation, no auditory or visual hallucinations, no paranoid ideation, no ideas of reference, no evidence of delusions or psychosis  Recent and Remote Memory: impaired, registers 3/3 objects, recalls 0/3 objects at 5 minutes; per interview/observation with patient  Attention and Concentration: easily distractible; per interview/observation with patient  Fund of Knowledge: vocabulary appropriate;  based on history, vocabulary, fund of knowledge, syntax, grammar, and content  Insight: limited; based on understanding of severity of illness and HPI  Judgment: questionable; based on patient's behavior and HPI    ASSESSMENT/PLAN:   Problems Addressed/Diagnoses:  Unspecified cognitive disorder R/O Major Neurocognitive disorder due to HIV    Plan:  No medication recommendations at this time.  Recommend delirium precautions  PEC not recommended at this time. Low risk of imminent harm to self or others.  Psychiatry will sign-off        Sb Martel

## 2025-06-27 ENCOUNTER — ANESTHESIA (OUTPATIENT)
Dept: INTERVENTIONAL RADIOLOGY/VASCULAR | Facility: HOSPITAL | Age: 65
End: 2025-06-27
Payer: MEDICARE

## 2025-06-27 ENCOUNTER — ANESTHESIA EVENT (OUTPATIENT)
Dept: SURGERY | Facility: HOSPITAL | Age: 65
End: 2025-06-27
Payer: MEDICARE

## 2025-06-27 ENCOUNTER — ANESTHESIA (OUTPATIENT)
Dept: SURGERY | Facility: HOSPITAL | Age: 65
End: 2025-06-27
Payer: MEDICARE

## 2025-06-27 LAB
ALBUMIN SERPL-MCNC: 2 G/DL (ref 3.4–4.8)
ALBUMIN/GLOB SERPL: 0.3 RATIO (ref 1.1–2)
ALP SERPL-CCNC: 1735 UNIT/L (ref 40–150)
ALT SERPL-CCNC: 42 UNIT/L (ref 0–55)
ANION GAP SERPL CALC-SCNC: 5 MEQ/L
APPEARANCE CSF: CLEAR
AST SERPL-CCNC: 83 UNIT/L (ref 11–45)
BACTERIA BLD CULT: NORMAL
BACTERIA BLD CULT: NORMAL
BASOPHILS # BLD AUTO: 0.02 X10(3)/MCL
BASOPHILS NFR BLD AUTO: 0.5 %
BILIRUB SERPL-MCNC: 0.6 MG/DL
BUN SERPL-MCNC: 27.4 MG/DL (ref 8.4–25.7)
C GATTII+NEOFOR DNA CSF QL NAA+NON-PROBE: NOT DETECTED
CALCIUM SERPL-MCNC: 7.9 MG/DL (ref 8.8–10)
CHLORIDE SERPL-SCNC: 107 MMOL/L (ref 98–107)
CMV DNA CSF QL NAA+NON-PROBE: NOT DETECTED
CO2 SERPL-SCNC: 24 MMOL/L (ref 23–31)
COLOR CSF: COLORLESS
CREAT SERPL-MCNC: 1.79 MG/DL (ref 0.72–1.25)
CREAT/UREA NIT SERPL: 15
E COLI K1 DNA CSF QL NAA+NON-PROBE: NOT DETECTED
EOSINOPHIL # BLD AUTO: 0.06 X10(3)/MCL (ref 0–0.9)
EOSINOPHIL NFR BLD AUTO: 1.4 %
ERYTHROCYTE [DISTWIDTH] IN BLOOD BY AUTOMATED COUNT: 16 % (ref 11.5–17)
EV RNA CSF QL NAA+NON-PROBE: NOT DETECTED
GFR SERPLBLD CREATININE-BSD FMLA CKD-EPI: 42 ML/MIN/1.73/M2
GLOBULIN SER-MCNC: 5.9 GM/DL (ref 2.4–3.5)
GLUCOSE CSF-MCNC: 33 MG/DL (ref 40–70)
GLUCOSE SERPL-MCNC: 64 MG/DL (ref 82–115)
GP B STREP DNA CSF QL NAA+NON-PROBE: NOT DETECTED
HAEM INFLU DNA CSF QL NAA+NON-PROBE: NOT DETECTED
HCT VFR BLD AUTO: 26.8 % (ref 42–52)
HGB BLD-MCNC: 8.1 G/DL (ref 14–18)
HHV6 DNA CSF QL NAA+NON-PROBE: NOT DETECTED
HSV1 DNA CSF QL NAA+NON-PROBE: NOT DETECTED
HSV2 DNA CSF QL NAA+NON-PROBE: NOT DETECTED
IMM GRANULOCYTES # BLD AUTO: 0.06 X10(3)/MCL (ref 0–0.04)
IMM GRANULOCYTES NFR BLD AUTO: 1.4 %
L MONOCYTOG DNA CSF QL NAA+NON-PROBE: NOT DETECTED
LYMPHOCYTES # BLD AUTO: 0.51 X10(3)/MCL (ref 0.6–4.6)
LYMPHOCYTES NFR BLD AUTO: 11.9 %
MCH RBC QN AUTO: 27.4 PG (ref 27–31)
MCHC RBC AUTO-ENTMCNC: 30.2 G/DL (ref 33–36)
MCV RBC AUTO: 90.5 FL (ref 80–94)
MONOCYTES # BLD AUTO: 0.25 X10(3)/MCL (ref 0.1–1.3)
MONOCYTES NFR BLD AUTO: 5.8 %
N MEN DNA CSF QL NAA+NON-PROBE: NOT DETECTED
NEUTROPHILS # BLD AUTO: 3.38 X10(3)/MCL (ref 2.1–9.2)
NEUTROPHILS NFR BLD AUTO: 79 %
NRBC BLD AUTO-RTO: 0 %
PARECHOVIRUS A RNA CSF QL NAA+NON-PROBE: NOT DETECTED
PLATELET # BLD AUTO: 147 X10(3)/MCL (ref 130–400)
PMV BLD AUTO: 11.4 FL (ref 7.4–10.4)
POTASSIUM SERPL-SCNC: 5 MMOL/L (ref 3.5–5.1)
PROT CSF-MCNC: 77 MG/DL (ref 15–45)
PROT SERPL-MCNC: 7.9 GM/DL (ref 5.8–7.6)
RBC # BLD AUTO: 2.96 X10(6)/MCL (ref 4.7–6.1)
RBC # CSF MANUAL: 4 /UL
S PNEUM DNA CSF QL NAA+NON-PROBE: NOT DETECTED
SODIUM SERPL-SCNC: 136 MMOL/L (ref 136–145)
TNC CSF (OLG): 0 /UL
TUBE NUMBER CSF (BEAKER): 3
VZV DNA CSF QL NAA+NON-PROBE: NOT DETECTED
WBC # BLD AUTO: 4.28 X10(3)/MCL (ref 4.5–11.5)

## 2025-06-27 PROCEDURE — 87206 SMEAR FLUORESCENT/ACID STAI: CPT | Performed by: INTERNAL MEDICINE

## 2025-06-27 PROCEDURE — 86780 TREPONEMA PALLIDUM: CPT | Performed by: INTERNAL MEDICINE

## 2025-06-27 PROCEDURE — 63600175 PHARM REV CODE 636 W HCPCS: Performed by: ANESTHESIOLOGY

## 2025-06-27 PROCEDURE — 88112 CYTOPATH CELL ENHANCE TECH: CPT

## 2025-06-27 PROCEDURE — 11000001 HC ACUTE MED/SURG PRIVATE ROOM

## 2025-06-27 PROCEDURE — 87483 CNS DNA AMP PROBE TYPE 12-25: CPT | Performed by: INTERNAL MEDICINE

## 2025-06-27 PROCEDURE — 25000003 PHARM REV CODE 250: Performed by: INTERNAL MEDICINE

## 2025-06-27 PROCEDURE — 25000003 PHARM REV CODE 250: Performed by: ANESTHESIOLOGY

## 2025-06-27 PROCEDURE — 87102 FUNGUS ISOLATION CULTURE: CPT | Performed by: INTERNAL MEDICINE

## 2025-06-27 PROCEDURE — 99231 SBSQ HOSP IP/OBS SF/LOW 25: CPT | Mod: ,,, | Performed by: INTERNAL MEDICINE

## 2025-06-27 PROCEDURE — 25000003 PHARM REV CODE 250: Performed by: NURSE ANESTHETIST, CERTIFIED REGISTERED

## 2025-06-27 PROCEDURE — 85025 COMPLETE CBC W/AUTO DIFF WBC: CPT | Performed by: INTERNAL MEDICINE

## 2025-06-27 PROCEDURE — 87116 MYCOBACTERIA CULTURE: CPT | Performed by: INTERNAL MEDICINE

## 2025-06-27 PROCEDURE — 89051 BODY FLUID CELL COUNT: CPT | Performed by: INTERNAL MEDICINE

## 2025-06-27 PROCEDURE — 88108 CYTOPATH CONCENTRATE TECH: CPT | Performed by: INTERNAL MEDICINE

## 2025-06-27 PROCEDURE — 63600175 PHARM REV CODE 636 W HCPCS: Performed by: INTERNAL MEDICINE

## 2025-06-27 PROCEDURE — 87899 AGENT NOS ASSAY W/OPTIC: CPT | Performed by: INTERNAL MEDICINE

## 2025-06-27 PROCEDURE — A9577 INJ MULTIHANCE: HCPCS | Performed by: INTERNAL MEDICINE

## 2025-06-27 PROCEDURE — 82945 GLUCOSE OTHER FLUID: CPT | Performed by: INTERNAL MEDICINE

## 2025-06-27 PROCEDURE — 36415 COLL VENOUS BLD VENIPUNCTURE: CPT | Performed by: INTERNAL MEDICINE

## 2025-06-27 PROCEDURE — 87798 DETECT AGENT NOS DNA AMP: CPT | Performed by: INTERNAL MEDICINE

## 2025-06-27 PROCEDURE — 80053 COMPREHEN METABOLIC PANEL: CPT | Performed by: INTERNAL MEDICINE

## 2025-06-27 PROCEDURE — 37000009 HC ANESTHESIA EA ADD 15 MINS: Performed by: INTERNAL MEDICINE

## 2025-06-27 PROCEDURE — 87070 CULTURE OTHR SPECIMN AEROBIC: CPT | Performed by: INTERNAL MEDICINE

## 2025-06-27 PROCEDURE — 37000008 HC ANESTHESIA 1ST 15 MINUTES: Performed by: INTERNAL MEDICINE

## 2025-06-27 PROCEDURE — 86592 SYPHILIS TEST NON-TREP QUAL: CPT | Performed by: INTERNAL MEDICINE

## 2025-06-27 PROCEDURE — 63600175 PHARM REV CODE 636 W HCPCS: Performed by: NURSE PRACTITIONER

## 2025-06-27 PROCEDURE — 84157 ASSAY OF PROTEIN OTHER: CPT | Performed by: INTERNAL MEDICINE

## 2025-06-27 PROCEDURE — 63600175 PHARM REV CODE 636 W HCPCS: Performed by: NURSE ANESTHETIST, CERTIFIED REGISTERED

## 2025-06-27 PROCEDURE — 009U3ZX DRAINAGE OF SPINAL CANAL, PERCUTANEOUS APPROACH, DIAGNOSTIC: ICD-10-PCS | Performed by: RADIOLOGY

## 2025-06-27 PROCEDURE — 25500020 PHARM REV CODE 255: Performed by: INTERNAL MEDICINE

## 2025-06-27 RX ORDER — PROPOFOL 10 MG/ML
VIAL (ML) INTRAVENOUS
Status: DISCONTINUED | OUTPATIENT
Start: 2025-06-27 | End: 2025-06-27

## 2025-06-27 RX ORDER — ROCURONIUM BROMIDE 10 MG/ML
INJECTION, SOLUTION INTRAVENOUS
Status: DISCONTINUED | OUTPATIENT
Start: 2025-06-27 | End: 2025-06-27

## 2025-06-27 RX ORDER — PHENYLEPHRINE HCL IN 0.9% NACL 1 MG/10 ML
SYRINGE (ML) INTRAVENOUS
Status: DISCONTINUED | OUTPATIENT
Start: 2025-06-27 | End: 2025-06-27

## 2025-06-27 RX ORDER — SODIUM CHLORIDE 9 MG/ML
INJECTION, SOLUTION INTRAVENOUS CONTINUOUS
Status: DISCONTINUED | OUTPATIENT
Start: 2025-06-27 | End: 2025-06-28

## 2025-06-27 RX ORDER — LIDOCAINE HYDROCHLORIDE 20 MG/ML
INJECTION, SOLUTION EPIDURAL; INFILTRATION; INTRACAUDAL; PERINEURAL
Status: DISCONTINUED | OUTPATIENT
Start: 2025-06-27 | End: 2025-06-27

## 2025-06-27 RX ORDER — ONDANSETRON HYDROCHLORIDE 2 MG/ML
INJECTION, SOLUTION INTRAVENOUS
Status: DISCONTINUED | OUTPATIENT
Start: 2025-06-27 | End: 2025-06-27

## 2025-06-27 RX ADMIN — LIDOCAINE HYDROCHLORIDE 100 MG: 20 INJECTION, SOLUTION EPIDURAL; INFILTRATION; INTRACAUDAL; PERINEURAL at 12:06

## 2025-06-27 RX ADMIN — Medication 100 MCG: at 01:06

## 2025-06-27 RX ADMIN — ONDANSETRON 4 MG: 2 INJECTION INTRAMUSCULAR; INTRAVENOUS at 01:06

## 2025-06-27 RX ADMIN — GADOBENATE DIMEGLUMINE 14 ML: 529 INJECTION, SOLUTION INTRAVENOUS at 01:06

## 2025-06-27 RX ADMIN — SODIUM CHLORIDE: 9 INJECTION, SOLUTION INTRAVENOUS at 01:06

## 2025-06-27 RX ADMIN — PROPOFOL 150 MG: 10 INJECTION, EMULSION INTRAVENOUS at 12:06

## 2025-06-27 RX ADMIN — ROCURONIUM BROMIDE 50 MG: 10 SOLUTION INTRAVENOUS at 12:06

## 2025-06-27 RX ADMIN — ONDANSETRON 4 MG: 2 INJECTION INTRAMUSCULAR; INTRAVENOUS at 06:06

## 2025-06-27 RX ADMIN — FLUCONAZOLE 400 MG: 2 INJECTION, SOLUTION INTRAVENOUS at 04:06

## 2025-06-27 RX ADMIN — SODIUM CHLORIDE: 9 INJECTION, SOLUTION INTRAVENOUS at 04:06

## 2025-06-27 RX ADMIN — SUGAMMADEX 200 MG: 100 INJECTION, SOLUTION INTRAVENOUS at 02:06

## 2025-06-27 RX ADMIN — ENOXAPARIN SODIUM 40 MG: 40 INJECTION SUBCUTANEOUS at 04:06

## 2025-06-27 RX ADMIN — SODIUM CHLORIDE: 9 INJECTION, SOLUTION INTRAVENOUS at 12:06

## 2025-06-27 NOTE — TRANSFER OF CARE
"Anesthesia Transfer of Care Note    Patient: Anne Parsonsiborne    Procedure(s) Performed: * No procedures listed *    Patient location: PACU    Anesthesia Type: general    Transport from OR: Transported from OR on room air with adequate spontaneous ventilation    Post pain: adequate analgesia    Post assessment: no apparent anesthetic complications and tolerated procedure well    Post vital signs: stable    Level of consciousness: awake, alert and confused    Nausea/Vomiting: no nausea/vomiting    Complications: none    Transfer of care protocol was followed      Last vitals: Visit Vitals  /78   Pulse 70   Temp 36.5 °C (97.7 °F)   Resp 17   Ht 6' 0.01" (1.829 m)   Wt 71 kg (156 lb 8.4 oz)   SpO2 (!) 93%   BMI 21.22 kg/m²     "

## 2025-06-27 NOTE — PROGRESS NOTES
[facility]  Hospital Medicine Progress Note        Chief Complaint: Inpatient Follow-up for AIDS, MAC infection     HPI:    Anne Terry is a 65 y.o. 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 The patient presented to [facility] on 6/21/2025 with a primary complaint of failure to thrive. Patient was brought to OSH ED found walking around naked and confused. He also likely has dementia. Patient says that he lives by himself in [place] but is unable to explain why he went to the ER or why he is now in the hospital but say he had no choice. He has not been taking his medications. Pt had a CT scans done at OSH, results to be uploaded. He had a hospital admission in March 2025 for treatment of HIV and MAC, a punch biopsy of BLLE that was negative for malignancy, and an Ophthalmology evaluation. He has a history of non-compliance and missing several follow up appointments. Denies fevers, chills, sweats, SOB, N/V/D, dysuria, new rashes. Does have oral thrush.       Interval Hx:   Patient today awake and comfortable.Oriented only to self. Can answer some questions and follow few verbal commands. Has been afebrile.     No family at bedside. Per sitter he is eating well.     Case was discussed with patient's nurse and  on the floor.    Objective/physical exam:  General: In no acute distress, frail  Chest: Clear to auscultation bilaterally  Heart: RRR, +S1, S2, no appreciable murmur  Abdomen: Soft, nontender, BS +  Neurologic: Cranial nerve II-XII intact, Strength 5/5 in all 4 extremities  Tato legs hyperpigmented     VITAL SIGNS: 24 HRS MIN & MAX LAST   Temp  Min: 97.3 °F (36.3 °C)  Max: 98.6 °F (37 °C) 97.3 °F (36.3 °C)   BP  Min: 118/70  Max: 150/81 (!) 149/77   Pulse  Min: 58  Max: 71  68   Resp  Min: 18  Max: 18 18   SpO2  Min: 92 %  Max: 100 % 97 %     I have reviewed the following labs:  Recent Labs   Lab  06/24/25  0603 06/25/25 0433 06/26/25  0404   WBC 3.61* 4.19* 4.07*   RBC 2.65* 2.88* 2.65*   HGB 7.2* 7.9* 7.4*   HCT 23.5* 25.6* 23.7*   MCV 88.7 88.9 89.4   MCH 27.2 27.4 27.9   MCHC 30.6* 30.9* 31.2*   RDW 16.0 15.9 15.9   * 142 132   MPV 11.7* 11.1* 11.6*     Recent Labs   Lab 06/21/25  1609 06/22/25  0130 06/23/25  0849 06/24/25 0603 06/25/25 0433 06/26/25  0404    138   < > 138 137 136   K 4.2 4.2   < > 4.2 4.6 4.5   * 108*   < > 106 109* 108*   CO2 24 23   < > 24 23 22*   BUN 19.6 20.5   < > 17.9 19.7 23.6   CREATININE 1.04 1.01   < > 1.19 1.38* 1.67*   GLU 84 70*   < > 66* 67* 72*   CALCIUM 7.5* 8.0*   < > 7.8* 8.0* 7.9*   MG 1.70  --   --   --   --   --    ALBUMIN 2.2* 2.2*  --   --   --   --    PROT 7.6 8.2*  --   --   --   --    ALKPHOS 883* 941*  --   --   --   --    ALT 23 24  --   --   --   --    AST 48* 55*  --   --   --   --    BILITOT 0.4 0.4  --   --   --   --     < > = values in this interval not displayed.     Microbiology Results (last 7 days)       Procedure Component Value Units Date/Time    Blood Culture [[id number]]  (Normal) Collected: 06/21/25 2356    Order Status: Completed Specimen: Blood Updated: 06/26/25 0204     Blood Culture No Growth At 96 Hours    Blood Culture [[id number]]  (Normal) Collected: 06/21/25 2356    Order Status: Completed Specimen: Blood Updated: 06/26/25 0204     Blood Culture No Growth At 96 Hours    Cryptococcal antigen, CSF [[id number]]     Order Status: Sent Specimen: CSF (Spinal Fluid)     AFB Smear [[id number]]     Order Status: Sent Specimen: CSF (Spinal Fluid) from Cerebrospinal Fluid     Mycobacteria and Nocardia Culture [[id number]]     Order Status: Sent Specimen: CSF (Spinal Fluid) from Cerebrospinal Fluid     Cerebrospinal Fluid Culture [[id number]]     Order Status: Sent Specimen: CSF (Spinal Fluid) from Cerebrospinal Fluid     Fungal Culture [[id number]]     Order Status: Sent Specimen: CSF (Spinal Fluid)              See below  for Radiology    Assessment/Plan:  Acute metabolic encephalopathy   HIV/AIDS - CD4 37 with opportunistic infection   Oral thrush   Anemic of chronic disease   H/O disseminated MAC, now with lung mass   H/O cryptococcal meningitis   Non complaint with HAART     Plan:  Patient still confused and needing some assist for ADLs   Will need to tony MRI brain done and LP for CSF analysis   Will reach put to radiology in am     F/U by ID team and continue zithrmax, bactrim, ethambutol, rifampin, diflucan     Continue strict aspiration, fall and decubitus precautions      Labs in am     VTE prophylaxis: Lovenox     Patient condition:  Guarded    Anticipated discharge and Disposition:   TBD      All diagnosis and differential diagnosis have been reviewed; assessment and plan has been documented; I have personally reviewed the labs and test results that are presently available; I have reviewed the patients medication list; I have reviewed the consulting providers response and recommendations. I have reviewed or attempted to review medical records based upon their availability    All of the patient's questions have been  addressed and answered. Patient's is agreeable to the above stated plan. I will continue to monitor closely and make adjustments to medical management as needed.    Portions of this note dictated using EMR integrated voice recognition software, and may be subject to voice recognition errors not corrected at proofreading. Please contact writer for clarification if needed.   _____________________________________________________________________    Malnutrition Status:  Nutrition consulted. Most recent weight and BMI monitored-     Measurements:  Wt Readings from Last 1 Encounters:   06/21/25 71 kg (156 lb 8.4 oz)   Body mass index is 21.22 kg/m².    Patient has been screened and assessed by RD.    Malnutrition Type:  Context: chronic illness  Level: severe    Malnutrition Characteristic Summary:  Weight Loss  (Malnutrition): other (see comments) (Does not meet criteria)  Subcutaneous Fat (Malnutrition): mild depletion  Muscle Mass (Malnutrition): moderate depletion    Interventions/Recommendations (treatment strategy):  Oral diet/nutrient modifications     Scheduled Med:   azithromycin  500 mg Oral Daily    enoxparin  40 mg Subcutaneous Q24H (prophylaxis, 1700)    ethambutoL  800 mg Oral Daily    ferrous sulfate  1 tablet Oral Daily    fluconazole (DIFLUCAN) IV (PEDS and ADULTS)  400 mg Intravenous Q24H    mupirocin   Nasal BID    polyethylene glycol  17 g Oral Daily    rifAMpin  300 mg Oral Daily    sulfamethoxazole-trimethoprim 800-160mg  1 tablet Oral BID      Continuous Infusions:     PRN Meds:    Current Facility-Administered Medications:     acetaminophen, 650 mg, Oral, Q6H PRN    dextrose 50%, 12.5 g, Intravenous, PRN    dextrose 50%, 25 g, Intravenous, PRN    glucagon (human recombinant), 1 mg, Intramuscular, PRN    glucose, 16 g, Oral, PRN    glucose, 24 g, Oral, PRN    naloxone, 0.02 mg, Intravenous, PRN    ondansetron, 4 mg, Intravenous, Q4H PRN    prochlorperazine, 5 mg, Intravenous, Q6H PRN    sodium chloride 0.9%, 10 mL, Intravenous, PRN     Radiology:  I have personally reviewed the following imaging and agree with the radiologist.     CT Head Without Contrast  Narrative: EXAMINATION:  CT HEAD WITHOUT CONTRAST    CLINICAL HISTORY:  Mental status change, unknown cause;    TECHNIQUE:  Multiple axial images were obtained from the base of the brain to the vertex without contrast administration.  Sagittal and coronal reconstructions were performed..Automatic exposure control is utilized to reduce patient radiation exposure.    COMPARISON:  06/09/2015    FINDINGS:  There is no intracranial mass or lesion seen.  No hemorrhage is seen.  No acute infarct is seen.  There is some cerebral atrophy seen.  There is some compensatory ventricular dilatation and periventricular white matter changes consistent with the  patient's age.  Calvarium is intact.  The posterior fossa is unremarkable..  The visualized portions of the paranasal sinuses show no acute abnormality.  Impression: Chronic age-related changes.  No acute process    Electronically signed by: Ankush Gordon  Date:    06/24/2025  Time:    18:36      Clif Osborne MD  Department of Hospital Medicine   [facility]   06/26/2025

## 2025-06-27 NOTE — ANESTHESIA POSTPROCEDURE EVALUATION
Anesthesia Post Evaluation    Patient: Anne Terry    Procedure(s) Performed: Procedure(s) (LRB):  MRI (Magnetic Resonance Imagine) (N/A)    Final Anesthesia Type: general      Patient location during evaluation: Cath Lab (cl 7)  Patient participation: Yes- Able to Participate  Level of consciousness: sedated  Post-procedure vital signs: reviewed and stable  Pain management: adequate  Airway patency: patent  OLIVERIO mitigation strategies: Multimodal analgesia  PONV status at discharge: No PONV  Anesthetic complications: no      Cardiovascular status: blood pressure returned to baseline and hemodynamically stable  Respiratory status: unassisted and spontaneous ventilation  Hydration status: euvolemic  Follow-up not needed.              Vitals Value Taken Time   /70 06/27/25 10:53   Temp 36.3 °C (97.4 °F) 06/27/25 10:53   Pulse 59 06/27/25 10:53   Resp 20 06/27/25 10:53   SpO2 98 % 06/27/25 10:53         No case tracking events are documented in the log.      Pain/Karel Score: No data recorded

## 2025-06-27 NOTE — TRANSFER OF CARE
"Anesthesia Transfer of Care Note    Patient: Anne Terry    Procedure(s) Performed: Procedure(s) (LRB):  MRI (Magnetic Resonance Imagine) (N/A)    Patient location: CL7 for LP.    Anesthesia Type: general    Transport from OR: Transported from OR on room air with adequate spontaneous ventilation. Transported from OR intubated on 100% O2 by AMBU with assisted ventilation. Continuous ECG monitoring in transport. Continuous SpO2 monitoring in transport. Continuos invasive BP monitoring in transport    Post pain: adequate analgesia    Post assessment: no apparent anesthetic complications    Post vital signs: stable    Level of consciousness: sedated    Nausea/Vomiting: no nausea/vomiting    Complications: none    Transfer of care protocol was followed      Last vitals: Visit Vitals  /70   Pulse (!) 59   Temp 36.3 °C (97.4 °F) (Oral)   Resp 20   Ht 6' 0.01" (1.829 m)   Wt 71 kg (156 lb 8.4 oz)   SpO2 98%   BMI 21.22 kg/m²     "

## 2025-06-27 NOTE — PROGRESS NOTES
Patient Name: Anne Terry   MRN: [id number]   Admission Date: 6/21/2025   Hospital Length of Stay: 6   Attending Provider: Clif Osborne MD   Consulting Provider: Carlos Hanks MD    Primary Care Physician:  No, Primary Doctor     Principal Problem: <principal problem not specified>       Final diagnoses:  [B20, G93.49] HIV encephalopathy      Goals of care review:    We met with the patient who appeared more awake and alert than on previous evaluation.  I asked if he understood why he was in the hospital.  He stated that he did not know.  I explained to him that on arrival he was confused.  He denied this and stated that I did not know what I was talking about.  I explained that he has pending testing including MRI and lumbar puncture today to try to identify the source of the problem that led him to the hospital.  He agreed but seemed to be somewhat confused about the purpose of testing.  We have already spoken with the patient's children.  His son, Unruly contreras has agreed to be the point of contact going forward.  When we have more information about the patient's diagnosis and treatment options, little stay in contact with him.  The family has already stated that they did not feel the patient is safe to return to his home alone due to previous inability to manage his own care and treatment for his illness.  They feel that the patient may need nursing home placement.  We will continue to discuss this with family.  However, if patient becomes more awake and alert, it may be difficult to make such decisions without his expressed agreement.    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    Interval History:     Some improvement in mental status inability to communicate.  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    Lung mass 02/27/2025     Resolved Ambulatory Problems     Diagnosis Date Noted    No Resolved Ambulatory Problems     No Additional Past Medical History        Past Surgical History:   Procedure Laterality Date    COLONOSCOPY  05/08/2015        Review of patient's allergies indicates:  No Known Allergies     Current Medications[1]       Current Facility-Administered Medications:     acetaminophen, 650 mg, Oral, Q6H PRN    dextrose 50%, 12.5 g, Intravenous, PRN    dextrose 50%, 25 g, Intravenous, PRN    glucagon (human recombinant), 1 mg, Intramuscular, PRN    glucose, 16 g, Oral, PRN    glucose, 24 g, Oral, PRN    naloxone, 0.02 mg, Intravenous, PRN    ondansetron, 4 mg, Intravenous, Q4H PRN    prochlorperazine, 5 mg, Intravenous, Q6H PRN    sodium chloride 0.9%, 10 mL, Intravenous, PRN     Family History   Problem Relation Name Age of Onset    No Known Problems Mother      No Known Problems Father      Cancer Sister      No Known Problems Brother            Review of Systems   All other systems reviewed and are negative.           Objective:   /70   Pulse (!) 59   Temp 97.4 °F (36.3 °C) (Oral)   Resp 18   Ht 6' 0.01" (1.829 m)   Wt 71 kg (156 lb 8.4 oz)   SpO2 98%   BMI 21.22 kg/m²      Physical Exam   Constitutional:  Non-toxic appearance.   Cachetic appearing   HENT: "   Mouth/Throat: Mucous membranes are moist. Oropharynx is clear.   Eyes: Pupils are equal, round, and reactive to light. Conjunctivae are normal.   Cardiovascular: Normal rate, regular rhythm, normal heart sounds and normal pulses. Pulmonary:      Effort: Pulmonary effort is normal.      Breath sounds: Normal breath sounds.     Abdominal: Soft. Bowel sounds are normal. He exhibits no distension.   Musculoskeletal:      Right lower leg: No edema.      Left lower leg: No edema.   Neurological: He is alert.   Skin: Skin is warm.   Vitals reviewed.         Review of Symptoms  Review of Symptoms      Symptom Assessment (ESAS 0-10 Scale)  Pain:  0  Dyspnea:  0  Anxiety:  0  Nausea:  0  Depression:  0  Anorexia:  0  Fatigue:  0  Insomnia:  0  Restlessness:  0  Agitation:  0         Psychosocial/Cultural:   See Palliative Psychosocial Note: Yes  **Primary  to Follow**  Palliative Care  Consult: No      Advance Care Planning   Advance Directives:   Living Will: No    [place]: No    Do Not Resuscitate Status: Yes (Yes, plan to discuss with attending physician and change code status to DNR.  Plan to also attempt to contact last adult child)    Medical Power of : No      Decision Making:  Patient answered questions  Goals of Care: What is most important right now is to focus on symptom/pain control, improvement in condition but with limits to invasive therapies. Accordingly, we have decided that the best plan to meet the patient's goals includes continuing with treatment.                22 min of encounter was spent in chart review, face to face discussion of goals of care, symptom assessment, coordination of care and emotional support.       Carlos Hanks MD  Palliative Medicine  [facility]        [1]   Current Facility-Administered Medications:     0.9% NaCl infusion, , Intravenous, Continuous, Clif Osborne MD    acetaminophen tablet 650 mg, 650  mg, Oral, Q6H PRN, Dara Horne MD    azithromycin tablet 500 mg, 500 mg, Oral, Daily, Supa Eubanks DO, 500 mg at 06/26/25 1354    dextrose 50% injection 12.5 g, 12.5 g, Intravenous, PRN, Joo Garcia, GRODOP    dextrose 50% injection 25 g, 25 g, Intravenous, PRN, Joo Garcia, GORDOP    enoxaparin injection 40 mg, 40 mg, Subcutaneous, Q24H (prophylaxis, 1700), Dara Horne MD, 40 mg at 06/26/25 1757    ethambutoL tablet 800 mg, 800 mg, Oral, Daily, Supa Eubanks DO, 800 mg at 06/26/25 1354    ferrous sulfate tablet 1 each, 1 tablet, Oral, Daily, Dara Horne MD, 1 each at 06/26/25 1354    fluconazole (DIFLUCAN) IVPB 400 mg, 400 mg, Intravenous, Q24H, Travis Krause MD, Stopped at 06/26/25 1957    glucagon (human recombinant) injection 1 mg, 1 mg, Intramuscular, PRN, Joo Garcia, MARCE    glucose chewable tablet 16 g, 16 g, Oral, PRN, Joo Garcia, MARCE    glucose chewable tablet 24 g, 24 g, Oral, PRN, Joo Garcia, GORDOP    naloxone 0.4 mg/mL injection 0.02 mg, 0.02 mg, Intravenous, PRN, Joo Garcia, GORDOP    ondansetron injection 4 mg, 4 mg, Intravenous, Q4H PRN, Joo Garcia, GORDOP, 4 mg at 06/24/25 1413    prochlorperazine injection Soln 5 mg, 5 mg, Intravenous, Q6H PRN, Joo Garcia, GORDOP    rifAMpin capsule 300 mg, 300 mg, Oral, Daily, Supa Eubanks DO, 300 mg at 06/26/25 1354    sodium chloride 0.9% flush 10 mL, 10 mL, Intravenous, PRN, Joo Garcia, GORDOP    sulfamethoxazole-trimethoprim 800-160mg per tablet 1 tablet, 1 tablet, Oral, BID, Supa Eubanks DO, 1 tablet at 06/26/25 2760

## 2025-06-27 NOTE — PROGRESS NOTES
Inpatient Nutrition Assessment    Admit Date: 6/21/2025   Total duration of encounter: 6 days   Patient Age: 65 y.o.    Nutrition Recommendation/Prescription     Continue oral diet as tolerated; Diet Adult Regular Standard Tray   Will trial Boost Plus once a day (360 kcal, 14 gm protein per container)    Communication of Recommendations: reviewed with patient    Nutrition Assessment     Malnutrition Assessment/Nutrition-Focused Physical Exam    Malnutrition Context: chronic illness (06/24/25 1445)  Malnutrition Level: severe (06/24/25 1445)     Weight Loss (Malnutrition): other (see comments) (Does not meet criteria) (06/27/25 1509)  Subcutaneous Fat (Malnutrition): mild depletion (06/27/25 1509)  Orbital Region (Subcutaneous Fat Loss): mild depletion        Muscle Mass (Malnutrition): moderate depletion (06/27/25 1509)  Oriental orthodox Region (Muscle Loss): moderate depletion  Clavicle Bone Region (Muscle Loss): moderate depletion  Clavicle and Acromion Bone Region (Muscle Loss): moderate depletion                          A minimum of two characteristics is recommended for diagnosis of either severe or non-severe malnutrition.    Chart Review    Reason Seen: malnutrition screening tool (MST)    Malnutrition Screening Tool Results   Have you recently lost weight without trying?: Unsure  Have you been eating poorly because of a decreased appetite?: No   MST Score: 2   Diagnosis:  Advanced HIV  MAC Infection  Lung Mass  Oral Candidiasis  Elevated Alk Phos 800s  Likely dementia    Relevant Medical History: HIV/AIDs, MAC, history of disseminated histoplasmosis, PJP, and cryptococcous     Scheduled Medications:  azithromycin, 500 mg, Daily  enoxparin, 40 mg, Q24H (prophylaxis, 1700)  ethambutoL, 800 mg, Daily  ferrous sulfate, 1 tablet, Daily  fluconazole (DIFLUCAN) IV (PEDS and ADULTS), 400 mg, Q24H  rifAMpin, 300 mg, Daily  sulfamethoxazole-trimethoprim 800-160mg, 1 tablet, BID    Continuous Infusions:  0.9% NaCl    PRN  Medications:  acetaminophen, 650 mg, Q6H PRN  dextrose 50%, 12.5 g, PRN  dextrose 50%, 25 g, PRN  glucagon (human recombinant), 1 mg, PRN  glucose, 16 g, PRN  glucose, 24 g, PRN  naloxone, 0.02 mg, PRN  ondansetron, 4 mg, Q4H PRN  prochlorperazine, 5 mg, Q6H PRN  sodium chloride 0.9%, 10 mL, PRN    Calorie Containing IV Medications: no significant kcals from medications at this time    Recent Labs   Lab 06/21/25  1608 06/21/25  1609 06/22/25  0130 06/23/25  0849 06/24/25  0603 06/25/25  0433 06/26/25  0404 06/27/25  0517   NA  --  139 138 135* 138 137 136 136   K  --  4.2 4.2 3.9 4.2 4.6 4.5 5.0   CALCIUM  --  7.5* 8.0* 7.9* 7.8* 8.0* 7.9* 7.9*   PHOS  --  3.4  --   --   --   --   --   --    MG  --  1.70  --   --   --   --   --   --    CL  --  108* 108* 106 106 109* 108* 107   CO2  --  24 23 23 24 23 22* 24   BUN  --  19.6 20.5 19.1 17.9 19.7 23.6 27.4*   CREATININE  --  1.04 1.01 1.21 1.19 1.38* 1.67* 1.79*   EGFRNORACEVR  --  >60 >60 >60 >60 57 46 42   GLU  --  84 70* 85 66* 67* 72* 64*   BILITOT  --  0.4 0.4  --   --   --   --  0.6   ALKPHOS  --  883* 941*  --   --   --   --  1,735*   ALT  --  23 24  --   --   --   --  42   AST  --  48* 55*  --   --   --   --  83*   ALBUMIN  --  2.2* 2.2*  --   --   --   --  2.0*   WBC 3.68*  --  4.27* 3.18* 3.61* 4.19* 4.07* 4.28*   HGB 8.1*  --  8.6* 8.1* 7.2* 7.9* 7.4* 8.1*   HCT 25.7*  --  27.7* 26.3* 23.5* 25.6* 23.7* 26.8*     Nutrition Orders:  Diet Adult Regular Standard Tray      Appetite/Oral Intake: fair/50-75% of meals  Factors Affecting Nutritional Intake: socio-economic  Social Needs Impacting Access to Food: reports lack of access to food / food insecure - referred to case management  Food/Temple/Cultural Preferences: unable to obtain  Food Allergies: no known food allergies  Last Bowel Movement: 06/26/25  Wound(s):  none noted    Comments    6/24/25 Pt eating lunch, difficulty obtaining hx from pt; reports no weight loss (appears stable in EMR since feb);  "declined oral supplements. Visible signs of fat and muscle loss present. Unsure if pt has adequate access to nutrition at home.    6/27/25 Pt out of room for procedure; nursing noted 25% intake of supper last night. Will f/u early.    Anthropometrics    Height: 6' 0.01" (182.9 cm),    Last Weight: 71 kg (156 lb 8.4 oz) (06/21/25 1220), Weight Method: Bed Scale  BMI (Calculated): 21.2  BMI Classification: normal (BMI 18.5-24.9)        Ideal Body Weight (IBW), Male: 178.06 lb     % Ideal Body Weight, Male (lb): 87.94 %                          Usual Weight Provided By: patient denies unintentional weight loss    Wt Readings from Last 5 Encounters:   06/21/25 71 kg (156 lb 8.4 oz)   02/28/25 70.3 kg (155 lb)   02/27/25 71.8 kg (158 lb 4.6 oz)   01/07/25 73.5 kg (161 lb 14.9 oz)   11/20/24 58 kg (127 lb 13.9 oz)     Weight Change(s) Since Admission:   Wt Readings from Last 1 Encounters:   06/21/25 1220 71 kg (156 lb 8.4 oz)   Admit Weight: 71 kg (156 lb 8.4 oz) (06/21/25 1220), Weight Method: Bed Scale    Estimated Needs    Weight Used For Calorie Calculations: 71 kg (156 lb 8.4 oz)  Energy Calorie Requirements (kcal): 8446-8727 kcal (30-35 kcal/kg)  Energy Need Method: Kcal/kg  Weight Used For Protein Calculations: 71 kg (156 lb 8.4 oz)  Protein Requirements: 106-120gm (1.5-1.7 gm/kg)  Fluid Requirements (mL): 2130 ml (30ml/kg)        Enteral Nutrition     Patient not receiving enteral nutrition at this time.    Parenteral Nutrition     Patient not receiving parenteral nutrition support at this time.    Evaluation of Received Nutrient Intake    Calories: not meeting estimated needs  Protein: not meeting estimated needs    Patient Education     Not applicable.    Nutrition Diagnosis         PES: Severe chronic disease or condition related malnutrition Related to chronic condition As Evidenced by:  - muscle mass depletion: 6 areas of moderate muscle loss (Trapezius, Deltoid, Pectoralis, Acromion, Temporalis, Clavicle) - " loss of subcutaneous fat: 2 areas of mild fat loss (Buccal, Infraorbital) new    Nutrition Interventions     Intervention(s): commercial beverage and collaboration with other providers  Intervention(s): Oral diet/nutrient modifications    Goal: Consume % of meals/snacks by follow-up. (goal not met)  Goal: Maintain weight throughout hospitalization. (goal progressing)    Nutrition Goals & Monitoring     Dietitian will monitor: food and beverage intake and weight change  Discharge planning: continue regular diet and provided referral to  for food access needs  Nutrition Risk/Follow-Up: patient at increased nutrition risk; dietitian will follow-up twice weekly   Please consult if re-assessment needed sooner.

## 2025-06-27 NOTE — ANESTHESIA POSTPROCEDURE EVALUATION
Anesthesia Post Evaluation    Patient: Marcoarn Bruning    Procedure(s) Performed: * No procedures listed *    Final Anesthesia Type: general      Patient location during evaluation: PACU  Patient participation: Yes- Able to Participate  Level of consciousness: awake and alert  Post-procedure vital signs: reviewed and stable  Pain management: adequate  Airway patency: patent    PONV status at discharge: No PONV  Anesthetic complications: no      Cardiovascular status: hemodynamically stable  Respiratory status: spontaneous ventilation and room air  Hydration status: euvolemic  Follow-up not needed.              Vitals Value Taken Time   /70 06/27/25 14:40   Temp 36.5 °C (97.7 °F) 06/27/25 14:18   Pulse 57 06/27/25 14:42   Resp 17 06/27/25 14:42   SpO2 95 % 06/27/25 14:42   Vitals shown include unfiled device data.      No case tracking events are documented in the log.      Pain/Karel Score: Karel Score: 9 (6/27/2025  2:13 PM)

## 2025-06-27 NOTE — ANESTHESIA PREPROCEDURE EVALUATION
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 The patient presented to [facility] on 6/21/2025 with a primary complaint of failure to thrive. Patient was brought to OSH ED found walking around naked and confused. He also likely has dementia.   Assessment/Plan:  Acute metabolic encephalopathy   HIV/AIDS - CD4 37 with opportunistic infection   Oral thrush   Anemic of chronic disease   H/O disseminated MAC, now with lung mass   H/O cryptococcal meningitis   Non complaint with HAART       Pre-op Assessment    I have reviewed the Patient Summary Reports.    I have reviewed the NPO Status.   I have reviewed the Medications.     Review of Systems      Physical Exam  General: Confusion    Airway:  Mallampati: III   Mouth Opening: Normal  TM Distance: Normal  Tongue: Normal  Neck ROM: Normal ROM    Dental:    Chest/Lungs:  Clear to auscultation    Heart:  Rate: Normal  Rhythm: Regular Rhythm        Anesthesia Plan  Type of Anesthesia, risks & benefits discussed:    Anesthesia Type: Gen ETT  Intra-op Monitoring Plan: Standard ASA Monitors  Induction:  IV  Airway Plan: Direct, Post-Induction  Informed Consent: Informed consent signed with the Patient representative and all parties understand the risks and agree with anesthesia plan.  All questions answered.   ASA Score: 3  Day of Surgery Review of History & Physical: H&P Update referred to the surgeon/provider.    Ready For Surgery From Anesthesia Perspective.     .

## 2025-06-27 NOTE — PROGRESS NOTES
[facility]  Hospital Medicine Progress Note        Chief Complaint: Inpatient Follow-up for AIDS, MAC infection     HPI:    Anne Terry is a 65 y.o. 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 The patient presented to [facility] on 6/21/2025 with a primary complaint of failure to thrive. Patient was brought to OSH ED found walking around naked and confused. He also likely has dementia. Patient says that he lives by himself in [place] but is unable to explain why he went to the ER or why he is now in the hospital but say he had no choice. He has not been taking his medications. Pt had a CT scans done at OSH, results to be uploaded. He had a hospital admission in March 2025 for treatment of HIV and MAC, a punch biopsy of BLLE that was negative for malignancy, and an Ophthalmology evaluation. He has a history of non-compliance and missing several follow up appointments. Denies fevers, chills, sweats, SOB, N/V/D, dysuria, new rashes. Does have oral thrush.    He was seen ID team and started on  zithrmax, bactrim, ethambutol, rifampin, diflucan. Last CD 4 was 37. MRI brain and LP planned to be done under anesthesia.        Interval Hx:   Patient today awake and comfortable. Mood is calm. States she is ready to get the tests done and eat. Has been afebrile. Denies any headache, blurry vision, SOB or chest pain.     Sitter at bedside.     Case was discussed with patient's nurse and  on the floor.    Objective/physical exam:  General: In no acute distress, frail  Chest: Clear to auscultation bilaterally  Heart: RRR, +S1, S2, no appreciable murmur  Abdomen: Soft, nontender, BS +  Neurologic: Cranial nerve II-XII intact, Strength 5/5 in all 4 extremities  Tato legs hyperpigmented     VITAL SIGNS: 24 HRS MIN & MAX LAST   Temp  Min: 97.3 °F (36.3 °C)  Max: 98.6 °F (37 °C) 97.9 °F (36.6 °C)   BP  Min: 114/60  Max:  149/77 123/68   Pulse  Min: 60  Max: 98  66   Resp  Min: 18  Max: 18 18   SpO2  Min: 93 %  Max: 98 % 98 %     I have reviewed the following labs:  Recent Labs   Lab 06/25/25  0433 06/26/25  0404 06/27/25  0517   WBC 4.19* 4.07* 4.28*   RBC 2.88* 2.65* 2.96*   HGB 7.9* 7.4* 8.1*   HCT 25.6* 23.7* 26.8*   MCV 88.9 89.4 90.5   MCH 27.4 27.9 27.4   MCHC 30.9* 31.2* 30.2*   RDW 15.9 15.9 16.0    132 147   MPV 11.1* 11.6* 11.4*     Recent Labs   Lab 06/21/25  1609 06/22/25  0130 06/23/25  0849 06/25/25  0433 06/26/25  0404 06/27/25  0517    138   < > 137 136 136   K 4.2 4.2   < > 4.6 4.5 5.0   * 108*   < > 109* 108* 107   CO2 24 23   < > 23 22* 24   BUN 19.6 20.5   < > 19.7 23.6 27.4*   CREATININE 1.04 1.01   < > 1.38* 1.67* 1.79*   GLU 84 70*   < > 67* 72* 64*   CALCIUM 7.5* 8.0*   < > 8.0* 7.9* 7.9*   MG 1.70  --   --   --   --   --    ALBUMIN 2.2* 2.2*  --   --   --  2.0*   PROT 7.6 8.2*  --   --   --  7.9*   ALKPHOS 883* 941*  --   --   --  1,735*   ALT 23 24  --   --   --  42   AST 48* 55*  --   --   --  83*   BILITOT 0.4 0.4  --   --   --  0.6    < > = values in this interval not displayed.     Microbiology Results (last 7 days)       Procedure Component Value Units Date/Time    Blood Culture [[id number]]  (Normal) Collected: 06/21/25 6808    Order Status: Completed Specimen: Blood Updated: 06/27/25 0204     Blood Culture No Growth at 5 days    Blood Culture [[id number]]  (Normal) Collected: 06/21/25 2356    Order Status: Completed Specimen: Blood Updated: 06/27/25 0204     Blood Culture No Growth at 5 days    Cryptococcal antigen, CSF [[id number]]     Order Status: Sent Specimen: CSF (Spinal Fluid)     AFB Smear [[id number]]     Order Status: Sent Specimen: CSF (Spinal Fluid) from Cerebrospinal Fluid     Mycobacteria and Nocardia Culture [[id number]]     Order Status: Sent Specimen: CSF (Spinal Fluid) from Cerebrospinal Fluid     Cerebrospinal Fluid Culture [[id number]]     Order Status: Sent  Specimen: CSF (Spinal Fluid) from Cerebrospinal Fluid     Fungal Culture [[id number]]     Order Status: Sent Specimen: CSF (Spinal Fluid)              See below for Radiology    Assessment/Plan:  Acute metabolic encephalopathy : improved   HIV/AIDS - CD4 37 with opportunistic infection   Oral thrush   ARF   Anemic of chronic disease   H/O disseminated MAC, now with lung mass   H/O cryptococcal meningitis   Non complaint with HAART     Plan:  Patient more alert and in a better mood today   Will need to tony MRI brain done and LP for CSF analysis, should be done under anesthesia today     F/U by ID team and continue zithrmax, bactrim, ethambutol, rifampin, diflucan     Continue strict aspiration, fall and decubitus precautions      Reviewed today's labs and  plt 147, Na 136, K 5, BUN 27, crt 1.79    His renal functions a bit elevated today, will start on iv fluids and monitor   Avoid NSAIDS     Labs in am     VTE prophylaxis: Lovenox     Patient condition:  Guarded    Anticipated discharge and Disposition:   TBD      All diagnosis and differential diagnosis have been reviewed; assessment and plan has been documented; I have personally reviewed the labs and test results that are presently available; I have reviewed the patients medication list; I have reviewed the consulting providers response and recommendations. I have reviewed or attempted to review medical records based upon their availability    All of the patient's questions have been  addressed and answered. Patient's is agreeable to the above stated plan. I will continue to monitor closely and make adjustments to medical management as needed.    Portions of this note dictated using EMR integrated voice recognition software, and may be subject to voice recognition errors not corrected at proofreading. Please contact writer for clarification if needed.   _____________________________________________________________________    Malnutrition Status:  Nutrition  consulted. Most recent weight and BMI monitored-     Measurements:  Wt Readings from Last 1 Encounters:   06/21/25 71 kg (156 lb 8.4 oz)   Body mass index is 21.22 kg/m².    Patient has been screened and assessed by RD.    Malnutrition Type:  Context: chronic illness  Level: severe    Malnutrition Characteristic Summary:  Weight Loss (Malnutrition): other (see comments) (Does not meet criteria)  Subcutaneous Fat (Malnutrition): mild depletion  Muscle Mass (Malnutrition): moderate depletion    Interventions/Recommendations (treatment strategy):  Oral diet/nutrient modifications     Scheduled Med:   azithromycin  500 mg Oral Daily    enoxparin  40 mg Subcutaneous Q24H (prophylaxis, 1700)    ethambutoL  800 mg Oral Daily    ferrous sulfate  1 tablet Oral Daily    fluconazole (DIFLUCAN) IV (PEDS and ADULTS)  400 mg Intravenous Q24H    rifAMpin  300 mg Oral Daily    sulfamethoxazole-trimethoprim 800-160mg  1 tablet Oral BID      Continuous Infusions:   0.9% NaCl   Intravenous Continuous          PRN Meds:    Current Facility-Administered Medications:     acetaminophen, 650 mg, Oral, Q6H PRN    dextrose 50%, 12.5 g, Intravenous, PRN    dextrose 50%, 25 g, Intravenous, PRN    glucagon (human recombinant), 1 mg, Intramuscular, PRN    glucose, 16 g, Oral, PRN    glucose, 24 g, Oral, PRN    naloxone, 0.02 mg, Intravenous, PRN    ondansetron, 4 mg, Intravenous, Q4H PRN    prochlorperazine, 5 mg, Intravenous, Q6H PRN    sodium chloride 0.9%, 10 mL, Intravenous, PRN     Radiology:  I have personally reviewed the following imaging and agree with the radiologist.     CT Head Without Contrast  Narrative: EXAMINATION:  CT HEAD WITHOUT CONTRAST    CLINICAL HISTORY:  Mental status change, unknown cause;    TECHNIQUE:  Multiple axial images were obtained from the base of the brain to the vertex without contrast administration.  Sagittal and coronal reconstructions were performed..Automatic exposure control is utilized to reduce patient  radiation exposure.    COMPARISON:  06/09/2015    FINDINGS:  There is no intracranial mass or lesion seen.  No hemorrhage is seen.  No acute infarct is seen.  There is some cerebral atrophy seen.  There is some compensatory ventricular dilatation and periventricular white matter changes consistent with the patient's age.  Calvarium is intact.  The posterior fossa is unremarkable..  The visualized portions of the paranasal sinuses show no acute abnormality.  Impression: Chronic age-related changes.  No acute process    Electronically signed by: Ankush Gordon  Date:    06/24/2025  Time:    18:36      Clif Osborne MD  Department of Hospital Medicine   [facility]   06/27/2025

## 2025-06-27 NOTE — INTERVAL H&P NOTE
The patient has been examined and the H&P has been reviewed:    I concur with the findings and no changes have occurred since H&P was written.        Active Hospital Problems    Diagnosis  POA    Severe malnutrition [E43]  Yes      Resolved Hospital Problems   No resolved problems to display.

## 2025-06-27 NOTE — ANESTHESIA PROCEDURE NOTES
Intubation    Date/Time: 6/27/2025 12:59 PM    Performed by: Lilian Conde  Authorized by: Thor Jorge MD    Intubation:     Induction:  Intravenous    Intubated:  Postinduction    Mask Ventilation:  Easy mask    Attempts:  1    Attempted By:  CRNA    Blade:  Sam 4    Laryngeal View Grade: Grade IIA - cords partially seen      Difficult Airway Encountered?: No      Complications:  None    Airway Device:  Oral endotracheal tube    Airway Device Size:  7.0    Style/Cuff Inflation:  Cuffed (inflated to minimal occlusive pressure)    Tube secured:  24    Secured at:  The lips    Placement Verified By:  Capnometry    Complicating Factors:  None    Findings Post-Intubation:  BS equal bilateral and atraumatic/condition of teeth unchanged

## 2025-06-27 NOTE — ANESTHESIA PREPROCEDURE EVALUATION
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 The patient presented to [facility] on 6/21/2025 with a primary complaint of failure to thrive. Patient was brought to OSH ED found walking around naked and confused. He also likely has dementia.   Assessment/Plan:  Acute metabolic encephalopathy   HIV/AIDS - CD4 37 with opportunistic infection   Oral thrush   Anemic of chronic disease   H/O disseminated MAC, now with lung mass   H/O cryptococcal meningitis   Non complaint with HAART       Pre-op Assessment    I have reviewed the Patient Summary Reports.    I have reviewed the NPO Status.   I have reviewed the Medications.     Review of Systems      Physical Exam  General: Confusion    Airway:  Mallampati: III   Mouth Opening: Normal  TM Distance: Normal  Tongue: Normal  Neck ROM: Normal ROM    Dental:    Chest/Lungs:  Clear to auscultation    Heart:  Rate: Normal  Rhythm: Regular Rhythm        Anesthesia Plan  Type of Anesthesia, risks & benefits discussed:    Anesthesia Type: Gen ETT  Intra-op Monitoring Plan: Standard ASA Monitors  Induction:  IV  Airway Plan: Direct, Post-Induction  Informed Consent: Informed consent signed with the Patient representative and all parties understand the risks and agree with anesthesia plan.  All questions answered.   ASA Score: 3  Day of Surgery Review of History & Physical: H&P Update referred to the surgeon/provider.    Ready For Surgery From Anesthesia Perspective.     .

## 2025-06-28 LAB
ANION GAP SERPL CALC-SCNC: 7 MEQ/L
BASOPHILS # BLD AUTO: 0.01 X10(3)/MCL
BASOPHILS NFR BLD AUTO: 0.2 %
BUN SERPL-MCNC: 31.5 MG/DL (ref 8.4–25.7)
CALCIUM SERPL-MCNC: 7.4 MG/DL (ref 8.8–10)
CHLORIDE SERPL-SCNC: 107 MMOL/L (ref 98–107)
CO2 SERPL-SCNC: 23 MMOL/L (ref 23–31)
CREAT SERPL-MCNC: 1.95 MG/DL (ref 0.72–1.25)
CREAT/UREA NIT SERPL: 16
EOSINOPHIL # BLD AUTO: 0.04 X10(3)/MCL (ref 0–0.9)
EOSINOPHIL NFR BLD AUTO: 0.9 %
ERYTHROCYTE [DISTWIDTH] IN BLOOD BY AUTOMATED COUNT: 16.1 % (ref 11.5–17)
GFR SERPLBLD CREATININE-BSD FMLA CKD-EPI: 38 ML/MIN/1.73/M2
GLUCOSE SERPL-MCNC: 69 MG/DL (ref 82–115)
HCT VFR BLD AUTO: 22.6 % (ref 42–52)
HCT VFR BLD AUTO: 23.9 % (ref 42–52)
HGB BLD-MCNC: 6.8 G/DL (ref 14–18)
HGB BLD-MCNC: 7.4 G/DL (ref 14–18)
IMM GRANULOCYTES # BLD AUTO: 0.08 X10(3)/MCL (ref 0–0.04)
IMM GRANULOCYTES NFR BLD AUTO: 1.9 %
LYMPHOCYTES # BLD AUTO: 0.34 X10(3)/MCL (ref 0.6–4.6)
LYMPHOCYTES NFR BLD AUTO: 8 %
M AVIUM PARATB TISS QL ZN STN: NORMAL
MCH RBC QN AUTO: 27.4 PG (ref 27–31)
MCHC RBC AUTO-ENTMCNC: 30.1 G/DL (ref 33–36)
MCV RBC AUTO: 91.1 FL (ref 80–94)
MONOCYTES # BLD AUTO: 0.14 X10(3)/MCL (ref 0.1–1.3)
MONOCYTES NFR BLD AUTO: 3.3 %
NEUTROPHILS # BLD AUTO: 3.64 X10(3)/MCL (ref 2.1–9.2)
NEUTROPHILS NFR BLD AUTO: 85.7 %
NRBC BLD AUTO-RTO: 0 %
PLATELET # BLD AUTO: 132 X10(3)/MCL (ref 130–400)
PLATELETS.RETICULATED NFR BLD AUTO: 2.2 % (ref 0.9–11.2)
PMV BLD AUTO: 11.3 FL (ref 7.4–10.4)
POTASSIUM SERPL-SCNC: 4.9 MMOL/L (ref 3.5–5.1)
RBC # BLD AUTO: 2.48 X10(6)/MCL (ref 4.7–6.1)
SODIUM SERPL-SCNC: 137 MMOL/L (ref 136–145)
WBC # BLD AUTO: 4.25 X10(3)/MCL (ref 4.5–11.5)

## 2025-06-28 PROCEDURE — 25000003 PHARM REV CODE 250: Performed by: INTERNAL MEDICINE

## 2025-06-28 PROCEDURE — 11000001 HC ACUTE MED/SURG PRIVATE ROOM

## 2025-06-28 PROCEDURE — 85018 HEMOGLOBIN: CPT | Performed by: INTERNAL MEDICINE

## 2025-06-28 PROCEDURE — 63600175 PHARM REV CODE 636 W HCPCS: Performed by: INTERNAL MEDICINE

## 2025-06-28 PROCEDURE — 80048 BASIC METABOLIC PNL TOTAL CA: CPT | Performed by: INTERNAL MEDICINE

## 2025-06-28 PROCEDURE — 36415 COLL VENOUS BLD VENIPUNCTURE: CPT | Performed by: INTERNAL MEDICINE

## 2025-06-28 PROCEDURE — 85025 COMPLETE CBC W/AUTO DIFF WBC: CPT | Performed by: INTERNAL MEDICINE

## 2025-06-28 PROCEDURE — 63700000 PHARM REV CODE 250 ALT 637 W/O HCPCS: Performed by: INTERNAL MEDICINE

## 2025-06-28 RX ORDER — PANTOPRAZOLE SODIUM 40 MG/1
40 TABLET, DELAYED RELEASE ORAL DAILY
Status: DISCONTINUED | OUTPATIENT
Start: 2025-06-28 | End: 2025-07-08 | Stop reason: HOSPADM

## 2025-06-28 RX ORDER — SODIUM CHLORIDE 9 MG/ML
INJECTION, SOLUTION INTRAVENOUS CONTINUOUS
Status: ACTIVE | OUTPATIENT
Start: 2025-06-28 | End: 2025-06-29

## 2025-06-28 RX ADMIN — SULFAMETHOXAZOLE AND TRIMETHOPRIM 1 TABLET: 800; 160 TABLET ORAL at 08:06

## 2025-06-28 RX ADMIN — ETHAMBUTOL HYDROCHLORIDE 800 MG: 400 TABLET ORAL at 09:06

## 2025-06-28 RX ADMIN — AZITHROMYCIN DIHYDRATE 500 MG: 250 TABLET ORAL at 09:06

## 2025-06-28 RX ADMIN — FLUCONAZOLE 400 MG: 2 INJECTION, SOLUTION INTRAVENOUS at 04:06

## 2025-06-28 RX ADMIN — ENOXAPARIN SODIUM 40 MG: 40 INJECTION SUBCUTANEOUS at 04:06

## 2025-06-28 RX ADMIN — Medication 1 EACH: at 09:06

## 2025-06-28 RX ADMIN — SULFAMETHOXAZOLE AND TRIMETHOPRIM 1 TABLET: 800; 160 TABLET ORAL at 09:06

## 2025-06-28 RX ADMIN — RIFAMPIN 300 MG: 300 CAPSULE ORAL at 09:06

## 2025-06-28 RX ADMIN — PANTOPRAZOLE SODIUM 40 MG: 40 TABLET, DELAYED RELEASE ORAL at 02:06

## 2025-06-28 RX ADMIN — SODIUM CHLORIDE: 9 INJECTION, SOLUTION INTRAVENOUS at 08:06

## 2025-06-28 NOTE — PROGRESS NOTES
[facility]  Hospital Medicine Progress Note        Chief Complaint: Inpatient Follow-up for AIDS, MAC infection     HPI:    Anne Terry is a 65 y.o. 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 The patient presented to [facility] on 6/21/2025 with a primary complaint of failure to thrive. Patient was brought to OSH ED found walking around naked and confused. He also likely has dementia. Patient says that he lives by himself in [place] but is unable to explain why he went to the ER or why he is now in the hospital but say he had no choice. He has not been taking his medications. Pt had a CT scans done at OSH, results to be uploaded. He had a hospital admission in March 2025 for treatment of HIV and MAC, a punch biopsy of BLLE that was negative for malignancy, and an Ophthalmology evaluation. He has a history of non-compliance and missing several follow up appointments. Denies fevers, chills, sweats, SOB, N/V/D, dysuria, new rashes. Does have oral thrush.    He was seen ID team and started on  zithrmax, bactrim, ethambutol, rifampin, diflucan. Last CD 4 was 37. MRI brain and LP planned to be done under anesthesia. CSF and MRI brain was unremarkable.        Interval Hx:   Patient today awake and comfortable. More interactive today. States he feels good. Eating well. No fever or chills.     Sitter at bedside.     Case was discussed with patient's nurse and  on the floor.    Objective/physical exam:  General: In no acute distress, frail  Chest: Clear to auscultation bilaterally  Heart: RRR, +S1, S2, no appreciable murmur  Abdomen: Soft, nontender, BS +  Neurologic: Cranial nerve II-XII intact, Strength 5/5 in all 4 extremities  Tato legs hyperpigmented     VITAL SIGNS: 24 HRS MIN & MAX LAST   Temp  Min: 97.7 °F (36.5 °C)  Max: 98.2 °F (36.8 °C) 98.1 °F (36.7 °C)   BP  Min: 99/73  Max: 133/73 116/70   Pulse   Min: 54  Max: 75  63   Resp  Min: 14  Max: 20 18   SpO2  Min: 93 %  Max: 100 % 97 %     I have reviewed the following labs:  Recent Labs   Lab 06/26/25  0404 06/27/25  0517 06/28/25  0406   WBC 4.07* 4.28* 4.25*   RBC 2.65* 2.96* 2.48*   HGB 7.4* 8.1* 6.8*   HCT 23.7* 26.8* 22.6*   MCV 89.4 90.5 91.1   MCH 27.9 27.4 27.4   MCHC 31.2* 30.2* 30.1*   RDW 15.9 16.0 16.1    147 132   MPV 11.6* 11.4* 11.3*     Recent Labs   Lab 06/21/25  1609 06/22/25  0130 06/23/25  0849 06/26/25  0404 06/27/25  0517 06/28/25  0405    138   < > 136 136 137   K 4.2 4.2   < > 4.5 5.0 4.9   * 108*   < > 108* 107 107   CO2 24 23   < > 22* 24 23   BUN 19.6 20.5   < > 23.6 27.4* 31.5*   CREATININE 1.04 1.01   < > 1.67* 1.79* 1.95*   GLU 84 70*   < > 72* 64* 69*   CALCIUM 7.5* 8.0*   < > 7.9* 7.9* 7.4*   MG 1.70  --   --   --   --   --    ALBUMIN 2.2* 2.2*  --   --  2.0*  --    PROT 7.6 8.2*  --   --  7.9*  --    ALKPHOS 883* 941*  --   --  1,735*  --    ALT 23 24  --   --  42  --    AST 48* 55*  --   --  83*  --    BILITOT 0.4 0.4  --   --  0.6  --     < > = values in this interval not displayed.     Microbiology Results (last 7 days)       Procedure Component Value Units Date/Time    AFB Smear [[id number]] Collected: 06/27/25 1402    Order Status: Completed Specimen: CSF (Spinal Fluid) from Cerebrospinal Fluid Updated: 06/28/25 1314     AFB Smear No AFB seen (Direct smear only) - Concentration to follow    Cryptococcal antigen, CSF [[id number]]     Order Status: Sent Specimen: CSF (Spinal Fluid) from CSF Tap, Tube 1     Cerebrospinal Fluid Culture [[id number]] Collected: 06/27/25 1402    Order Status: Completed Specimen: CSF (Spinal Fluid) from Cerebrospinal Fluid Updated: 06/28/25 0641     CULTURE, CSF No Growth At 24 Hours     GRAM STAIN No WBCs, No bacteria seen    Mycobacteria and Nocardia Culture [[id number]] Collected: 06/27/25 1402    Order Status: Sent Specimen: CSF (Spinal Fluid) from Cerebrospinal Fluid Updated:  06/27/25 1435    Cryptococcal antigen, CSF [[id number]] Collected: 06/27/25 1402    Order Status: Sent Specimen: CSF (Spinal Fluid) from CSF Tap, Tube 1 Updated: 06/27/25 1435    Fungal Culture [[id number]] Collected: 06/27/25 1402    Order Status: Sent Specimen: CSF (Spinal Fluid) from Cerebrospinal Fluid Updated: 06/27/25 1435    Blood Culture [[id number]]  (Normal) Collected: 06/21/25 2356    Order Status: Completed Specimen: Blood Updated: 06/27/25 0204     Blood Culture No Growth at 5 days    Blood Culture [[id number]]  (Normal) Collected: 06/21/25 2356    Order Status: Completed Specimen: Blood Updated: 06/27/25 0204     Blood Culture No Growth at 5 days             See below for Radiology    Assessment/Plan:  Acute metabolic encephalopathy : improved   HIV/AIDS - CD4 37 with opportunistic infection   Oral thrush   ARF   Anemic of chronic disease : worse   H/O disseminated MAC, now with lung mass   H/O cryptococcal meningitis   Non complaint with HAART     Plan:  Patients mental status has improved a lot   Now able to have a conversation   MRI brain unremarkable   CSF analysis so far negative     F/U by ID team and continue zithrmax, bactrim, ethambutol, rifampin, diflucan     Continue strict aspiration, fall and decubitus precautions      Reviewed today's labs and  Hb 6.8, Plt 132, BUN 31, Crt 1.95    Will continue iv fluids, Check H&H at 6 pm. If still <7 then transfuse 1 unit of PRBC    Avoid NSAIDS     Labs in am     VTE prophylaxis: Lovenox     Patient condition:  Guarded    Anticipated discharge and Disposition:   TBD      All diagnosis and differential diagnosis have been reviewed; assessment and plan has been documented; I have personally reviewed the labs and test results that are presently available; I have reviewed the patients medication list; I have reviewed the consulting providers response and recommendations. I have reviewed or attempted to review medical records based upon their  availability    All of the patient's questions have been  addressed and answered. Patient's is agreeable to the above stated plan. I will continue to monitor closely and make adjustments to medical management as needed.    Portions of this note dictated using EMR integrated voice recognition software, and may be subject to voice recognition errors not corrected at proofreading. Please contact writer for clarification if needed.   _____________________________________________________________________    Malnutrition Status:  Nutrition consulted. Most recent weight and BMI monitored-     Measurements:  Wt Readings from Last 1 Encounters:   06/21/25 71 kg (156 lb 8.4 oz)   Body mass index is 21.22 kg/m².    Patient has been screened and assessed by RD.    Malnutrition Type:  Context: chronic illness  Level: severe    Malnutrition Characteristic Summary:  Weight Loss (Malnutrition): other (see comments) (Does not meet criteria)  Subcutaneous Fat (Malnutrition): mild depletion  Muscle Mass (Malnutrition): moderate depletion    Interventions/Recommendations (treatment strategy):  Oral diet/nutrient modifications     Scheduled Med:   azithromycin  500 mg Oral Daily    enoxparin  40 mg Subcutaneous Q24H (prophylaxis, 1700)    ethambutoL  800 mg Oral Daily    ferrous sulfate  1 tablet Oral Daily    fluconazole (DIFLUCAN) IV (PEDS and ADULTS)  400 mg Intravenous Q24H    rifAMpin  300 mg Oral Daily    sulfamethoxazole-trimethoprim 800-160mg  1 tablet Oral BID      Continuous Infusions:   0.9% NaCl   Intravenous Continuous 125 mL/hr at 06/28/25 1200 Rate Change at 06/28/25 1200      PRN Meds:    Current Facility-Administered Medications:     acetaminophen, 650 mg, Oral, Q6H PRN    dextrose 50%, 12.5 g, Intravenous, PRN    dextrose 50%, 25 g, Intravenous, PRN    glucagon (human recombinant), 1 mg, Intramuscular, PRN    glucose, 16 g, Oral, PRN    glucose, 24 g, Oral, PRN    naloxone, 0.02 mg, Intravenous, PRN    ondansetron, 4  mg, Intravenous, Q4H PRN    prochlorperazine, 5 mg, Intravenous, Q6H PRN    sodium chloride 0.9%, 10 mL, Intravenous, PRN     Radiology:  I have personally reviewed the following imaging and agree with the radiologist.     IR LUMBAR PUNCTURE DIAGNOSTIC WITH IMAGING  Narrative: EXAMINATION:  IR LUMBAR PUNCTURE DIAGNOSTIC WITH IMAGING    CLINICAL HISTORY:  eval for cryptococcal meningitis;    TECHNIQUE:  The risks and benefits of the procedure were explained to the patient's son and written informed consent was obtained. All questions were answered. A suitable skin entry site was chosen under fluoroscopy. The lower back was then prepped and draped in sterile fashion. 1% lidocaine was used for local anesthesia.    COMPARISON:  No relevant comparison studies available at the time of dictation.    FINDINGS:  Under intermittent fluoroscopic guidance, a 20-gauge spinal needle was advanced into the thecal sac at the L2-L3 interlaminar space with position confirmed by flow of CSF. Approximately 8.5 mL of clear CSF was collected and sent to the lab for further evaluation.  3 tubes of fluid collected four total of 8.5 mL.  Attempts to aspirate further fluid for 4th tube unsuccessful.  The needle was removed and hemostasis achieved at the skin puncture site. No immediate complication. Estimated blood loss was negligible.    Total fluoroscopy time is 0.1 minute with reference air kerma of 7.19 mGy.    Opening pressure 13 cm water    Closing pressure less than 9 cm water  Impression: Successful fluoroscopic guided lumbar puncture.    Electronically signed by: Moni Hdez  Date:    06/27/2025  Time:    14:43  MRI Brain W WO Contrast  Narrative: EXAMINATION:  MRI BRAIN W WO CONTRAST    CLINICAL HISTORY:  Mental status change, unknown cause;HIV/AIDs, dementia;    TECHNIQUE:  Multiplanar, multisequence MR images of the brain were obtained with and without administration of intravenous contrast.    COMPARISON:  CT head dated  06/24/2025    FINDINGS:  There is no restricted diffusion, hemorrhage or edema.  Mild patchy T2/FLAIR hyperintensity in the subcortical and periventricular white matter are nonspecific and may represent chronic microvascular ischemic changes.  There is no abnormal parenchymal or leptomeningeal enhancement.    There is no mass effect or midline shift.  The basal cisterns are patent there is mild diffuse parenchymal volume loss.  There is no hydrocephalus or abnormal extra-axial fluid collection.  The major intracranial flow voids are patent.  The paranasal sinuses are clear.  Impression: 1. No acute intracranial abnormality.  2. Mild chronic microvascular ischemic changes.    Electronically signed by: Mel Hanks  Date:    06/27/2025  Time:    13:48      Clif Osborne MD  Department of Hospital Medicine   [facility]   06/28/2025

## 2025-06-28 NOTE — PROGRESS NOTES
[facility] - 5th Floor Med Surg  Infectious Disease  Progress Note    Patient Name: Anne Terry  MRN: [id number]  Admission Date: 6/21/2025  Hospital Length of Stay: 7 days  Attending Physician: Clif Osborne MD  Primary Care Provider: No, Primary Doctor     Isolation Status: No active isolations    Reason for consultation  HIV management      Assessment/Plan:       # Failure to thrive and generalized weakness /metabolic encephalopathy    # HIV/AIDS with history of medical noncompliance  -antiviral therapy regimen unclear  -patient has not been taking medications for an extended period of time  -currently holding antiviral therapy to avoid IRIS  -CD4 count 21/7% 6/23  -,288 6/23    # Recent history of disseminated MAC  -currently on azithromycin rifabutin and ethambutol  - CT c/a/p- 6/23 with no new findings, but increased LDN and same multifocal micronodular of both lungs    # 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 therapy with amphotericin B and flucytosine currently on consolidation therapy with oral fluconazole ( ?Unsure where and when this was diagnosed- no recent records available)  - S/p LP 6/27- unremarkable opening pressure. CSF fluid analysis unremarkable. Prot somewhat elevated, glu low (but also concurrently hypoglycemic), ME PCR neg, including crypto  - CSF cx in progress   - 6/27 MRI brain neg for acute findings     ---> Last visit with ID clinic at [facility] was in 3/3/25- with no mention of recent Cryptococcal meningitis     # Past Medical Hx of HIV/AIDs, MAC, history of disseminated histoplasmosis, PJP,  and cryptococcosis     # TAYLOR- worsening creatinine     Per chart review of ID NOTES- last one on 3/3/25:     Azithromycin 500 mg PO daily Start: 01/07/25  Ethambutol 15 mg/kg suspension daily Start: 01/07/25  Rifabutin 150 mg PO daily Start: 01/07/25  Atovaquone 1500 mg PO daily Start: 01/07/25 End: 02/28/25  Bactrim DS 1 tab  daily  Truvada 1 tab PO daily  Abacavir 300mg PO daily  Darunavir 800mg PO daily  Ritonavir 100mg PO daily  -s/p punch bx of LE lesion in March- neg for malingnancy.  Negative for AFB    Recommendations       Continue disseminated MAC medication treatment with azithromycin 500 mg daily ethambutol 15 mg/kg every 24 hours plus rifampin 300 mg every 24 hours- patient is having a hard time tolerating the medications.  Hopefully patient can tolerate    Continue with fluconazole- IV, as patient unable to tolerate his oral medications.  Will decrease dose to 200mg given renal fx. It is unclear where was he recently diagnosed for cryptococcal meningitis that is mentioned in the notes this admission.    Continue Bactrim DS for PCP/PJP     Holding antiviral therapy to avoid IRIS for now. Hopefully can restart ART once studies neg   Follow up CSF studies and cultures  Supportive measures and nutritional support  Poor overall prognosis and high risk of mortality due to underlying infections, and medical non adherence   Appreciate Palliative care consultation- family have opted to continue with full care at this time, DNR DNI    Upon discharge he can should follow up with [facility] ID clinic     ID will follow along      Travis Krause MD  Infectious Disease  [facility] - 5th Floor Med Surg    Subjective:   Afebrile. Wbc ok, cr rising. S/p MRI and LP yesterday under anesthesia.  Today, patient apparently ate better per caregiver.  He is still refusing some of his pills.  Denies any headaches or any other pain.  No nausea or vomiting.  Some diarrhea today   ______________________________________________    HPI:     The Pt is a 65 y.o. 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 The patient presented to [facility] on 6/21/2025 with a primary complaint of failure to thrive. Patient was brought to [facility] ED found walking around naked and confused. He also  likely has dementia. Patient says that he lives by himself in [place] but is unable to explain why he went to the ER or why he is now in the hospital but say he had no choice. He has not been taking his medications. Pt had a CT scans done at OSH, results to be uploaded. He had a hospital admission in March 2025 for treatment of HIV and MAC, a punch biopsy of BLLE that was negative for malignancy, and an Ophthalmology evaluation. He has a history of non-compliance and missing several follow up appointments. Denies fevers, chills, sweats, SOB, N/V/D, dysuria, new rashes. Does have oral thrush.     Medications:  Medications Prior to Admission   Medication Sig    abacavir (ZIAGEN) 300 mg Tab Take 1 tablet (300 mg total) by mouth once daily.    azithromycin (ZITHROMAX) 500 MG tablet Take 1 tablet (500 mg total) by mouth once daily.    darunavir (PREZISTA) 800 mg Tab Take 1 tablet (800 mg total) by mouth daily with breakfast.    emtricitabine-tenofovir 200-300 mg (TRUVADA) 200-300 mg Tab Take 1 tablet by mouth once daily.    ethambutoL (MYAMBUTOL) 400 MG Tab Take 2 tablets (800 mg total) by mouth once daily.    ferrous sulfate 325 (65 FE) MG EC tablet Take 1 tablet (325 mg total) by mouth once daily.    rifabutin (MYCOBUTIN) 150 mg Cap Take 1 capsule (150 mg total) by mouth once daily.    ritonavir (NORVIR) 100 mg Tab tablet Take 1 tablet (100 mg total) by mouth once daily.     Antibiotics (From admission, onward)      Start     Stop Route Frequency Ordered    06/22/25 1015  rifAMpin capsule 300 mg         -- Oral Daily 06/22/25 0907    06/22/25 1015  azithromycin tablet 500 mg         07/06/25 0859 Oral Daily 06/22/25 0907    06/22/25 1015  ethambutoL tablet 800 mg         07/06/25 0859 Oral Daily 06/22/25 0907    06/22/25 1015  sulfamethoxazole-trimethoprim 800-160mg per tablet 1 tablet         07/06/25 0859 Oral 2 times daily 06/22/25 0908    06/21/25 2100  mupirocin 2 % ointment         06/26/25 2059 Nasl 2 times  daily 06/21/25 1549          Antifungals (From admission, onward)      Start     Stop Route Frequency Ordered    06/24/25 1730  fluconazole (DIFLUCAN) IVPB 400 mg         -- IV Every 24 hours (non-standard times) 06/24/25 1615          Antivirals (From admission, onward)      None             Immunization History   Administered Date(s) Administered    Influenza - Quadrivalent 11/16/2020, 11/08/2021    Influenza - Quadrivalent - PF (6-35 months) 12/12/2018    Influenza - Quadrivalent - PF *Preferred* (6 months and older) 11/07/2019    Influenza - Trivalent - Fluarix, Flulaval, Fluzone, Afluria - PF 10/09/2017    Meningococcal Conjugate (MCV4P) 09/11/2018, 05/07/2019    Pneumococcal Polysaccharide - 23 Valent 01/19/2021    Zoster Recombinant 11/16/2020, 07/28/2021        Objective:     Vital Signs (Most Recent):  Temp: 98.2 °F (36.8 °C) (06/28/25 0720)  Pulse: 75 (06/28/25 0720)  Resp: 18 (06/28/25 0720)  BP: 123/68 (06/28/25 0720)  SpO2: 99 % (06/28/25 0720) Vital Signs (24h Range):  Temp:  [97.4 °F (36.3 °C)-98.2 °F (36.8 °C)] 98.2 °F (36.8 °C)  Pulse:  [54-75] 75  Resp:  [14-20] 18  SpO2:  [93 %-100 %] 99 %  BP: ()/(67-78) 123/68     Weight: 71 kg (156 lb 8.4 oz)  Body mass index is 21.22 kg/m².    Estimated Creatinine Clearance: 38.9 mL/min (A) (based on SCr of 1.95 mg/dL (H)).    Physical Exam:   General Appearance: Thin male, sitting in bed, much more alert, although still does not say much.  Few answers   HEENT (Head, Eyes, Ears, Nose and Throat): Normocephalic, Nontraumatic.  No thrush   Cardiovascular: Regular Rate and Rhythm  Respiratory:  On room air, in no distress

## 2025-06-29 LAB
ABS NEUT (OLG): 4.43 X10(3)/MCL (ref 2.1–9.2)
ANION GAP SERPL CALC-SCNC: 5 MEQ/L
ANISOCYTOSIS BLD QL SMEAR: ABNORMAL
BUN SERPL-MCNC: 25.2 MG/DL (ref 8.4–25.7)
CALCIUM SERPL-MCNC: 7.2 MG/DL (ref 8.8–10)
CHLORIDE SERPL-SCNC: 110 MMOL/L (ref 98–107)
CO2 SERPL-SCNC: 20 MMOL/L (ref 23–31)
CREAT SERPL-MCNC: 1.5 MG/DL (ref 0.72–1.25)
CREAT/UREA NIT SERPL: 17
ERYTHROCYTE [DISTWIDTH] IN BLOOD BY AUTOMATED COUNT: 15.9 % (ref 11.5–17)
FERRITIN SERPL-MCNC: 1767.27 NG/ML (ref 21.81–274.66)
GFR SERPLBLD CREATININE-BSD FMLA CKD-EPI: 52 ML/MIN/1.73/M2
GLUCOSE SERPL-MCNC: 63 MG/DL (ref 82–115)
HCT VFR BLD AUTO: 23 % (ref 42–52)
HGB BLD-MCNC: 7.1 G/DL (ref 14–18)
HYPOCHROMIA BLD QL SMEAR: ABNORMAL
INSTRUMENT WBC (OLG): 4.71 X10(3)/MCL
IRON SATN MFR SERPL: 23 % (ref 20–50)
IRON SERPL-MCNC: 24 UG/DL (ref 65–175)
LYMPHOCYTES NFR BLD MANUAL: 0.09 X10(3)/MCL (ref 0.6–4.6)
LYMPHOCYTES NFR BLD MANUAL: 2 %
MACROCYTES BLD QL SMEAR: ABNORMAL
MCH RBC QN AUTO: 27.7 PG (ref 27–31)
MCHC RBC AUTO-ENTMCNC: 30.9 G/DL (ref 33–36)
MCV RBC AUTO: 89.8 FL (ref 80–94)
MONOCYTES NFR BLD MANUAL: 0.19 X10(3)/MCL (ref 0.1–1.3)
MONOCYTES NFR BLD MANUAL: 4 %
NEUTROPHILS NFR BLD MANUAL: 94 %
OVALOCYTES (OLG): ABNORMAL
PLATELET # BLD AUTO: 126 X10(3)/MCL (ref 130–400)
PLATELET # BLD EST: NORMAL 10*3/UL
PLATELETS.RETICULATED NFR BLD AUTO: 2.3 % (ref 0.9–11.2)
PMV BLD AUTO: 10.6 FL (ref 7.4–10.4)
POIKILOCYTOSIS BLD QL SMEAR: ABNORMAL
POTASSIUM SERPL-SCNC: 4.9 MMOL/L (ref 3.5–5.1)
RBC # BLD AUTO: 2.56 X10(6)/MCL (ref 4.7–6.1)
RBC MORPH BLD: ABNORMAL
SODIUM SERPL-SCNC: 135 MMOL/L (ref 136–145)
TEAR DROP CELL (OLG): ABNORMAL
TIBC SERPL-MCNC: 105 UG/DL (ref 250–450)
TIBC SERPL-MCNC: 81 UG/DL (ref 60–240)
TRANSFERRIN SERPL-MCNC: 87 MG/DL (ref 163–344)
WBC # BLD AUTO: 4.71 X10(3)/MCL (ref 4.5–11.5)

## 2025-06-29 PROCEDURE — 82728 ASSAY OF FERRITIN: CPT | Performed by: INTERNAL MEDICINE

## 2025-06-29 PROCEDURE — 36415 COLL VENOUS BLD VENIPUNCTURE: CPT | Performed by: INTERNAL MEDICINE

## 2025-06-29 PROCEDURE — 25000003 PHARM REV CODE 250: Performed by: INTERNAL MEDICINE

## 2025-06-29 PROCEDURE — 83540 ASSAY OF IRON: CPT | Performed by: INTERNAL MEDICINE

## 2025-06-29 PROCEDURE — 63600175 PHARM REV CODE 636 W HCPCS: Performed by: INTERNAL MEDICINE

## 2025-06-29 PROCEDURE — 85025 COMPLETE CBC W/AUTO DIFF WBC: CPT | Performed by: INTERNAL MEDICINE

## 2025-06-29 PROCEDURE — 11000001 HC ACUTE MED/SURG PRIVATE ROOM

## 2025-06-29 PROCEDURE — 63700000 PHARM REV CODE 250 ALT 637 W/O HCPCS: Performed by: INTERNAL MEDICINE

## 2025-06-29 PROCEDURE — 80048 BASIC METABOLIC PNL TOTAL CA: CPT | Performed by: INTERNAL MEDICINE

## 2025-06-29 RX ORDER — FLUCONAZOLE 2 MG/ML
200 INJECTION, SOLUTION INTRAVENOUS
Status: DISCONTINUED | OUTPATIENT
Start: 2025-06-29 | End: 2025-07-04

## 2025-06-29 RX ADMIN — FLUCONAZOLE 200 MG: 2 INJECTION, SOLUTION INTRAVENOUS at 05:06

## 2025-06-29 RX ADMIN — SODIUM CHLORIDE: 9 INJECTION, SOLUTION INTRAVENOUS at 04:06

## 2025-06-29 RX ADMIN — ENOXAPARIN SODIUM 40 MG: 40 INJECTION SUBCUTANEOUS at 05:06

## 2025-06-29 RX ADMIN — SULFAMETHOXAZOLE AND TRIMETHOPRIM 1 TABLET: 800; 160 TABLET ORAL at 09:06

## 2025-06-29 NOTE — PROGRESS NOTES
[facility]  Hospital Medicine Progress Note        Chief Complaint: Inpatient Follow-up for AIDS, MAC infection     HPI:    Anne Terry is a 65 y.o. 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 The patient presented to [facility] on 6/21/2025 with a primary complaint of failure to thrive. Patient was brought to OSH ED found walking around naked and confused. He also likely has dementia. Patient says that he lives by himself in [place] but is unable to explain why he went to the ER or why he is now in the hospital but say he had no choice. He has not been taking his medications. Pt had a CT scans done at OSH, results to be uploaded. He had a hospital admission in March 2025 for treatment of HIV and MAC, a punch biopsy of BLLE that was negative for malignancy, and an Ophthalmology evaluation. He has a history of non-compliance and missing several follow up appointments. Denies fevers, chills, sweats, SOB, N/V/D, dysuria, new rashes. Does have oral thrush.    He was seen ID team and started on  zithrmax, bactrim, ethambutol, rifampin, diflucan. Last CD 4 was 37. MRI brain and LP planned to be done under anesthesia. CSF and MRI brain was unremarkable.      Interval Hx:   Patient today awake and comfortable. Able to have a conversation. Oriented to self. Following verbal commands. Still does not know the year or where he is now.   He has not ambulated much.     Sitter at bedside.     Case was discussed with patient's nurse and  on the floor.    Objective/physical exam:  General: In no acute distress, frail  Chest: Clear to auscultation bilaterally  Heart: RRR, +S1, S2, no appreciable murmur  Abdomen: Soft, nontender, BS +  Neurologic: Cranial nerve II-XII intact, Strength 5/5 in all 4 extremities  Tato legs hyperpigmented     VITAL SIGNS: 24 HRS MIN & MAX LAST   Temp  Min: 97 °F (36.1 °C)  Max: 98.2 °F (36.8  °C) 98.2 °F (36.8 °C)   BP  Min: 111/69  Max: 123/73 123/73   Pulse  Min: 63  Max: 71  71   Resp  Min: 17  Max: 18 17   SpO2  Min: 97 %  Max: 99 % 98 %     I have reviewed the following labs:  Recent Labs   Lab 06/27/25 0517 06/28/25  0406 06/28/25  1824 06/29/25  0535   WBC 4.28* 4.25*  --  4.71  4.71   RBC 2.96* 2.48*  --  2.56*   HGB 8.1* 6.8* 7.4* 7.1*   HCT 26.8* 22.6* 23.9* 23.0*   MCV 90.5 91.1  --  89.8   MCH 27.4 27.4  --  27.7   MCHC 30.2* 30.1*  --  30.9*   RDW 16.0 16.1  --  15.9    132  --  126*   MPV 11.4* 11.3*  --  10.6*     Recent Labs   Lab 06/27/25 0517 06/28/25 0405 06/29/25  0535    137 135*   K 5.0 4.9 4.9    107 110*   CO2 24 23 20*   BUN 27.4* 31.5* 25.2   CREATININE 1.79* 1.95* 1.50*   GLU 64* 69* 63*   CALCIUM 7.9* 7.4* 7.2*   ALBUMIN 2.0*  --   --    PROT 7.9*  --   --    ALKPHOS 1,735*  --   --    ALT 42  --   --    AST 83*  --   --    BILITOT 0.6  --   --      Microbiology Results (last 7 days)       Procedure Component Value Units Date/Time    Cerebrospinal Fluid Culture [[id number]] Collected: 06/27/25 1402    Order Status: Completed Specimen: CSF (Spinal Fluid) from Cerebrospinal Fluid Updated: 06/29/25 0716     CULTURE, CSF No Growth At 48 Hours     GRAM STAIN No WBCs, No bacteria seen    AFB Smear [[id number]] Collected: 06/27/25 1402    Order Status: Completed Specimen: CSF (Spinal Fluid) from Cerebrospinal Fluid Updated: 06/28/25 1346     AFB Smear No AFB seen (Direct smear only) - Concentration to follow    Cryptococcal antigen, CSF [[id number]]     Order Status: Sent Specimen: CSF (Spinal Fluid) from CSF Tap, Tube 1     Mycobacteria and Nocardia Culture [[id number]] Collected: 06/27/25 1402    Order Status: Sent Specimen: CSF (Spinal Fluid) from Cerebrospinal Fluid Updated: 06/27/25 1435    Cryptococcal antigen, CSF [[id number]] Collected: 06/27/25 1402    Order Status: Sent Specimen: CSF (Spinal Fluid) from CSF Tap, Tube 1 Updated: 06/27/25 1435    Fungal  Culture [[id number]] Collected: 06/27/25 1402    Order Status: Sent Specimen: CSF (Spinal Fluid) from Cerebrospinal Fluid Updated: 06/27/25 1435    Blood Culture [[id number]]  (Normal) Collected: 06/21/25 2356    Order Status: Completed Specimen: Blood Updated: 06/27/25 0204     Blood Culture No Growth at 5 days    Blood Culture [[id number]]  (Normal) Collected: 06/21/25 2356    Order Status: Completed Specimen: Blood Updated: 06/27/25 0204     Blood Culture No Growth at 5 days             See below for Radiology    Assessment/Plan:  Acute metabolic encephalopathy : improved   HIV/AIDS - CD4 37 with opportunistic infection   Oral thrush   ARF   Anemic of chronic disease : worse   Mild thrombocytopenia   Iron deficiency   H/O disseminated MAC, now with lung mass   H/O cryptococcal meningitis   Non complaint with HAART   Elevated ALP ? Etiology     Plan:  Patient clinically improving  Will start OT/PT    Now able to have a conversation   MRI brain unremarkable   CSF analysis so far negative     F/U by ID team and continue zithrmax, bactrim, ethambutol, rifampin, diflucan     Continue strict aspiration, fall and decubitus precautions      Reviewed today's labs and  Hb 7.1, Plt 126, BUN 20, Crt 1.50  Transfuse if Hb <7    Will continue iv fluids for today     His ALP was elevated, CT abdomen done 6/23/25 showed normal liver   US liver in am     Avoid NSAIDS     Labs in am     VTE prophylaxis: Lovenox     Patient condition:  Guarded    Anticipated discharge and Disposition:   TBD      All diagnosis and differential diagnosis have been reviewed; assessment and plan has been documented; I have personally reviewed the labs and test results that are presently available; I have reviewed the patients medication list; I have reviewed the consulting providers response and recommendations. I have reviewed or attempted to review medical records based upon their availability    All of the patient's questions have been  addressed  and answered. Patient's is agreeable to the above stated plan. I will continue to monitor closely and make adjustments to medical management as needed.    Portions of this note dictated using EMR integrated voice recognition software, and may be subject to voice recognition errors not corrected at proofreading. Please contact writer for clarification if needed.   _____________________________________________________________________    Malnutrition Status:  Nutrition consulted. Most recent weight and BMI monitored-     Measurements:  Wt Readings from Last 1 Encounters:   06/21/25 71 kg (156 lb 8.4 oz)   Body mass index is 21.22 kg/m².    Patient has been screened and assessed by RD.    Malnutrition Type:  Context: chronic illness  Level: severe    Malnutrition Characteristic Summary:  Weight Loss (Malnutrition): other (see comments) (Does not meet criteria)  Subcutaneous Fat (Malnutrition): mild depletion  Muscle Mass (Malnutrition): moderate depletion    Interventions/Recommendations (treatment strategy):  Oral diet/nutrient modifications     Scheduled Med:   azithromycin  500 mg Oral Daily    enoxparin  40 mg Subcutaneous Q24H (prophylaxis, 1700)    ethambutoL  800 mg Oral Daily    ferrous sulfate  1 tablet Oral Daily    fluconazole (DIFLUCAN) IV (PEDS and ADULTS)  400 mg Intravenous Q24H    pantoprazole  40 mg Oral Daily    rifAMpin  300 mg Oral Daily    sulfamethoxazole-trimethoprim 800-160mg  1 tablet Oral BID      Continuous Infusions:   0.9% NaCl   Intravenous Continuous 125 mL/hr at 06/29/25 0409 New Bag at 06/29/25 0409      PRN Meds:    Current Facility-Administered Medications:     acetaminophen, 650 mg, Oral, Q6H PRN    dextrose 50%, 12.5 g, Intravenous, PRN    dextrose 50%, 25 g, Intravenous, PRN    glucagon (human recombinant), 1 mg, Intramuscular, PRN    glucose, 16 g, Oral, PRN    glucose, 24 g, Oral, PRN    naloxone, 0.02 mg, Intravenous, PRN    ondansetron, 4 mg, Intravenous, Q4H PRN     prochlorperazine, 5 mg, Intravenous, Q6H PRN    sodium chloride 0.9%, 10 mL, Intravenous, PRN     Radiology:  I have personally reviewed the following imaging and agree with the radiologist.     US Retroperitoneal Complete  Narrative: EXAMINATION:  US RETROPERITONEAL COMPLETE    CLINICAL HISTORY:  ARF;    TECHNIQUE:  Ultrasound of the kidneys and urinary bladder was performed including color flow and grayscale evaluation of the kidneys.    COMPARISON:  CT chest abdomen pelvis 06/23/2025    FINDINGS:  RIGHT KIDNEY: The right kidney measures 9.1 cm.  No hydronephrosis.    LEFT KIDNEY: The left kidney measures 10.8 cm. No hydronephrosis.Portions of the upper pole obscured by shadowing.    BLADDER: The bladder has an unremarkable appearance taking into account degree of distention.    OTHER: Retroperitoneal adenopathy.  Largest lymph node seen is a 5.6 x 5 x 3.2 cm Lisa caval lymph node.  Aorta and IVC unremarkable.  Impression: Normal sized, nonobstructed kidneys.    Retroperitoneal lymphadenopathy.  Defer to recent CT exam.    Electronically signed by: Moni Hdez  Date:    06/28/2025  Time:    13:42      Clif Osborne MD  Department of Hospital Medicine   [facility]   06/29/2025

## 2025-06-30 LAB
ABACAVIR ISLT GENOTYP: NORMAL
ALBUMIN SERPL-MCNC: 1.9 G/DL (ref 3.4–4.8)
ALBUMIN/GLOB SERPL: 0.3 RATIO (ref 1.1–2)
ALP SERPL-CCNC: 2162 UNIT/L (ref 40–150)
ALT SERPL-CCNC: 35 UNIT/L (ref 0–55)
ANION GAP SERPL CALC-SCNC: 5 MEQ/L
AST SERPL-CCNC: 80 UNIT/L (ref 11–45)
ATAZANAVIR+RITONAVIR ISLT GENOTYP: NORMAL
BASOPHILS # BLD AUTO: 0.01 X10(3)/MCL
BASOPHILS NFR BLD AUTO: 0.2 %
BICTEGRAVIR ISLT GENOTYP: NORMAL
BILIRUB SERPL-MCNC: 0.6 MG/DL
BUN SERPL-MCNC: 25.9 MG/DL (ref 8.4–25.7)
CABOTEGRAVIR ISLT GENOTYP: NORMAL
CALCIUM SERPL-MCNC: 7.6 MG/DL (ref 8.8–10)
CHLORIDE SERPL-SCNC: 112 MMOL/L (ref 98–107)
CO2 SERPL-SCNC: 17 MMOL/L (ref 23–31)
CREAT SERPL-MCNC: 1.4 MG/DL (ref 0.72–1.25)
CREAT/UREA NIT SERPL: 19
CRYPTOC AG CSF QL IA.RAPID: NEGATIVE
DARUNAVIR+RITONAVIR ISLT GENOTYP: NORMAL
DIDANOSINE ISLT GENOTYP: NORMAL
DOLUTEGRAVIR ISLT GENOTYP: NORMAL
DORAVIRINE ISLT GENOTYP: NORMAL
EFAVIRENZ ISLT GENOTYP: NORMAL
ELVITEGRAVIR ISLT GENOTYP: NORMAL
EMTRICITABINE ISLT GENOTYP: NORMAL
EOSINOPHIL # BLD AUTO: 0.03 X10(3)/MCL (ref 0–0.9)
EOSINOPHIL NFR BLD AUTO: 0.7 %
ERYTHROCYTE [DISTWIDTH] IN BLOOD BY AUTOMATED COUNT: 15.9 % (ref 11.5–17)
ETRAVIRINE ISLT GENOTYP: NORMAL
FOSAMPRENAVIR+RITONAVIR ISLT GENOTYP: NORMAL
GFR SERPLBLD CREATININE-BSD FMLA CKD-EPI: 56 ML/MIN/1.73/M2
GLOBULIN SER-MCNC: 5.7 GM/DL (ref 2.4–3.5)
GLUCOSE SERPL-MCNC: 72 MG/DL (ref 82–115)
HCT VFR BLD AUTO: 23.9 % (ref 42–52)
HGB BLD-MCNC: 7.4 G/DL (ref 14–18)
HIV 1 RNA GENOTYPE ISLT: NORMAL
HIV 1 RNA RT + PR + IN MUT DET PLAS SEQ: NORMAL
HIV NNRTI GENE MUT DET ISLT: NORMAL
HIV NRTI GENE MUT DET ISLT: NORMAL
HIV PI GENE MUT DET ISLT: NORMAL
HIV1 INTEGRASE GENE MUT DET ISLT: NORMAL
IMM GRANULOCYTES # BLD AUTO: 0.11 X10(3)/MCL (ref 0–0.04)
IMM GRANULOCYTES NFR BLD AUTO: 2.4 %
INDINAVIR+RITONAVIR ISLT GENOTYP: NORMAL
JCPYV DNA CSF QL NAA+PROBE: NEGATIVE
LAB AOE PNL PATIENT: NORMAL
LAMIVUDINE ISLT GENOTYP: NORMAL
LOPINAVIR+RITONAVIR ISLT GENOTYP: NORMAL
LYMPHOCYTES # BLD AUTO: 0.38 X10(3)/MCL (ref 0.6–4.6)
LYMPHOCYTES NFR BLD AUTO: 8.3 %
M INTEGRASE FAILED CODONS: NORMAL
M PROTEASE FAILED CODONS: NORMAL
M REVERSE TRANSCRIPTASE FAILED CODONS: NORMAL
MAYO GENERIC ORDERABLE RESULT: NORMAL
MCH RBC QN AUTO: 27.9 PG (ref 27–31)
MCHC RBC AUTO-ENTMCNC: 31 G/DL (ref 33–36)
MCV RBC AUTO: 90.2 FL (ref 80–94)
MONOCYTES # BLD AUTO: 0.21 X10(3)/MCL (ref 0.1–1.3)
MONOCYTES NFR BLD AUTO: 4.6 %
NELFINAVIR ISLT GENOTYP: NORMAL
NEUTROPHILS # BLD AUTO: 3.85 X10(3)/MCL (ref 2.1–9.2)
NEUTROPHILS NFR BLD AUTO: 83.8 %
NEVIRAPINE ISLT GENOTYP: NORMAL
NRBC BLD AUTO-RTO: 0 %
PLATELET # BLD AUTO: 161 X10(3)/MCL (ref 130–400)
PMV BLD AUTO: 10.7 FL (ref 7.4–10.4)
POTASSIUM SERPL-SCNC: 5.2 MMOL/L (ref 3.5–5.1)
PROT SERPL-MCNC: 7.6 GM/DL (ref 5.8–7.6)
PSYCHE PATHOLOGY RESULT: NORMAL
RALTEGRAVIR ISLT GENOTYP: NORMAL
RBC # BLD AUTO: 2.65 X10(6)/MCL (ref 4.7–6.1)
RILPIVIRINE ISLT GENOTYP: NORMAL
SAQUINAVIR+RITONAVIR ISLT GENOTYP: NORMAL
SODIUM SERPL-SCNC: 134 MMOL/L (ref 136–145)
STAVUDINE ISLT GENOTYP: NORMAL
TENOFOVIR ISLT GENOTYP: NORMAL
TIPRANAVIR+RITONAVIR ISLT GENOTYP: NORMAL
WBC # BLD AUTO: 4.59 X10(3)/MCL (ref 4.5–11.5)
ZIDOVUDINE ISLT GENOTYP: NORMAL

## 2025-06-30 PROCEDURE — 97166 OT EVAL MOD COMPLEX 45 MIN: CPT

## 2025-06-30 PROCEDURE — 11000001 HC ACUTE MED/SURG PRIVATE ROOM

## 2025-06-30 PROCEDURE — 85025 COMPLETE CBC W/AUTO DIFF WBC: CPT | Performed by: INTERNAL MEDICINE

## 2025-06-30 PROCEDURE — 36415 COLL VENOUS BLD VENIPUNCTURE: CPT | Performed by: INTERNAL MEDICINE

## 2025-06-30 PROCEDURE — 25000003 PHARM REV CODE 250: Performed by: INTERNAL MEDICINE

## 2025-06-30 PROCEDURE — 63600175 PHARM REV CODE 636 W HCPCS: Performed by: INTERNAL MEDICINE

## 2025-06-30 PROCEDURE — 97162 PT EVAL MOD COMPLEX 30 MIN: CPT

## 2025-06-30 PROCEDURE — 80053 COMPREHEN METABOLIC PANEL: CPT | Performed by: INTERNAL MEDICINE

## 2025-06-30 PROCEDURE — 63700000 PHARM REV CODE 250 ALT 637 W/O HCPCS: Performed by: INTERNAL MEDICINE

## 2025-06-30 PROCEDURE — 63600175 PHARM REV CODE 636 W HCPCS: Performed by: NURSE PRACTITIONER

## 2025-06-30 RX ORDER — SODIUM BICARBONATE 650 MG/1
650 TABLET ORAL DAILY
Status: DISCONTINUED | OUTPATIENT
Start: 2025-06-30 | End: 2025-07-08 | Stop reason: HOSPADM

## 2025-06-30 RX ORDER — SULFAMETHOXAZOLE AND TRIMETHOPRIM 800; 160 MG/1; MG/1
1 TABLET ORAL DAILY
Status: DISCONTINUED | OUTPATIENT
Start: 2025-07-01 | End: 2025-07-08 | Stop reason: HOSPADM

## 2025-06-30 RX ADMIN — ENOXAPARIN SODIUM 40 MG: 40 INJECTION SUBCUTANEOUS at 04:06

## 2025-06-30 RX ADMIN — ONDANSETRON 4 MG: 2 INJECTION INTRAMUSCULAR; INTRAVENOUS at 05:06

## 2025-06-30 RX ADMIN — FLUCONAZOLE 200 MG: 2 INJECTION, SOLUTION INTRAVENOUS at 04:06

## 2025-06-30 NOTE — PT/OT/SLP EVAL
Occupational Therapy  Evaluation    Name: Anne Terry  MRN: [id number]  Recent Surgery: Procedure(s) (LRB):  MRI (Magnetic Resonance Imagine) (N/A) 3 Days Post-Op    Recommendations:     Discharge therapy intensity: No Therapy Indicated (psychiatric facility)   Discharge Equipment Recommendations:   (TBD)  Barriers to discharge:  Other (Comment) (ongoing medical needs)    Assessment:     Anne Terry is a 65 y.o. male with a medical diagnosis of acute metabolic encephalopathy, failure to thrive, TAYLOR, hx of HIV. He is A&Ox3 and tolerated OT evaluation well. He presents with decreased safety awareness and impulsivity; 1:1 sitter present. He reports living alone and being independent with ADLs prior. He presents with the following performance deficits affecting function: weakness, impaired endurance, impaired self care skills, impaired functional mobility, gait instability, decreased upper extremity function, decreased lower extremity function, decreased safety awareness, impaired cognition. He required SBA for bed mobility and min A for sit<>stand t/f. Pt able to take lateral steps along EOB with CGA; however, pt impulsively returned to sitting EOB 3x's and declined further mobility attempts. Anticipate pt will progress quickly; will continue to assess mobility as able.      DME Justification:  Anne's mobility limitation cannot be sufficiently resolved by the use of a cane. His functional mobility deficit can be sufficiently resolved with the use of a Rolling Walker. Patient's mobility limitation significantly impairs their ability to participate in one of more activities of daily living.  The use of a RW will significantly improve the patient's ability to participate in MRADLS and the patient will use it on regular basis in the home.    Rehab Prognosis: Good; patient would benefit from acute skilled OT services to address these deficits and reach maximum level of function.       Plan:     Patient to be seen 5  x/week to address the above listed problems via self-care/home management  Plan of Care Expires: 07/30/25  Plan of Care Reviewed with: patient    Subjective     Chief Complaint: n/a  Patient/Family Comments/goals: to go home     Occupational Profile:  Living Environment: Pt reports living alone in a single level home with x1 step to enter. Pt has a tub/shower combo with no SC.   Previous level of function: Pt reports being independent with ADLs prior.   Roles and Routines: friend   Equipment Used at Home: none  Assistance upon Discharge: Per chart, pt has assist from a friend sometimes.     Pain/Comfort:  Pain Rating 1: 0/10    Patients cultural, spiritual, Mandaen conflicts given the current situation: no    Objective:     OT communicated with RN prior to session.      Patient was found HOB elevated with peripheral IV (1:1) upon OT entry to room.    General Precautions: Standard, fall  Orthopedic Precautions: N/A  Braces: N/A    Vital Signs: Respiratory Status: on room air      Functional Mobility/Transfers:  Bed mobility:    Supine to Sit: stand by assistance  Sit to Supine: stand by assistance  Transfers: Sit to Stand: minimum assistance with rolling walker  Functional Mobility: pt able to take lateral steps along EOB with CGA using rolling walker. Pt abruptly returned to sitting EOB 3x's and required cues for safety. Pt refused further mobility attempts.     Activities of Daily Living:  Lower Body Dressing: minimum assistance to don socks.     AMPAC 6 Click ADL:  AMPAC Total Score: 21    Functional Cognition:  Orientation: oriented to Person, Place, and Time  Safety Awareness: Impaired.      Visual Perceptual Skills:  Intact    Upper Extremity Function:  Right Upper Extremity:   WFL    Left Upper Extremity:  WFL    Balance:   Overall CGA    Therapeutic Positioning  Risk for acquired pressure injuries is decreased due to ability to get to BSC/toilet with assist.    OT interventions performed during the course  of today's session:   Positioning recommendations were communicated to care team     Skin assessment: all bony prominences were assessed    Findings: Visible skin intact.     OT recommendations for therapeutic positioning throughout hospitalization:   Follow [facility] Pressure Injury Prevention Protocol    Co-Treatment: No    Patient Education:  Patient provided with verbal education education regarding OT role/goals/POC, fall prevention, safety awareness, Discharge/DME recommendations, and pressure ulcer prevention.  Understanding was verbalized, however additional teaching warranted.     Patient left HOB elevated with all lines intact, call button in reach, RN notified, and 1:1 present.    GOALS:   Multidisciplinary Problems       Occupational Therapy Goals          Problem: Occupational Therapy    Goal Priority Disciplines Outcome Interventions   Occupational Therapy Goal     OT, PT/OT Progressing    Description: LTG: Pt will perform basic ADLs and ADL transfers with Modified independence using LRAD by discharge.    STG: to be met by 7/30/25    Pt will complete grooming standing at sink with LRAD with SBA.  Pt will complete UB dressing with SBA.  Pt will complete LB dressing with SBA using LRAD.  Pt will complete toileting with SBA using LRAD.  Pt will complete functional mobility to/from toilet and toilet transfer with SBA using LRAD.                        History:     No past medical history on file.      Past Surgical History:   Procedure Laterality Date    COLONOSCOPY  05/08/2015    MAGNETIC RESONANCE IMAGING N/A 6/27/2025    Procedure: MRI (Magnetic Resonance Imagine);  Surgeon: Dara Horne MD;  Location: [facility];  Service: Medicine;  Laterality: N/A;  MRI WITH ANESTHESIA /   BRAIN - WITH AND WITHOUT CONTRAST       Time Tracking:     OT Date of Treatment: 06/30/25  OT Start Time: 1031  OT Stop Time: 1048  OT Total Time (min): 17 min    Billable Minutes:Evaluation Moderate Complexity.     6/30/2025

## 2025-06-30 NOTE — PHYSICIAN QUERY
Please clarify the etiology of the patient's altered mental status.  AMS due to metabolic encephalopathy superimposed on dementia

## 2025-06-30 NOTE — PLAN OF CARE
Rounded on patient to discuss discharge plans. Informed patient of therapy recommendations. Patient is denying placement, asked patient if he has help at home. Patient tells CM no he live alone. CM explained with his mobility discharging home alone may not be a safe plan. Patient not receptive to advise at this time. Will round in the morning.

## 2025-06-30 NOTE — PLAN OF CARE
Problem: Physical Therapy  Goal: Physical Therapy Goal  Description: Pt will be seen for the following goals   1. Pt will be ind with bed mobility  2. Pt will be sba with transfers with a rw   3. Pt will ambulate 100ft with a rw sba  6/30/2025 1332 by Usama Schuster, PT  Outcome: Progressing  6/30/2025 1332 by Usama Schuster, PT  Outcome: Progressing

## 2025-06-30 NOTE — PROGRESS NOTES
Inpatient Nutrition Assessment    Admit Date: 6/21/2025   Total duration of encounter: 9 days   Patient Age: 65 y.o.    Nutrition Recommendation/Prescription     Continue oral diet as tolerated; Diet Adult Regular Standard Tray   Will trial Boost Plus once a day (360 kcal, 14 gm protein per container)    Communication of Recommendations: reviewed with patient    Nutrition Assessment     Malnutrition Assessment/Nutrition-Focused Physical Exam    Malnutrition Context: chronic illness (06/27/25 1509)  Malnutrition Level: severe (06/27/25 1509)     Weight Loss (Malnutrition): other (see comments) (Does not meet criteria) (06/27/25 1509)  Subcutaneous Fat (Malnutrition): mild depletion (06/27/25 1509)  Orbital Region (Subcutaneous Fat Loss): mild depletion        Muscle Mass (Malnutrition): moderate depletion (06/27/25 1509)  [place] Region (Muscle Loss): moderate depletion  Clavicle Bone Region (Muscle Loss): moderate depletion  Clavicle and Acromion Bone Region (Muscle Loss): moderate depletion                          A minimum of two characteristics is recommended for diagnosis of either severe or non-severe malnutrition.    Chart Review    Reason Seen: malnutrition screening tool (MST)    Malnutrition Screening Tool Results   Have you recently lost weight without trying?: Unsure  Have you been eating poorly because of a decreased appetite?: No   MST Score: 2   Diagnosis:  Advanced HIV  MAC Infection  Lung Mass  Oral Candidiasis  Elevated Alk Phos 800s  Likely dementia    Relevant Medical History: HIV/AIDs, MAC, history of disseminated histoplasmosis, PJP, and cryptococcous     Scheduled Medications:  azithromycin, 500 mg, Daily  enoxparin, 40 mg, Q24H (prophylaxis, 1700)  ethambutoL, 800 mg, Daily  ferrous sulfate, 1 tablet, Daily  fluconazole (DIFLUCAN) IV (PEDS and ADULTS), 200 mg, Q24H  pantoprazole, 40 mg, Daily  rifAMpin, 300 mg, Daily  sodium bicarbonate, 650 mg, Daily  sulfamethoxazole-trimethoprim 800-160mg,  1 tablet, BID    Continuous Infusions:     PRN Medications:  acetaminophen, 650 mg, Q6H PRN  dextrose 50%, 12.5 g, PRN  dextrose 50%, 25 g, PRN  glucagon (human recombinant), 1 mg, PRN  glucose, 16 g, PRN  glucose, 24 g, PRN  naloxone, 0.02 mg, PRN  ondansetron, 4 mg, Q4H PRN  prochlorperazine, 5 mg, Q6H PRN  sodium chloride 0.9%, 10 mL, PRN    Calorie Containing IV Medications: no significant kcals from medications at this time    Recent Labs   Lab 06/24/25  0603 06/25/25  0433 06/26/25  0404 06/27/25  0517 06/28/25  0405 06/28/25  0406 06/28/25  1824 06/29/25  0535 06/30/25  0613 06/30/25  0925    137 136 136 137  --   --  135* 134*  --    K 4.2 4.6 4.5 5.0 4.9  --   --  4.9 5.2*  --    CALCIUM 7.8* 8.0* 7.9* 7.9* 7.4*  --   --  7.2* 7.6*  --     109* 108* 107 107  --   --  110* 112*  --    CO2 24 23 22* 24 23  --   --  20* 17*  --    BUN 17.9 19.7 23.6 27.4* 31.5*  --   --  25.2 25.9*  --    CREATININE 1.19 1.38* 1.67* 1.79* 1.95*  --   --  1.50* 1.40*  --    EGFRNORACEVR >60 57 46 42 38  --   --  52 56  --    GLU 66* 67* 72* 64* 69*  --   --  63* 72*  --    BILITOT  --   --   --  0.6  --   --   --   --  0.6  --    ALKPHOS  --   --   --  1,735*  --   --   --   --  2,162*  --    ALT  --   --   --  42  --   --   --   --  35  --    AST  --   --   --  83*  --   --   --   --  80*  --    ALBUMIN  --   --   --  2.0*  --   --   --   --  1.9*  --    WBC 3.61* 4.19* 4.07* 4.28*  --  4.25*  --  4.71  4.71  --  4.59   HGB 7.2* 7.9* 7.4* 8.1*  --  6.8* 7.4* 7.1*  --  7.4*   HCT 23.5* 25.6* 23.7* 26.8*  --  22.6* 23.9* 23.0*  --  23.9*     Nutrition Orders:  Diet Adult Regular Standard Tray  Dietary nutrition supplements Daily; Boost Original Nutritional Drink - Any flavor    Appetite/Oral Intake: fair/50-75% of meals  Factors Affecting Nutritional Intake: socio-economic  Social Needs Impacting Access to Food: reports lack of access to food / food insecure - referred to case management  Food/Holiness/Cultural  "Preferences: unable to obtain  Food Allergies: no known food allergies  Last Bowel Movement: 06/28/25  Wound(s):  none noted    Comments    6/24/25 Pt eating lunch, difficulty obtaining hx from pt; reports no weight loss (appears stable in EMR since feb); declined oral supplements. Visible signs of fat and muscle loss present. Unsure if pt has adequate access to nutrition at home.    6/27/25 Pt out of room for procedure; nursing noted 25% intake of supper last night. Will f/u early.    6/30/25 Pt eating about 25-50% of meals per nursing, but reports he is eating well.    Anthropometrics    Height: 6' 0.01" (182.9 cm),    Last Weight: 71 kg (156 lb 8.4 oz) (06/21/25 1220), Weight Method: Bed Scale  BMI (Calculated): 21.2  BMI Classification: normal (BMI 18.5-24.9)        Ideal Body Weight (IBW), Male: 178.06 lb     % Ideal Body Weight, Male (lb): 87.94 %                          Usual Weight Provided By: patient denies unintentional weight loss    Wt Readings from Last 5 Encounters:   06/21/25 71 kg (156 lb 8.4 oz)   02/28/25 70.3 kg (155 lb)   02/27/25 71.8 kg (158 lb 4.6 oz)   01/07/25 73.5 kg (161 lb 14.9 oz)   11/20/24 58 kg (127 lb 13.9 oz)     Weight Change(s) Since Admission:   Wt Readings from Last 1 Encounters:   06/21/25 1220 71 kg (156 lb 8.4 oz)   Admit Weight: 71 kg (156 lb 8.4 oz) (06/21/25 1220), Weight Method: Bed Scale    Estimated Needs    Weight Used For Calorie Calculations: 71 kg (156 lb 8.4 oz)  Energy Calorie Requirements (kcal): 4904-6938 kcal (30-35 kcal/kg)  Energy Need Method: Kcal/kg  Weight Used For Protein Calculations: 71 kg (156 lb 8.4 oz)  Protein Requirements: 106-120gm (1.5-1.7 gm/kg)  Fluid Requirements (mL): 2130 ml (30ml/kg)        Enteral Nutrition     Patient not receiving enteral nutrition at this time.    Parenteral Nutrition     Patient not receiving parenteral nutrition support at this time.    Evaluation of Received Nutrient Intake    Calories: not meeting estimated " needs  Protein: not meeting estimated needs    Patient Education     Not applicable.    Nutrition Diagnosis         PES: Severe chronic disease or condition related malnutrition Related to chronic condition As Evidenced by:  - muscle mass depletion: 6 areas of moderate muscle loss (Trapezius, Deltoid, Pectoralis, Acromion, Temporalis, Clavicle) - loss of subcutaneous fat: 2 areas of mild fat loss (Buccal, Infraorbital) active    Nutrition Interventions     Intervention(s): commercial beverage and collaboration with other providers  Intervention(s): Oral diet/nutrient modifications    Goal: Consume % of meals/snacks by follow-up. (goal progressing)  Goal: Maintain weight throughout hospitalization. (goal progressing)    Nutrition Goals & Monitoring     Dietitian will monitor: food and beverage intake and weight change  Discharge planning: continue regular diet and provided referral to  for food access needs  Nutrition Risk/Follow-Up: patient at increased nutrition risk; dietitian will follow-up twice weekly   Please consult if re-assessment needed sooner.

## 2025-06-30 NOTE — PROGRESS NOTES
[facility]  Hospital Medicine Progress Note        Chief Complaint: Inpatient Follow-up for AIDS, MAC infection     HPI:    Anne Terry is a 65 y.o. 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 The patient presented to [facility] on 6/21/2025 with a primary complaint of failure to thrive. Patient was brought to OSH ED found walking around naked and confused. He also likely has dementia. Patient says that he lives by himself in [place] but is unable to explain why he went to the ER or why he is now in the hospital but say he had no choice. He has not been taking his medications. Pt had a CT scans done at OSH, results to be uploaded. He had a hospital admission in March 2025 for treatment of HIV and MAC, a punch biopsy of BLLE that was negative for malignancy, and an Ophthalmology evaluation. He has a history of non-compliance and missing several follow up appointments. Denies fevers, chills, sweats, SOB, N/V/D, dysuria, new rashes. Does have oral thrush.    He was seen ID team and started on  zithrmax, bactrim, ethambutol, rifampin, diflucan. Last CD 4 was 37. MRI brain and LP planned to be done under anesthesia. CSF and MRI brain was unremarkable.      Interval Hx:   Patient today awake and comfortable. Eating well. Has been afebrile. Participated well with PT.     Sitter at bedside.     Case was discussed with patient's nurse and  on the floor.    Objective/physical exam:  General: In no acute distress, frail  Chest: Clear to auscultation bilaterally  Heart: RRR, +S1, S2, no appreciable murmur  Abdomen: Soft, nontender, BS +  Neurologic: Cranial nerve II-XII intact, Strength 5/5 in all 4 extremities  Tato legs hyperpigmented     VITAL SIGNS: 24 HRS MIN & MAX LAST   Temp  Min: 97.5 °F (36.4 °C)  Max: 98.9 °F (37.2 °C) 98.1 °F (36.7 °C)   BP  Min: 130/81  Max: 151/80 136/79   Pulse  Min: 67  Max: 82  70    Resp  Min: 18  Max: 18 18   SpO2  Min: 92 %  Max: 99 % 99 %     I have reviewed the following labs:  Recent Labs   Lab 06/28/25  0406 06/28/25  1824 06/29/25  0535 06/30/25  0925   WBC 4.25*  --  4.71  4.71 4.59   RBC 2.48*  --  2.56* 2.65*   HGB 6.8* 7.4* 7.1* 7.4*   HCT 22.6* 23.9* 23.0* 23.9*   MCV 91.1  --  89.8 90.2   MCH 27.4  --  27.7 27.9   MCHC 30.1*  --  30.9* 31.0*   RDW 16.1  --  15.9 15.9     --  126* 161   MPV 11.3*  --  10.6* 10.7*     Recent Labs   Lab 06/27/25  0517 06/28/25  0405 06/29/25  0535 06/30/25  0613    137 135* 134*   K 5.0 4.9 4.9 5.2*    107 110* 112*   CO2 24 23 20* 17*   BUN 27.4* 31.5* 25.2 25.9*   CREATININE 1.79* 1.95* 1.50* 1.40*   GLU 64* 69* 63* 72*   CALCIUM 7.9* 7.4* 7.2* 7.6*   ALBUMIN 2.0*  --   --  1.9*   PROT 7.9*  --   --  7.6   ALKPHOS 1,735*  --   --  2,162*   ALT 42  --   --  35   AST 83*  --   --  80*   BILITOT 0.6  --   --  0.6     Microbiology Results (last 7 days)       Procedure Component Value Units Date/Time    Cryptococcal antigen, CSF [[id number]]  (Normal) Collected: 06/27/25 1402    Order Status: Completed Specimen: CSF (Spinal Fluid) from CSF Tap, Tube 1 Updated: 06/30/25 1138     Cryptococcal Antigen, CSF Negative    Cerebrospinal Fluid Culture [[id number]] Collected: 06/27/25 1402    Order Status: Completed Specimen: CSF (Spinal Fluid) from Cerebrospinal Fluid Updated: 06/30/25 0922     CULTURE, CSF No Growth At 72 Hours     GRAM STAIN No WBCs, No bacteria seen    AFB Smear [[id number]] Collected: 06/27/25 1402    Order Status: Completed Specimen: CSF (Spinal Fluid) from Cerebrospinal Fluid Updated: 06/28/25 1346     AFB Smear No AFB seen (Direct smear only) - Concentration to follow    Cryptococcal antigen, CSF [[id number]]     Order Status: Sent Specimen: CSF (Spinal Fluid) from CSF Tap, Tube 1     Mycobacteria and Nocardia Culture [[id number]] Collected: 06/27/25 1402    Order Status: Sent Specimen: CSF (Spinal Fluid) from  Cerebrospinal Fluid Updated: 06/27/25 1435    Fungal Culture [[id number]] Collected: 06/27/25 1402    Order Status: Sent Specimen: CSF (Spinal Fluid) from Cerebrospinal Fluid Updated: 06/27/25 1435    Blood Culture [[id number]]  (Normal) Collected: 06/21/25 2356    Order Status: Completed Specimen: Blood Updated: 06/27/25 0204     Blood Culture No Growth at 5 days    Blood Culture [[id number]]  (Normal) Collected: 06/21/25 2356    Order Status: Completed Specimen: Blood Updated: 06/27/25 0204     Blood Culture No Growth at 5 days             See below for Radiology    Assessment/Plan:  Acute metabolic encephalopathy : improved   HIV/AIDS - CD4 37 with opportunistic infection   Oral thrush   ARF   Anemic of chronic disease : worse   Mild thrombocytopenia   Iron deficiency   H/O disseminated MAC, now with lung mass   H/O cryptococcal meningitis   Non complaint with HAART   Elevated ALP ? Etiology     Plan:  Patient looks comfortable  Has been afebrile  Participated well with PT    MRI brain unremarkable   CSF analysis so far negative     F/U by ID team and continue zithrmax, bactrim, ethambutol, rifampin, diflucan     Continue strict aspiration, fall and decubitus precautions      Reviewed today's labs and  Hb 7.4, Plt 161, , K 5.2, CO2 17, BUN 25, Crt 1.40  Transfuse if Hb <7    His ALP was elevated today 2162 ? Etiology   CT abdomen done 6/23/25 showed normal liver   US liver was normal     Avoid NSAIDS     Labs in am     VTE prophylaxis: Lovenox     Patient condition:  Fair     Anticipated discharge and Disposition:   TBD      All diagnosis and differential diagnosis have been reviewed; assessment and plan has been documented; I have personally reviewed the labs and test results that are presently available; I have reviewed the patients medication list; I have reviewed the consulting providers response and recommendations. I have reviewed or attempted to review medical records based upon their  availability    All of the patient's questions have been  addressed and answered. Patient's is agreeable to the above stated plan. I will continue to monitor closely and make adjustments to medical management as needed.    Portions of this note dictated using EMR integrated voice recognition software, and may be subject to voice recognition errors not corrected at proofreading. Please contact writer for clarification if needed.   _____________________________________________________________________    Malnutrition Status:  Nutrition consulted. Most recent weight and BMI monitored-     Measurements:  Wt Readings from Last 1 Encounters:   06/21/25 71 kg (156 lb 8.4 oz)   Body mass index is 21.22 kg/m².    Patient has been screened and assessed by RD.    Malnutrition Type:  Context: chronic illness  Level: severe    Malnutrition Characteristic Summary:  Weight Loss (Malnutrition): other (see comments) (Does not meet criteria)  Subcutaneous Fat (Malnutrition): mild depletion  Muscle Mass (Malnutrition): moderate depletion    Interventions/Recommendations (treatment strategy):  Oral diet/nutrient modifications     Scheduled Med:   azithromycin  500 mg Oral Daily    enoxparin  40 mg Subcutaneous Q24H (prophylaxis, 1700)    ethambutoL  800 mg Oral Daily    ferrous sulfate  1 tablet Oral Daily    fluconazole (DIFLUCAN) IV (PEDS and ADULTS)  200 mg Intravenous Q24H    pantoprazole  40 mg Oral Daily    rifAMpin  300 mg Oral Daily    sodium bicarbonate  650 mg Oral Daily    sulfamethoxazole-trimethoprim 800-160mg  1 tablet Oral BID      Continuous Infusions:       PRN Meds:    Current Facility-Administered Medications:     acetaminophen, 650 mg, Oral, Q6H PRN    dextrose 50%, 12.5 g, Intravenous, PRN    dextrose 50%, 25 g, Intravenous, PRN    glucagon (human recombinant), 1 mg, Intramuscular, PRN    glucose, 16 g, Oral, PRN    glucose, 24 g, Oral, PRN    naloxone, 0.02 mg, Intravenous, PRN    ondansetron, 4 mg, Intravenous,  Q4H PRN    prochlorperazine, 5 mg, Intravenous, Q6H PRN    sodium chloride 0.9%, 10 mL, Intravenous, PRN     Radiology:  I have personally reviewed the following imaging and agree with the radiologist.     US Abdomen Limited  Narrative: EXAMINATION:  US ABDOMEN LIMITED    CLINICAL HISTORY:  Right upper quadrant pain.    TECHNIQUE:  Multiple real-time transverse and longitudinal sections were performed of the right abdomen by the sonographer. Select images were submitted for review.    COMPARISON:  CT abdomen pelvis June 23, 2025    FINDINGS:  Liver is of unremarkable echotexture with normal contour and size. There was no delineation of discrete hepatic cystic or solid mass. Hepatic maximum diameter is estimated to be 20.26 cm with ultrasound and appears to be overestimated.  On the previous CT abdomen maximum diameter of the liver in the midclavicular line is 16.2 cm which is within normal limits.  There was unremarkable hepatopedal flow within the portal vein.  Pancreas is obscured by overlying bowel gas.    Gallbladder lumen is without echogenicity indicative of sludge or cholelithiasis. Gallbladder wall thickness is within normal limits. Common bile duct caliber of 2.0 mm is within normal limits for the age. Sonographer reported negative Gomes's sign.    Inferior vena cava is unremarkable. Visualized portion of the abdominal aorta is without aneurysmal dilatation.    Normally located right kidney length measures 12.2 x 4.3 x 5.9 cm. Right renal corticomedullary differentiation is unremarkable. No evidence of hydronephrosis.  Impression: No acute findings sonography identified.    Electronically signed by: Ezio Deleon  Date:    06/29/2025  Time:    20:00      Clif Osborne MD  Department of Hospital Medicine   [facility]   06/30/2025

## 2025-06-30 NOTE — PT/OT/SLP EVAL
Physical Therapy Evaluation    Patient Name:  Anne Terry   MRN:  [id number]    Recommendations:     Discharge therapy intensity: Moderate Intensity Therapy   Discharge Equipment Recommendations: none   Barriers to discharge: Impaired mobility    Assessment:     Anne Terry is a 65 y.o. male admitted with a medical diagnosis of acute metabolic encephalopathy, failure to thrive, TAYLOR, hx of HIV. .  He presents with the following impairments/functional limitations: weakness, gait instability, impaired endurance, impaired self care skills, impaired functional mobility, impaired cognition, decreased safety awareness, impaired balance .    DME Justification:   Alexs mobility limitation cannot be sufficiently resolved by the use of a cane. His functional mobility deficit can be sufficiently resolved with the use of a Rolling Walker. Patient's mobility limitation significantly impairs their ability to participate in one of more activities of daily living.  The use of a RW will significantly improve the patient's ability to participate in MRADLS and the patient will use it on regular basis in the home.    Rehab Prognosis: Good; patient would benefit from acute skilled PT services to address these deficits and reach maximum level of function.    Recent Surgery: Procedure(s) (LRB):  MRI (Magnetic Resonance Imagine) (N/A) 3 Days Post-Op    Plan:     During this hospitalization, patient would benefit from acute PT services 5 x/week to address the identified rehab impairments via gait training, therapeutic activities, therapeutic exercises and progress toward the following goals:    Plan of Care Expires:       Subjective     Chief Complaint:   Patient/Family Comments/goals:   Pain/Comfort:       Patients cultural, spiritual, Sabianist conflicts given the current situation:      Living Environment:  Lives alone in a [facility]  Prior to admission, patients level of function was ind.  Equipment used at home: none.  DME owned  (not currently used): .  Upon discharge, patient will have assistance from TBD.    Objective:     Communicated with nurse prior to session.  Patient found supine with peripheral IV  upon PT entry to room.    General Precautions: Standard, fall  Orthopedic Precautions:N/A   Braces: N/A  Respiratory Status: Room air  Blood Pressure:       Exams:  RLE ROM: WFL  RLE Strength: 4-/5  LLE ROM: WFL  LLE Strength: 4-/5  Skin integrity:       Functional Mobility:  Bed Mobility:     Supine to Sit: stand by assistance  Sit to Supine: supervision  Transfers:     Sit to Stand:  minimum assistance with rolling walker  Bed to Chair: minimum assistance with  rolling walker  using  Stand Pivot  Gait: pt ambulates with a rw 20ft before needing to sit down. Pt is quite impulsive      AM-PAC 6 CLICK MOBILITY  Total Score:      Co-Treatment: No    Treatment & Education:      Patient provided with verbal education education regarding PT role/goals/POC, fall prevention, and safety awareness.  Understanding was verbalized, however additional teaching warranted.     Patient left supine with all lines intact and call button in reach.      GOALS:   Multidisciplinary Problems       Physical Therapy Goals          Problem: Physical Therapy    Goal Priority Disciplines Outcome Interventions   Physical Therapy Goal     PT, PT/OT Progressing    Description: Pt will be seen for the following goals   1. Pt will be ind with bed mobility  2. Pt will be sba with transfers with a rw   3. Pt will ambulate 100ft with a rw sba                       History:     No past medical history on file.    Past Surgical History:   Procedure Laterality Date    COLONOSCOPY  05/08/2015    MAGNETIC RESONANCE IMAGING N/A 6/27/2025    Procedure: MRI (Magnetic Resonance Imagine);  Surgeon: Dara Horne MD;  Location: [facility];  Service: Medicine;  Laterality: N/A;  MRI WITH ANESTHESIA /   BRAIN - WITH AND WITHOUT CONTRAST       Time Tracking:     PT Received On:    PT  Start Time: 1218     PT Stop Time: 1234  PT Total Time (min): 16 min     Billable Minutes: Evaluation 16      06/30/2025

## 2025-06-30 NOTE — PROGRESS NOTES
[facility] - 5th Floor Med Surg  Infectious Disease Progress Note     Patient Name: Anne Terry  MRN: [id number]  Admission Date: 6/21/2025  Hospital Length of Stay: 7 days  Attending Physician: Clif Osborne MD  Primary Care Provider: Nadia Primary Doctor   6/30/2025     Isolation Status: No active isolations     Reason for consultation  HIV management     IMPRESSION:     # Failure to thrive and generalized weakness /metabolic encephalopathy     # HIV/AIDS with history of medical noncompliance  -antiviral therapy regimen unclear  -patient has not been taking medications for an extended period of time  -currently holding antiviral therapy to avoid IRIS  -CD4 count 21/7% 6/23  -,288 6/23     # Recent history of disseminated MAC  -currently on azithromycin rifabutin and ethambutol  - CT c/a/p- 6/23 with no new findings, but increased LDN and same multifocal micronodular of both lungs     # 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 therapy with amphotericin B and flucytosine currently on consolidation therapy with oral fluconazole.  - S/p LP 6/27- unremarkable opening pressure. CSF fluid analysis unremarkable. Prot somewhat elevated, glu low (but also concurrently hypoglycemic), ME PCR neg, including crypto  - CSF cx in progress   - 6/27 MRI brain neg for acute findings      ---> Last visit with ID clinic at [facility] was in 3/3/25- with no mention of recent Cryptococcal meningitis      # Past Medical Hx of HIV/AIDs, MAC, history of disseminated histoplasmosis, PJP,  and cryptococcosis      # TAYLOR- worsening creatinine      Per chart review of ID NOTES- last one on 3/3/25:      Azithromycin 500 mg PO daily Start: 01/07/25  Ethambutol 15 mg/kg suspension daily Start: 01/07/25  Rifabutin 150 mg PO daily Start: 01/07/25  Atovaquone 1500 mg PO daily Start: 01/07/25 End: 02/28/25  Bactrim DS 1 tab daily  Truvada 1 tab PO daily  Abacavir 300mg PO daily  Darunavir  800mg PO daily  Ritonavir 100mg PO daily  -s/p punch bx of LE lesion in March- neg for malingnancy.  Negative for AFB     Recommendations         Continue disseminated MAC medication treatment with azithromycin 500 mg daily ethambutol 15 mg/kg every 24 hours plus rifampin 300 mg every 24 hours- patient is having a hard time tolerating the medications.  Hopefully patient can tolerate  Continue with fluconazole 200mg given renal failure.   Continue Bactrim DS for PCP/PJP   Hopefully can restart ART in 1-2 days   Follow up CSF studies and cultures  Supportive measures and nutritional support  Poor overall prognosis and high risk of mortality due to underlying infections, and medical non adherence.     Upon discharge he can should follow up with [facility] ID clinic        SUBJECTIVE:  Afebrile. Today, patient apparently ate better per caregiver.   Denies any headaches or any other pain.  No nausea or vomiting.  Some diarrhea today       HPI:      The Pt is a 65 y.o. 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 The patient presented to [facility] on 6/21/2025 with a primary complaint of failure to thrive. Patient was brought to OSH ED found walking around naked and confused. He also likely has dementia. Patient says that he lives by himself in [place] but is unable to explain why he went to the ER or why he is now in the hospital but say he had no choice. He has not been taking his medications. Pt had a CT scans done at OSH, results to be uploaded. He had a hospital admission in March 2025 for treatment of HIV and MAC, a punch biopsy of BLLE that was negative for malignancy, and an Ophthalmology evaluation. He has a history of non-compliance and missing several follow up appointments. Denies fevers, chills, sweats, SOB, N/V/D, dysuria, new rashes. Does have oral thrush.    Physical exam:  General: In no acute distress, frail  Chest: Clear to auscultation  bilaterally  Heart: RRR, +S1, S2, no appreciable murmur  Abdomen: Soft, nontender, BS +  Neurologic: Cranial nerve II-XII intact, Strength 5/5 in all 4 extremities  Tato legs hyperpigmented      VITAL SIGNS: 24 HRS MIN & MAX LAST   Temp  Min: 97.5 °F (36.4 °C)  Max: 98.9 °F (37.2 °C) 98.1 °F (36.7 °C)   BP  Min: 130/81  Max: 151/80 136/79   Pulse  Min: 67  Max: 82  70   Resp  Min: 18  Max: 18 18   SpO2  Min: 92 %  Max: 99 % 99 %      I have reviewed the following labs:         Recent Labs   Lab 06/28/25  0406 06/28/25  1824 06/29/25  0535 06/30/25  0925   WBC 4.25*  --  4.71  4.71 4.59   RBC 2.48*  --  2.56* 2.65*   HGB 6.8* 7.4* 7.1* 7.4*   HCT 22.6* 23.9* 23.0* 23.9*   MCV 91.1  --  89.8 90.2   MCH 27.4  --  27.7 27.9   MCHC 30.1*  --  30.9* 31.0*   RDW 16.1  --  15.9 15.9     --  126* 161   MPV 11.3*  --  10.6* 10.7*             Recent Labs   Lab 06/27/25  0517 06/28/25  0405 06/29/25  0535 06/30/25  0613    137 135* 134*   K 5.0 4.9 4.9 5.2*    107 110* 112*   CO2 24 23 20* 17*   BUN 27.4* 31.5* 25.2 25.9*   CREATININE 1.79* 1.95* 1.50* 1.40*   GLU 64* 69* 63* 72*   CALCIUM 7.9* 7.4* 7.2* 7.6*   ALBUMIN 2.0*  --   --  1.9*   PROT 7.9*  --   --  7.6   ALKPHOS 1,735*  --   --  2,162*   ALT 42  --   --  35   AST 83*  --   --  80*   BILITOT 0.6  --   --  0.6      Microbiology Results (last 7 days)         Procedure Component Value Units Date/Time     Cryptococcal antigen, CSF [[id number]]  (Normal) Collected: 06/27/25 1402     Order Status: Completed Specimen: CSF (Spinal Fluid) from CSF Tap, Tube 1 Updated: 06/30/25 1138       Cryptococcal Antigen, CSF Negative     Cerebrospinal Fluid Culture [[id number]] Collected: 06/27/25 1402     Order Status: Completed Specimen: CSF (Spinal Fluid) from Cerebrospinal Fluid Updated: 06/30/25 0922       CULTURE, CSF No Growth At 72 Hours       GRAM STAIN No WBCs, No bacteria seen     AFB Smear [[id number]] Collected: 06/27/25 1402     Order Status: Completed  Specimen: CSF (Spinal Fluid) from Cerebrospinal Fluid Updated: 06/28/25 1346       AFB Smear No AFB seen (Direct smear only) - Concentration to follow     Cryptococcal antigen, CSF [[id number]]       Order Status: Sent Specimen: CSF (Spinal Fluid) from CSF Tap, Tube 1       Mycobacteria and Nocardia Culture [[id number]] Collected: 06/27/25 1402     Order Status: Sent Specimen: CSF (Spinal Fluid) from Cerebrospinal Fluid Updated: 06/27/25 1435     Fungal Culture [[id number]] Collected: 06/27/25 1402     Order Status: Sent Specimen: CSF (Spinal Fluid) from Cerebrospinal Fluid Updated: 06/27/25 1435     Blood Culture [[id number]]  (Normal) Collected: 06/21/25 2356     Order Status: Completed Specimen: Blood Updated: 06/27/25 0204       Blood Culture No Growth at 5 days     Blood Culture [[id number]]  (Normal) Collected: 06/21/25 2356     Order Status: Completed Specimen: Blood Updated: 06/27/25 0204       Blood Culture No Growth at 5 days

## 2025-07-01 LAB
ABO + RH BLD: NORMAL
ALBUMIN SERPL-MCNC: 1.7 G/DL (ref 3.4–4.8)
ALBUMIN/GLOB SERPL: 0.3 RATIO (ref 1.1–2)
ALP SERPL-CCNC: 2154 UNIT/L (ref 40–150)
ALT SERPL-CCNC: 36 UNIT/L (ref 0–55)
ANION GAP SERPL CALC-SCNC: 5 MEQ/L
AST SERPL-CCNC: 75 UNIT/L (ref 11–45)
BASOPHILS # BLD AUTO: 0.01 X10(3)/MCL
BASOPHILS NFR BLD AUTO: 0.3 %
BILIRUB SERPL-MCNC: 0.5 MG/DL
BLD PROD TYP BPU: NORMAL
BLOOD UNIT EXPIRATION DATE: NORMAL
BLOOD UNIT TYPE CODE: 7300
BUN SERPL-MCNC: 23.9 MG/DL (ref 8.4–25.7)
CALCIUM SERPL-MCNC: 7.2 MG/DL (ref 8.8–10)
CHLORIDE SERPL-SCNC: 109 MMOL/L (ref 98–107)
CO2 SERPL-SCNC: 21 MMOL/L (ref 23–31)
CREAT SERPL-MCNC: 1.25 MG/DL (ref 0.72–1.25)
CREAT/UREA NIT SERPL: 19
CROSSMATCH INTERPRETATION: NORMAL
DISPENSE STATUS: NORMAL
EOSINOPHIL # BLD AUTO: 0.04 X10(3)/MCL (ref 0–0.9)
EOSINOPHIL NFR BLD AUTO: 1.4 %
ERYTHROCYTE [DISTWIDTH] IN BLOOD BY AUTOMATED COUNT: 16.1 % (ref 11.5–17)
GFR SERPLBLD CREATININE-BSD FMLA CKD-EPI: >60 ML/MIN/1.73/M2
GLOBULIN SER-MCNC: 5.3 GM/DL (ref 2.4–3.5)
GLUCOSE SERPL-MCNC: 73 MG/DL (ref 82–115)
GROUP & RH: NORMAL
HCT VFR BLD AUTO: 22.2 % (ref 42–52)
HGB BLD-MCNC: 6.9 G/DL (ref 14–18)
IMM GRANULOCYTES # BLD AUTO: 0.06 X10(3)/MCL (ref 0–0.04)
IMM GRANULOCYTES NFR BLD AUTO: 2 %
INDIRECT COOMBS: NORMAL
LYMPHOCYTES # BLD AUTO: 0.33 X10(3)/MCL (ref 0.6–4.6)
LYMPHOCYTES NFR BLD AUTO: 11.2 %
MCH RBC QN AUTO: 27.7 PG (ref 27–31)
MCHC RBC AUTO-ENTMCNC: 31.1 G/DL (ref 33–36)
MCV RBC AUTO: 89.2 FL (ref 80–94)
MONOCYTES # BLD AUTO: 0.18 X10(3)/MCL (ref 0.1–1.3)
MONOCYTES NFR BLD AUTO: 6.1 %
NEUTROPHILS # BLD AUTO: 2.33 X10(3)/MCL (ref 2.1–9.2)
NEUTROPHILS NFR BLD AUTO: 79 %
NRBC BLD AUTO-RTO: 0 %
PLATELET # BLD AUTO: 157 X10(3)/MCL (ref 130–400)
PMV BLD AUTO: 10.8 FL (ref 7.4–10.4)
POTASSIUM SERPL-SCNC: 4.7 MMOL/L (ref 3.5–5.1)
PROT SERPL-MCNC: 7 GM/DL (ref 5.8–7.6)
RBC # BLD AUTO: 2.49 X10(6)/MCL (ref 4.7–6.1)
SODIUM SERPL-SCNC: 135 MMOL/L (ref 136–145)
SPECIMEN OUTDATE: NORMAL
UNIT NUMBER: NORMAL
WBC # BLD AUTO: 2.95 X10(3)/MCL (ref 4.5–11.5)

## 2025-07-01 PROCEDURE — 63600175 PHARM REV CODE 636 W HCPCS: Performed by: INTERNAL MEDICINE

## 2025-07-01 PROCEDURE — 11000001 HC ACUTE MED/SURG PRIVATE ROOM

## 2025-07-01 PROCEDURE — 86901 BLOOD TYPING SEROLOGIC RH(D): CPT | Performed by: INTERNAL MEDICINE

## 2025-07-01 PROCEDURE — P9016 RBC LEUKOCYTES REDUCED: HCPCS | Performed by: INTERNAL MEDICINE

## 2025-07-01 PROCEDURE — 99233 SBSQ HOSP IP/OBS HIGH 50: CPT | Mod: ,,, | Performed by: INTERNAL MEDICINE

## 2025-07-01 PROCEDURE — 85025 COMPLETE CBC W/AUTO DIFF WBC: CPT | Performed by: INTERNAL MEDICINE

## 2025-07-01 PROCEDURE — 36430 TRANSFUSION BLD/BLD COMPNT: CPT

## 2025-07-01 PROCEDURE — 36415 COLL VENOUS BLD VENIPUNCTURE: CPT | Performed by: INTERNAL MEDICINE

## 2025-07-01 PROCEDURE — 86923 COMPATIBILITY TEST ELECTRIC: CPT | Performed by: INTERNAL MEDICINE

## 2025-07-01 PROCEDURE — 80053 COMPREHEN METABOLIC PANEL: CPT | Performed by: INTERNAL MEDICINE

## 2025-07-01 RX ORDER — HYDROCODONE BITARTRATE AND ACETAMINOPHEN 500; 5 MG/1; MG/1
TABLET ORAL
Status: DISCONTINUED | OUTPATIENT
Start: 2025-07-01 | End: 2025-07-08 | Stop reason: HOSPADM

## 2025-07-01 RX ADMIN — ENOXAPARIN SODIUM 40 MG: 40 INJECTION SUBCUTANEOUS at 05:07

## 2025-07-01 RX ADMIN — FLUCONAZOLE 200 MG: 2 INJECTION, SOLUTION INTRAVENOUS at 09:07

## 2025-07-01 NOTE — PROGRESS NOTES
[facility] - 5th Floor Med Surg  Infectious Disease Progress Note     Patient Name: Anne Terry  MRN: [id number]  Admission Date: 6/21/2025  Hospital Length of Stay: 7 days  Attending Physician: Clif Osborne MD  Primary Care Provider: Nadia Primary Doctor   7/01/2025     Isolation Status: No active isolations     Reason for consultation  HIV management     IMPRESSION:     # Failure to thrive and generalized weakness /metabolic encephalopathy     # HIV/AIDS with history of medical noncompliance  -patient has not been taking medications for an extended period of time  -currently holding antiviral therapy to avoid IRIS  -CD4 count 21/7% 6/23  -,288 6/23     # Recent history of disseminated MAC  -currently on azithromycin rifabutin and ethambutol  - CT c/a/p- 6/23 with no new findings, but increased LDN and same multifocal micronodular of both lungs     # 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 therapy with amphotericin B and flucytosine currently on consolidation therapy with oral fluconazole.  - S/p LP 6/27- unremarkable opening pressure. CSF fluid analysis unremarkable. Prot somewhat elevated, glu low (but also concurrently hypoglycemic), ME PCR neg, including crypto  - CSF cx in progress   - 6/27 MRI brain neg for acute findings      ---> Last visit with ID clinic at [facility] was in 3/3/25- with no mention of recent Cryptococcal meningitis      # Past Medical Hx of HIV/AIDs, MAC, history of disseminated histoplasmosis, PJP,  and cryptococcosis      # TAYLOR- worsening creatinine      Per chart review of ID NOTES- last one on 3/3/25:      Azithromycin 500 mg PO daily Start: 01/07/25  Ethambutol 15 mg/kg suspension daily Start: 01/07/25  Rifabutin 150 mg PO daily Start: 01/07/25  Atovaquone 1500 mg PO daily Start: 01/07/25 End: 02/28/25  Bactrim DS 1 tab daily  Truvada 1 tab PO daily  Abacavir 300mg PO daily  Darunavir 800mg PO daily  Ritonavir 100mg PO  daily  -s/p punch bx of LE lesion in March- neg for malingnancy.  Negative for AFB     Recommendations         Continue disseminated MAC medication treatment with azithromycin 500 mg daily ethambutol 15 mg/kg every 24 hours plus rifampin 300 mg every 24 hours- patient is having a hard time tolerating the medications.  Hopefully patient can tolerate  Continue with fluconazole 200mg given renal failure.   Continue Bactrim DS for PCP/PJP   Hopefully can restart ART in 1-2 days   Follow up CSF studies and cultures  Supportive measures and nutritional support  Poor overall prognosis and high risk of mortality due to underlying infections, and medical non adherence.     Upon discharge he can should follow up with [facility] ID clinic         SUBJECTIVE:  Afebrile. No new events.        HPI:      The Pt is a 65 y.o. male who a [facility] HIV/AIDs, MAC, history of disseminated histoplasmosis, PJP,  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 . The patient presented to [facility] on 6/21/2025 with a primary complaint of failure to thrive. Patient was brought to OSH ED found walking around naked and confused. He also likely has dementia. Patient says that he lives by himself in [place] but is unable to explain why he went to the ER or why he is now in the hospital but say he had no choice. He has not been taking his medications. Pt had a CT scans done at OSH, results to be uploaded. He had a hospital admission in March 2025 for treatment of HIV and MAC, a punch biopsy of BLLE that was negative for malignancy, and an Ophthalmology evaluation. He has a history of non-compliance and missing several follow up appointments. Denies fevers, chills, sweats, SOB, N/V/D, dysuria, new rashes. Does have oral thrush.     Physical exam:  General: In no acute distress, frail  Chest: Clear to auscultation bilaterally  Heart: RRR, +S1, S2, no appreciable murmur  Abdomen: Soft, nontender, BS +  Neurologic: no changes.    Labs Reviewed.    Microbiology Results (Last  365 Days)    Procedure Component Value Units Date/Time   Cryptococcal antigen, CSF [[id number]]    Order Status: Sent Specimen: CSF (Spinal Fluid) from CSF Tap, Tube 1    Cryptococcal antigen, CSF [[id number]] (Normal) Collected: 06/27/25 1402   Order Status: Completed Specimen: CSF (Spinal Fluid) from CSF Tap, Tube 1 Updated: 06/30/25 1138    Cryptococcal Antigen, CSF Negative   AFB Smear [[id number]] Collected: 06/27/25 1402   Order Status: Completed Specimen: CSF (Spinal Fluid) from Cerebrospinal Fluid Updated: 06/28/25 1346    AFB Smear No AFB seen (Direct smear only) - Concentration to follow   Mycobacteria and Nocardia Culture [[id number]] Collected: 06/27/25 1402   Order Status: Sent Specimen: CSF (Spinal Fluid) from Cerebrospinal Fluid Updated: 06/27/25 1435   Cerebrospinal Fluid Culture [[id number]] Collected: 06/27/25 1402   Order Status: Completed Specimen: CSF (Spinal Fluid) from Cerebrospinal Fluid Updated: 07/01/25 0744    CULTURE, CSF No growth at 4 days    GRAM STAIN No WBCs, No bacteria seen   Fungal Culture [[id number]] Collected: 06/27/25 1402   Order Status: Sent Specimen: CSF (Spinal Fluid) from Cerebrospinal Fluid Updated: 06/27/25 1435   Blood Culture [[id number]] (Normal) Collected: 06/21/25 2356   Order Status: Completed Specimen: Blood Updated: 06/27/25 0204    Blood Culture No Growth at 5 days   Blood Culture [[id number]] (Normal) Collected: 06/21/25 2356   Order Status: Completed Specimen: Blood Updated: 06/27/25 0204    Blood Culture No Growth at 5 days

## 2025-07-01 NOTE — PT/OT/SLP PROGRESS
Attempted 3 times. Pt was eating, then pt was getting changed, and then pt refused . Will see pt tomorrow

## 2025-07-01 NOTE — PLAN OF CARE
Cm contacted patient's friend  and his son Anne Bess Both confirm patient does not have electricity and running water at his home. Per son he contacted police department and ambulance to bring patient to the hospital. Son was given EPS number and will be calling in a report. Anne Bess informs CM patient's siblings are not allowing him back to the home. Patient has a total of 9 siblings. Spoke with patient's sister  Shelbi [phone] who is also the home owner. Per Shelbi patient was not receiving mail a the residence. Patient was responsible for paying water and electricity bill. Per Shelbi this is the second time the utilities have been turned off. The first time Shelbi paid $1,000 to turn the utilities back on, she stated she will not be paying another $1,000. Family is unable to care for patient in their homes, patient choice list given to patient. Locet will be called in by [facility] [place], CM will submit PASRR via assessmentpro.     NH referral sent via [facility] to 8 facilities. Barriers: accessing financials; patient receives a check to his  [address] [place] [phone] per Shelbi.

## 2025-07-01 NOTE — PT/OT/SLP PROGRESS
"Occupational Therapy      Patient Name:  Anne Terry   MRN:  [id number]    Patient not seen today secondary to Patient unwilling to participate stating "not right now lady," educated on acute care OT POC and benefits of therapy with continued refusal. Will follow-up 7/2 as appropriate.    7/1/2025  "

## 2025-07-01 NOTE — PROGRESS NOTES
Patient Name: Anne Terry   MRN: [id number]   Admission Date: 6/21/2025   Hospital Length of Stay: 10   Attending Provider: Clif Osborne MD   Consulting Provider: Carlos Hanks MD    Primary Care Physician:  No, Primary Doctor     Principal Problem: <principal problem not specified>       Final diagnoses:  [B20, G93.49] HIV encephalopathy      Goals of care review:    I met with the patient to review his understanding of his current clinical condition and the future goals of care.  He spoke very little.  I got the impression that he possibly did not understand much of what I was saying.  He could not tell me where he was or why he was here.  He was able to name the year after some time.  When I mentioned his children, he became abstinent and angry.  He would not say why.  I explained to him that we do need to contact someone on his behalf especially if he becomes confused and disoriented and can not make decisions for himself.  He repeatedly stated, I will be all right.   I explained to him that he could not return home and probably would require skilled rehab and possibly nursing home placement.  He responded, I will be all right.     I contacted the patient's son, Jr Anne. he stated that the patient has at least 8 children.  These are all his half-brothers and sisters.  He may have more but he is not certain.  He stated he has not talked to the the patient and over 25 years.  He stated that the patient's sister owns the home he lives in.  She is not willing to let him come back to that home as he has not paid the rent and they have turned off the electricity more than once.  I talked about the option of nursing home placement.  He appeared to be willing to do that if the patient can not make his own choices.  After talking to the attending physician, I would agree that if the patient does not elect to take a RT therapy or any other treatment for HIV/aids, he would be a good candidate for  hospice care.  I believe the family would be in agreement with this plan.  We will see if he is willing to take the medication in the next couple of days.  Physical therapy has signed off and is not willing to work with him further as the patient has refused.  Therefore, he is not a candidate for skilled nursing rehab placement.    Symptom review:    He denies any complaints of pain, nausea, shortness of breath, anxiety or other symptoms.    Assessment/Plan:     Goals of care/counseling  HIV/aids  Disseminated histoplasmosis  MAC  Adult failure to thrive    The patient remains with one-to-one sitters.  He has generalized confusion and can not make medical decisions on his own behalf by my assessment.  He may benefit from a capacity evaluation if needed.  His family is willing to make decisions on his behalf.  They would be willing to elect nursing home placement.  They may be willing to elect hospice care especially if patient is unwilling to take medications to manage his illness.  We will continue to be available to reach out to family regarding this option.    Interval History:     No significant interval changes.      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    Lung mass 02/27/2025     Resolved Ambulatory Problems     Diagnosis Date Noted    No Resolved Ambulatory Problems     No Additional Past Medical History        Past Surgical History:   Procedure Laterality Date    COLONOSCOPY  05/08/2015    MAGNETIC RESONANCE IMAGING N/A 6/27/2025    Procedure: MRI (Magnetic Resonance Imagine);  Surgeon: Dara Horne MD;  Location: [facility];  Service: Medicine;  Laterality: N/A;  MRI WITH  "ANESTHESIA /   BRAIN - WITH AND WITHOUT CONTRAST        Review of patient's allergies indicates:  No Known Allergies     Current Medications[1]       Current Facility-Administered Medications:     0.9%  NaCl infusion (for blood administration), , Intravenous, Q24H PRN    acetaminophen, 650 mg, Oral, Q6H PRN    dextrose 50%, 12.5 g, Intravenous, PRN    dextrose 50%, 25 g, Intravenous, PRN    glucagon (human recombinant), 1 mg, Intramuscular, PRN    glucose, 16 g, Oral, PRN    glucose, 24 g, Oral, PRN    naloxone, 0.02 mg, Intravenous, PRN    ondansetron, 4 mg, Intravenous, Q4H PRN    prochlorperazine, 5 mg, Intravenous, Q6H PRN    sodium chloride 0.9%, 10 mL, Intravenous, PRN     Family History   Problem Relation Name Age of Onset    No Known Problems Mother      No Known Problems Father      Cancer Sister      No Known Problems Brother            Review of Systems   All other systems reviewed and are negative.           Objective:   /73   Pulse 61   Temp 97.8 °F (36.6 °C) (Oral)   Resp 18   Ht 6' 0.01" (1.829 m)   Wt 71 kg (156 lb 8.4 oz)   SpO2 100%   BMI 21.22 kg/m²      Physical Exam   Constitutional:  Non-toxic appearance.   Cachetic appearing   HENT:   Head: Normocephalic.   Mouth/Throat: Mucous membranes are moist. Oropharynx is clear.   Eyes: Pupils are equal, round, and reactive to light. Conjunctivae are normal.   Cardiovascular: Normal rate, regular rhythm, normal heart sounds and normal pulses. Pulmonary:      Effort: Pulmonary effort is normal.      Breath sounds: Normal breath sounds.     Abdominal: Soft. Bowel sounds are normal. He exhibits no distension.   Musculoskeletal:      Right lower leg: No edema.      Left lower leg: No edema.   Neurological: He is alert. He is disoriented.   Skin: Skin is warm.   Vitals reviewed.         Review of Symptoms  Review of Symptoms      Symptom Assessment (ESAS 0-10 Scale)  Unable to complete assessment due to Mental status change     CAM / Delirium:  " Positive      Pain Assessment in Advanced Demential Scale (PAINAD)   Breathing - Independent of vocalization:  0  Negative vocalization:  0  Facial expression:  0  Body language:  0  Consolability:  0  Total:  0    Psychosocial/Cultural:   See Palliative Psychosocial Note: Yes  **Primary  to Follow**  Palliative Care  Consult: No      Advance Care Planning   Advance Directives:   Living Will: No    LaPOST: No    Do Not Resuscitate Status: Yes (Yes, plan to discuss with attending physician and change code status to DNR.  Plan to also attempt to contact last adult child)    Medical Power of : No      Decision Making:  Patient answered questions  Goals of Care: What is most important right now is to focus on symptom/pain control, improvement in condition but with limits to invasive therapies. Accordingly, we have decided that the best plan to meet the patient's goals includes continuing with treatment.                35 min of encounter was spent in chart review, face to face discussion of goals of care, symptom assessment, coordination of care and emotional support.       Carlos Hanks MD  Palliative Medicine  [facility] - Observation Unit        [1]   Current Facility-Administered Medications:     0.9%  NaCl infusion (for blood administration), , Intravenous, Q24H PRN, Clif Osborne MD    acetaminophen tablet 650 mg, 650 mg, Oral, Q6H PRN, Dara Horne MD    azithromycin tablet 500 mg, 500 mg, Oral, Daily, Supa Eubanks DO, 500 mg at 06/28/25 0925    dextrose 50% injection 12.5 g, 12.5 g, Intravenous, PRN, Joo Garcia, FNP    dextrose 50% injection 25 g, 25 g, Intravenous, PRN, Joo Garcia, GORDOP    enoxaparin injection 40 mg, 40 mg, Subcutaneous, Q24H (prophylaxis, 1700), Dara Horne MD, 40 mg at 06/30/25 1645    ethambutoL tablet 800 mg, 800 mg, Oral, Daily, Supa Eubanks DO, 800 mg at 06/28/25 0925    ferrous  sulfate tablet 1 each, 1 tablet, Oral, Daily, Dara Horne MD, 1 each at 06/28/25 0925    fluconazole (DIFLUCAN) IVPB 200 mg, 200 mg, Intravenous, Q24H, Clif Osborne MD, Stopped at 06/30/25 1746    glucagon (human recombinant) injection 1 mg, 1 mg, Intramuscular, PRN, Joo Garcia FNP    glucose chewable tablet 16 g, 16 g, Oral, PRN, Joo Garcia FNP    glucose chewable tablet 24 g, 24 g, Oral, PRN, Joo Garcia FNP    naloxone 0.4 mg/mL injection 0.02 mg, 0.02 mg, Intravenous, PRN, Joo Garcia FNP    ondansetron injection 4 mg, 4 mg, Intravenous, Q4H PRN, Joo Garcia FNP, 4 mg at 06/30/25 1739    pantoprazole EC tablet 40 mg, 40 mg, Oral, Daily, Clif Osborne MD, 40 mg at 06/28/25 1445    prochlorperazine injection Soln 5 mg, 5 mg, Intravenous, Q6H PRN, Joo Garcia FNP    rifAMpin capsule 300 mg, 300 mg, Oral, Daily, Supa Eubanks DO, 300 mg at 06/28/25 0925    sodium bicarbonate tablet 650 mg, 650 mg, Oral, Daily, Clif Osborne MD    sodium chloride 0.9% flush 10 mL, 10 mL, Intravenous, PRN, Joo Garcia FNP    sulfamethoxazole-trimethoprim 800-160mg per tablet 1 tablet, 1 tablet, Oral, Daily, Aashish Danielle MD

## 2025-07-01 NOTE — PROGRESS NOTES
[facility]  Hospital Medicine Progress Note        Chief Complaint: Inpatient Follow-up for AIDS, MAC infection     HPI:    Anne Terry is a 65 y.o. 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 The patient presented to [facility] on 6/21/2025 with a primary complaint of failure to thrive. Patient was brought to OSH ED found walking around naked and confused. He also likely has dementia. Patient says that he lives by himself in [place] but is unable to explain why he went to the ER or why he is now in the hospital but say he had no choice. He has not been taking his medications. Pt had a CT scans done at OSH, results to be uploaded. He had a hospital admission in March 2025 for treatment of HIV and MAC, a punch biopsy of BLLE that was negative for malignancy, and an Ophthalmology evaluation. He has a history of non-compliance and missing several follow up appointments. Denies fevers, chills, sweats, SOB, N/V/D, dysuria, new rashes. Does have oral thrush.    He was seen ID team and started on  zithrmax, bactrim, ethambutol, rifampin, diflucan. Last CD 4 was 37. MRI brain and LP planned to be done under anesthesia. CSF and MRI brain was unremarkable. CD 4 this admit 21/7%, ,288.     CM working on NH placement. He will need palliative/comfort care.      Interval Hx:   Patient today awake and comfortable. Mostly in bed. Mood is calm. Has been afebrile.   Sitter at bedside.     Case was discussed with patient's nurse and  on the floor.    Objective/physical exam:  General: In no acute distress, frail  Chest: Clear to auscultation bilaterally  Heart: RRR, +S1, S2, no appreciable murmur  Abdomen: Soft, nontender, BS +  Neurologic: Cranial nerve II-XII intact, Strength 5/5 in all 4 extremities  Tato legs hyperpigmented     VITAL SIGNS: 24 HRS MIN & MAX LAST   Temp  Min: 97.7 °F (36.5 °C)  Max: 98.4 °F (36.9  °C) 97.8 °F (36.6 °C)   BP  Min: 123/73  Max: 137/72 124/73   Pulse  Min: 61  Max: 77  61   Resp  Min: 18  Max: 20 18   SpO2  Min: 98 %  Max: 100 % 100 %     I have reviewed the following labs:  Recent Labs   Lab 06/29/25  0535 06/30/25  0925 07/01/25  0434   WBC 4.71  4.71 4.59 2.95*   RBC 2.56* 2.65* 2.49*   HGB 7.1* 7.4* 6.9*   HCT 23.0* 23.9* 22.2*   MCV 89.8 90.2 89.2   MCH 27.7 27.9 27.7   MCHC 30.9* 31.0* 31.1*   RDW 15.9 15.9 16.1   * 161 157   MPV 10.6* 10.7* 10.8*     Recent Labs   Lab 06/27/25  0517 06/28/25  0405 06/29/25  0535 06/30/25  0613 07/01/25  0434      < > 135* 134* 135*   K 5.0   < > 4.9 5.2* 4.7      < > 110* 112* 109*   CO2 24   < > 20* 17* 21*   BUN 27.4*   < > 25.2 25.9* 23.9   CREATININE 1.79*   < > 1.50* 1.40* 1.25   GLU 64*   < > 63* 72* 73*   CALCIUM 7.9*   < > 7.2* 7.6* 7.2*   ALBUMIN 2.0*  --   --  1.9* 1.7*   PROT 7.9*  --   --  7.6 7.0   ALKPHOS 1,735*  --   --  2,162* 2,154*   ALT 42  --   --  35 36   AST 83*  --   --  80* 75*   BILITOT 0.6  --   --  0.6 0.5    < > = values in this interval not displayed.     Microbiology Results (last 7 days)       Procedure Component Value Units Date/Time    Cerebrospinal Fluid Culture [[id number]] Collected: 06/27/25 1402    Order Status: Completed Specimen: CSF (Spinal Fluid) from Cerebrospinal Fluid Updated: 07/01/25 0795     CULTURE, CSF No growth at 4 days     GRAM STAIN No WBCs, No bacteria seen    Cryptococcal antigen, CSF [[id number]]  (Normal) Collected: 06/27/25 1402    Order Status: Completed Specimen: CSF (Spinal Fluid) from CSF Tap, Tube 1 Updated: 06/30/25 1138     Cryptococcal Antigen, CSF Negative    AFB Smear [[id number]] Collected: 06/27/25 1402    Order Status: Completed Specimen: CSF (Spinal Fluid) from Cerebrospinal Fluid Updated: 06/28/25 1346     AFB Smear No AFB seen (Direct smear only) - Concentration to follow    Cryptococcal antigen, CSF [[id number]]     Order Status: Sent Specimen: CSF (Spinal  Fluid) from CSF Tap, Tube 1     Mycobacteria and Nocardia Culture [[id number]] Collected: 06/27/25 1402    Order Status: Sent Specimen: CSF (Spinal Fluid) from Cerebrospinal Fluid Updated: 06/27/25 1435    Fungal Culture [[id number]] Collected: 06/27/25 1402    Order Status: Sent Specimen: CSF (Spinal Fluid) from Cerebrospinal Fluid Updated: 06/27/25 1435    Blood Culture [[id number]]  (Normal) Collected: 06/21/25 2356    Order Status: Completed Specimen: Blood Updated: 06/27/25 0204     Blood Culture No Growth at 5 days    Blood Culture [[id number]]  (Normal) Collected: 06/21/25 2356    Order Status: Completed Specimen: Blood Updated: 06/27/25 0204     Blood Culture No Growth at 5 days             See below for Radiology    Assessment/Plan:  Acute metabolic encephalopathy : improved   HIV/AIDS - CD4 21/7%, , 288 with opportunistic infection   Oral thrush   ARF   Anemic of chronic disease : worse   Mild thrombocytopenia   Iron deficiency   H/O disseminated MAC, now with lung mass   H/O cryptococcal meningitis   Non complaint with HAART   Elevated ALP ? Etiology     Plan:  Patient has no new issues  Mood is stable   Has been afebrile  Participated well with PT    MRI brain unremarkable   CSF analysis so far negative     F/U by ID team and continue zithrmax, bactrim, ethambutol, rifampin for MAC  On fluconazole 200 mg daily   On Bactrim DS for PCP     Continue strict aspiration, fall and decubitus precautions      Reviewed today's labs and  Hb 6.9, Plt 157, , K 4.7, CO2 21, BUN 23, Crt 1.25  Transfuse 1 unit PRBC today   Continue PO iron tabs   Check stool occult blood     His ALP was elevated ? Etiology   CT abdomen done 6/23/25 showed normal liver   US liver was normal     Avoid NSAIDS     Labs in am     Unfortunately his over all prognosis is very poor     Critical care note:  Critical care diagnosis: Severe anemia needing blood transfusion   Critical care interventions: Hands-on evaluation, review of  labs/radiographs/records and discussion with patient and family if present  Critical care time spent: 35 minutes     VTE prophylaxis: Lovenox     Patient condition:  Fair     Anticipated discharge and Disposition:   NH with ? Hospice care       All diagnosis and differential diagnosis have been reviewed; assessment and plan has been documented; I have personally reviewed the labs and test results that are presently available; I have reviewed the patients medication list; I have reviewed the consulting providers response and recommendations. I have reviewed or attempted to review medical records based upon their availability    All of the patient's questions have been  addressed and answered. Patient's is agreeable to the above stated plan. I will continue to monitor closely and make adjustments to medical management as needed.    Portions of this note dictated using EMR integrated voice recognition software, and may be subject to voice recognition errors not corrected at proofreading. Please contact writer for clarification if needed.   _____________________________________________________________________    Malnutrition Status:  Nutrition consulted. Most recent weight and BMI monitored-     Measurements:  Wt Readings from Last 1 Encounters:   06/21/25 71 kg (156 lb 8.4 oz)   Body mass index is 21.22 kg/m².    Patient has been screened and assessed by RD.    Malnutrition Type:  Context: chronic illness  Level: severe    Malnutrition Characteristic Summary:  Weight Loss (Malnutrition): other (see comments) (Does not meet criteria)  Subcutaneous Fat (Malnutrition): mild depletion  Muscle Mass (Malnutrition): moderate depletion    Interventions/Recommendations (treatment strategy):  Oral diet/nutrient modifications     Scheduled Med:   azithromycin  500 mg Oral Daily    enoxparin  40 mg Subcutaneous Q24H (prophylaxis, 1700)    ethambutoL  800 mg Oral Daily    ferrous sulfate  1 tablet Oral Daily    fluconazole  (DIFLUCAN) IV (PEDS and ADULTS)  200 mg Intravenous Q24H    pantoprazole  40 mg Oral Daily    rifAMpin  300 mg Oral Daily    sodium bicarbonate  650 mg Oral Daily    sulfamethoxazole-trimethoprim 800-160mg  1 tablet Oral Daily      Continuous Infusions:       PRN Meds:    Current Facility-Administered Medications:     acetaminophen, 650 mg, Oral, Q6H PRN    dextrose 50%, 12.5 g, Intravenous, PRN    dextrose 50%, 25 g, Intravenous, PRN    glucagon (human recombinant), 1 mg, Intramuscular, PRN    glucose, 16 g, Oral, PRN    glucose, 24 g, Oral, PRN    naloxone, 0.02 mg, Intravenous, PRN    ondansetron, 4 mg, Intravenous, Q4H PRN    prochlorperazine, 5 mg, Intravenous, Q6H PRN    sodium chloride 0.9%, 10 mL, Intravenous, PRN     Radiology:  I have personally reviewed the following imaging and agree with the radiologist.     US Abdomen Limited  Narrative: EXAMINATION:  US ABDOMEN LIMITED    CLINICAL HISTORY:  Right upper quadrant pain.    TECHNIQUE:  Multiple real-time transverse and longitudinal sections were performed of the right abdomen by the sonographer. Select images were submitted for review.    COMPARISON:  CT abdomen pelvis June 23, 2025    FINDINGS:  Liver is of unremarkable echotexture with normal contour and size. There was no delineation of discrete hepatic cystic or solid mass. Hepatic maximum diameter is estimated to be 20.26 cm with ultrasound and appears to be overestimated.  On the previous CT abdomen maximum diameter of the liver in the midclavicular line is 16.2 cm which is within normal limits.  There was unremarkable hepatopedal flow within the portal vein.  Pancreas is obscured by overlying bowel gas.    Gallbladder lumen is without echogenicity indicative of sludge or cholelithiasis. Gallbladder wall thickness is within normal limits. Common bile duct caliber of 2.0 mm is within normal limits for the age. Sonographer reported negative Gomes's sign.    Inferior vena cava is unremarkable.  Visualized portion of the abdominal aorta is without aneurysmal dilatation.    Normally located right kidney length measures 12.2 x 4.3 x 5.9 cm. Right renal corticomedullary differentiation is unremarkable. No evidence of hydronephrosis.  Impression: No acute findings sonography identified.    Electronically signed by: Ezio Deleon  Date:    06/29/2025  Time:    20:00      Clif Osborne MD  Department of Hospital Medicine   [facility]   07/01/2025

## 2025-07-02 LAB
ALBUMIN SERPL-MCNC: 1.7 G/DL (ref 3.4–4.8)
ALBUMIN/GLOB SERPL: 0.3 RATIO (ref 1.1–2)
ALP SERPL-CCNC: 2337 UNIT/L (ref 40–150)
ALT SERPL-CCNC: 36 UNIT/L (ref 0–55)
ANION GAP SERPL CALC-SCNC: 4 MEQ/L
AST SERPL-CCNC: 75 UNIT/L (ref 11–45)
BACTERIA CSF CULT: NORMAL
BASOPHILS # BLD AUTO: 0.01 X10(3)/MCL
BASOPHILS NFR BLD AUTO: 0.3 %
BILIRUB SERPL-MCNC: 0.8 MG/DL
BUN SERPL-MCNC: 25.9 MG/DL (ref 8.4–25.7)
CALCIUM SERPL-MCNC: 7.5 MG/DL (ref 8.8–10)
CHLORIDE SERPL-SCNC: 112 MMOL/L (ref 98–107)
CO2 SERPL-SCNC: 21 MMOL/L (ref 23–31)
CREAT SERPL-MCNC: 1.26 MG/DL (ref 0.72–1.25)
CREAT/UREA NIT SERPL: 21
EOSINOPHIL # BLD AUTO: 0.09 X10(3)/MCL (ref 0–0.9)
EOSINOPHIL NFR BLD AUTO: 3 %
ERYTHROCYTE [DISTWIDTH] IN BLOOD BY AUTOMATED COUNT: 16.9 % (ref 11.5–17)
GFR SERPLBLD CREATININE-BSD FMLA CKD-EPI: >60 ML/MIN/1.73/M2
GLOBULIN SER-MCNC: 5.4 GM/DL (ref 2.4–3.5)
GLUCOSE SERPL-MCNC: 116 MG/DL (ref 82–115)
GRAM STN SPEC: NORMAL
HCT VFR BLD AUTO: 25 % (ref 42–52)
HGB BLD-MCNC: 8 G/DL (ref 14–18)
IMM GRANULOCYTES # BLD AUTO: 0.06 X10(3)/MCL (ref 0–0.04)
IMM GRANULOCYTES NFR BLD AUTO: 2 %
LYMPHOCYTES # BLD AUTO: 0.35 X10(3)/MCL (ref 0.6–4.6)
LYMPHOCYTES NFR BLD AUTO: 11.6 %
MCH RBC QN AUTO: 27.8 PG (ref 27–31)
MCHC RBC AUTO-ENTMCNC: 32 G/DL (ref 33–36)
MCV RBC AUTO: 86.8 FL (ref 80–94)
MONOCYTES # BLD AUTO: 0.17 X10(3)/MCL (ref 0.1–1.3)
MONOCYTES NFR BLD AUTO: 5.6 %
NEUTROPHILS # BLD AUTO: 2.35 X10(3)/MCL (ref 2.1–9.2)
NEUTROPHILS NFR BLD AUTO: 77.5 %
NRBC BLD AUTO-RTO: 0 %
PLATELET # BLD AUTO: 180 X10(3)/MCL (ref 130–400)
PMV BLD AUTO: 11.2 FL (ref 7.4–10.4)
POTASSIUM SERPL-SCNC: 4.5 MMOL/L (ref 3.5–5.1)
PROT SERPL-MCNC: 7.1 GM/DL (ref 5.8–7.6)
RBC # BLD AUTO: 2.88 X10(6)/MCL (ref 4.7–6.1)
SODIUM SERPL-SCNC: 137 MMOL/L (ref 136–145)
WBC # BLD AUTO: 3.03 X10(3)/MCL (ref 4.5–11.5)

## 2025-07-02 PROCEDURE — 97535 SELF CARE MNGMENT TRAINING: CPT

## 2025-07-02 PROCEDURE — 25000003 PHARM REV CODE 250: Performed by: INTERNAL MEDICINE

## 2025-07-02 PROCEDURE — 80053 COMPREHEN METABOLIC PANEL: CPT | Performed by: INTERNAL MEDICINE

## 2025-07-02 PROCEDURE — 97530 THERAPEUTIC ACTIVITIES: CPT | Mod: CQ

## 2025-07-02 PROCEDURE — 63600175 PHARM REV CODE 636 W HCPCS: Performed by: INTERNAL MEDICINE

## 2025-07-02 PROCEDURE — 36415 COLL VENOUS BLD VENIPUNCTURE: CPT | Performed by: INTERNAL MEDICINE

## 2025-07-02 PROCEDURE — 85025 COMPLETE CBC W/AUTO DIFF WBC: CPT | Performed by: INTERNAL MEDICINE

## 2025-07-02 PROCEDURE — 63700000 PHARM REV CODE 250 ALT 637 W/O HCPCS: Performed by: INTERNAL MEDICINE

## 2025-07-02 PROCEDURE — 11000001 HC ACUTE MED/SURG PRIVATE ROOM

## 2025-07-02 PROCEDURE — 97530 THERAPEUTIC ACTIVITIES: CPT

## 2025-07-02 RX ADMIN — SULFAMETHOXAZOLE AND TRIMETHOPRIM 1 TABLET: 800; 160 TABLET ORAL at 08:07

## 2025-07-02 RX ADMIN — FLUCONAZOLE 200 MG: 2 INJECTION, SOLUTION INTRAVENOUS at 04:07

## 2025-07-02 RX ADMIN — ENOXAPARIN SODIUM 40 MG: 40 INJECTION SUBCUTANEOUS at 04:07

## 2025-07-02 RX ADMIN — Medication 1 EACH: at 08:07

## 2025-07-02 RX ADMIN — EMTRICITABINE AND TENOFOVIR ALAFENAMIDE 1 TABLET: 200; 25 TABLET ORAL at 04:07

## 2025-07-02 RX ADMIN — PANTOPRAZOLE SODIUM 40 MG: 40 TABLET, DELAYED RELEASE ORAL at 08:07

## 2025-07-02 RX ADMIN — ETHAMBUTOL HYDROCHLORIDE 800 MG: 400 TABLET ORAL at 08:07

## 2025-07-02 RX ADMIN — SODIUM BICARBONATE 650 MG: 650 TABLET ORAL at 08:07

## 2025-07-02 RX ADMIN — DOLUTEGRAVIR SODIUM 50 MG: 50 TABLET, FILM COATED ORAL at 04:07

## 2025-07-02 RX ADMIN — AZITHROMYCIN DIHYDRATE 500 MG: 250 TABLET ORAL at 08:07

## 2025-07-02 RX ADMIN — RIFAMPIN 300 MG: 300 CAPSULE ORAL at 08:07

## 2025-07-02 NOTE — PLAN OF CARE
Called EPS [phone] and filed report, staff member informed  she will speak with her supervisor regarding case. Anne Fleming. unable to assist with accessing patient's financials, due to his location and lack of knowledge regarding patient's banking information. Informed patient's sister Shelbi [phone] if a facility accepts the patient they will need his bank statements. Shelbi informs  facility can contact her regarding finances, she states patient lost his p.o box key some time ago. Shelbi is willing to go to post office and check mail for patient.

## 2025-07-02 NOTE — PROGRESS NOTES
"[facility] - 5th Floor Med Surg  Infectious Disease Progress Note     Patient Name: Anne Terry  MRN: [id number]  Admission Date: 6/21/2025  Hospital Length of Stay: 7 days  Attending Physician: Clif Osborne MD  Primary Care Provider: Nadia Primary Doctor   7/02/2025     Isolation Status: No active isolations     Reason for consultation  HIV management     IMPRESSION:     # Failure to thrive and generalized weakness /metabolic encephalopathy     # HIV/AIDS with history of medical noncompliance  -patient has not been taking medications for an extended period of time  -currently holding antiviral therapy to avoid IRIS  -CD4 count 21/7% 6/23  -,288 6/23     # Recent history of disseminated MAC  -currently on azithromycin rifabutin and ethambutol  - CT c/a/p- 6/23 with no new findings, but increased LDN and same multifocal micronodular of both lungs     # 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 therapy with amphotericin B and flucytosine currently on consolidation therapy with oral fluconazole.  - S/p LP 6/27- unremarkable opening pressure. CSF fluid analysis unremarkable. Prot somewhat elevated, glu low (but also concurrently hypoglycemic), ME PCR neg, including crypto  - CSF cx in progress   - 6/27 MRI brain neg for acute findings      ---> Last visit with ID clinic at [facility] was in 3/3/25- with no mention of recent Cryptococcal meningitis      # Past Medical Hx of HIV/AIDs, MAC, history of disseminated histoplasmosis, PJP,  and cryptococcosis      # TAYLOR- worsening creatinine.    # Elavated ALP secondary to OI"s like MAC and Histoplasmosis.     Per chart review of ID NOTES- last one on 3/3/25:      Azithromycin 500 mg PO daily Start: 01/07/25  Ethambutol 15 mg/kg suspension daily Start: 01/07/25  Rifabutin 150 mg PO daily Start: 01/07/25  Atovaquone 1500 mg PO daily Start: 01/07/25 End: 02/28/25  Bactrim DS 1 tab daily  Truvada 1 tab PO daily  Abacavir " 300mg PO daily  Darunavir 800mg PO daily  Ritonavir 100mg PO daily  -s/p punch bx of LE lesion in March- neg for malingnancy.  Negative for AFB     Recommendations         Continue disseminated MAC medication treatment with azithromycin 500 mg daily ethambutol 15 mg/kg every 24 hours plus rifampin 300 mg every 24 hours.  Continue with fluconazole 200mg given renal failure.   Continue Bactrim DS for PCP/PJP   Restart ART and monitor for IRIS   Negative CSF studies and cultures  Supportive measures and nutritional support  Poor overall prognosis and high risk of mortality due to underlying infections, and medical non adherence.     Upon discharge he can should follow up with [facility] ID clinic         SUBJECTIVE:  Afebrile. Awake.     HPI:      The Pt is a 65 y.o. 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 The patient presented to [facility] on 6/21/2025 with a primary complaint of failure to thrive. Patient was brought to OSH ED found walking around naked and confused. He also likely has dementia. Patient says that he lives by himself in [place] but is unable to explain why he went to the ER or why he is now in the hospital but say he had no choice. He has not been taking his medications. Pt had a CT scans done at OSH, results to be uploaded. He had a hospital admission in March 2025 for treatment of HIV and MAC, a punch biopsy of BLLE that was negative for malignancy, and an Ophthalmology evaluation. He has a history of non-compliance and missing several follow up appointments. Denies fevers, chills, sweats, SOB, N/V/D, dysuria, new rashes. Does have oral thrush.     Physical exam:  General: In no acute distress, frail  Chest: Clear to auscultation bilaterally  Heart: RRR, +S1, S2, no appreciable murmur  Abdomen: Soft, nontender, BS +  Neurologic: no changes.    VITAL SIGNS: 24 HRS MIN & MAX LAST   Temp  Min: 97.6 °F (36.4 °C)  Max: 98.5 °F (36.9 °C)  97.7 °F (36.5 °C)   BP  Min: 124/73  Max: 154/84 136/81   Pulse  Min: 57  Max: 73  60   Resp  Min: 18  Max: 18 18   SpO2  Min: 98 %  Max: 100 % 98 %      I have reviewed the following labs:        Recent Labs   Lab 06/30/25  0925 07/01/25  0434 07/02/25  0501   WBC 4.59 2.95* 3.03*   RBC 2.65* 2.49* 2.88*   HGB 7.4* 6.9* 8.0*   HCT 23.9* 22.2* 25.0*   MCV 90.2 89.2 86.8   MCH 27.9 27.7 27.8   MCHC 31.0* 31.1* 32.0*   RDW 15.9 16.1 16.9    157 180   MPV 10.7* 10.8* 11.2*            Recent Labs   Lab 06/30/25  0613 07/01/25  0434 07/02/25  0501   * 135* 137   K 5.2* 4.7 4.5   * 109* 112*   CO2 17* 21* 21*   BUN 25.9* 23.9 25.9*   CREATININE 1.40* 1.25 1.26*   GLU 72* 73* 116*   CALCIUM 7.6* 7.2* 7.5*   ALBUMIN 1.9* 1.7* 1.7*   PROT 7.6 7.0 7.1   ALKPHOS 2,162* 2,154* 2,337*   ALT 35 36 36   AST 80* 75* 75*   BILITOT 0.6 0.5 0.8      Microbiology Results (last 7 days)         Procedure Component Value Units Date/Time     Cerebrospinal Fluid Culture [[id number]] Collected: 06/27/25 1402     Order Status: Completed Specimen: CSF (Spinal Fluid) from Cerebrospinal Fluid Updated: 07/01/25 0744       CULTURE, CSF No growth at 4 days       GRAM STAIN No WBCs, No bacteria seen     Cryptococcal antigen, CSF [[id number]]  (Normal) Collected: 06/27/25 1402     Order Status: Completed Specimen: CSF (Spinal Fluid) from CSF Tap, Tube 1 Updated: 06/30/25 1138       Cryptococcal Antigen, CSF Negative     AFB Smear [[id number]] Collected: 06/27/25 1402     Order Status: Completed Specimen: CSF (Spinal Fluid) from Cerebrospinal Fluid Updated: 06/28/25 1346       AFB Smear No AFB seen (Direct smear only) - Concentration to follow     Cryptococcal antigen, CSF [[id number]]       Order Status: Canceled Specimen: CSF (Spinal Fluid) from CSF Tap, Tube 1       Mycobacteria and Nocardia Culture [[id number]] Collected: 06/27/25 1402     Order Status: Sent Specimen: CSF (Spinal Fluid) from Cerebrospinal Fluid Updated: 06/27/25  1435     Fungal Culture [[id number]] Collected: 06/27/25 1402     Order Status: Sent Specimen: CSF (Spinal Fluid) from Cerebrospinal Fluid Updated: 06/27/25 1435     Blood Culture [[id number]]  (Normal) Collected: 06/21/25 2356     Order Status: Completed Specimen: Blood Updated: 06/27/25 0204       Blood Culture No Growth at 5 days     Blood Culture [[id number]]  (Normal) Collected: 06/21/25 2356     Order Status: Completed Specimen: Blood Updated: 06/27/25 0204       Blood Culture No Growth at 5 days

## 2025-07-02 NOTE — PT/OT/SLP PROGRESS
"Physical Therapy Treatment    Patient Name:  Anne Terry   MRN:  [id number]    Recommendations:     Discharge therapy intensity: Moderate Intensity Therapy   Discharge Equipment Recommendations: none  Barriers to discharge: Impaired mobility, Ongoing medical needs, and placement    Assessment:     Anne Terry is a 65 y.o. male admitted with a medical diagnosis of acute metabolic encephalopathy, failure to thrive, TAYLOR, hx of HIV.  He presents with the following impairments/functional limitations: weakness, gait instability, impaired endurance, impaired self care skills, impaired functional mobility, impaired cognition, decreased safety awareness, impaired balance.    Pt tolerated tx session fair today; required maximal encouragement to participate and remain engaged; demo'd decreased response / initiation to commands, self-limiting behavior, and poor safety awareness. Required sba for bed mobility, Kasie for STS, and modA for ambulation; ambulated ~2' away from EOB, then impulsively requested to return B2B requiring modA to complete. Will cont to progress as able pending participation.     DME Justification:  No DME recommended requiring DME justifications    Rehab Prognosis: Fair; patient would benefit from acute skilled PT services to address these deficits and reach maximum level of function.    Recent Surgery: Procedure(s) (LRB):  MRI (Magnetic Resonance Imagine) (N/A) 5 Days Post-Op    Plan:     During this hospitalization, patient would benefit from acute PT services 5 x/week to address the identified rehab impairments via gait training, therapeutic activities, therapeutic exercises and progress toward the following goals:    Plan of Care Expires:       Subjective     Chief Complaint: "Man y'all don't quit."  Patient/Family Comments/goals:   Pain/Comfort:         Objective:     Communicated with RN prior to session.  Patient found HOB elevated with peripheral IV upon PT entry to room.     General " Precautions: Standard, fall  Orthopedic Precautions: N/A  Braces: N/A  Respiratory Status: Room air  Blood Pressure: NT  Skin Integrity: Visible skin intact    Functional Mobility:  Bed Mobility:     Supine to Sit: stand by assistance  Sit to Supine: stand by assistance  Transfers:     Sit to Stand:  minimum assistance with rolling walker  Gait: ~2' modA w/RW; ambulated ~2' away from EOB, then impulsively requested to return B2B requiring modA to complete; decreased safety awareness.    Co-Treatment: Yes, due to Limited activity tolerance    Education:  Patient provided with verbal education education regarding PT role/goals/POC, fall prevention, safety awareness, discharge/DME recommendations, and pressure ulcer prevention.  Additional teaching is warranted.     Patient left HOB elevated with all lines intact, call button in reach, bed alarm on, RN notified, and OT present      GOALS:   Multidisciplinary Problems       Physical Therapy Goals          Problem: Physical Therapy    Goal Priority Disciplines Outcome Interventions   Physical Therapy Goal     PT, PT/OT Progressing    Description: Pt will be seen for the following goals   1. Pt will be ind with bed mobility  2. Pt will be sba with transfers with a rw   3. Pt will ambulate 100ft with a rw sba                       Time Tracking:     PT Received On: 07/02/25  PT Start Time: 1129     PT Stop Time: 1147  PT Total Time (min): 18 min     Billable Minutes: Therapeutic Activity 1    Treatment Type: Treatment  PT/PTA: PTA     Number of PTA visits since last PT visit: 1 07/02/2025

## 2025-07-02 NOTE — PT/OT/SLP PROGRESS
Occupational Therapy   Treatment    Name: Anne Terry  MRN: [id number]    Recommendations:     Recommended therapy intensity at discharge: Moderate Intensity Therapy   Discharge Equipment Recommendations:  walker, rolling  Barriers to discharge:  Other (Comment) (oingoing medical needs; placement)    Assessment:     Anne Terry is a 65 y.o. 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 The patient presented to [facility] on 6/21/2025 with a primary complaint of failure to thrive.  He presents with fair tolerance for today's session, primarily limited by decreased motivation to engage in meaningful activities of daily living/mobility and reports of fatigue. Performance deficits affecting function are weakness, impaired endurance, impaired self care skills, impaired functional mobility, gait instability, decreased upper extremity function, decreased lower extremity function, decreased safety awareness. Patient demo's improvement from previous session as evidenced by completing self-feeding with setup for item retrieval/container management and bed mobility with close supervision. Patient continues to require maximal vc's for encouragement to engage in sessions; yet when given option to continue skilled therapeutic intervention vs. Discontinue therapy patient requesting to continue therapy. OT to reduce plan of care from 5x/wk to 4x/wk due to limited patient progress and continued patient refusals; to continue to follow up as appropriate.     DME Justification:  Tony mobility limitation cannot be sufficiently resolved by the use of a cane. His functional mobility deficit can be sufficiently resolved with the use of a Rolling Walker. Patient's mobility limitation significantly impairs their ability to participate in one of more activities of daily living.  The use of a RW will significantly improve the patient's ability to  participate in MRADLS and the patient will use it on regular basis in the home.    Rehab Prognosis:  Fair; patient would benefit from acute skilled OT services to address these deficits and reach maximum level of function.       Plan:     Patient to be seen 4 x/week to address the above listed problems via self-care/home management, therapeutic activities, therapeutic exercises  Plan of Care Expires: 07/30/25  Plan of Care Reviewed with: patient    Subjective     Pain/Comfort:  Pain Rating 1: 0/10    Objective:     Communicated with: RN prior to session.  Patient found supine with peripheral IV, telemetry upon OT entry to room.    General Precautions: Standard, fall, other (see comments) ()    Orthopedic Precautions:N/A  Braces: N/A  Respiratory Status: Room air  Vital Signs: NT     Occupational Performance:     Functional Mobility/Transfers:  Bed mobility:    Supine to Sit: supervision  Sit to Supine: stand by assistance  Transfers: Sit to Stand: minimum assistance with rolling walker    Activities of Daily Living:  Feeding:  Setup assist Patient completing 5 trials of self feeding in supported long sitting in bed req setup assist for container management and item retrieval  Grooming: supervision Patient complete face washing supported long sitting in bed req min vc's for thoroughness  Upper Body Dressing: minimum assistance Patient don hospital gown as robe seated EOB req min A for threading R UE;  unsure of patient effort/ attention to task  Toileting: total assistance - maximal assist Patient incontinent of bladder supine in bed req total assist for clothing management; patient demo ability to complete anterior perineal hygiene with setup for item retrieval and maximal vc/visual cues for technique/sequencing.     Balance:   Static Sitting Balance: Bilateral UE support: Fair: Patient able to maintain balance with handhold support; may require occasional minimal assistance.    Therapeutic Activities:  Patient  complete 2x ~2-3 ft functional mobility within hospital room w/ RW req min-mod A secondary to limited RW coordination, posterior leaning with noted primary heel weightbearing, and decreased safety. Patient spontaneously attempting to return to sitting ~1 foot away from bed surface req maximal tactile cues to redirect to use RW and complete backwards stepping to bed surface.      [facility] 6 Click ADL: 15    Co-Treatment: Yes, due to Limited activity tolerance    Patient Education:  Patient provided with verbal education education regarding OT role/goals/POC, fall prevention, safety awareness, and Discharge/DME recommendations.  Understanding was verbalized, however additional teaching warranted.      Patient left supine with all lines intact and call button in reach.    GOALS:   Multidisciplinary Problems       Occupational Therapy Goals          Problem: Occupational Therapy    Goal Priority Disciplines Outcome Interventions   Occupational Therapy Goal     OT, PT/OT Progressing    Description: LTG: Pt will perform basic ADLs and ADL transfers with Modified independence using LRAD by discharge.    STG: to be met by 7/30/25    Pt will complete grooming standing at sink with LRAD with SBA.  Pt will complete UB dressing with SBA.  Pt will complete LB dressing with SBA using LRAD.  Pt will complete toileting with SBA using LRAD.  Pt will complete functional mobility to/from toilet and toilet transfer with SBA using LRAD.                        Time Tracking:     OT Date of Treatment: 07/02/25  OT Start Time: 1129  OT Stop Time: 1152  OT Total Time (min): 23 min    Billable Minutes:Self Care/Home Management 13  Therapeutic Activity 10    OT/BEV: OT     Number of BEV visits since last OT visit: 0    7/2/2025

## 2025-07-02 NOTE — PLAN OF CARE
SSC spoke to Maral at [facility] who stated she reached out to the patient's sister Shelbi to discuss finances, however she did not answer and has not yet returned the phone call.     SSC Called patient's sister Shelbi to let her know the facility is trying to contact her. No answer, vm left, awaiting a call back.

## 2025-07-02 NOTE — PROGRESS NOTES
[facility]  Hospital Medicine Progress Note        Chief Complaint: Inpatient Follow-up for AIDS, MAC infection     HPI:    Anne Terry is a 65 y.o. 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 The patient presented to [facility] on 6/21/2025 with a primary complaint of failure to thrive. Patient was brought to OSH ED found walking around naked and confused. He also likely has dementia. Patient says that he lives by himself in [place] but is unable to explain why he went to the ER or why he is now in the hospital but say he had no choice. He has not been taking his medications. Pt had a CT scans done at OSH, results to be uploaded. He had a hospital admission in March 2025 for treatment of HIV and MAC, a punch biopsy of BLLE that was negative for malignancy, and an Ophthalmology evaluation. He has a history of non-compliance and missing several follow up appointments. Denies fevers, chills, sweats, SOB, N/V/D, dysuria, new rashes. Does have oral thrush.    He was seen ID team and started on  zithrmax, bactrim, ethambutol, rifampin, diflucan. Last CD 4 was 37. MRI brain and LP planned to be done under anesthesia. CSF and MRI brain was unremarkable. CD 4 this admit 21/7%, ,288.     D/W ID about patient elevated ALP. Suspect liver infiltrative disease from AIDS. Poor prognosis.     CM working on NH placement. He will need palliative/comfort care.      Interval Hx:   Patient today awake and comfortable. Encouraged him to ambulate. Has been afebrile.   Mood has been calm. Will switch 1:1 sitter to .     Sitter at bedside.     Case was discussed with patient's nurse and  on the floor.    Objective/physical exam:  General: In no acute distress, frail  Chest: Clear to auscultation bilaterally  Heart: RRR, +S1, S2, no appreciable murmur  Abdomen: Soft, nontender, BS +  Neurologic: Cranial nerve II-XII  intact, Strength 5/5 in all 4 extremities  Tato legs hyperpigmented     VITAL SIGNS: 24 HRS MIN & MAX LAST   Temp  Min: 97.6 °F (36.4 °C)  Max: 98.5 °F (36.9 °C) 97.7 °F (36.5 °C)   BP  Min: 124/73  Max: 154/84 136/81   Pulse  Min: 57  Max: 73  60   Resp  Min: 18  Max: 18 18   SpO2  Min: 98 %  Max: 100 % 98 %     I have reviewed the following labs:  Recent Labs   Lab 06/30/25  0925 07/01/25  0434 07/02/25  0501   WBC 4.59 2.95* 3.03*   RBC 2.65* 2.49* 2.88*   HGB 7.4* 6.9* 8.0*   HCT 23.9* 22.2* 25.0*   MCV 90.2 89.2 86.8   MCH 27.9 27.7 27.8   MCHC 31.0* 31.1* 32.0*   RDW 15.9 16.1 16.9    157 180   MPV 10.7* 10.8* 11.2*     Recent Labs   Lab 06/30/25  0613 07/01/25  0434 07/02/25  0501   * 135* 137   K 5.2* 4.7 4.5   * 109* 112*   CO2 17* 21* 21*   BUN 25.9* 23.9 25.9*   CREATININE 1.40* 1.25 1.26*   GLU 72* 73* 116*   CALCIUM 7.6* 7.2* 7.5*   ALBUMIN 1.9* 1.7* 1.7*   PROT 7.6 7.0 7.1   ALKPHOS 2,162* 2,154* 2,337*   ALT 35 36 36   AST 80* 75* 75*   BILITOT 0.6 0.5 0.8     Microbiology Results (last 7 days)       Procedure Component Value Units Date/Time    Cerebrospinal Fluid Culture [[id number]] Collected: 06/27/25 1402    Order Status: Completed Specimen: CSF (Spinal Fluid) from Cerebrospinal Fluid Updated: 07/01/25 0744     CULTURE, CSF No growth at 4 days     GRAM STAIN No WBCs, No bacteria seen    Cryptococcal antigen, CSF [[id number]]  (Normal) Collected: 06/27/25 1402    Order Status: Completed Specimen: CSF (Spinal Fluid) from CSF Tap, Tube 1 Updated: 06/30/25 1138     Cryptococcal Antigen, CSF Negative    AFB Smear [[id number]] Collected: 06/27/25 1402    Order Status: Completed Specimen: CSF (Spinal Fluid) from Cerebrospinal Fluid Updated: 06/28/25 1346     AFB Smear No AFB seen (Direct smear only) - Concentration to follow    Cryptococcal antigen, CSF [[id number]]     Order Status: Canceled Specimen: CSF (Spinal Fluid) from CSF Tap, Tube 1     Mycobacteria and Nocardia Culture  [[id number]] Collected: 06/27/25 1402    Order Status: Sent Specimen: CSF (Spinal Fluid) from Cerebrospinal Fluid Updated: 06/27/25 1435    Fungal Culture [[id number]] Collected: 06/27/25 1402    Order Status: Sent Specimen: CSF (Spinal Fluid) from Cerebrospinal Fluid Updated: 06/27/25 1435    Blood Culture [[id number]]  (Normal) Collected: 06/21/25 2356    Order Status: Completed Specimen: Blood Updated: 06/27/25 0204     Blood Culture No Growth at 5 days    Blood Culture [[id number]]  (Normal) Collected: 06/21/25 2356    Order Status: Completed Specimen: Blood Updated: 06/27/25 0204     Blood Culture No Growth at 5 days             See below for Radiology    Assessment/Plan:  Acute metabolic encephalopathy : improved   HIV/AIDS - CD4 21/7%, , 288 with opportunistic infection   Oral thrush   ARF   Anemic of chronic disease : worse   Mild thrombocytopenia   Iron deficiency   H/O disseminated MAC, now with lung mass   H/O cryptococcal meningitis   Non complaint with HAART   Elevated ALP ? Infiltrate disease     Plan:  Patient looks comfortable  Has been afebrile.   Mood is stable, will take off 1:1 sitters and add  monitoring     Participated well with PT    MRI brain unremarkable   CSF analysis so far negative     F/U by ID team and continue zithrmax, bactrim, ethambutol, rifampin for MAC  On fluconazole 200 mg daily   On Bactrim DS for PCP     Continue strict aspiration, fall and decubitus precautions      Reviewed today's labs and  Hb 8, plt 180  Was Transfuse 1 unit PRBC on 7/1/25  Continue PO iron tabs   Check stool occult blood     His ALP was elevated. D/W ID team and suspect infiltrative disease   CT abdomen done 6/23/25 showed normal liver   US liver was normal     Avoid NSAIDS     Labs in am     Unfortunately his over all prognosis is very poor ]    CM working with family to get financials.       VTE prophylaxis: Lovenox     Patient condition:  Fair     Anticipated discharge and Disposition:   NH  with Hospice care       All diagnosis and differential diagnosis have been reviewed; assessment and plan has been documented; I have personally reviewed the labs and test results that are presently available; I have reviewed the patients medication list; I have reviewed the consulting providers response and recommendations. I have reviewed or attempted to review medical records based upon their availability    All of the patient's questions have been  addressed and answered. Patient's is agreeable to the above stated plan. I will continue to monitor closely and make adjustments to medical management as needed.    Portions of this note dictated using EMR integrated voice recognition software, and may be subject to voice recognition errors not corrected at proofreading. Please contact writer for clarification if needed.   _____________________________________________________________________    Malnutrition Status:  Nutrition consulted. Most recent weight and BMI monitored-     Measurements:  Wt Readings from Last 1 Encounters:   06/21/25 71 kg (156 lb 8.4 oz)   Body mass index is 21.22 kg/m².    Patient has been screened and assessed by RD.    Malnutrition Type:  Context: chronic illness  Level: severe    Malnutrition Characteristic Summary:  Weight Loss (Malnutrition): other (see comments) (Does not meet criteria)  Subcutaneous Fat (Malnutrition): mild depletion  Muscle Mass (Malnutrition): moderate depletion    Interventions/Recommendations (treatment strategy):  Oral diet/nutrient modifications     Scheduled Med:   azithromycin  500 mg Oral Daily    enoxparin  40 mg Subcutaneous Q24H (prophylaxis, 1700)    ethambutoL  800 mg Oral Daily    ferrous sulfate  1 tablet Oral Daily    fluconazole (DIFLUCAN) IV (PEDS and ADULTS)  200 mg Intravenous Q24H    pantoprazole  40 mg Oral Daily    rifAMpin  300 mg Oral Daily    sodium bicarbonate  650 mg Oral Daily    sulfamethoxazole-trimethoprim 800-160mg  1 tablet Oral Daily       Continuous Infusions:       PRN Meds:    Current Facility-Administered Medications:     0.9%  NaCl infusion (for blood administration), , Intravenous, Q24H PRN    acetaminophen, 650 mg, Oral, Q6H PRN    dextrose 50%, 12.5 g, Intravenous, PRN    dextrose 50%, 25 g, Intravenous, PRN    glucagon (human recombinant), 1 mg, Intramuscular, PRN    glucose, 16 g, Oral, PRN    glucose, 24 g, Oral, PRN    naloxone, 0.02 mg, Intravenous, PRN    ondansetron, 4 mg, Intravenous, Q4H PRN    prochlorperazine, 5 mg, Intravenous, Q6H PRN    sodium chloride 0.9%, 10 mL, Intravenous, PRN     Radiology:  I have personally reviewed the following imaging and agree with the radiologist.     US Abdomen Limited  Narrative: EXAMINATION:  US ABDOMEN LIMITED    CLINICAL HISTORY:  Right upper quadrant pain.    TECHNIQUE:  Multiple real-time transverse and longitudinal sections were performed of the right abdomen by the sonographer. Select images were submitted for review.    COMPARISON:  CT abdomen pelvis June 23, 2025    FINDINGS:  Liver is of unremarkable echotexture with normal contour and size. There was no delineation of discrete hepatic cystic or solid mass. Hepatic maximum diameter is estimated to be 20.26 cm with ultrasound and appears to be overestimated.  On the previous CT abdomen maximum diameter of the liver in the midclavicular line is 16.2 cm which is within normal limits.  There was unremarkable hepatopedal flow within the portal vein.  Pancreas is obscured by overlying bowel gas.    Gallbladder lumen is without echogenicity indicative of sludge or cholelithiasis. Gallbladder wall thickness is within normal limits. Common bile duct caliber of 2.0 mm is within normal limits for the age. Sonographer reported negative Gomes's sign.    Inferior vena cava is unremarkable. Visualized portion of the abdominal aorta is without aneurysmal dilatation.    Normally located right kidney length measures 12.2 x 4.3 x 5.9 cm. Right renal  corticomedullary differentiation is unremarkable. No evidence of hydronephrosis.  Impression: No acute findings sonography identified.    Electronically signed by: Ezio Deleon  Date:    06/29/2025  Time:    20:00      Clif Osborne MD  Department of Hospital Medicine   [facility]   07/02/2025

## 2025-07-02 NOTE — PLAN OF CARE
SSC spoke to Maral at [facility], Referral is under review for long-term placement.

## 2025-07-02 NOTE — PLAN OF CARE
SSC spoke to Micheal at [facility] they are unable to accept patient due to not being able to meet patient needs.       [facility] is unable to accept patient due to not being able to meet patient needs    SSC called [facility] to follow up on referral, no answer, no vm. SSC will follow up at a later time.       SSC faxed referral to Elfego Mazariegos at alternate number given by facility, [phone]

## 2025-07-02 NOTE — PLAN OF CARE
SSC spoke to Charlene at [facility], referral is under review, awaiting a response.     SSC spoke to Kristen at [facility] of they are unable to accept patient due to being out of network with the patient's insurance

## 2025-07-03 LAB
BASOPHILS # BLD AUTO: 0.02 X10(3)/MCL
BASOPHILS NFR BLD AUTO: 0.5 %
EOSINOPHIL # BLD AUTO: 0.07 X10(3)/MCL (ref 0–0.9)
EOSINOPHIL NFR BLD AUTO: 1.7 %
ERYTHROCYTE [DISTWIDTH] IN BLOOD BY AUTOMATED COUNT: 17 % (ref 11.5–17)
HCT VFR BLD AUTO: 24.5 % (ref 42–52)
HGB BLD-MCNC: 7.8 G/DL (ref 14–18)
IMM GRANULOCYTES # BLD AUTO: 0.1 X10(3)/MCL (ref 0–0.04)
IMM GRANULOCYTES NFR BLD AUTO: 2.4 %
LYMPHOCYTES # BLD AUTO: 0.44 X10(3)/MCL (ref 0.6–4.6)
LYMPHOCYTES NFR BLD AUTO: 10.6 %
MCH RBC QN AUTO: 27.8 PG (ref 27–31)
MCHC RBC AUTO-ENTMCNC: 31.8 G/DL (ref 33–36)
MCV RBC AUTO: 87.2 FL (ref 80–94)
MONOCYTES # BLD AUTO: 0.29 X10(3)/MCL (ref 0.1–1.3)
MONOCYTES NFR BLD AUTO: 7 %
NEUTROPHILS # BLD AUTO: 3.24 X10(3)/MCL (ref 2.1–9.2)
NEUTROPHILS NFR BLD AUTO: 77.8 %
NRBC BLD AUTO-RTO: 0 %
PLATELET # BLD AUTO: 202 X10(3)/MCL (ref 130–400)
PMV BLD AUTO: 11.7 FL (ref 7.4–10.4)
RBC # BLD AUTO: 2.81 X10(6)/MCL (ref 4.7–6.1)
WBC # BLD AUTO: 4.16 X10(3)/MCL (ref 4.5–11.5)

## 2025-07-03 PROCEDURE — 63600175 PHARM REV CODE 636 W HCPCS: Performed by: INTERNAL MEDICINE

## 2025-07-03 PROCEDURE — 11000001 HC ACUTE MED/SURG PRIVATE ROOM

## 2025-07-03 PROCEDURE — 25000003 PHARM REV CODE 250: Performed by: INTERNAL MEDICINE

## 2025-07-03 PROCEDURE — 36415 COLL VENOUS BLD VENIPUNCTURE: CPT | Performed by: INTERNAL MEDICINE

## 2025-07-03 PROCEDURE — 97530 THERAPEUTIC ACTIVITIES: CPT | Mod: CQ

## 2025-07-03 PROCEDURE — 85025 COMPLETE CBC W/AUTO DIFF WBC: CPT | Performed by: INTERNAL MEDICINE

## 2025-07-03 PROCEDURE — 63700000 PHARM REV CODE 250 ALT 637 W/O HCPCS: Performed by: INTERNAL MEDICINE

## 2025-07-03 RX ADMIN — AZITHROMYCIN DIHYDRATE 500 MG: 250 TABLET ORAL at 09:07

## 2025-07-03 RX ADMIN — RIFAMPIN 300 MG: 300 CAPSULE ORAL at 09:07

## 2025-07-03 RX ADMIN — PANTOPRAZOLE SODIUM 40 MG: 40 TABLET, DELAYED RELEASE ORAL at 09:07

## 2025-07-03 RX ADMIN — Medication 1 EACH: at 09:07

## 2025-07-03 RX ADMIN — EMTRICITABINE AND TENOFOVIR ALAFENAMIDE 1 TABLET: 200; 25 TABLET ORAL at 09:07

## 2025-07-03 RX ADMIN — FLUCONAZOLE 200 MG: 2 INJECTION, SOLUTION INTRAVENOUS at 04:07

## 2025-07-03 RX ADMIN — SODIUM BICARBONATE 650 MG: 650 TABLET ORAL at 09:07

## 2025-07-03 RX ADMIN — DOLUTEGRAVIR SODIUM 50 MG: 50 TABLET, FILM COATED ORAL at 09:07

## 2025-07-03 RX ADMIN — SULFAMETHOXAZOLE AND TRIMETHOPRIM 1 TABLET: 800; 160 TABLET ORAL at 09:07

## 2025-07-03 RX ADMIN — ETHAMBUTOL HYDROCHLORIDE 800 MG: 400 TABLET ORAL at 09:07

## 2025-07-03 NOTE — PROGRESS NOTES
Inpatient Nutrition Assessment    Admit Date: 6/21/2025   Total duration of encounter: 12 days   Patient Age: 65 y.o.    Nutrition Recommendation/Prescription     Continue oral diet as tolerated; Diet Adult Regular Standard Tray   Will continue Boost Plus once a day (360 kcal, 14 gm protein per container)  May benefit from an appetite stimulant if medically appropriate.     Communication of Recommendations: reviewed with patient    Nutrition Assessment     Malnutrition Assessment/Nutrition-Focused Physical Exam    Malnutrition Context: chronic illness (06/27/25 1509)  Malnutrition Level: severe (06/27/25 1509)     Weight Loss (Malnutrition): other (see comments) (Does not meet criteria) (06/27/25 1509)  Subcutaneous Fat (Malnutrition): mild depletion (06/27/25 1509)  Orbital Region (Subcutaneous Fat Loss): mild depletion        Muscle Mass (Malnutrition): moderate depletion (06/27/25 1509)  Earle Region (Muscle Loss): moderate depletion  Clavicle Bone Region (Muscle Loss): moderate depletion  Clavicle and Acromion Bone Region (Muscle Loss): moderate depletion                          A minimum of two characteristics is recommended for diagnosis of either severe or non-severe malnutrition.    Chart Review    Reason Seen: malnutrition screening tool (MST)    Malnutrition Screening Tool Results   Have you recently lost weight without trying?: Unsure  Have you been eating poorly because of a decreased appetite?: No   MST Score: 2   Diagnosis:  Advanced HIV  MAC Infection  Lung Mass  Oral Candidiasis  Elevated Alk Phos 800s  Likely dementia    Relevant Medical History: HIV/AIDs, MAC, history of disseminated histoplasmosis, PJP, and cryptococcous     Scheduled Medications:  azithromycin, 500 mg, Daily  dolutegravir, 50 mg, Daily  emtricitabine-tenofovir alafen, 1 tablet, Daily  enoxparin, 40 mg, Q24H (prophylaxis, 1700)  ethambutoL, 800 mg, Daily  ferrous sulfate, 1 tablet, Daily  fluconazole (DIFLUCAN) IV (PEDS and  ADULTS), 200 mg, Q24H  pantoprazole, 40 mg, Daily  rifAMpin, 300 mg, Daily  sodium bicarbonate, 650 mg, Daily  sulfamethoxazole-trimethoprim 800-160mg, 1 tablet, Daily    Continuous Infusions:     PRN Medications:  0.9%  NaCl infusion (for blood administration), , Q24H PRN  acetaminophen, 650 mg, Q6H PRN  dextrose 50%, 12.5 g, PRN  dextrose 50%, 25 g, PRN  glucagon (human recombinant), 1 mg, PRN  glucose, 16 g, PRN  glucose, 24 g, PRN  naloxone, 0.02 mg, PRN  ondansetron, 4 mg, Q4H PRN  prochlorperazine, 5 mg, Q6H PRN  sodium chloride 0.9%, 10 mL, PRN    Calorie Containing IV Medications: no significant kcals from medications at this time    Recent Labs   Lab 06/27/25  0517 06/28/25  0405 06/28/25  0406 06/28/25  1824 06/29/25  0535 06/30/25  0613 06/30/25  0925 07/01/25  0434 07/02/25  0501 07/03/25  0501    137  --   --  135* 134*  --  135* 137  --    K 5.0 4.9  --   --  4.9 5.2*  --  4.7 4.5  --    CALCIUM 7.9* 7.4*  --   --  7.2* 7.6*  --  7.2* 7.5*  --     107  --   --  110* 112*  --  109* 112*  --    CO2 24 23  --   --  20* 17*  --  21* 21*  --    BUN 27.4* 31.5*  --   --  25.2 25.9*  --  23.9 25.9*  --    CREATININE 1.79* 1.95*  --   --  1.50* 1.40*  --  1.25 1.26*  --    EGFRNORACEVR 42 38  --   --  52 56  --  >60 >60  --    GLU 64* 69*  --   --  63* 72*  --  73* 116*  --    BILITOT 0.6  --   --   --   --  0.6  --  0.5 0.8  --    ALKPHOS 1,735*  --   --   --   --  2,162*  --  2,154* 2,337*  --    ALT 42  --   --   --   --  35  --  36 36  --    AST 83*  --   --   --   --  80*  --  75* 75*  --    ALBUMIN 2.0*  --   --   --   --  1.9*  --  1.7* 1.7*  --    WBC 4.28*  --  4.25*  --  4.71  4.71  --  4.59 2.95* 3.03* 4.16*   HGB 8.1*  --  6.8* 7.4* 7.1*  --  7.4* 6.9* 8.0* 7.8*   HCT 26.8*  --  22.6* 23.9* 23.0*  --  23.9* 22.2* 25.0* 24.5*     Nutrition Orders:  Diet Adult Regular Standard Tray  Dietary nutrition supplements Daily; Boost Original Nutritional Drink - Any flavor    Appetite/Oral  "Intake: fair/25-50% of meals  Factors Affecting Nutritional Intake: socio-economic  Social Needs Impacting Access to Food: reports lack of access to food / food insecure - referred to case management  Food/Nondenominational/Cultural Preferences: unable to obtain  Food Allergies: no known food allergies  Last Bowel Movement: 06/28/25  Wound(s):  none noted    Comments    6/24/25 Pt eating lunch, difficulty obtaining hx from pt; reports no weight loss (appears stable in EMR since feb); declined oral supplements. Visible signs of fat and muscle loss present. Unsure if pt has adequate access to nutrition at home.    6/27/25 Pt out of room for procedure; nursing noted 25% intake of supper last night. Will f/u early.    6/30/25 Pt eating about 25-50% of meals per nursing, but reports he is eating well.    7/3/25 Pt sleeping in chair, nurse reports fair intake, eats more at some meals than others; drinking the boost sometimes so will continue to send.     Anthropometrics    Height: 6' 0.01" (182.9 cm),    Last Weight: 71 kg (156 lb 8.4 oz) (06/21/25 1220), Weight Method: Bed Scale  BMI (Calculated): 21.2  BMI Classification: normal (BMI 18.5-24.9)        Ideal Body Weight (IBW), Male: 178.06 lb     % Ideal Body Weight, Male (lb): 87.94 %                          Usual Weight Provided By: patient denies unintentional weight loss    Wt Readings from Last 5 Encounters:   06/21/25 71 kg (156 lb 8.4 oz)   02/28/25 70.3 kg (155 lb)   02/27/25 71.8 kg (158 lb 4.6 oz)   01/07/25 73.5 kg (161 lb 14.9 oz)   11/20/24 58 kg (127 lb 13.9 oz)     Weight Change(s) Since Admission:   Wt Readings from Last 1 Encounters:   06/21/25 1220 71 kg (156 lb 8.4 oz)   Admit Weight: 71 kg (156 lb 8.4 oz) (06/21/25 1220), Weight Method: Bed Scale    Estimated Needs    Weight Used For Calorie Calculations: 71 kg (156 lb 8.4 oz)  Energy Calorie Requirements (kcal): 5819-1645 kcal (30-35 kcal/kg)  Energy Need Method: Kcal/kg  Weight Used For Protein " Calculations: 71 kg (156 lb 8.4 oz)  Protein Requirements: 106-120gm (1.5-1.7 gm/kg)  Fluid Requirements (mL): 2130 ml (30ml/kg)        Enteral Nutrition     Patient not receiving enteral nutrition at this time.    Parenteral Nutrition     Patient not receiving parenteral nutrition support at this time.    Evaluation of Received Nutrient Intake    Calories: not meeting estimated needs  Protein: not meeting estimated needs    Patient Education     Not applicable.    Nutrition Diagnosis         PES: Severe chronic disease or condition related malnutrition Related to chronic condition As Evidenced by:  - muscle mass depletion: 6 areas of moderate muscle loss (Trapezius, Deltoid, Pectoralis, Acromion, Temporalis, Clavicle) - loss of subcutaneous fat: 2 areas of mild fat loss (Buccal, Infraorbital) active    Nutrition Interventions     Intervention(s): commercial beverage and collaboration with other providers  Intervention(s): Oral diet/nutrient modifications    Goal: Consume % of meals/snacks by follow-up. (goal not met)  Goal: Maintain weight throughout hospitalization. (goal progressing)    Nutrition Goals & Monitoring     Dietitian will monitor: food and beverage intake and weight change  Discharge planning: continue regular diet and provided referral to  for food access needs  Nutrition Risk/Follow-Up: patient at increased nutrition risk; dietitian will follow-up twice weekly   Please consult if re-assessment needed sooner.

## 2025-07-03 NOTE — PLAN OF CARE
SSC spoke with pt's sister Shelbi, who states she does not have access to pt's financial information but pt's son Mary Jane does. [facility] can accept pt but need financial information first. Left a voicemail for Mary Jane, awaiting a call back at this time.     1432- spoke with Mary Jane, who states he does not deal with the pt's financial information but will find out who does and call me back. Mary Jane does confirm the plan is to go skilled to long term care.

## 2025-07-03 NOTE — PROGRESS NOTES
"[facility]  Hospital Medicine Progress Note        Chief Complaint: Inpatient Follow-up for AIDS, MAC infection     HPI per admitting team:  "Anne Terry is a 65 y.o. male who a [facility] HIV/AIDs, MAC, history of disseminated histoplasmosis, PJP,  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 . The patient presented to [facility] on 6/21/2025 with a primary complaint of failure to thrive. Patient was brought to OSH ED found walking around naked and confused. He also likely has dementia. Patient says that he lives by himself in [place] but is unable to explain why he went to the ER or why he is now in the hospital but say he had no choice. He has not been taking his medications. Pt had a CT scans done at OSH, results to be uploaded. He had a hospital admission in March 2025 for treatment of HIV and MAC, a punch biopsy of BLLE that was negative for malignancy, and an Ophthalmology evaluation. He has a history of non-compliance and missing several follow up appointments. Denies fevers, chills, sweats, SOB, N/V/D, dysuria, new rashes. Does have oral thrush.  He was seen ID team and started on  zithrmax, bactrim, ethambutol, rifampin, diflucan. Last CD 4 was 37. MRI brain and LP planned to be done under anesthesia. CSF and MRI brain was unremarkable. CD 4 this admit 21/7%, ,288.   D/W ID about patient elevated ALP. Suspect liver infiltrative disease from AIDS. Poor prognosis.   MRI brain unremarkable   CSF analysis so far negative   CM working on NH placement. He will need palliative/comfort care"     Interval Hx:   Patient is sitting in chair, discussed discharge plan, patient in agreement   No family at bedside  Case was discussed with patient's nurse and  on the floor.    Objective/physical exam:  General: In no acute distress, frail, chronically ill-looking  Chest: Clear to auscultation bilaterally anteriorly, on room air  Heart: RRR, +S1, S2, no appreciable murmur  Abdomen: Soft, " nontender, BS +  Musculoskeletal: Tato legs hyperpigmented, no edema  Neurologic: Cranial nerve II-XII intact, able to move all 4 extremities      VITAL SIGNS: 24 HRS MIN & MAX LAST   Temp  Min: 96.8 °F (36 °C)  Max: 99.4 °F (37.4 °C) 98.3 °F (36.8 °C)   BP  Min: 111/67  Max: 153/86 (!) 153/86   Pulse  Min: 72  Max: 88  76   Resp  Min: 17  Max: 18 18   SpO2  Min: 96 %  Max: 100 % 100 %     I have reviewed the following labs:  Recent Labs   Lab 07/01/25  0434 07/02/25  0501 07/03/25  0501   WBC 2.95* 3.03* 4.16*   RBC 2.49* 2.88* 2.81*   HGB 6.9* 8.0* 7.8*   HCT 22.2* 25.0* 24.5*   MCV 89.2 86.8 87.2   MCH 27.7 27.8 27.8   MCHC 31.1* 32.0* 31.8*   RDW 16.1 16.9 17.0    180 202   MPV 10.8* 11.2* 11.7*     Recent Labs   Lab 06/30/25  0613 07/01/25  0434 07/02/25  0501   * 135* 137   K 5.2* 4.7 4.5   * 109* 112*   CO2 17* 21* 21*   BUN 25.9* 23.9 25.9*   CREATININE 1.40* 1.25 1.26*   GLU 72* 73* 116*   CALCIUM 7.6* 7.2* 7.5*   ALBUMIN 1.9* 1.7* 1.7*   PROT 7.6 7.0 7.1   ALKPHOS 2,162* 2,154* 2,337*   ALT 35 36 36   AST 80* 75* 75*   BILITOT 0.6 0.5 0.8     Microbiology Results (last 7 days)       Procedure Component Value Units Date/Time    Cerebrospinal Fluid Culture [[id number]] Collected: 06/27/25 1402    Order Status: Completed Specimen: CSF (Spinal Fluid) from Cerebrospinal Fluid Updated: 07/02/25 1003     CULTURE, CSF Final Report: At 5 days. No growth     GRAM STAIN No WBCs, No bacteria seen    Cryptococcal antigen, CSF [[id number]]  (Normal) Collected: 06/27/25 1402    Order Status: Completed Specimen: CSF (Spinal Fluid) from CSF Tap, Tube 1 Updated: 06/30/25 1138     Cryptococcal Antigen, CSF Negative    AFB Smear [[id number]] Collected: 06/27/25 1402    Order Status: Completed Specimen: CSF (Spinal Fluid) from Cerebrospinal Fluid Updated: 06/28/25 1346     AFB Smear No AFB seen (Direct smear only) - Concentration to follow    Cryptococcal antigen, CSF [[id number]]     Order Status: Canceled  Specimen: CSF (Spinal Fluid) from CSF Tap, Tube 1     Mycobacteria and Nocardia Culture [[id number]] Collected: 06/27/25 1402    Order Status: Sent Specimen: CSF (Spinal Fluid) from Cerebrospinal Fluid Updated: 06/27/25 1435    Fungal Culture [[id number]] Collected: 06/27/25 1402    Order Status: Sent Specimen: CSF (Spinal Fluid) from Cerebrospinal Fluid Updated: 06/27/25 1435    Blood Culture [[id number]]  (Normal) Collected: 06/21/25 2356    Order Status: Completed Specimen: Blood Updated: 06/27/25 0204     Blood Culture No Growth at 5 days    Blood Culture [[id number]]  (Normal) Collected: 06/21/25 2356    Order Status: Completed Specimen: Blood Updated: 06/27/25 0204     Blood Culture No Growth at 5 days             See below for Radiology    Assessment/Plan:  Acute metabolic encephalopathy - improved   HIV/AIDS - CD4 21/7%, , 288 with opportunistic infection   Oral thrush   H/O disseminated MAC, now with lung mass   H/O cryptococcal meningitis   Non complaint with HAART   Elevated ALP ? Infiltrative disease   TAYLOR  Anemic of chronic disease   Mild thrombocytopenia - resolved  Iron deficiency anemia  Severe malnutrition  DNR        Afebrile.   Mood is stable, off of 1:1 sitters and  in the room  Participating well with therapy  F/U by ID team and continue zithrmax, bactrim, ethambutol, rifampin for disseminated MAC. Restart ART and monitor for IRIS   On fluconazole 200 mg daily   On Bactrim DS for PCP/PJP  Poor overall prognosis and high risk of mortality due to underlying infections, and medical non adherence. Upon discharge he can should follow up with [facility] ID clinic   His ALP was elevated. Dr Mccullough discusses with ID team --> suspect infiltrative disease   CT abdomen done 6/23/25 showed normal liver , US liver was normal   Continue strict aspiration, fall and decubitus precautions    Received 1 unit PRBC transfusion on 7/1/25, monitor hemoglobin  Continue PO iron tabs   Check stool occult blood - pending  collection but can expect positive results given p.o. iron supplementation  CM working with family to get financials for NH placement, sister informed  that patient's son Flakito may have.  Cm talk to patient's son who does not have the information available but will find out and call back.   confirmed the patient's son and the plan is skill to long-term care  Morning CBC, CMP ordered    VTE prophylaxis: Lovenox     Patient condition:  Fair    Anticipated discharge and Disposition:   SNF to transition to NH with Hospice care once financial received.  Patient is medically cleared      All diagnosis and differential diagnosis have been reviewed; assessment and plan has been documented; I have personally reviewed the labs and test results that are presently available; I have reviewed the patients medication list; I have reviewed the consulting providers response and recommendations. I have reviewed or attempted to review medical records based upon their availability    All of the patient's questions have been  addressed and answered. Patient's is agreeable to the above stated plan. I will continue to monitor closely and make adjustments to medical management as needed.    Portions of this note dictated using EMR integrated voice recognition software, and may be subject to voice recognition errors not corrected at proofreading. Please contact writer for clarification if needed.   _____________________________________________________________________    Malnutrition Status:  Nutrition consulted. Most recent weight and BMI monitored-     Measurements:  Wt Readings from Last 1 Encounters:   06/21/25 71 kg (156 lb 8.4 oz)   Body mass index is 21.22 kg/m².    Patient has been screened and assessed by RD.    Malnutrition Type:  Context: chronic illness  Level: severe    Malnutrition Characteristic Summary:  Weight Loss (Malnutrition): other (see comments) (Does not meet criteria)  Subcutaneous Fat  (Malnutrition): mild depletion  Muscle Mass (Malnutrition): moderate depletion    Interventions/Recommendations (treatment strategy):  Oral diet/nutrient modifications     Scheduled Med:   azithromycin  500 mg Oral Daily    dolutegravir  50 mg Oral Daily    emtricitabine-tenofovir alafen  1 tablet Oral Daily    enoxparin  40 mg Subcutaneous Q24H (prophylaxis, 1700)    ethambutoL  800 mg Oral Daily    ferrous sulfate  1 tablet Oral Daily    fluconazole (DIFLUCAN) IV (PEDS and ADULTS)  200 mg Intravenous Q24H    pantoprazole  40 mg Oral Daily    rifAMpin  300 mg Oral Daily    sodium bicarbonate  650 mg Oral Daily    sulfamethoxazole-trimethoprim 800-160mg  1 tablet Oral Daily      Continuous Infusions:       PRN Meds:  Current Facility-Administered Medications:     0.9%  NaCl infusion (for blood administration), , Intravenous, Q24H PRN    acetaminophen, 650 mg, Oral, Q6H PRN    dextrose 50%, 12.5 g, Intravenous, PRN    dextrose 50%, 25 g, Intravenous, PRN    glucagon (human recombinant), 1 mg, Intramuscular, PRN    glucose, 16 g, Oral, PRN    glucose, 24 g, Oral, PRN    naloxone, 0.02 mg, Intravenous, PRN    ondansetron, 4 mg, Intravenous, Q4H PRN    prochlorperazine, 5 mg, Intravenous, Q6H PRN    sodium chloride 0.9%, 10 mL, Intravenous, PRN     Radiology:  I have personally reviewed the following imaging and agree with the radiologist.     US Abdomen Limited  Narrative: EXAMINATION:  US ABDOMEN LIMITED    CLINICAL HISTORY:  Right upper quadrant pain.    TECHNIQUE:  Multiple real-time transverse and longitudinal sections were performed of the right abdomen by the sonographer. Select images were submitted for review.    COMPARISON:  CT abdomen pelvis June 23, 2025    FINDINGS:  Liver is of unremarkable echotexture with normal contour and size. There was no delineation of discrete hepatic cystic or solid mass. Hepatic maximum diameter is estimated to be 20.26 cm with ultrasound and appears to be overestimated.  On the  previous CT abdomen maximum diameter of the liver in the midclavicular line is 16.2 cm which is within normal limits.  There was unremarkable hepatopedal flow within the portal vein.  Pancreas is obscured by overlying bowel gas.    Gallbladder lumen is without echogenicity indicative of sludge or cholelithiasis. Gallbladder wall thickness is within normal limits. Common bile duct caliber of 2.0 mm is within normal limits for the age. Sonographer reported negative Gomes's sign.    Inferior vena cava is unremarkable. Visualized portion of the abdominal aorta is without aneurysmal dilatation.    Normally located right kidney length measures 12.2 x 4.3 x 5.9 cm. Right renal corticomedullary differentiation is unremarkable. No evidence of hydronephrosis.  Impression: No acute findings sonography identified.    Electronically signed by: Ezio Deleon  Date:    06/29/2025  Time:    20:00      Carlton Woo MD  Department of Hospital Medicine   [facility]   07/03/2025

## 2025-07-03 NOTE — PLAN OF CARE
Problem: Adult Inpatient Plan of Care  Goal: Plan of Care Review  Outcome: Progressing  Goal: Patient-Specific Goal (Individualized)  Outcome: Progressing  Goal: Absence of Hospital-Acquired Illness or Injury  Outcome: Progressing  Goal: Optimal Comfort and Wellbeing  Outcome: Progressing  Goal: Readiness for Transition of Care  Outcome: Progressing     Problem: Wound  Goal: Optimal Coping  Outcome: Progressing  Goal: Optimal Functional Ability  Outcome: Progressing  Goal: Absence of Infection Signs and Symptoms  Outcome: Progressing  Goal: Improved Oral Intake  Outcome: Progressing  Goal: Optimal Pain Control and Function  Outcome: Progressing  Goal: Skin Health and Integrity  Outcome: Progressing  Goal: Optimal Wound Healing  Outcome: Progressing     Problem: Coping Ineffective  Goal: Effective Coping  Outcome: Progressing     Problem: Skin Injury Risk Increased  Goal: Skin Health and Integrity  Outcome: Progressing

## 2025-07-03 NOTE — PROGRESS NOTES
"[facility] Med Surg  Infectious Disease Progress Note     Patient Name: Anne Terry  MRN: [id number]  Admission Date: 6/21/2025  Hospital Length of Stay: 7 days  Attending Physician: Clif Osborne MD  Primary Care Provider: Nadia Primary Doctor   7/03/2025     Isolation Status: No active isolations     Reason for consultation  HIV management     IMPRESSION:     # Failure to thrive and wasting syndddrome /metabolic encephalopathy     # HIV/AIDS with history of medical noncompliance  -patient has not been taking medications for an extended period of time  -currently holding antiviral therapy to avoid IRIS  -CD4 count 21/7% 6/23  -,288 6/23     # Recent history of disseminated MAC  -currently on azithromycin rifabutin and ethambutol  - CT c/a/p- 6/23 with no new findings, but increased LDN and same multifocal micronodular of both lungs     # 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 therapy with amphotericin B and flucytosine currently on consolidation therapy with oral fluconazole.  - S/p LP 6/27- unremarkable opening pressure. CSF fluid analysis unremarkable. Prot somewhat elevated, glu low (but also concurrently hypoglycemic), ME PCR neg, including crypto  - CSF cx in progress   - 6/27 MRI brain neg for acute findings      ---> Last visit with ID clinic at [facility] was in 3/3/25- with no mention of recent Cryptococcal meningitis      # Past Medical Hx of HIV/AIDs, MAC, history of disseminated histoplasmosis, PJP,  and cryptococcosis      # TAYLOR- worsening creatinine.     # Elavated ALP secondary to OI"s like MAC and Histoplasmosis.     Per chart review of ID NOTES- last one on 3/3/25:      Azithromycin 500 mg PO daily Start: 01/07/25  Ethambutol 15 mg/kg suspension daily Start: 01/07/25  Rifabutin 150 mg PO daily Start: 01/07/25  Atovaquone 1500 mg PO daily Start: 01/07/25 End: 02/28/25  Bactrim DS 1 tab daily  Truvada 1 tab PO daily  Abacavir " 300mg PO daily  Darunavir 800mg PO daily  Ritonavir 100mg PO daily  -s/p punch bx of LE lesion in March- neg for malingnancy.  Negative for AFB     Recommendations         Continue disseminated MAC medication treatment with azithromycin 500 mg daily ethambutol 15 mg/kg every 24 hours plus rifampin 300 mg every 24 hours.  Continue with fluconazole 200mg given renal failure.   Continue Bactrim DS for PCP/PJP   Continue HAART ( Descovy and Tivicay) and monitor for IRIS   Negative CSF studies and cultures  Supportive measures and nutritional support  Poor overall prognosis and high risk of mortality due to underlying infections, and medical non adherence.     Upon discharge he can should follow up with [facility] ID clinic   Will sign off and follow periodically.        SUBJECTIVE:  No new events, Awake, confused.     HPI:      The Pt is a 65 y.o. male who a [facility] HIV/AIDs, MAC, history of disseminated histoplasmosis, PJP,  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 . The patient presented to [facility] on 6/21/2025 with a primary complaint of failure to thrive. Patient was brought to OSH ED found walking around naked and confused. He also likely has dementia. Patient says that he lives by himself in [place] but is unable to explain why he went to the ER or why he is now in the hospital but say he had no choice. He has not been taking his medications. Pt had a CT scans done at OSH, results to be uploaded. He had a hospital admission in March 2025 for treatment of HIV and MAC, a punch biopsy of BLLE that was negative for malignancy, and an Ophthalmology evaluation. He has a history of non-compliance and missing several follow up appointments. Denies fevers, chills, sweats, SOB, N/V/D, dysuria, new rashes. Does have oral thrush.     Physical exam:  VS Reviewed.  General: In no acute distress, frail  Chest: Clear to auscultation bilaterally  Heart: RRR, +S1, S2, no appreciable murmur  Abdomen: Soft, nontender, BS +  Neurologic: no  changes.    Labs reviewed.    Microbiology Results (Last 365 Days)    Procedure Component Value Units Date/Time   Cryptococcal antigen, CSF [[id number]]    Order Status: Canceled Specimen: CSF (Spinal Fluid) from CSF Tap, Tube 1    Cryptococcal antigen, CSF [[id number]] (Normal) Collected: 06/27/25 1402   Order Status: Completed Specimen: CSF (Spinal Fluid) from CSF Tap, Tube 1 Updated: 06/30/25 1138    Cryptococcal Antigen, CSF Negative   AFB Smear [[id number]] Collected: 06/27/25 1402   Order Status: Completed Specimen: CSF (Spinal Fluid) from Cerebrospinal Fluid Updated: 06/28/25 1346    AFB Smear No AFB seen (Direct smear only) - Concentration to follow   Mycobacteria and Nocardia Culture [[id number]] Collected: 06/27/25 1402   Order Status: Sent Specimen: CSF (Spinal Fluid) from Cerebrospinal Fluid Updated: 06/27/25 1435   Cerebrospinal Fluid Culture [[id number]] Collected: 06/27/25 1402   Order Status: Completed Specimen: CSF (Spinal Fluid) from Cerebrospinal Fluid Updated: 07/02/25 1003    CULTURE, CSF Final Report: At 5 days. No growth    GRAM STAIN No WBCs, No bacteria seen   Fungal Culture [[id number]] Collected: 06/27/25 1402   Order Status: Sent Specimen: CSF (Spinal Fluid) from Cerebrospinal Fluid Updated: 06/27/25 1435   Blood Culture [[id number]] (Normal) Collected: 06/21/25 2356   Order Status: Completed Specimen: Blood Updated: 06/27/25 0204    Blood Culture No Growth at 5 days   Blood Culture [[id number]] (Normal) Collected: 06/21/25 2356   Order Status: Completed Specimen: Blood Updated: 06/27/25 0204    Blood Culture No Growth at 5 days

## 2025-07-03 NOTE — PT/OT/SLP PROGRESS
Physical Therapy Treatment    Patient Name:  Anne Terry   MRN:  [id number]    Recommendations:     Discharge therapy intensity: Moderate Intensity Therapy   Discharge Equipment Recommendations: none  Barriers to discharge: Impaired mobility, Ongoing medical needs, and placement    Assessment:     Anne Terry is a 65 y.o. male admitted with a medical diagnosis of acute metabolic encephalopathy, failure to thrive, TAYLOR, hx of HIV.  He presents with the following impairments/functional limitations: weakness, gait instability, impaired endurance, impaired self care skills, impaired functional mobility, impaired cognition, decreased safety awareness, impaired balance.    Rehab Prognosis: Fair; patient would benefit from acute skilled PT services to address these deficits and reach maximum level of function.    Recent Surgery: Procedure(s) (LRB):  MRI (Magnetic Resonance Imagine) (N/A) 6 Days Post-Op    Plan:     During this hospitalization, patient would benefit from acute PT services 5 x/week to address the identified rehab impairments via gait training, therapeutic activities, therapeutic exercises and progress toward the following goals:    Plan of Care Expires:       Subjective     Chief Complaint: n/a  Patient/Family Comments/goals:   Pain/Comfort:         Objective:     Communicated with RN prior to session.  Patient found HOB elevated with peripheral IV upon PT entry to room.     General Precautions: Standard, fall  Orthopedic Precautions: N/A  Braces: N/A  Respiratory Status: Room air  Blood Pressure: NT  Skin Integrity: Visible skin intact    Functional Mobility:  Bed Mobility:     Supine to Sit: stand by assistance  Transfers:     Sit to Stand:  minimum assistance with rolling walker  X 2 from EOB  +void in brief requiring change  Bed to Chair: minimum assistance with  rolling walker  using  Step Transfer; decreased safety awareness   Balance: Kasie for dynamic balance 2/2 posterior lean  Refused further  ambulation     Co-Treatment: No    Education:  Patient provided with verbal education education regarding PT role/goals/POC, fall prevention, safety awareness, and pressure ulcer prevention.  Additional teaching is warranted.     Patient left up in chair with all lines intact, call button in reach, chair alarm on, RN notified, and CNA present      GOALS:   Multidisciplinary Problems       Physical Therapy Goals          Problem: Physical Therapy    Goal Priority Disciplines Outcome Interventions   Physical Therapy Goal     PT, PT/OT Progressing    Description: Pt will be seen for the following goals   1. Pt will be ind with bed mobility  2. Pt will be sba with transfers with a rw   3. Pt will ambulate 100ft with a rw sba                       Time Tracking:     PT Received On: 07/03/25  PT Start Time: 0906     PT Stop Time: 0920  PT Total Time (min): 14 min     Billable Minutes: Therapeutic Activity 1    Treatment Type: Treatment  PT/PTA: PTA     Number of PTA visits since last PT visit: 2     07/03/2025

## 2025-07-04 LAB
ALBUMIN SERPL-MCNC: 1.7 G/DL (ref 3.4–4.8)
ALBUMIN/GLOB SERPL: 0.3 RATIO (ref 1.1–2)
ALP SERPL-CCNC: 2208 UNIT/L (ref 40–150)
ALT SERPL-CCNC: 37 UNIT/L (ref 0–55)
ANION GAP SERPL CALC-SCNC: 6 MEQ/L
AST SERPL-CCNC: 66 UNIT/L (ref 11–45)
BILIRUB SERPL-MCNC: 1 MG/DL
BUN SERPL-MCNC: 28.5 MG/DL (ref 8.4–25.7)
CALCIUM SERPL-MCNC: 7.6 MG/DL (ref 8.8–10)
CHLORIDE SERPL-SCNC: 110 MMOL/L (ref 98–107)
CO2 SERPL-SCNC: 22 MMOL/L (ref 23–31)
CREAT SERPL-MCNC: 1.58 MG/DL (ref 0.72–1.25)
CREAT/UREA NIT SERPL: 18
ERYTHROCYTE [DISTWIDTH] IN BLOOD BY AUTOMATED COUNT: 16.9 % (ref 11.5–17)
GFR SERPLBLD CREATININE-BSD FMLA CKD-EPI: 49 ML/MIN/1.73/M2
GLOBULIN SER-MCNC: 5.9 GM/DL (ref 2.4–3.5)
GLUCOSE SERPL-MCNC: 75 MG/DL (ref 82–115)
HCT VFR BLD AUTO: 23.1 % (ref 42–52)
HGB BLD-MCNC: 7.5 G/DL (ref 14–18)
MCH RBC QN AUTO: 28 PG (ref 27–31)
MCHC RBC AUTO-ENTMCNC: 32.5 G/DL (ref 33–36)
MCV RBC AUTO: 86.2 FL (ref 80–94)
NRBC BLD AUTO-RTO: 0 %
PLATELET # BLD AUTO: 210 X10(3)/MCL (ref 130–400)
PMV BLD AUTO: 11.5 FL (ref 7.4–10.4)
POTASSIUM SERPL-SCNC: 5.1 MMOL/L (ref 3.5–5.1)
PROT SERPL-MCNC: 7.6 GM/DL (ref 5.8–7.6)
RBC # BLD AUTO: 2.68 X10(6)/MCL (ref 4.7–6.1)
SODIUM SERPL-SCNC: 138 MMOL/L (ref 136–145)
WBC # BLD AUTO: 3.8 X10(3)/MCL (ref 4.5–11.5)

## 2025-07-04 PROCEDURE — 36415 COLL VENOUS BLD VENIPUNCTURE: CPT | Performed by: INTERNAL MEDICINE

## 2025-07-04 PROCEDURE — 97116 GAIT TRAINING THERAPY: CPT | Mod: CQ

## 2025-07-04 PROCEDURE — 25000003 PHARM REV CODE 250: Performed by: INTERNAL MEDICINE

## 2025-07-04 PROCEDURE — 63600175 PHARM REV CODE 636 W HCPCS: Performed by: INTERNAL MEDICINE

## 2025-07-04 PROCEDURE — 63700000 PHARM REV CODE 250 ALT 637 W/O HCPCS: Performed by: INTERNAL MEDICINE

## 2025-07-04 PROCEDURE — 85027 COMPLETE CBC AUTOMATED: CPT | Performed by: INTERNAL MEDICINE

## 2025-07-04 PROCEDURE — 97535 SELF CARE MNGMENT TRAINING: CPT | Mod: CO

## 2025-07-04 PROCEDURE — 80053 COMPREHEN METABOLIC PANEL: CPT | Performed by: INTERNAL MEDICINE

## 2025-07-04 PROCEDURE — 11000001 HC ACUTE MED/SURG PRIVATE ROOM

## 2025-07-04 RX ORDER — FLUCONAZOLE 200 MG/1
200 TABLET ORAL DAILY
Status: DISCONTINUED | OUTPATIENT
Start: 2025-07-04 | End: 2025-07-08 | Stop reason: HOSPADM

## 2025-07-04 RX ORDER — SODIUM CHLORIDE, SODIUM LACTATE, POTASSIUM CHLORIDE, CALCIUM CHLORIDE 600; 310; 30; 20 MG/100ML; MG/100ML; MG/100ML; MG/100ML
INJECTION, SOLUTION INTRAVENOUS CONTINUOUS
Status: DISCONTINUED | OUTPATIENT
Start: 2025-07-04 | End: 2025-07-07

## 2025-07-04 RX ADMIN — SODIUM ZIRCONIUM CYCLOSILICATE 10 G: 10 POWDER, FOR SUSPENSION ORAL at 09:07

## 2025-07-04 RX ADMIN — DOLUTEGRAVIR SODIUM 50 MG: 50 TABLET, FILM COATED ORAL at 09:07

## 2025-07-04 RX ADMIN — SODIUM BICARBONATE 650 MG: 650 TABLET ORAL at 09:07

## 2025-07-04 RX ADMIN — FLUCONAZOLE 200 MG: 200 TABLET ORAL at 04:07

## 2025-07-04 RX ADMIN — RIFAMPIN 300 MG: 300 CAPSULE ORAL at 09:07

## 2025-07-04 RX ADMIN — Medication 1 EACH: at 09:07

## 2025-07-04 RX ADMIN — AZITHROMYCIN DIHYDRATE 500 MG: 250 TABLET ORAL at 09:07

## 2025-07-04 RX ADMIN — SULFAMETHOXAZOLE AND TRIMETHOPRIM 1 TABLET: 800; 160 TABLET ORAL at 09:07

## 2025-07-04 RX ADMIN — SODIUM CHLORIDE, POTASSIUM CHLORIDE, SODIUM LACTATE AND CALCIUM CHLORIDE: 600; 310; 30; 20 INJECTION, SOLUTION INTRAVENOUS at 09:07

## 2025-07-04 RX ADMIN — EMTRICITABINE AND TENOFOVIR ALAFENAMIDE 1 TABLET: 200; 25 TABLET ORAL at 09:07

## 2025-07-04 RX ADMIN — ETHAMBUTOL HYDROCHLORIDE 800 MG: 400 TABLET ORAL at 09:07

## 2025-07-04 RX ADMIN — PANTOPRAZOLE SODIUM 40 MG: 40 TABLET, DELAYED RELEASE ORAL at 09:07

## 2025-07-04 NOTE — PT/OT/SLP PROGRESS
Physical Therapy Treatment    Patient Name:  Anne Terry   MRN:  [id number]    Recommendations:     Discharge therapy intensity: Moderate Intensity Therapy   Discharge Equipment Recommendations: none  Barriers to discharge: Impaired mobility, Ongoing medical needs, and placement    Assessment:     Anne Terry is a 65 y.o. male admitted with a medical diagnosis of acute metabolic encephalopathy, failure to thrive, TAYLOR, hx of HIV.  He presents with the following impairments/functional limitations: weakness, gait instability, impaired endurance, impaired self care skills, impaired functional mobility, impaired cognition, decreased safety awareness, impaired balance.    Rehab Prognosis: Fair; patient would benefit from acute skilled PT services to address these deficits and reach maximum level of function.    Recent Surgery: Procedure(s) (LRB):  MRI (Magnetic Resonance Imagine) (N/A) 7 Days Post-Op    Plan:     During this hospitalization, patient would benefit from acute PT services 5 x/week to address the identified rehab impairments via gait training, therapeutic activities, therapeutic exercises and progress toward the following goals:    Plan of Care Expires:       Subjective     Chief Complaint: n/a  Patient/Family Comments/goals:   Pain/Comfort:         Objective:     Communicated with RN prior to session.  Patient found up in chair with peripheral IV upon PT entry to room.     General Precautions: Standard, fall  Orthopedic Precautions: N/A  Braces: N/A  Respiratory Status: Room air  Blood Pressure: NT  Skin Integrity: Visible skin intact    Functional Mobility:  Transfers:     Sit to Stand:  minimum assistance with rolling walker  Toilet Transfer: moderate assistance with  rolling walker  using  Step Transfer  Gait: ~8' x 2 Kasie w/RW; pt ambulated to the restroom and back to bschairminA 2/2 multiple minor LOB and posterior lean; max vc for increased step length and AD management.   Balance: min-modA for  "static and dynamic standing     Therapeutic Activities/Exercises:  Pt stood at sink for ~2min; one posterior R LOB requiring modA to recover; when asked what happened, pt stated "this happens sometimes when things get unaligned."    Co-Treatment: Yes, due to Limited activity tolerance    Education:  Patient provided with verbal education education regarding PT role/goals/POC, fall prevention, safety awareness, and discharge/DME recommendations.  Additional teaching is warranted.     Patient left up in chair with all lines intact, call button in reach, chair alarm on, RN notified, and       GOALS:   Multidisciplinary Problems       Physical Therapy Goals          Problem: Physical Therapy    Goal Priority Disciplines Outcome Interventions   Physical Therapy Goal     PT, PT/OT Progressing    Description: Pt will be seen for the following goals   1. Pt will be ind with bed mobility  2. Pt will be sba with transfers with a rw   3. Pt will ambulate 100ft with a rw sba                       Time Tracking:     PT Received On: 07/04/25  PT Start Time: 1400     PT Stop Time: 1418  PT Total Time (min): 18 min     Billable Minutes: Gait Training 1    Treatment Type: Treatment  PT/PTA: PTA     Number of PTA visits since last PT visit: 3     07/04/2025    "

## 2025-07-04 NOTE — PROGRESS NOTES
"[facility]  Hospital Medicine Progress Note        Chief Complaint: Inpatient Follow-up for AIDS, MAC infection     HPI per admitting team:  "Anne Terry is a 65 y.o. male who a [facility] HIV/AIDs, MAC, history of disseminated histoplasmosis, PJP,  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 . The patient presented to [facility] on 6/21/2025 with a primary complaint of failure to thrive. Patient was brought to OSH ED found walking around naked and confused. He also likely has dementia. Patient says that he lives by himself in [place] but is unable to explain why he went to the ER or why he is now in the hospital but say he had no choice. He has not been taking his medications. Pt had a CT scans done at OSH, results to be uploaded. He had a hospital admission in March 2025 for treatment of HIV and MAC, a punch biopsy of BLLE that was negative for malignancy, and an Ophthalmology evaluation. He has a history of non-compliance and missing several follow up appointments. Denies fevers, chills, sweats, SOB, N/V/D, dysuria, new rashes. Does have oral thrush.  He was seen ID team and started on  zithrmax, bactrim, ethambutol, rifampin, diflucan. Last CD 4 was 37. MRI brain and LP planned to be done under anesthesia. CSF and MRI brain was unremarkable. CD 4 this admit 21/7%, ,288.   D/W ID about patient elevated ALP. Suspect liver infiltrative disease from AIDS. Poor prognosis.   MRI brain unremarkable   CSF analysis so far negative   CM working on NH placement. He will need palliative/comfort care"     Interval Hx:   Patient is laying in bed, CNAs helping with bathing.  Informed that we are still waiting on financial information from his family.  Verbalized understanding   No family at bedside  Case was discussed with patient's nurse on the floor.    Objective/physical exam:  General: In no acute distress, frail, chronically ill-looking  Chest: Clear to auscultation bilaterally anteriorly, on room " air  Heart: RRR, +S1, S2, no appreciable murmur  Abdomen: Soft, nontender, BS +  Musculoskeletal: Tato legs hyperpigmented, no edema  Neurologic: Cranial nerve II-XII intact, able to move all 4 extremities      VITAL SIGNS: 24 HRS MIN & MAX LAST   Temp  Min: 97.5 °F (36.4 °C)  Max: 99 °F (37.2 °C) 97.5 °F (36.4 °C)   BP  Min: 108/66  Max: 153/86 110/66   Pulse  Min: 70  Max: 83  78   Resp  Min: 18  Max: 18 18   SpO2  Min: 97 %  Max: 100 % 100 %     I have reviewed the following labs:  Recent Labs   Lab 07/02/25  0501 07/03/25  0501 07/04/25  0441   WBC 3.03* 4.16* 3.80*   RBC 2.88* 2.81* 2.68*   HGB 8.0* 7.8* 7.5*   HCT 25.0* 24.5* 23.1*   MCV 86.8 87.2 86.2   MCH 27.8 27.8 28.0   MCHC 32.0* 31.8* 32.5*   RDW 16.9 17.0 16.9    202 210   MPV 11.2* 11.7* 11.5*     Recent Labs   Lab 07/01/25  0434 07/02/25  0501 07/04/25  0441   * 137 138   K 4.7 4.5 5.1   * 112* 110*   CO2 21* 21* 22*   BUN 23.9 25.9* 28.5*   CREATININE 1.25 1.26* 1.58*   GLU 73* 116* 75*   CALCIUM 7.2* 7.5* 7.6*   ALBUMIN 1.7* 1.7* 1.7*   PROT 7.0 7.1 7.6   ALKPHOS 2,154* 2,337* 2,208*   ALT 36 36 37   AST 75* 75* 66*   BILITOT 0.5 0.8 1.0     Microbiology Results (last 7 days)       Procedure Component Value Units Date/Time    Cerebrospinal Fluid Culture [[id number]] Collected: 06/27/25 1402    Order Status: Completed Specimen: CSF (Spinal Fluid) from Cerebrospinal Fluid Updated: 07/02/25 1003     CULTURE, CSF Final Report: At 5 days. No growth     GRAM STAIN No WBCs, No bacteria seen    Cryptococcal antigen, CSF [[id number]]  (Normal) Collected: 06/27/25 1402    Order Status: Completed Specimen: CSF (Spinal Fluid) from CSF Tap, Tube 1 Updated: 06/30/25 1138     Cryptococcal Antigen, CSF Negative    AFB Smear [[id number]] Collected: 06/27/25 1402    Order Status: Completed Specimen: CSF (Spinal Fluid) from Cerebrospinal Fluid Updated: 06/28/25 1346     AFB Smear No AFB seen (Direct smear only) - Concentration to follow     Cryptococcal antigen, CSF [[id number]]     Order Status: Canceled Specimen: CSF (Spinal Fluid) from CSF Tap, Tube 1     Mycobacteria and Nocardia Culture [[id number]] Collected: 06/27/25 1402    Order Status: Sent Specimen: CSF (Spinal Fluid) from Cerebrospinal Fluid Updated: 06/27/25 1435    Fungal Culture [[id number]] Collected: 06/27/25 1402    Order Status: Sent Specimen: CSF (Spinal Fluid) from Cerebrospinal Fluid Updated: 06/27/25 1435             See below for Radiology    Assessment/Plan:  Acute metabolic encephalopathy - improved   HIV/AIDS - CD4 21/7%, , 288 with opportunistic infection   Oral thrush   H/O disseminated MAC, now with lung mass   H/O cryptococcal meningitis   Non complaint with HAART   Elevated ALP ? Infiltrative disease   TAYLOR- worsening  Anemic of chronic disease - acute on chronic  Mild thrombocytopenia - resolved  Iron deficiency anemia  Severe malnutrition  DNR          Mood is stable, off of 1:1 sitters and  in the room  Participating well with therapy  ID has now signed off, recommended to continue zithrmax, bactrim, ethambutol, rifampin for disseminated MAC. Restarted ART and monitor for IRIS   Continue fluconazole 200 mg daily -renal dose  Continue Bactrim DS for PCP/PJP  Poor overall prognosis and high risk of mortality due to underlying infections, and medical non adherence. Upon discharge he can should follow up with [facility] ID clinic   His ALP was elevated. Dr Mccullough discusses with ID team --> suspect infiltrative disease    6/23/25- CT abdomen done showed normal liver, US RUQ-  liver was normal   Continue strict aspiration, fall and decubitus precautions    Received 1 unit PRBC transfusion on 7/1/25, monitor hemoglobin, continued to trickle down  HB today 7.5, transfuse if 7 or less than 7  Continue PO iron tabs   Check stool occult blood - pending collection but can expect positive results given p.o. iron supplementation  Renal functions continued to worsen, start Ringer  lactate at 75 cc/hours  Potassium 5.1, will order Lokelma 10 g p.o. x1  CM working with family to get financials for NH placement, sister informed  that patient's son Flakito may have.  Cm talk to patient's son who does not have the information available but will find out and call back.   confirmed the patient's son and the plan is skill to long-term care  Morning BMP ordered    VTE prophylaxis: Lovenox     Patient condition:  Fair    Anticipated discharge and Disposition:   SNF to transition to NH with Hospice care once financial received.  Patient is medically cleared      All diagnosis and differential diagnosis have been reviewed; assessment and plan has been documented; I have personally reviewed the labs and test results that are presently available; I have reviewed the patients medication list; I have reviewed the consulting providers response and recommendations. I have reviewed or attempted to review medical records based upon their availability    All of the patient's questions have been  addressed and answered. Patient's is agreeable to the above stated plan. I will continue to monitor closely and make adjustments to medical management as needed.    Portions of this note dictated using EMR integrated voice recognition software, and may be subject to voice recognition errors not corrected at proofreading. Please contact writer for clarification if needed.   _____________________________________________________________________    Malnutrition Status:  Nutrition consulted. Most recent weight and BMI monitored-     Measurements:  Wt Readings from Last 1 Encounters:   06/21/25 71 kg (156 lb 8.4 oz)   Body mass index is 21.22 kg/m².    Patient has been screened and assessed by RD.    Malnutrition Type:  Context: chronic illness  Level: severe    Malnutrition Characteristic Summary:  Weight Loss (Malnutrition): other (see comments) (Does not meet criteria)  Subcutaneous Fat (Malnutrition):  mild depletion  Muscle Mass (Malnutrition): moderate depletion    Interventions/Recommendations (treatment strategy):  Oral diet/nutrient modifications     Scheduled Med:   azithromycin  500 mg Oral Daily    dolutegravir  50 mg Oral Daily    emtricitabine-tenofovir alafen  1 tablet Oral Daily    enoxparin  40 mg Subcutaneous Q24H (prophylaxis, 1700)    ethambutoL  800 mg Oral Daily    ferrous sulfate  1 tablet Oral Daily    fluconazole (DIFLUCAN) IV (PEDS and ADULTS)  200 mg Intravenous Q24H    pantoprazole  40 mg Oral Daily    rifAMpin  300 mg Oral Daily    sodium bicarbonate  650 mg Oral Daily    sulfamethoxazole-trimethoprim 800-160mg  1 tablet Oral Daily      Continuous Infusions:   lactated ringers   Intravenous Continuous 75 mL/hr at 07/04/25 0939 New Bag at 07/04/25 0939        PRN Meds:  Current Facility-Administered Medications:     0.9%  NaCl infusion (for blood administration), , Intravenous, Q24H PRN    acetaminophen, 650 mg, Oral, Q6H PRN    dextrose 50%, 12.5 g, Intravenous, PRN    dextrose 50%, 25 g, Intravenous, PRN    glucagon (human recombinant), 1 mg, Intramuscular, PRN    glucose, 16 g, Oral, PRN    glucose, 24 g, Oral, PRN    naloxone, 0.02 mg, Intravenous, PRN    ondansetron, 4 mg, Intravenous, Q4H PRN    prochlorperazine, 5 mg, Intravenous, Q6H PRN    sodium chloride 0.9%, 10 mL, Intravenous, PRN     Radiology:  I have personally reviewed the following imaging and agree with the radiologist.     US Abdomen Limited  Narrative: EXAMINATION:  US ABDOMEN LIMITED    CLINICAL HISTORY:  Right upper quadrant pain.    TECHNIQUE:  Multiple real-time transverse and longitudinal sections were performed of the right abdomen by the sonographer. Select images were submitted for review.    COMPARISON:  CT abdomen pelvis June 23, 2025    FINDINGS:  Liver is of unremarkable echotexture with normal contour and size. There was no delineation of discrete hepatic cystic or solid mass. Hepatic maximum diameter is  estimated to be 20.26 cm with ultrasound and appears to be overestimated.  On the previous CT abdomen maximum diameter of the liver in the midclavicular line is 16.2 cm which is within normal limits.  There was unremarkable hepatopedal flow within the portal vein.  Pancreas is obscured by overlying bowel gas.    Gallbladder lumen is without echogenicity indicative of sludge or cholelithiasis. Gallbladder wall thickness is within normal limits. Common bile duct caliber of 2.0 mm is within normal limits for the age. Sonographer reported negative Gomes's sign.    Inferior vena cava is unremarkable. Visualized portion of the abdominal aorta is without aneurysmal dilatation.    Normally located right kidney length measures 12.2 x 4.3 x 5.9 cm. Right renal corticomedullary differentiation is unremarkable. No evidence of hydronephrosis.  Impression: No acute findings sonography identified.    Electronically signed by: Ezio Deleon  Date:    06/29/2025  Time:    20:00      Carlton Woo MD  Department of Hospital Medicine   [facility]   07/04/2025

## 2025-07-04 NOTE — PLAN OF CARE
Problem: Adult Inpatient Plan of Care  Goal: Plan of Care Review  Outcome: Progressing  Goal: Absence of Hospital-Acquired Illness or Injury  Outcome: Progressing  Goal: Optimal Comfort and Wellbeing  Outcome: Progressing  Goal: Readiness for Transition of Care  Outcome: Progressing     Problem: Wound  Goal: Optimal Coping  Outcome: Progressing  Goal: Optimal Functional Ability  Outcome: Progressing  Goal: Absence of Infection Signs and Symptoms  Outcome: Progressing  Goal: Improved Oral Intake  Outcome: Progressing  Goal: Optimal Pain Control and Function  Outcome: Progressing  Goal: Skin Health and Integrity  Outcome: Progressing  Goal: Optimal Wound Healing  Outcome: Progressing     Problem: Coping Ineffective  Goal: Effective Coping  Outcome: Progressing     Problem: Skin Injury Risk Increased  Goal: Skin Health and Integrity  Outcome: Progressing

## 2025-07-04 NOTE — PT/OT/SLP PROGRESS
"Occupational Therapy   Treatment    Name: Anne Terry  MRN: [id number]    Recommendations:     Recommended therapy intensity at discharge: Moderate Intensity Therapy   Discharge Equipment Recommendations:  walker, rolling  Barriers to discharge:       Assessment:     Anne Terry is a 65 y.o. male with a medical diagnosis of <principal problem not specified>.  He presents with good participation. Performance deficits affecting function are weakness, impaired endurance, impaired self care skills, impaired functional mobility, gait instability, decreased upper extremity function, decreased lower extremity function, decreased safety awareness.     Rehab Prognosis:  Good; patient would benefit from acute skilled OT services to address these deficits and reach maximum level of function.       Plan:     Patient to be seen 4 x/week to address the above listed problems via self-care/home management, therapeutic activities, therapeutic exercises  Plan of Care Expires: 07/30/25  Plan of Care Reviewed with: patient    Subjective     Pain/Comfort:  Pain Rating 1: 0/10    Objective:     Communicated with: nurse prior to session.  Patient found up in chair, soiled with CNA present, with peripheral IV, telemetry upon OT entry to room.    General Precautions: Standard, fall    Orthopedic Precautions:N/A  Braces: N/A  Respiratory Status: Room air     Occupational Performance:     Functional Mobility/Transfers:  Transfers: Sit to Stand: minimum assistance with rolling walker  Toilet Transfer: minimum assistance with rolling walker using Step Transfer    Activities of Daily Living:  Grooming: minimum assistance with Rw, standing at sink to wash face and hands, LOB posteriorly and to thr right, patient unable to recover without assistance, stated that the loss of balance, "just happens"   Self feeding with Min A with setup sitting at bed side chair with bed side table  LB dressing with Total A to don/doff adult brief     Balance: "   Static Sitting Balance: No UE support: SBA    Therapeutic Activities:  Ambulate to and from bathroom with Min A with RW     Therapeutic Positioning    OT interventions performed during the course of today's session in an effort to prevent and/or reduce acquired pressure injuries:   Therapeutic positioning was provided at the conclusion of session to offload all bony prominences for the prevention and/or reduction of pressure injuries    [facility] 6 Click ADL: 15    Co-Treatment: Yes, due to Limited activity tolerance    Patient Education:  Patient provided with verbal education and demonstrations education regarding OT role/goals/POC, fall prevention, and safety awareness.  Additional teaching is warranted.      Patient left up in chair with all lines intact, call button in reach, chair alarm on, and CNA notified.    GOALS:   Multidisciplinary Problems       Occupational Therapy Goals          Problem: Occupational Therapy    Goal Priority Disciplines Outcome Interventions   Occupational Therapy Goal     OT, PT/OT Progressing    Description: LTG: Pt will perform basic ADLs and ADL transfers with Modified independence using LRAD by discharge.    STG: to be met by 7/30/25    Pt will complete grooming standing at sink with LRAD with SBA.  Pt will complete UB dressing with SBA.  Pt will complete LB dressing with SBA using LRAD.  Pt will complete toileting with SBA using LRAD.  Pt will complete functional mobility to/from toilet and toilet transfer with SBA using LRAD.                        Time Tracking:     OT Date of Treatment: 07/04/25  OT Start Time: 1400  OT Stop Time: 1423  OT Total Time (min): 23 min    Billable Minutes:Self Care/Home Management 23    OTR/L readily available for conference at the time of the provision of services: RUSS Petty  OT/BEV: BEV     Number of BEV visits since last OT visit: 2    7/4/2025

## 2025-07-05 LAB
ANION GAP SERPL CALC-SCNC: 5 MEQ/L
BUN SERPL-MCNC: 31 MG/DL (ref 8.4–25.7)
CALCIUM SERPL-MCNC: 7.6 MG/DL (ref 8.8–10)
CHLORIDE SERPL-SCNC: 112 MMOL/L (ref 98–107)
CO2 SERPL-SCNC: 23 MMOL/L (ref 23–31)
CREAT SERPL-MCNC: 1.48 MG/DL (ref 0.72–1.25)
CREAT/UREA NIT SERPL: 21
GFR SERPLBLD CREATININE-BSD FMLA CKD-EPI: 53 ML/MIN/1.73/M2
GLUCOSE SERPL-MCNC: 81 MG/DL (ref 82–115)
POTASSIUM SERPL-SCNC: 5.5 MMOL/L (ref 3.5–5.1)
SODIUM SERPL-SCNC: 140 MMOL/L (ref 136–145)

## 2025-07-05 PROCEDURE — 11000001 HC ACUTE MED/SURG PRIVATE ROOM

## 2025-07-05 PROCEDURE — 63600175 PHARM REV CODE 636 W HCPCS: Performed by: INTERNAL MEDICINE

## 2025-07-05 PROCEDURE — 63700000 PHARM REV CODE 250 ALT 637 W/O HCPCS: Performed by: INTERNAL MEDICINE

## 2025-07-05 PROCEDURE — 36415 COLL VENOUS BLD VENIPUNCTURE: CPT | Performed by: INTERNAL MEDICINE

## 2025-07-05 PROCEDURE — 25000003 PHARM REV CODE 250: Performed by: INTERNAL MEDICINE

## 2025-07-05 PROCEDURE — 80048 BASIC METABOLIC PNL TOTAL CA: CPT | Performed by: INTERNAL MEDICINE

## 2025-07-05 RX ORDER — ETHAMBUTOL HYDROCHLORIDE 400 MG/1
800 TABLET, FILM COATED ORAL DAILY
Status: DISCONTINUED | OUTPATIENT
Start: 2025-07-05 | End: 2025-07-05

## 2025-07-05 RX ORDER — AZITHROMYCIN 250 MG/1
500 TABLET, FILM COATED ORAL DAILY
Status: DISCONTINUED | OUTPATIENT
Start: 2025-07-06 | End: 2025-07-08 | Stop reason: HOSPADM

## 2025-07-05 RX ORDER — AZITHROMYCIN 250 MG/1
500 TABLET, FILM COATED ORAL DAILY
Status: DISCONTINUED | OUTPATIENT
Start: 2025-07-05 | End: 2025-07-05

## 2025-07-05 RX ORDER — ETHAMBUTOL HYDROCHLORIDE 400 MG/1
800 TABLET, FILM COATED ORAL DAILY
Status: DISCONTINUED | OUTPATIENT
Start: 2025-07-06 | End: 2025-07-08 | Stop reason: HOSPADM

## 2025-07-05 RX ADMIN — SODIUM BICARBONATE 650 MG: 650 TABLET ORAL at 07:07

## 2025-07-05 RX ADMIN — PANTOPRAZOLE SODIUM 40 MG: 40 TABLET, DELAYED RELEASE ORAL at 07:07

## 2025-07-05 RX ADMIN — FLUCONAZOLE 200 MG: 200 TABLET ORAL at 07:07

## 2025-07-05 RX ADMIN — SODIUM CHLORIDE, POTASSIUM CHLORIDE, SODIUM LACTATE AND CALCIUM CHLORIDE: 600; 310; 30; 20 INJECTION, SOLUTION INTRAVENOUS at 08:07

## 2025-07-05 RX ADMIN — Medication 1 EACH: at 07:07

## 2025-07-05 RX ADMIN — RIFAMPIN 300 MG: 300 CAPSULE ORAL at 07:07

## 2025-07-05 RX ADMIN — SODIUM CHLORIDE, POTASSIUM CHLORIDE, SODIUM LACTATE AND CALCIUM CHLORIDE: 600; 310; 30; 20 INJECTION, SOLUTION INTRAVENOUS at 05:07

## 2025-07-05 RX ADMIN — SODIUM ZIRCONIUM CYCLOSILICATE 10 G: 10 POWDER, FOR SUSPENSION ORAL at 08:07

## 2025-07-05 RX ADMIN — AZITHROMYCIN DIHYDRATE 500 MG: 250 TABLET ORAL at 07:07

## 2025-07-05 RX ADMIN — SULFAMETHOXAZOLE AND TRIMETHOPRIM 1 TABLET: 800; 160 TABLET ORAL at 07:07

## 2025-07-05 RX ADMIN — DOLUTEGRAVIR SODIUM 50 MG: 50 TABLET, FILM COATED ORAL at 07:07

## 2025-07-05 RX ADMIN — ETHAMBUTOL HYDROCHLORIDE 800 MG: 400 TABLET ORAL at 07:07

## 2025-07-05 RX ADMIN — EMTRICITABINE AND TENOFOVIR ALAFENAMIDE 1 TABLET: 200; 25 TABLET ORAL at 07:07

## 2025-07-05 NOTE — PLAN OF CARE
Problem: Adult Inpatient Plan of Care  Goal: Plan of Care Review  Outcome: Progressing  Goal: Patient-Specific Goal (Individualized)  Outcome: Progressing  Goal: Absence of Hospital-Acquired Illness or Injury  Outcome: Progressing  Goal: Optimal Comfort and Wellbeing  Outcome: Progressing  Goal: Readiness for Transition of Care  Outcome: Progressing     Problem: Wound  Goal: Optimal Wound Healing  Outcome: Met     Problem: Coping Ineffective  Goal: Effective Coping  Outcome: Progressing     Problem: Skin Injury Risk Increased  Goal: Skin Health and Integrity  Outcome: Progressing

## 2025-07-05 NOTE — PLAN OF CARE
Problem: Adult Inpatient Plan of Care  Goal: Plan of Care Review  Outcome: Progressing  Goal: Absence of Hospital-Acquired Illness or Injury  Outcome: Progressing  Goal: Optimal Comfort and Wellbeing  Outcome: Progressing  Goal: Readiness for Transition of Care  Outcome: Progressing     Problem: Wound  Goal: Optimal Coping  Outcome: Progressing  Goal: Optimal Functional Ability  Outcome: Progressing  Goal: Absence of Infection Signs and Symptoms  Outcome: Progressing  Goal: Improved Oral Intake  Outcome: Progressing  Goal: Optimal Pain Control and Function  Outcome: Progressing  Goal: Skin Health and Integrity  Outcome: Progressing     Problem: Coping Ineffective  Goal: Effective Coping  Outcome: Progressing     Problem: Skin Injury Risk Increased  Goal: Skin Health and Integrity  Outcome: Progressing

## 2025-07-05 NOTE — PROGRESS NOTES
"[facility]  Hospital Medicine Progress Note        Chief Complaint: Inpatient Follow-up for AIDS, MAC infection     HPI per admitting team:  "Anne Terry is a 65 y.o. 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 The patient presented to [facility] on 6/21/2025 with a primary complaint of failure to thrive. Patient was brought to OSH ED found walking around naked and confused. He also likely has dementia. Patient says that he lives by himself in [place] but is unable to explain why he went to the ER or why he is now in the hospital but say he had no choice. He has not been taking his medications. Pt had a CT scans done at OSH, results to be uploaded. He had a hospital admission in March 2025 for treatment of HIV and MAC, a punch biopsy of BLLE that was negative for malignancy, and an Ophthalmology evaluation. He has a history of non-compliance and missing several follow up appointments. Denies fevers, chills, sweats, SOB, N/V/D, dysuria, new rashes. Does have oral thrush.  He was seen ID team and started on  zithrmax, bactrim, ethambutol, rifampin, diflucan. Last CD 4 was 37. MRI brain and LP planned to be done under anesthesia. CSF and MRI brain was unremarkable. CD 4 this admit 21/7%, ,288.   D/W ID about patient elevated ALP. Suspect liver infiltrative disease from AIDS. Poor prognosis.   MRI brain unremarkable   CSF analysis so far negative   CM working on NH placement. He will need palliative/comfort care"     Interval Hx:   Patient is SITTING in bed, Informed that we are still waiting on financial information from his family.  Verbalized understanding   No family at bedside    Objective/physical exam:  General: In no acute distress, frail, chronically ill-looking  Chest: Clear to auscultation bilaterally anteriorly, on room air  Heart: RRR, +S1, S2, no appreciable murmur  Abdomen: Soft, nontender, BS " +  Musculoskeletal: Tato legs hyperpigmented, no edema  Neurologic: Cranial nerve II-XII intact, able to move all 4 extremities      VITAL SIGNS: 24 HRS MIN & MAX LAST   Temp  Min: 97.5 °F (36.4 °C)  Max: 98.7 °F (37.1 °C) 97.5 °F (36.4 °C)   BP  Min: 110/66  Max: 149/74 138/76   Pulse  Min: 62  Max: 78  63   Resp  Min: 14  Max: 18 16   SpO2  Min: 99 %  Max: 100 % 100 %     I have reviewed the following labs:  Recent Labs   Lab 07/02/25  0501 07/03/25  0501 07/04/25  0441   WBC 3.03* 4.16* 3.80*   RBC 2.88* 2.81* 2.68*   HGB 8.0* 7.8* 7.5*   HCT 25.0* 24.5* 23.1*   MCV 86.8 87.2 86.2   MCH 27.8 27.8 28.0   MCHC 32.0* 31.8* 32.5*   RDW 16.9 17.0 16.9    202 210   MPV 11.2* 11.7* 11.5*     Recent Labs   Lab 07/01/25  0434 07/02/25  0501 07/04/25  0441 07/05/25  0451   * 137 138 140   K 4.7 4.5 5.1 5.5*   * 112* 110* 112*   CO2 21* 21* 22* 23   BUN 23.9 25.9* 28.5* 31.0*   CREATININE 1.25 1.26* 1.58* 1.48*   GLU 73* 116* 75* 81*   CALCIUM 7.2* 7.5* 7.6* 7.6*   ALBUMIN 1.7* 1.7* 1.7*  --    PROT 7.0 7.1 7.6  --    ALKPHOS 2,154* 2,337* 2,208*  --    ALT 36 36 37  --    AST 75* 75* 66*  --    BILITOT 0.5 0.8 1.0  --      Microbiology Results (last 7 days)       Procedure Component Value Units Date/Time    Cerebrospinal Fluid Culture [[id number]] Collected: 06/27/25 1402    Order Status: Completed Specimen: CSF (Spinal Fluid) from Cerebrospinal Fluid Updated: 07/02/25 1003     CULTURE, CSF Final Report: At 5 days. No growth     GRAM STAIN No WBCs, No bacteria seen    Cryptococcal antigen, CSF [[id number]]  (Normal) Collected: 06/27/25 1402    Order Status: Completed Specimen: CSF (Spinal Fluid) from CSF Tap, Tube 1 Updated: 06/30/25 1138     Cryptococcal Antigen, CSF Negative    AFB Smear [[id number]] Collected: 06/27/25 1402    Order Status: Completed Specimen: CSF (Spinal Fluid) from Cerebrospinal Fluid Updated: 06/28/25 1346     AFB Smear No AFB seen (Direct smear only) - Concentration to follow              See below for Radiology    Assessment/Plan:  Acute metabolic encephalopathy - improved   HIV/AIDS - CD4 21/7%, , 288 with opportunistic infection   Oral thrush   H/O disseminated MAC, now with lung mass   H/O cryptococcal meningitis   Non complaint with HAART   Elevated ALP ? Infiltrative disease   TAYLOR- worsening  Anemic of chronic disease - acute on chronic  Mild thrombocytopenia - resolved  Iron deficiency anemia  Severe malnutrition  DNR status       Mood is stable, off of 1:1 sitters and  in the room  Participating well with therapy  ID has now signed off, recommended to continue ethambutol, azithromycin, rifampin for disseminated MAC for at least 1 year. Restarted ART on 7/2 and monitor for IRIS   Continue fluconazole 200 mg daily -renal dose  Continue Bactrim DS for PCP/PJP  Poor overall prognosis and high risk of mortality due to underlying infections, and medical non adherence. Upon discharge he can should follow up with [facility] ID clinic   His ALP was elevated. Dr Mccullough discusses with ID team --> suspect infiltrative disease   6/23/25- CT abdomen done showed normal liver, US RUQ-  liver was normal   Continue strict aspiration, fall and decubitus precautions    Received 1 unit PRBC transfusion on 7/1/25, monitor hemoglobin, continued to trickle down  HB today 7.5, transfuse if 7 or less than 7  Continue PO iron tabs   Check stool occult blood - requested again 7/5 to sent stool sample  Cont Ringer lactate at 75 cc/hours, Cr slightly imrpoved t 1.4  Potassium 5.5 despite receiving Lokelma x1 yesterday, ordered Lokelma 10 g p.o.BID x 2 doses  CM working with family to get financials for NH placement, sister informed  that patient's son Flakito may have.  Cm talk to patient's son who does not have the information but will find out and call back.   confirmed the patient's son and the plan is SNF to long-term care  Morning  CBC CMP Mag ordered    VTE prophylaxis: Lovenox      Patient condition:  Fair    Anticipated discharge and Disposition:   SNF to transition to NH once financial received.  Patient is medically cleared      All diagnosis and differential diagnosis have been reviewed; assessment and plan has been documented; I have personally reviewed the labs and test results that are presently available; I have reviewed the patients medication list; I have reviewed the consulting providers response and recommendations. I have reviewed or attempted to review medical records based upon their availability    All of the patient's questions have been  addressed and answered. Patient's is agreeable to the above stated plan. I will continue to monitor closely and make adjustments to medical management as needed.    Portions of this note dictated using EMR integrated voice recognition software, and may be subject to voice recognition errors not corrected at proofreading. Please contact writer for clarification if needed.   _____________________________________________________________________    Malnutrition Status:  Nutrition consulted. Most recent weight and BMI monitored-     Measurements:  Wt Readings from Last 1 Encounters:   06/21/25 71 kg (156 lb 8.4 oz)   Body mass index is 21.22 kg/m².    Patient has been screened and assessed by RD.    Malnutrition Type:  Context: chronic illness  Level: severe    Malnutrition Characteristic Summary:  Weight Loss (Malnutrition): other (see comments) (Does not meet criteria)  Subcutaneous Fat (Malnutrition): mild depletion  Muscle Mass (Malnutrition): moderate depletion    Interventions/Recommendations (treatment strategy):  Oral diet/nutrient modifications     Scheduled Med:   azithromycin  500 mg Oral Daily    [START ON 7/6/2025] azithromycin  500 mg Oral Daily    dolutegravir  50 mg Oral Daily    emtricitabine-tenofovir alafen  1 tablet Oral Daily    enoxparin  40 mg Subcutaneous Q24H (prophylaxis, 1700)    ethambutoL  800 mg Oral Daily     [START ON 7/6/2025] ethambutoL  800 mg Oral Daily    ferrous sulfate  1 tablet Oral Daily    fluconazole  200 mg Oral Daily    pantoprazole  40 mg Oral Daily    rifAMpin  300 mg Oral Daily    sodium bicarbonate  650 mg Oral Daily    sodium zirconium cyclosilicate  10 g Oral BID    sulfamethoxazole-trimethoprim 800-160mg  1 tablet Oral Daily      Continuous Infusions:   lactated ringers   Intravenous Continuous 75 mL/hr at 07/05/25 0548 New Bag at 07/05/25 0548        PRN Meds:  Current Facility-Administered Medications:     0.9%  NaCl infusion (for blood administration), , Intravenous, Q24H PRN    acetaminophen, 650 mg, Oral, Q6H PRN    dextrose 50%, 12.5 g, Intravenous, PRN    dextrose 50%, 25 g, Intravenous, PRN    glucagon (human recombinant), 1 mg, Intramuscular, PRN    glucose, 16 g, Oral, PRN    glucose, 24 g, Oral, PRN    naloxone, 0.02 mg, Intravenous, PRN    ondansetron, 4 mg, Intravenous, Q4H PRN    sodium chloride 0.9%, 10 mL, Intravenous, PRN     Radiology:  I have personally reviewed the following imaging and agree with the radiologist.     US Abdomen Limited  Narrative: EXAMINATION:  US ABDOMEN LIMITED    CLINICAL HISTORY:  Right upper quadrant pain.    TECHNIQUE:  Multiple real-time transverse and longitudinal sections were performed of the right abdomen by the sonographer. Select images were submitted for review.    COMPARISON:  CT abdomen pelvis June 23, 2025    FINDINGS:  Liver is of unremarkable echotexture with normal contour and size. There was no delineation of discrete hepatic cystic or solid mass. Hepatic maximum diameter is estimated to be 20.26 cm with ultrasound and appears to be overestimated.  On the previous CT abdomen maximum diameter of the liver in the midclavicular line is 16.2 cm which is within normal limits.  There was unremarkable hepatopedal flow within the portal vein.  Pancreas is obscured by overlying bowel gas.    Gallbladder lumen is without echogenicity indicative of  sludge or cholelithiasis. Gallbladder wall thickness is within normal limits. Common bile duct caliber of 2.0 mm is within normal limits for the age. Sonographer reported negative Gomes's sign.    Inferior vena cava is unremarkable. Visualized portion of the abdominal aorta is without aneurysmal dilatation.    Normally located right kidney length measures 12.2 x 4.3 x 5.9 cm. Right renal corticomedullary differentiation is unremarkable. No evidence of hydronephrosis.  Impression: No acute findings sonography identified.    Electronically signed by: Ezio Deleon  Date:    06/29/2025  Time:    20:00      Carlton Woo MD  Department of Hospital Medicine   [facility]   07/05/2025

## 2025-07-06 LAB
ALBUMIN SERPL-MCNC: 1.7 G/DL (ref 3.4–4.8)
ALBUMIN/GLOB SERPL: 0.3 RATIO (ref 1.1–2)
ALP SERPL-CCNC: 1943 UNIT/L (ref 40–150)
ALT SERPL-CCNC: 28 UNIT/L (ref 0–55)
ANION GAP SERPL CALC-SCNC: 4 MEQ/L
AST SERPL-CCNC: 42 UNIT/L (ref 11–45)
BILIRUB SERPL-MCNC: 0.9 MG/DL
BUN SERPL-MCNC: 25.9 MG/DL (ref 8.4–25.7)
CALCIUM SERPL-MCNC: 7.7 MG/DL (ref 8.8–10)
CHLORIDE SERPL-SCNC: 109 MMOL/L (ref 98–107)
CO2 SERPL-SCNC: 25 MMOL/L (ref 23–31)
CREAT SERPL-MCNC: 1.25 MG/DL (ref 0.72–1.25)
CREAT/UREA NIT SERPL: 21
ERYTHROCYTE [DISTWIDTH] IN BLOOD BY AUTOMATED COUNT: 16.7 % (ref 11.5–17)
GFR SERPLBLD CREATININE-BSD FMLA CKD-EPI: >60 ML/MIN/1.73/M2
GLOBULIN SER-MCNC: 5.7 GM/DL (ref 2.4–3.5)
GLUCOSE SERPL-MCNC: 73 MG/DL (ref 82–115)
HCT VFR BLD AUTO: 23.5 % (ref 42–52)
HGB BLD-MCNC: 7.2 G/DL (ref 14–18)
MAGNESIUM SERPL-MCNC: 1.8 MG/DL (ref 1.6–2.6)
MCH RBC QN AUTO: 27.2 PG (ref 27–31)
MCHC RBC AUTO-ENTMCNC: 30.6 G/DL (ref 33–36)
MCV RBC AUTO: 88.7 FL (ref 80–94)
NRBC BLD AUTO-RTO: 0 %
PLATELET # BLD AUTO: 240 X10(3)/MCL (ref 130–400)
PMV BLD AUTO: 10.8 FL (ref 7.4–10.4)
POTASSIUM SERPL-SCNC: 4.6 MMOL/L (ref 3.5–5.1)
PROT SERPL-MCNC: 7.4 GM/DL (ref 5.8–7.6)
RBC # BLD AUTO: 2.65 X10(6)/MCL (ref 4.7–6.1)
SODIUM SERPL-SCNC: 138 MMOL/L (ref 136–145)
WBC # BLD AUTO: 3.2 X10(3)/MCL (ref 4.5–11.5)

## 2025-07-06 PROCEDURE — 83735 ASSAY OF MAGNESIUM: CPT | Performed by: INTERNAL MEDICINE

## 2025-07-06 PROCEDURE — 25000003 PHARM REV CODE 250: Performed by: INTERNAL MEDICINE

## 2025-07-06 PROCEDURE — 63600175 PHARM REV CODE 636 W HCPCS: Performed by: INTERNAL MEDICINE

## 2025-07-06 PROCEDURE — 11000001 HC ACUTE MED/SURG PRIVATE ROOM

## 2025-07-06 PROCEDURE — 80053 COMPREHEN METABOLIC PANEL: CPT | Performed by: INTERNAL MEDICINE

## 2025-07-06 PROCEDURE — 85027 COMPLETE CBC AUTOMATED: CPT | Performed by: INTERNAL MEDICINE

## 2025-07-06 PROCEDURE — 36415 COLL VENOUS BLD VENIPUNCTURE: CPT | Performed by: INTERNAL MEDICINE

## 2025-07-06 PROCEDURE — 63700000 PHARM REV CODE 250 ALT 637 W/O HCPCS: Performed by: INTERNAL MEDICINE

## 2025-07-06 RX ADMIN — DOLUTEGRAVIR SODIUM 50 MG: 50 TABLET, FILM COATED ORAL at 09:07

## 2025-07-06 RX ADMIN — ETHAMBUTOL HYDROCHLORIDE 800 MG: 400 TABLET ORAL at 09:07

## 2025-07-06 RX ADMIN — AZITHROMYCIN DIHYDRATE 500 MG: 250 TABLET ORAL at 09:07

## 2025-07-06 RX ADMIN — SULFAMETHOXAZOLE AND TRIMETHOPRIM 1 TABLET: 800; 160 TABLET ORAL at 09:07

## 2025-07-06 RX ADMIN — EMTRICITABINE AND TENOFOVIR ALAFENAMIDE 1 TABLET: 200; 25 TABLET ORAL at 09:07

## 2025-07-06 RX ADMIN — SODIUM BICARBONATE 650 MG: 650 TABLET ORAL at 09:07

## 2025-07-06 RX ADMIN — PANTOPRAZOLE SODIUM 40 MG: 40 TABLET, DELAYED RELEASE ORAL at 09:07

## 2025-07-06 RX ADMIN — FLUCONAZOLE 200 MG: 200 TABLET ORAL at 09:07

## 2025-07-06 RX ADMIN — ENOXAPARIN SODIUM 40 MG: 40 INJECTION SUBCUTANEOUS at 05:07

## 2025-07-06 RX ADMIN — Medication 1 EACH: at 09:07

## 2025-07-06 RX ADMIN — RIFAMPIN 300 MG: 300 CAPSULE ORAL at 09:07

## 2025-07-06 RX ADMIN — SODIUM CHLORIDE, POTASSIUM CHLORIDE, SODIUM LACTATE AND CALCIUM CHLORIDE: 600; 310; 30; 20 INJECTION, SOLUTION INTRAVENOUS at 09:07

## 2025-07-06 NOTE — PLAN OF CARE
Problem: Adult Inpatient Plan of Care  Goal: Plan of Care Review  Outcome: Progressing  Goal: Patient-Specific Goal (Individualized)  Outcome: Progressing  Goal: Optimal Comfort and Wellbeing  Outcome: Progressing  Goal: Readiness for Transition of Care  Outcome: Progressing     Problem: Wound  Goal: Absence of Infection Signs and Symptoms  Outcome: Progressing  Goal: Improved Oral Intake  Outcome: Progressing  Goal: Skin Health and Integrity  Outcome: Progressing     Problem: Coping Ineffective  Goal: Effective Coping  Outcome: Progressing

## 2025-07-06 NOTE — PROGRESS NOTES
"[facility]  Hospital Medicine Progress Note        Chief Complaint: Inpatient Follow-up for AIDS, MAC infection     HPI per admitting team:  "Anne Terry is a 65 y.o. 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 The patient presented to [facility] on 6/21/2025 with a primary complaint of failure to thrive. Patient was brought to OSH ED found walking around naked and confused. He also likely has dementia. Patient says that he lives by himself in [place] but is unable to explain why he went to the ER or why he is now in the hospital but say he had no choice. He has not been taking his medications. Pt had a CT scans done at OSH, results to be uploaded. He had a hospital admission in March 2025 for treatment of HIV and MAC, a punch biopsy of BLLE that was negative for malignancy, and an Ophthalmology evaluation. He has a history of non-compliance and missing several follow up appointments. Denies fevers, chills, sweats, SOB, N/V/D, dysuria, new rashes. Does have oral thrush.  He was seen ID team and started on  zithrmax, bactrim, ethambutol, rifampin, diflucan. Last CD 4 was 37. MRI brain and LP planned to be done under anesthesia. CSF and MRI brain was unremarkable. CD 4 this admit 21/7%, ,288.   D/W ID about patient elevated ALP. Suspect liver infiltrative disease from AIDS. Poor prognosis.   MRI brain unremarkable   CSF analysis so far negative    working on NH placement. He will need palliative/comfort care"     Interval Hx:   Patient is sitting in bed, ate little bit of his breakfast.  Discussed with patient that he needs to take his medications as he refused Lokelma morning dose.  Patient just nodded and closed his eyes   No family at bedside  Case was discussed with patient's nurse on the floor    Objective/physical exam:  General: In no acute distress, frail, chronically ill-looking  Chest: Clear to " auscultation bilaterally anteriorly, on room air  Heart: RRR, +S1, S2, no appreciable murmur  Abdomen: Soft, nontender, BS +  Musculoskeletal: Tato legs hyperpigmented, no edema  Neurologic: Cranial nerve II-XII intact, able to move all 4 extremities      VITAL SIGNS: 24 HRS MIN & MAX LAST   Temp  Min: 97.6 °F (36.4 °C)  Max: 98.1 °F (36.7 °C) 98.1 °F (36.7 °C)   BP  Min: 123/76  Max: 151/75 123/76   Pulse  Min: 55  Max: 69  69   Resp  Min: 16  Max: 20 16   SpO2  Min: 98 %  Max: 100 % 99 %     I have reviewed the following labs:  Recent Labs   Lab 07/03/25 0501 07/04/25 0441 07/06/25 0439   WBC 4.16* 3.80* 3.20*   RBC 2.81* 2.68* 2.65*   HGB 7.8* 7.5* 7.2*   HCT 24.5* 23.1* 23.5*   MCV 87.2 86.2 88.7   MCH 27.8 28.0 27.2   MCHC 31.8* 32.5* 30.6*   RDW 17.0 16.9 16.7    210 240   MPV 11.7* 11.5* 10.8*     Recent Labs   Lab 07/02/25 0501 07/04/25 0441 07/05/25 0451 07/06/25  0439    138 140 138   K 4.5 5.1 5.5* 4.6   * 110* 112* 109*   CO2 21* 22* 23 25   BUN 25.9* 28.5* 31.0* 25.9*   CREATININE 1.26* 1.58* 1.48* 1.25   * 75* 81* 73*   CALCIUM 7.5* 7.6* 7.6* 7.7*   MG  --   --   --  1.80   ALBUMIN 1.7* 1.7*  --  1.7*   PROT 7.1 7.6  --  7.4   ALKPHOS 2,337* 2,208*  --  1,943*   ALT 36 37  --  28   AST 75* 66*  --  42   BILITOT 0.8 1.0  --  0.9     Microbiology Results (last 7 days)       Procedure Component Value Units Date/Time    Cerebrospinal Fluid Culture [[id number]] Collected: 06/27/25 1402    Order Status: Completed Specimen: CSF (Spinal Fluid) from Cerebrospinal Fluid Updated: 07/02/25 1003     CULTURE, CSF Final Report: At 5 days. No growth     GRAM STAIN No WBCs, No bacteria seen    Cryptococcal antigen, CSF [[id number]]  (Normal) Collected: 06/27/25 1402    Order Status: Completed Specimen: CSF (Spinal Fluid) from CSF Tap, Tube 1 Updated: 06/30/25 1138     Cryptococcal Antigen, CSF Negative             See below for Radiology    Assessment/Plan:  Acute metabolic encephalopathy  - improved   HIV/AIDS - CD4 21/7%, , 288 with opportunistic infection   Oral thrush   H/O disseminated MAC, now with lung mass   H/O cryptococcal meningitis   Non complaint with HAART   Elevated ALP ? Infiltrative disease   TAYLOR- worsening  Anemic of chronic disease - acute on chronic  Mild thrombocytopenia - resolved  Iron deficiency anemia  Severe malnutrition  DNR status       Mood is stable, off of 1:1 sitters  Participating well with therapy  ID has now signed off, recommended to continue ethambutol, azithromycin, rifampin for disseminated MAC for at least 1 year. Restarted ART on 7/2 and monitor for IRIS   Continue fluconazole 200 mg daily -renal dose  Continue Bactrim DS for PCP/PJP  Poor overall prognosis and high risk of mortality due to underlying infections, and medical non adherence. Upon discharge he can should follow up with [facility] ID clinic   His ALP was elevated. Dr Mccullough discusses with ID team --> suspect infiltrative disease --> now starting to slowly trend down  6/23/25- CT abdomen done showed normal liver, US RUQ-  liver was normal   Continue strict aspiration, fall and decubitus precautions    Received 1 unit PRBC transfusion on 7/1/25, monitor hemoglobin, continued to trickle down  HB today 7.2, transfuse if 7 or less than 7  Continue PO iron tabs   Check stool occult blood - requested again 7/5 to sent stool sample--> still no sample collected.  Reached out to patient's nurse to find out if patient is constipated or is it an overlook  Cont Ringer lactate at 75 cc/hours, Cr now normalized to 1.2  Potassium normalized to 4.6  CM working with family to get financials for NH placement, sister informed  that patient's son Flakito may have.  Cm talk to patient's son who does not have the information but will find out and call back.   confirmed the patient's son and the plan is SNF to long-term care  Morning  CBC CMP ordered    VTE prophylaxis: Lovenox     Patient condition:   Fair    Anticipated discharge and Disposition:   SNF to transition to NH once financial received.  Patient is medically cleared      All diagnosis and differential diagnosis have been reviewed; assessment and plan has been documented; I have personally reviewed the labs and test results that are presently available; I have reviewed the patients medication list; I have reviewed the consulting providers response and recommendations. I have reviewed or attempted to review medical records based upon their availability    All of the patient's questions have been  addressed and answered. Patient's is agreeable to the above stated plan. I will continue to monitor closely and make adjustments to medical management as needed.    Portions of this note dictated using EMR integrated voice recognition software, and may be subject to voice recognition errors not corrected at proofreading. Please contact writer for clarification if needed.   _____________________________________________________________________    Malnutrition Status:  Nutrition consulted. Most recent weight and BMI monitored-     Measurements:  Wt Readings from Last 1 Encounters:   06/21/25 71 kg (156 lb 8.4 oz)   Body mass index is 21.22 kg/m².    Patient has been screened and assessed by RD.    Malnutrition Type:  Context: chronic illness  Level: severe    Malnutrition Characteristic Summary:  Weight Loss (Malnutrition): other (see comments) (Does not meet criteria)  Subcutaneous Fat (Malnutrition): mild depletion  Muscle Mass (Malnutrition): moderate depletion    Interventions/Recommendations (treatment strategy):  Oral diet/nutrient modifications     Scheduled Med:   azithromycin  500 mg Oral Daily    dolutegravir  50 mg Oral Daily    emtricitabine-tenofovir alafen  1 tablet Oral Daily    enoxparin  40 mg Subcutaneous Q24H (prophylaxis, 1700)    ethambutoL  800 mg Oral Daily    ferrous sulfate  1 tablet Oral Daily    fluconazole  200 mg Oral Daily     pantoprazole  40 mg Oral Daily    rifAMpin  300 mg Oral Daily    sodium bicarbonate  650 mg Oral Daily    sulfamethoxazole-trimethoprim 800-160mg  1 tablet Oral Daily      Continuous Infusions:   lactated ringers   Intravenous Continuous 75 mL/hr at 07/06/25 0958 New Bag at 07/06/25 0958        PRN Meds:  Current Facility-Administered Medications:     0.9%  NaCl infusion (for blood administration), , Intravenous, Q24H PRN    acetaminophen, 650 mg, Oral, Q6H PRN    dextrose 50%, 12.5 g, Intravenous, PRN    dextrose 50%, 25 g, Intravenous, PRN    glucagon (human recombinant), 1 mg, Intramuscular, PRN    glucose, 16 g, Oral, PRN    glucose, 24 g, Oral, PRN    naloxone, 0.02 mg, Intravenous, PRN    ondansetron, 4 mg, Intravenous, Q4H PRN    sodium chloride 0.9%, 10 mL, Intravenous, PRN     Radiology:  I have personally reviewed the following imaging and agree with the radiologist.     US Abdomen Limited  Narrative: EXAMINATION:  US ABDOMEN LIMITED    CLINICAL HISTORY:  Right upper quadrant pain.    TECHNIQUE:  Multiple real-time transverse and longitudinal sections were performed of the right abdomen by the sonographer. Select images were submitted for review.    COMPARISON:  CT abdomen pelvis June 23, 2025    FINDINGS:  Liver is of unremarkable echotexture with normal contour and size. There was no delineation of discrete hepatic cystic or solid mass. Hepatic maximum diameter is estimated to be 20.26 cm with ultrasound and appears to be overestimated.  On the previous CT abdomen maximum diameter of the liver in the midclavicular line is 16.2 cm which is within normal limits.  There was unremarkable hepatopedal flow within the portal vein.  Pancreas is obscured by overlying bowel gas.    Gallbladder lumen is without echogenicity indicative of sludge or cholelithiasis. Gallbladder wall thickness is within normal limits. Common bile duct caliber of 2.0 mm is within normal limits for the age. Sonographer reported negative  Gomes's sign.    Inferior vena cava is unremarkable. Visualized portion of the abdominal aorta is without aneurysmal dilatation.    Normally located right kidney length measures 12.2 x 4.3 x 5.9 cm. Right renal corticomedullary differentiation is unremarkable. No evidence of hydronephrosis.  Impression: No acute findings sonography identified.    Electronically signed by: zEio Deleon  Date:    06/29/2025  Time:    20:00      Carlton Woo MD  Department of Hospital Medicine   [facility]   07/06/2025

## 2025-07-06 NOTE — PLAN OF CARE
Problem: Adult Inpatient Plan of Care  Goal: Plan of Care Review  Outcome: Progressing  Goal: Patient-Specific Goal (Individualized)  Outcome: Progressing  Goal: Absence of Hospital-Acquired Illness or Injury  Outcome: Progressing  Goal: Optimal Comfort and Wellbeing  Outcome: Progressing  Goal: Readiness for Transition of Care  Outcome: Progressing     Problem: Wound  Goal: Optimal Coping  Outcome: Progressing  Goal: Optimal Functional Ability  Outcome: Progressing  Goal: Absence of Infection Signs and Symptoms  Outcome: Progressing  Goal: Improved Oral Intake  Outcome: Progressing  Goal: Optimal Pain Control and Function  Outcome: Progressing  Goal: Skin Health and Integrity  Outcome: Progressing     Problem: Coping Ineffective  Goal: Effective Coping  Outcome: Progressing     Problem: Skin Injury Risk Increased  Goal: Skin Health and Integrity  Outcome: Progressing

## 2025-07-07 LAB
ALBUMIN SERPL-MCNC: 1.6 G/DL (ref 3.4–4.8)
ALBUMIN/GLOB SERPL: 0.3 RATIO (ref 1.1–2)
ALP SERPL-CCNC: 1943 UNIT/L (ref 40–150)
ALT SERPL-CCNC: 25 UNIT/L (ref 0–55)
ANION GAP SERPL CALC-SCNC: 6 MEQ/L
AST SERPL-CCNC: 36 UNIT/L (ref 11–45)
BILIRUB SERPL-MCNC: 0.8 MG/DL
BUN SERPL-MCNC: 25.5 MG/DL (ref 8.4–25.7)
CALCIUM SERPL-MCNC: 7.6 MG/DL (ref 8.8–10)
CHLORIDE SERPL-SCNC: 109 MMOL/L (ref 98–107)
CO2 SERPL-SCNC: 22 MMOL/L (ref 23–31)
COLOR STL: NORMAL
CONSISTENCY STL: NORMAL
CREAT SERPL-MCNC: 1.24 MG/DL (ref 0.72–1.25)
CREAT/UREA NIT SERPL: 21
ERYTHROCYTE [DISTWIDTH] IN BLOOD BY AUTOMATED COUNT: 16.6 % (ref 11.5–17)
GFR SERPLBLD CREATININE-BSD FMLA CKD-EPI: >60 ML/MIN/1.73/M2
GLOBULIN SER-MCNC: 5.8 GM/DL (ref 2.4–3.5)
GLUCOSE SERPL-MCNC: 68 MG/DL (ref 82–115)
HCT VFR BLD AUTO: 22.9 % (ref 42–52)
HEMOCCULT SP1 STL QL: NEGATIVE
HGB BLD-MCNC: 7.3 G/DL (ref 14–18)
MCH RBC QN AUTO: 27.8 PG (ref 27–31)
MCHC RBC AUTO-ENTMCNC: 31.9 G/DL (ref 33–36)
MCV RBC AUTO: 87.1 FL (ref 80–94)
NRBC BLD AUTO-RTO: 0 %
PLATELET # BLD AUTO: 253 X10(3)/MCL (ref 130–400)
PMV BLD AUTO: 10.7 FL (ref 7.4–10.4)
POTASSIUM SERPL-SCNC: 4.6 MMOL/L (ref 3.5–5.1)
PROT SERPL-MCNC: 7.4 GM/DL (ref 5.8–7.6)
RBC # BLD AUTO: 2.63 X10(6)/MCL (ref 4.7–6.1)
SODIUM SERPL-SCNC: 137 MMOL/L (ref 136–145)
WBC # BLD AUTO: 3.07 X10(3)/MCL (ref 4.5–11.5)

## 2025-07-07 PROCEDURE — 25000003 PHARM REV CODE 250: Performed by: INTERNAL MEDICINE

## 2025-07-07 PROCEDURE — 36415 COLL VENOUS BLD VENIPUNCTURE: CPT | Performed by: INTERNAL MEDICINE

## 2025-07-07 PROCEDURE — 63600175 PHARM REV CODE 636 W HCPCS: Performed by: INTERNAL MEDICINE

## 2025-07-07 PROCEDURE — 11000001 HC ACUTE MED/SURG PRIVATE ROOM

## 2025-07-07 PROCEDURE — 85027 COMPLETE CBC AUTOMATED: CPT | Performed by: INTERNAL MEDICINE

## 2025-07-07 PROCEDURE — 82272 OCCULT BLD FECES 1-3 TESTS: CPT | Performed by: INTERNAL MEDICINE

## 2025-07-07 PROCEDURE — 80053 COMPREHEN METABOLIC PANEL: CPT | Performed by: INTERNAL MEDICINE

## 2025-07-07 PROCEDURE — 63700000 PHARM REV CODE 250 ALT 637 W/O HCPCS: Performed by: INTERNAL MEDICINE

## 2025-07-07 RX ADMIN — SODIUM CHLORIDE, POTASSIUM CHLORIDE, SODIUM LACTATE AND CALCIUM CHLORIDE: 600; 310; 30; 20 INJECTION, SOLUTION INTRAVENOUS at 02:07

## 2025-07-07 NOTE — PLAN OF CARE
[facility] notified pt's sister Shelbi of pt's transfer to [facility].

## 2025-07-07 NOTE — PROGRESS NOTES
"[facility]  Hospital Medicine Progress Note        Chief Complaint: Inpatient Follow-up for AIDS, MAC infection     HPI per admitting team:  "Anne Terry is a 65 y.o. 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 The patient presented to [facility] on 6/21/2025 with a primary complaint of failure to thrive. Patient was brought to OSH ED found walking around naked and confused. He also likely has dementia. Patient says that he lives by himself in [place] but is unable to explain why he went to the ER or why he is now in the hospital but say he had no choice. He has not been taking his medications. Pt had a CT scans done at OSH, results to be uploaded. He had a hospital admission in March 2025 for treatment of HIV and MAC, a punch biopsy of BLLE that was negative for malignancy, and an Ophthalmology evaluation. He has a history of non-compliance and missing several follow up appointments. Denies fevers, chills, sweats, SOB, N/V/D, dysuria, new rashes. Does have oral thrush.  He was seen ID team and started on  zithrmax, bactrim, ethambutol, rifampin, diflucan. Last CD 4 was 37. MRI brain and LP planned to be done under anesthesia. CSF and MRI brain was unremarkable. CD 4 this admit 21/7%, ,288.   D/W ID about patient elevated ALP. Suspect liver infiltrative disease from AIDS. Poor prognosis.   MRI brain unremarkable   CSF analysis so far negative   CM working on NH placement. He will need palliative/comfort care"     Interval Hx:   Patient is sitting in bed, no complaints.  No family at bedside  Case was discussed with patient's nurse and  on the floor    Objective/physical exam:  General: In no acute distress, frail, chronically ill-looking  Chest: Clear to auscultation bilaterally anteriorly, on room air  Heart: RRR, +S1, S2, no appreciable murmur  Abdomen: Soft, nontender, BS +  Musculoskeletal: " Tato legs hyperpigmented, no edema  Neurologic: Cranial nerve II-XII intact, able to move all 4 extremities      VITAL SIGNS: 24 HRS MIN & MAX LAST   Temp  Min: 97.6 °F (36.4 °C)  Max: 99.3 °F (37.4 °C)  (pt refuse vitals)   BP  Min: 140/81  Max: 147/84 (!) 147/84   Pulse  Min: 65  Max: 75  72   Resp  Min: 14  Max: 18 18   SpO2  Min: 97 %  Max: 100 % 100 %     I have reviewed the following labs:  Recent Labs   Lab 07/04/25 0441 07/06/25 0439 07/07/25 0449   WBC 3.80* 3.20* 3.07*   RBC 2.68* 2.65* 2.63*   HGB 7.5* 7.2* 7.3*   HCT 23.1* 23.5* 22.9*   MCV 86.2 88.7 87.1   MCH 28.0 27.2 27.8   MCHC 32.5* 30.6* 31.9*   RDW 16.9 16.7 16.6    240 253   MPV 11.5* 10.8* 10.7*     Recent Labs   Lab 07/04/25 0441 07/05/25 0451 07/06/25 0439 07/07/25 0449    140 138 137   K 5.1 5.5* 4.6 4.6   * 112* 109* 109*   CO2 22* 23 25 22*   BUN 28.5* 31.0* 25.9* 25.5   CREATININE 1.58* 1.48* 1.25 1.24   GLU 75* 81* 73* 68*   CALCIUM 7.6* 7.6* 7.7* 7.6*   MG  --   --  1.80  --    ALBUMIN 1.7*  --  1.7* 1.6*   PROT 7.6  --  7.4 7.4   ALKPHOS 2,208*  --  1,943* 1,943*   ALT 37  --  28 25   AST 66*  --  42 36   BILITOT 1.0  --  0.9 0.8     Microbiology Results (last 7 days)       Procedure Component Value Units Date/Time    Cerebrospinal Fluid Culture [[id number]] Collected: 06/27/25 1402    Order Status: Completed Specimen: CSF (Spinal Fluid) from Cerebrospinal Fluid Updated: 07/02/25 1003     CULTURE, CSF Final Report: At 5 days. No growth     GRAM STAIN No WBCs, No bacteria seen             See below for Radiology    Assessment/Plan:  Acute metabolic encephalopathy - improved   HIV/AIDS - CD4 21/7%, , 288 with opportunistic infection   Oral thrush   H/O disseminated MAC, now with lung mass   H/O cryptococcal meningitis   Non complaint with HAART   Elevated ALP ? Infiltrative disease   TAYLOR- worsening  Anemic of chronic disease - acute on chronic  Mild thrombocytopenia - resolved  Iron deficiency  anemia  Severe malnutrition  DNR status       Mood is stable, off of 1:1 sitters  ID has now signed off, recommended to continue ethambutol, azithromycin, rifampin for disseminated MAC for at least 1 year. Restarted ART on 7/2  Continue fluconazole 200 mg daily -renal dose  Continue Bactrim DS for PCP/PJP  Poor overall prognosis and high risk of mortality due to underlying infections, and medical non adherence. Upon discharge he can should follow up with [facility] ID clinic   His ALP was elevated. Dr Mccullough discusses with ID team --> suspect infiltrative disease --> now starting to slowly trend down  6/23/25- CT abdomen done showed normal liver, US RUQ-  liver was normal   Continue strict aspiration, fall and decubitus precautions    Received 1 unit PRBC transfusion on 7/1/25, monitor hemoglobin, continued to trickle down  HB today 7.3, transfuse if 7 or less than 7  Continue PO iron tabs   Check stool occult blood - pending  Cr now normalized to 1.2--discontinue IV fluids  Potassium normalized to 4.6  CM informed me that patient is accepted to [facility] and will be transferred tomorrow.    Repeat labs Wednesday or as indicated    VTE prophylaxis: Lovenox     Patient condition:  Fair    Anticipated discharge and Disposition:   [facility] tomorrow,  Patient is medically cleared      All diagnosis and differential diagnosis have been reviewed; assessment and plan has been documented; I have personally reviewed the labs and test results that are presently available; I have reviewed the patients medication list; I have reviewed the consulting providers response and recommendations. I have reviewed or attempted to review medical records based upon their availability    All of the patient's questions have been  addressed and answered. Patient's is agreeable to the above stated plan. I will continue to monitor closely and make adjustments to medical management as needed.    Portions of this note dictated using EMR integrated voice recognition  software, and may be subject to voice recognition errors not corrected at proofreading. Please contact writer for clarification if needed.   _____________________________________________________________________    Malnutrition Status:  Nutrition consulted. Most recent weight and BMI monitored-     Measurements:  Wt Readings from Last 1 Encounters:   06/21/25 71 kg (156 lb 8.4 oz)   Body mass index is 21.22 kg/m².    Patient has been screened and assessed by RD.    Malnutrition Type:  Context: chronic illness  Level: severe    Malnutrition Characteristic Summary:  Weight Loss (Malnutrition): other (see comments) (Does not meet criteria)  Subcutaneous Fat (Malnutrition): mild depletion  Muscle Mass (Malnutrition): moderate depletion    Interventions/Recommendations (treatment strategy):  Oral diet/nutrient modifications     Scheduled Med:   azithromycin  500 mg Oral Daily    dolutegravir  50 mg Oral Daily    emtricitabine-tenofovir alafen  1 tablet Oral Daily    enoxparin  40 mg Subcutaneous Q24H (prophylaxis, 1700)    ethambutoL  800 mg Oral Daily    ferrous sulfate  1 tablet Oral Daily    fluconazole  200 mg Oral Daily    pantoprazole  40 mg Oral Daily    rifAMpin  300 mg Oral Daily    sodium bicarbonate  650 mg Oral Daily    sulfamethoxazole-trimethoprim 800-160mg  1 tablet Oral Daily      Continuous Infusions:         PRN Meds:  Current Facility-Administered Medications:     0.9%  NaCl infusion (for blood administration), , Intravenous, Q24H PRN    acetaminophen, 650 mg, Oral, Q6H PRN    dextrose 50%, 12.5 g, Intravenous, PRN    dextrose 50%, 25 g, Intravenous, PRN    glucagon (human recombinant), 1 mg, Intramuscular, PRN    glucose, 16 g, Oral, PRN    glucose, 24 g, Oral, PRN    naloxone, 0.02 mg, Intravenous, PRN    ondansetron, 4 mg, Intravenous, Q4H PRN    sodium chloride 0.9%, 10 mL, Intravenous, PRN     Radiology:  I have personally reviewed the following imaging and agree with the radiologist.     US  Abdomen Limited  Narrative: EXAMINATION:  US ABDOMEN LIMITED    CLINICAL HISTORY:  Right upper quadrant pain.    TECHNIQUE:  Multiple real-time transverse and longitudinal sections were performed of the right abdomen by the sonographer. Select images were submitted for review.    COMPARISON:  CT abdomen pelvis June 23, 2025    FINDINGS:  Liver is of unremarkable echotexture with normal contour and size. There was no delineation of discrete hepatic cystic or solid mass. Hepatic maximum diameter is estimated to be 20.26 cm with ultrasound and appears to be overestimated.  On the previous CT abdomen maximum diameter of the liver in the midclavicular line is 16.2 cm which is within normal limits.  There was unremarkable hepatopedal flow within the portal vein.  Pancreas is obscured by overlying bowel gas.    Gallbladder lumen is without echogenicity indicative of sludge or cholelithiasis. Gallbladder wall thickness is within normal limits. Common bile duct caliber of 2.0 mm is within normal limits for the age. Sonographer reported negative Gomes's sign.    Inferior vena cava is unremarkable. Visualized portion of the abdominal aorta is without aneurysmal dilatation.    Normally located right kidney length measures 12.2 x 4.3 x 5.9 cm. Right renal corticomedullary differentiation is unremarkable. No evidence of hydronephrosis.  Impression: No acute findings sonography identified.    Electronically signed by: Ezio Deleon  Date:    06/29/2025  Time:    20:00      Carlton Woo MD  Department of Hospital Medicine   [facility]   07/07/2025

## 2025-07-07 NOTE — PT/OT/SLP PROGRESS
Attempted A.M session however pt. Declining despite max encouragement, follow up as schedule permits.

## 2025-07-07 NOTE — PT/OT/SLP PROGRESS
Physical Therapy      Patient Name:  Anne Terry   MRN:  [id number]    Attempted tx from 0922-0932, pt on toilet having BM requiring increased time. Will follow-up if schedule allows.

## 2025-07-07 NOTE — PROGRESS NOTES
Inpatient Nutrition Assessment    Admit Date: 6/21/2025   Total duration of encounter: 16 days   Patient Age: 65 y.o.    Nutrition Recommendation/Prescription     Continue oral diet as tolerated; Diet Adult Regular Standard Tray   Will continue Boost Plus once a day (360 kcal, 14 gm protein per container)  May benefit from an appetite stimulant if medically appropriate.     Communication of Recommendations: reviewed with patient    Nutrition Assessment     Malnutrition Assessment/Nutrition-Focused Physical Exam    Malnutrition Context: chronic illness (06/27/25 1509)  Malnutrition Level: severe (06/27/25 1509)     Weight Loss (Malnutrition): other (see comments) (Does not meet criteria) (06/27/25 1509)  Subcutaneous Fat (Malnutrition): mild depletion (06/27/25 1509)  Orbital Region (Subcutaneous Fat Loss): mild depletion        Muscle Mass (Malnutrition): moderate depletion (06/27/25 1509)  [place] Region (Muscle Loss): moderate depletion  Clavicle Bone Region (Muscle Loss): moderate depletion  Clavicle and Acromion Bone Region (Muscle Loss): moderate depletion                          A minimum of two characteristics is recommended for diagnosis of either severe or non-severe malnutrition.    Chart Review    Reason Seen: malnutrition screening tool (MST)    Malnutrition Screening Tool Results   Have you recently lost weight without trying?: Unsure  Have you been eating poorly because of a decreased appetite?: No   MST Score: 2   Diagnosis:  Advanced HIV  MAC Infection  Lung Mass  Oral Candidiasis  Elevated Alk Phos 800s  Likely dementia    Relevant Medical History: HIV/AIDs, MAC, history of disseminated histoplasmosis, PJP, and cryptococcous     Scheduled Medications:  azithromycin, 500 mg, Daily  dolutegravir, 50 mg, Daily  emtricitabine-tenofovir alafen, 1 tablet, Daily  enoxparin, 40 mg, Q24H (prophylaxis, 1700)  ethambutoL, 800 mg, Daily  ferrous sulfate, 1 tablet, Daily  fluconazole, 200 mg,  Daily  pantoprazole, 40 mg, Daily  rifAMpin, 300 mg, Daily  sodium bicarbonate, 650 mg, Daily  sulfamethoxazole-trimethoprim 800-160mg, 1 tablet, Daily    Continuous Infusions:     PRN Medications:  0.9%  NaCl infusion (for blood administration), , Q24H PRN  acetaminophen, 650 mg, Q6H PRN  dextrose 50%, 12.5 g, PRN  dextrose 50%, 25 g, PRN  glucagon (human recombinant), 1 mg, PRN  glucose, 16 g, PRN  glucose, 24 g, PRN  naloxone, 0.02 mg, PRN  ondansetron, 4 mg, Q4H PRN  sodium chloride 0.9%, 10 mL, PRN    Calorie Containing IV Medications: no significant kcals from medications at this time    Recent Labs   Lab 07/01/25  0434 07/02/25  0501 07/03/25  0501 07/04/25  0441 07/05/25  0451 07/06/25  0439 07/07/25  0449   * 137  --  138 140 138 137   K 4.7 4.5  --  5.1 5.5* 4.6 4.6   CALCIUM 7.2* 7.5*  --  7.6* 7.6* 7.7* 7.6*   MG  --   --   --   --   --  1.80  --    * 112*  --  110* 112* 109* 109*   CO2 21* 21*  --  22* 23 25 22*   BUN 23.9 25.9*  --  28.5* 31.0* 25.9* 25.5   CREATININE 1.25 1.26*  --  1.58* 1.48* 1.25 1.24   EGFRNORACEVR >60 >60  --  49 53 >60 >60   GLU 73* 116*  --  75* 81* 73* 68*   BILITOT 0.5 0.8  --  1.0  --  0.9 0.8   ALKPHOS 2,154* 2,337*  --  2,208*  --  1,943* 1,943*   ALT 36 36  --  37  --  28 25   AST 75* 75*  --  66*  --  42 36   ALBUMIN 1.7* 1.7*  --  1.7*  --  1.7* 1.6*   WBC 2.95* 3.03* 4.16* 3.80*  --  3.20* 3.07*   HGB 6.9* 8.0* 7.8* 7.5*  --  7.2* 7.3*   HCT 22.2* 25.0* 24.5* 23.1*  --  23.5* 22.9*     Nutrition Orders:  Diet Adult Regular Standard Tray  Dietary nutrition supplements Daily; Boost Original Nutritional Drink - Any flavor    Appetite/Oral Intake: fair/25-50% of meals  Factors Affecting Nutritional Intake: socio-economic  Social Needs Impacting Access to Food: reports lack of access to food / food insecure - referred to case management  Food/Sabianist/Cultural Preferences: unable to obtain  Food Allergies: no known food allergies  Last Bowel Movement:  "07/07/25  Wound(s):  none noted    Comments    6/24/25 Pt eating lunch, difficulty obtaining hx from pt; reports no weight loss (appears stable in EMR since feb); declined oral supplements. Visible signs of fat and muscle loss present. Unsure if pt has adequate access to nutrition at home.    6/27/25 Pt out of room for procedure; nursing noted 25% intake of supper last night. Will f/u early.    6/30/25 Pt eating about 25-50% of meals per nursing, but reports he is eating well.    7/3/25 Pt sleeping in chair, nurse reports fair intake, eats more at some meals than others; drinking the boost sometimes so will continue to send.     7/7/25 Pt ate 75% of breakfast this morning and 25% of lunch; fluctuating intake of meals continues.    Anthropometrics    Height: 6' 0.01" (182.9 cm),    Last Weight: 71 kg (156 lb 8.4 oz) (06/21/25 1220), Weight Method: Bed Scale  BMI (Calculated): 21.2  BMI Classification: normal (BMI 18.5-24.9)        Ideal Body Weight (IBW), Male: 178.06 lb     % Ideal Body Weight, Male (lb): 87.94 %                          Usual Weight Provided By: patient denies unintentional weight loss    Wt Readings from Last 5 Encounters:   06/21/25 71 kg (156 lb 8.4 oz)   02/28/25 70.3 kg (155 lb)   02/27/25 71.8 kg (158 lb 4.6 oz)   01/07/25 73.5 kg (161 lb 14.9 oz)   11/20/24 58 kg (127 lb 13.9 oz)     Weight Change(s) Since Admission:   Wt Readings from Last 1 Encounters:   06/21/25 1220 71 kg (156 lb 8.4 oz)   Admit Weight: 71 kg (156 lb 8.4 oz) (06/21/25 1220), Weight Method: Bed Scale    Estimated Needs    Weight Used For Calorie Calculations: 71 kg (156 lb 8.4 oz)  Energy Calorie Requirements (kcal): 1690-2370 kcal (30-35 kcal/kg)  Energy Need Method: Kcal/kg  Weight Used For Protein Calculations: 71 kg (156 lb 8.4 oz)  Protein Requirements: 106-120gm (1.5-1.7 gm/kg)  Fluid Requirements (mL): 2130 ml (30ml/kg)        Enteral Nutrition     Patient not receiving enteral nutrition at this time.    Parenteral " Nutrition     Patient not receiving parenteral nutrition support at this time.    Evaluation of Received Nutrient Intake    Calories: not meeting estimated needs  Protein: not meeting estimated needs    Patient Education     Not applicable.    Nutrition Diagnosis         PES: Severe chronic disease or condition related malnutrition Related to chronic condition As Evidenced by:  - muscle mass depletion: 6 areas of moderate muscle loss (Trapezius, Deltoid, Pectoralis, Acromion, Temporalis, Clavicle) - loss of subcutaneous fat: 2 areas of mild fat loss (Buccal, Infraorbital) active    Nutrition Interventions     Intervention(s): commercial beverage and collaboration with other providers  Intervention(s): Oral diet/nutrient modifications    Goal: Consume % of meals/snacks by follow-up. (goal not met)  Goal: Maintain weight throughout hospitalization. (goal progressing)    Nutrition Goals & Monitoring     Dietitian will monitor: food and beverage intake and weight change  Discharge planning: continue regular diet and provided referral to  for food access needs  Nutrition Risk/Follow-Up: patient at increased nutrition risk; dietitian will follow-up twice weekly   Please consult if re-assessment needed sooner.

## 2025-07-08 ENCOUNTER — HOSPITAL ENCOUNTER (INPATIENT)
Facility: HOSPITAL | Age: 65
LOS: 6 days | Discharge: HOSPICE | DRG: 974 | End: 2025-07-15
Attending: FAMILY MEDICINE | Admitting: FAMILY MEDICINE
Payer: MEDICARE

## 2025-07-08 VITALS
TEMPERATURE: 99 F | RESPIRATION RATE: 19 BRPM | HEIGHT: 72 IN | WEIGHT: 156.5 LBS | HEART RATE: 87 BPM | DIASTOLIC BLOOD PRESSURE: 82 MMHG | BODY MASS INDEX: 21.2 KG/M2 | SYSTOLIC BLOOD PRESSURE: 139 MMHG | OXYGEN SATURATION: 100 %

## 2025-07-08 DIAGNOSIS — R00.0 TACHYCARDIA: ICD-10-CM

## 2025-07-08 DIAGNOSIS — B20 CURRENTLY ASYMPTOMATIC HIV INFECTION, WITH HISTORY OF HIV-RELATED ILLNESS: ICD-10-CM

## 2025-07-08 DIAGNOSIS — B20 HIV ENCEPHALOPATHY: ICD-10-CM

## 2025-07-08 DIAGNOSIS — Z21 HIV (HUMAN IMMUNODEFICIENCY VIRUS INFECTION): ICD-10-CM

## 2025-07-08 DIAGNOSIS — R00.0 SINUS TACHYCARDIA: ICD-10-CM

## 2025-07-08 DIAGNOSIS — E44.0 MODERATE MALNUTRITION: ICD-10-CM

## 2025-07-08 DIAGNOSIS — B99.9 OPPORTUNISTIC INFECTION: ICD-10-CM

## 2025-07-08 DIAGNOSIS — R62.7 FAILURE TO THRIVE IN ADULT: ICD-10-CM

## 2025-07-08 DIAGNOSIS — E43 SEVERE MALNUTRITION: ICD-10-CM

## 2025-07-08 DIAGNOSIS — B20 SYMPTOMATIC HIV INFECTION: ICD-10-CM

## 2025-07-08 DIAGNOSIS — R74.8 ELEVATED ALKALINE PHOSPHATASE LEVEL: ICD-10-CM

## 2025-07-08 DIAGNOSIS — E16.2 HYPOGLYCEMIA: ICD-10-CM

## 2025-07-08 DIAGNOSIS — G93.49 HIV ENCEPHALOPATHY: ICD-10-CM

## 2025-07-08 DIAGNOSIS — A31.0 MYCOBACTERIUM AVIUM-INTRACELLULARE COMPLEX: ICD-10-CM

## 2025-07-08 DIAGNOSIS — B20 AIDS: ICD-10-CM

## 2025-07-08 DIAGNOSIS — D63.8 ANEMIA, CHRONIC DISEASE: ICD-10-CM

## 2025-07-08 DIAGNOSIS — N17.9 AKI (ACUTE KIDNEY INJURY): Primary | ICD-10-CM

## 2025-07-08 DIAGNOSIS — D50.9 IRON DEFICIENCY ANEMIA, UNSPECIFIED IRON DEFICIENCY ANEMIA TYPE: ICD-10-CM

## 2025-07-08 DIAGNOSIS — B59: ICD-10-CM

## 2025-07-08 DIAGNOSIS — R41.89 MODERATE COGNITIVE IMPAIRMENT: ICD-10-CM

## 2025-07-08 LAB
ALBUMIN SERPL-MCNC: 1.8 G/DL (ref 3.4–4.8)
ALBUMIN/GLOB SERPL: 0.3 RATIO (ref 1.1–2)
ALP SERPL-CCNC: 1939 UNIT/L (ref 40–150)
ALT SERPL-CCNC: 26 UNIT/L (ref 0–55)
ANION GAP SERPL CALC-SCNC: 7 MEQ/L
AST SERPL-CCNC: 39 UNIT/L (ref 11–45)
BASOPHILS # BLD AUTO: 0.02 X10(3)/MCL
BASOPHILS NFR BLD AUTO: 0.6 %
BILIRUB SERPL-MCNC: 0.7 MG/DL
BUN SERPL-MCNC: 26.9 MG/DL (ref 8.4–25.7)
CALCIUM SERPL-MCNC: 7.7 MG/DL (ref 8.8–10)
CHLORIDE SERPL-SCNC: 111 MMOL/L (ref 98–107)
CO2 SERPL-SCNC: 23 MMOL/L (ref 23–31)
CREAT SERPL-MCNC: 1.22 MG/DL (ref 0.72–1.25)
CREAT/UREA NIT SERPL: 22
EOSINOPHIL # BLD AUTO: 0.07 X10(3)/MCL (ref 0–0.9)
EOSINOPHIL NFR BLD AUTO: 2.1 %
ERYTHROCYTE [DISTWIDTH] IN BLOOD BY AUTOMATED COUNT: 16.2 % (ref 11.5–17)
GFR SERPLBLD CREATININE-BSD FMLA CKD-EPI: >60 ML/MIN/1.73/M2
GLOBULIN SER-MCNC: 6.3 GM/DL (ref 2.4–3.5)
GLUCOSE SERPL-MCNC: 82 MG/DL (ref 82–115)
HCT VFR BLD AUTO: 24.6 % (ref 42–52)
HGB BLD-MCNC: 7.6 G/DL (ref 14–18)
IMM GRANULOCYTES # BLD AUTO: 0.05 X10(3)/MCL (ref 0–0.04)
IMM GRANULOCYTES NFR BLD AUTO: 1.5 %
LYMPHOCYTES # BLD AUTO: 0.38 X10(3)/MCL (ref 0.6–4.6)
LYMPHOCYTES NFR BLD AUTO: 11.1 %
MCH RBC QN AUTO: 27.7 PG (ref 27–31)
MCHC RBC AUTO-ENTMCNC: 30.9 G/DL (ref 33–36)
MCV RBC AUTO: 89.8 FL (ref 80–94)
MONOCYTES # BLD AUTO: 0.3 X10(3)/MCL (ref 0.1–1.3)
MONOCYTES NFR BLD AUTO: 8.8 %
NEUTROPHILS # BLD AUTO: 2.59 X10(3)/MCL (ref 2.1–9.2)
NEUTROPHILS NFR BLD AUTO: 75.9 %
NRBC BLD AUTO-RTO: 0 %
PLATELET # BLD AUTO: 299 X10(3)/MCL (ref 130–400)
PMV BLD AUTO: 10.3 FL (ref 7.4–10.4)
POTASSIUM SERPL-SCNC: 4.5 MMOL/L (ref 3.5–5.1)
PROT SERPL-MCNC: 8.1 GM/DL (ref 5.8–7.6)
RBC # BLD AUTO: 2.74 X10(6)/MCL (ref 4.7–6.1)
SODIUM SERPL-SCNC: 141 MMOL/L (ref 136–145)
WBC # BLD AUTO: 3.41 X10(3)/MCL (ref 4.5–11.5)

## 2025-07-08 PROCEDURE — 25000003 PHARM REV CODE 250: Performed by: INTERNAL MEDICINE

## 2025-07-08 PROCEDURE — 11000001 HC ACUTE MED/SURG PRIVATE ROOM

## 2025-07-08 PROCEDURE — 85025 COMPLETE CBC W/AUTO DIFF WBC: CPT | Performed by: FAMILY MEDICINE

## 2025-07-08 PROCEDURE — 63700000 PHARM REV CODE 250 ALT 637 W/O HCPCS: Performed by: INTERNAL MEDICINE

## 2025-07-08 PROCEDURE — 36415 COLL VENOUS BLD VENIPUNCTURE: CPT | Performed by: FAMILY MEDICINE

## 2025-07-08 PROCEDURE — 63600175 PHARM REV CODE 636 W HCPCS: Performed by: INTERNAL MEDICINE

## 2025-07-08 PROCEDURE — 80053 COMPREHEN METABOLIC PANEL: CPT | Performed by: FAMILY MEDICINE

## 2025-07-08 RX ORDER — GLUCAGON 1 MG
1 KIT INJECTION
Status: CANCELLED | OUTPATIENT
Start: 2025-07-08

## 2025-07-08 RX ORDER — IBUPROFEN 200 MG
24 TABLET ORAL
Status: CANCELLED | OUTPATIENT
Start: 2025-07-08

## 2025-07-08 RX ORDER — ONDANSETRON HYDROCHLORIDE 2 MG/ML
4 INJECTION, SOLUTION INTRAVENOUS EVERY 4 HOURS PRN
Status: CANCELLED | OUTPATIENT
Start: 2025-07-08

## 2025-07-08 RX ORDER — ACETAMINOPHEN 325 MG/1
650 TABLET ORAL EVERY 6 HOURS PRN
Status: DISCONTINUED | OUTPATIENT
Start: 2025-07-08 | End: 2025-07-15 | Stop reason: HOSPADM

## 2025-07-08 RX ORDER — SULFAMETHOXAZOLE AND TRIMETHOPRIM 800; 160 MG/1; MG/1
1 TABLET ORAL DAILY
Status: DISCONTINUED | OUTPATIENT
Start: 2025-07-09 | End: 2025-07-15 | Stop reason: HOSPADM

## 2025-07-08 RX ORDER — FLUCONAZOLE 200 MG/1
200 TABLET ORAL DAILY
Status: CANCELLED | OUTPATIENT
Start: 2025-07-08

## 2025-07-08 RX ORDER — GLUCAGON 1 MG
1 KIT INJECTION
Status: DISCONTINUED | OUTPATIENT
Start: 2025-07-08 | End: 2025-07-15 | Stop reason: HOSPADM

## 2025-07-08 RX ORDER — PANTOPRAZOLE SODIUM 40 MG/1
40 TABLET, DELAYED RELEASE ORAL DAILY
Status: CANCELLED | OUTPATIENT
Start: 2025-07-08

## 2025-07-08 RX ORDER — NALOXONE HCL 0.4 MG/ML
0.02 VIAL (ML) INJECTION
Status: CANCELLED | OUTPATIENT
Start: 2025-07-08

## 2025-07-08 RX ORDER — NALOXONE HCL 0.4 MG/ML
0.02 VIAL (ML) INJECTION
Status: DISCONTINUED | OUTPATIENT
Start: 2025-07-08 | End: 2025-07-15 | Stop reason: HOSPADM

## 2025-07-08 RX ORDER — ONDANSETRON HYDROCHLORIDE 2 MG/ML
4 INJECTION, SOLUTION INTRAVENOUS EVERY 4 HOURS PRN
Status: DISCONTINUED | OUTPATIENT
Start: 2025-07-08 | End: 2025-07-15 | Stop reason: HOSPADM

## 2025-07-08 RX ORDER — ENOXAPARIN SODIUM 100 MG/ML
40 INJECTION SUBCUTANEOUS EVERY 24 HOURS
Status: CANCELLED | OUTPATIENT
Start: 2025-07-08

## 2025-07-08 RX ORDER — LANOLIN ALCOHOL/MO/W.PET/CERES
1 CREAM (GRAM) TOPICAL DAILY
Status: DISCONTINUED | OUTPATIENT
Start: 2025-07-09 | End: 2025-07-15 | Stop reason: HOSPADM

## 2025-07-08 RX ORDER — AZITHROMYCIN 250 MG/1
500 TABLET, FILM COATED ORAL DAILY
Status: CANCELLED | OUTPATIENT
Start: 2025-07-08

## 2025-07-08 RX ORDER — ACETAMINOPHEN 325 MG/1
650 TABLET ORAL EVERY 6 HOURS PRN
Status: CANCELLED | OUTPATIENT
Start: 2025-07-08

## 2025-07-08 RX ORDER — ETHAMBUTOL HYDROCHLORIDE 400 MG/1
800 TABLET, FILM COATED ORAL DAILY
Status: CANCELLED | OUTPATIENT
Start: 2025-07-08

## 2025-07-08 RX ORDER — IBUPROFEN 200 MG
24 TABLET ORAL
Status: DISCONTINUED | OUTPATIENT
Start: 2025-07-08 | End: 2025-07-15 | Stop reason: HOSPADM

## 2025-07-08 RX ORDER — FLUCONAZOLE 100 MG/1
200 TABLET ORAL DAILY
Status: DISCONTINUED | OUTPATIENT
Start: 2025-07-09 | End: 2025-07-15 | Stop reason: HOSPADM

## 2025-07-08 RX ORDER — AZITHROMYCIN 250 MG/1
500 TABLET, FILM COATED ORAL DAILY
Status: DISCONTINUED | OUTPATIENT
Start: 2025-07-09 | End: 2025-07-15 | Stop reason: HOSPADM

## 2025-07-08 RX ORDER — IBUPROFEN 200 MG
16 TABLET ORAL
Status: CANCELLED | OUTPATIENT
Start: 2025-07-08

## 2025-07-08 RX ORDER — RIFAMPIN 300 MG/1
300 CAPSULE ORAL DAILY
Status: CANCELLED | OUTPATIENT
Start: 2025-07-08

## 2025-07-08 RX ORDER — RIFAMPIN 300 MG/1
300 CAPSULE ORAL DAILY
Status: DISCONTINUED | OUTPATIENT
Start: 2025-07-09 | End: 2025-07-10

## 2025-07-08 RX ORDER — LANOLIN ALCOHOL/MO/W.PET/CERES
1 CREAM (GRAM) TOPICAL DAILY
Status: CANCELLED | OUTPATIENT
Start: 2025-07-08

## 2025-07-08 RX ORDER — PANTOPRAZOLE SODIUM 40 MG/1
40 TABLET, DELAYED RELEASE ORAL DAILY
Status: DISCONTINUED | OUTPATIENT
Start: 2025-07-09 | End: 2025-07-15 | Stop reason: HOSPADM

## 2025-07-08 RX ORDER — SODIUM BICARBONATE 650 MG/1
650 TABLET ORAL DAILY
Status: DISCONTINUED | OUTPATIENT
Start: 2025-07-09 | End: 2025-07-15 | Stop reason: HOSPADM

## 2025-07-08 RX ORDER — SODIUM CHLORIDE 0.9 % (FLUSH) 0.9 %
10 SYRINGE (ML) INJECTION
Status: DISCONTINUED | OUTPATIENT
Start: 2025-07-08 | End: 2025-07-15 | Stop reason: HOSPADM

## 2025-07-08 RX ORDER — SULFAMETHOXAZOLE AND TRIMETHOPRIM 800; 160 MG/1; MG/1
1 TABLET ORAL DAILY
Status: CANCELLED | OUTPATIENT
Start: 2025-07-08

## 2025-07-08 RX ORDER — ETHAMBUTOL HYDROCHLORIDE 400 MG/1
800 TABLET, FILM COATED ORAL DAILY
Status: DISCONTINUED | OUTPATIENT
Start: 2025-07-09 | End: 2025-07-15 | Stop reason: HOSPADM

## 2025-07-08 RX ORDER — SODIUM BICARBONATE 650 MG/1
650 TABLET ORAL DAILY
Status: CANCELLED | OUTPATIENT
Start: 2025-07-08

## 2025-07-08 RX ORDER — IBUPROFEN 200 MG
16 TABLET ORAL
Status: DISCONTINUED | OUTPATIENT
Start: 2025-07-08 | End: 2025-07-15 | Stop reason: HOSPADM

## 2025-07-08 RX ORDER — SODIUM CHLORIDE 0.9 % (FLUSH) 0.9 %
10 SYRINGE (ML) INJECTION
Status: CANCELLED | OUTPATIENT
Start: 2025-07-08

## 2025-07-08 RX ORDER — ENOXAPARIN SODIUM 100 MG/ML
40 INJECTION SUBCUTANEOUS EVERY 24 HOURS
Status: DISCONTINUED | OUTPATIENT
Start: 2025-07-09 | End: 2025-07-15 | Stop reason: HOSPADM

## 2025-07-08 RX ADMIN — FLUCONAZOLE 200 MG: 200 TABLET ORAL at 08:07

## 2025-07-08 RX ADMIN — EMTRICITABINE AND TENOFOVIR ALAFENAMIDE 1 TABLET: 200; 25 TABLET ORAL at 08:07

## 2025-07-08 RX ADMIN — ENOXAPARIN SODIUM 40 MG: 40 INJECTION SUBCUTANEOUS at 04:07

## 2025-07-08 RX ADMIN — PANTOPRAZOLE SODIUM 40 MG: 40 TABLET, DELAYED RELEASE ORAL at 08:07

## 2025-07-08 RX ADMIN — AZITHROMYCIN DIHYDRATE 500 MG: 250 TABLET ORAL at 08:07

## 2025-07-08 RX ADMIN — SULFAMETHOXAZOLE AND TRIMETHOPRIM 1 TABLET: 800; 160 TABLET ORAL at 08:07

## 2025-07-08 RX ADMIN — Medication 1 EACH: at 08:07

## 2025-07-08 RX ADMIN — ETHAMBUTOL HYDROCHLORIDE 800 MG: 400 TABLET ORAL at 08:07

## 2025-07-08 RX ADMIN — RIFAMPIN 300 MG: 300 CAPSULE ORAL at 08:07

## 2025-07-08 RX ADMIN — SODIUM BICARBONATE 650 MG: 650 TABLET ORAL at 08:07

## 2025-07-08 RX ADMIN — DOLUTEGRAVIR SODIUM 50 MG: 50 TABLET, FILM COATED ORAL at 08:07

## 2025-07-08 NOTE — DISCHARGE SUMMARY
"[facility] Hospital Medicine Discharge Summary    Admit Date: 6/21/2025  Discharge Date and Time: 7/8/20258:07 AM  Admitting Physician: ROMINA Team  Discharging Physician: Carlton Woo MD.  Primary Care Physician: Nadia, Primary Doctor  Consults: Infectious Disease, Psychiatry, and Palliative care    Discharge Diagnoses:  Acute metabolic encephalopathy - resolved   HIV/AIDS - CD4 21/7%, , 288 with opportunistic infection   Oral thrush - treated   H/O disseminated MAC, now with lung mass   H/O cryptococcal meningitis   Non complaint with HAART   Elevated ALP - suspect Infiltrative disease   TAYLOR- resolved   Anemic of chronic disease - acute on chronic- monitoring   Mild thrombocytopenia - resolved  Iron deficiency anemia  Severe malnutrition  DNR status     Hospital Course:    "Anne Terry is a 65 y.o. 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 The patient presented to [facility] on 6/21/2025 with a primary complaint of failure to thrive. Patient was brought to OSH ED found walking around naked and confused. He also likely has dementia. Patient says that he lives by himself in [place] but is unable to explain why he went to the ER or why he is now in the hospital but say he had no choice. He has not been taking his medications. Pt had a CT scans done at OSH, results to be uploaded. He had a hospital admission in March 2025 for treatment of HIV and MAC, a punch biopsy of BLLE that was negative for malignancy, and an Ophthalmology evaluation. He has a history of non-compliance and missing several follow up appointments. Denies fevers, chills, sweats, SOB, N/V/D, dysuria, new rashes. Does have oral thrush.  He was seen ID team and started on  zithrmax, bactrim, ethambutol, rifampin, diflucan. Last CD 4 was 37. MRI brain and LP planned to be done under anesthesia. CSF and MRI brain was unremarkable. CD 4 this admit " 21/7%, ,288.   D/W ID about patient elevated ALP. Suspect liver infiltrative disease from AIDS. Poor prognosis.   MRI brain unremarkable   CSF analysis so far negative   Mood is stable, off of 1:1 sitters  ID has now signed off, recommended to continue ethambutol, azithromycin, rifampin for disseminated MAC for at least 1 year. Restarted ART on 7/2  Continue fluconazole 200 mg daily -renal dose  Continue Bactrim DS for PCP/PJP  Poor overall prognosis and high risk of mortality due to underlying infections, and medical non adherence. Upon discharge he can should follow up with [facility] ID clinic   His ALP was elevated. Dr Mccullough discusses with ID team --> suspect infiltrative disease --> now starting to slowly trend down  6/23/25- CT abdomen done showed normal liver, US RUQ-  liver was normal   Continue strict aspiration, fall and decubitus precautions    Received 1 unit PRBC transfusion on 7/1/25, monitor hemoglobin, continued to trickle down  HB 7.3, transfuse if 7 or less than 7  Continue PO iron tabs   Stool occult blood - negative   Cr now normalized to 1.2--discontinue IV fluids  Potassium normalized to 4.6  CM informed me that patient is accepted to [facility], I have given report to Dr Nick this am, pt is stable for transfer     Pt was seen and examined on the day of discharge  Vitals:  VITAL SIGNS: 24 HRS MIN & MAX LAST   Temp  Min: 97 °F (36.1 °C)  Max: 98.7 °F (37.1 °C) 97.1 °F (36.2 °C)   BP  Min: 138/81  Max: 155/85 (!) 144/82   Pulse  Min: 56  Max: 84  (!) 56   Resp  Min: 18  Max: 20 18   SpO2  Min: 95 %  Max: 100 % 100 %       Physical Exam:  General: In no acute distress, frail, chronically ill-looking  Chest: Clear to auscultation bilaterally anteriorly, on room air  Heart: RRR, +S1, S2, no appreciable murmur  Abdomen: Soft, nontender, BS +  Musculoskeletal: Tato legs hyperpigmented, no edema  Neurologic: comfortably asleep     Recent Labs   Lab 07/04/25  0441 07/06/25  0439 07/07/25  0449   WBC 3.80* 3.20*  3.07*   RBC 2.68* 2.65* 2.63*   HGB 7.5* 7.2* 7.3*   HCT 23.1* 23.5* 22.9*   MCV 86.2 88.7 87.1   MCH 28.0 27.2 27.8   MCHC 32.5* 30.6* 31.9*   RDW 16.9 16.7 16.6    240 253   MPV 11.5* 10.8* 10.7*       Recent Labs   Lab 07/04/25  0441 07/05/25  0451 07/06/25  0439 07/07/25  0449    140 138 137   K 5.1 5.5* 4.6 4.6   * 112* 109* 109*   CO2 22* 23 25 22*   BUN 28.5* 31.0* 25.9* 25.5   CREATININE 1.58* 1.48* 1.25 1.24   GLU 75* 81* 73* 68*   CALCIUM 7.6* 7.6* 7.7* 7.6*   MG  --   --  1.80  --    ALBUMIN 1.7*  --  1.7* 1.6*   PROT 7.6  --  7.4 7.4   ALKPHOS 2,208*  --  1,943* 1,943*   ALT 37  --  28 25   AST 66*  --  42 36   BILITOT 1.0  --  0.9 0.8        Microbiology Results (last 7 days)       Procedure Component Value Units Date/Time    Cerebrospinal Fluid Culture [[id number]] Collected: 06/27/25 1402    Order Status: Completed Specimen: CSF (Spinal Fluid) from Cerebrospinal Fluid Updated: 07/02/25 1003     CULTURE, CSF Final Report: At 5 days. No growth     GRAM STAIN No WBCs, No bacteria seen             US Abdomen Limited  Narrative: EXAMINATION:  US ABDOMEN LIMITED    CLINICAL HISTORY:  Right upper quadrant pain.    TECHNIQUE:  Multiple real-time transverse and longitudinal sections were performed of the right abdomen by the sonographer. Select images were submitted for review.    COMPARISON:  CT abdomen pelvis June 23, 2025    FINDINGS:  Liver is of unremarkable echotexture with normal contour and size. There was no delineation of discrete hepatic cystic or solid mass. Hepatic maximum diameter is estimated to be 20.26 cm with ultrasound and appears to be overestimated.  On the previous CT abdomen maximum diameter of the liver in the midclavicular line is 16.2 cm which is within normal limits.  There was unremarkable hepatopedal flow within the portal vein.  Pancreas is obscured by overlying bowel gas.    Gallbladder lumen is without echogenicity indicative of sludge or cholelithiasis.  Gallbladder wall thickness is within normal limits. Common bile duct caliber of 2.0 mm is within normal limits for the age. Sonographer reported negative Gomes's sign.    Inferior vena cava is unremarkable. Visualized portion of the abdominal aorta is without aneurysmal dilatation.    Normally located right kidney length measures 12.2 x 4.3 x 5.9 cm. Right renal corticomedullary differentiation is unremarkable. No evidence of hydronephrosis.  Impression: No acute findings sonography identified.    Electronically signed by: Ezio Deleon  Date:    06/29/2025  Time:    20:00         Medication List        ASK your doctor about these medications      abacavir 300 mg Tab  Commonly known as: ZIAGEN  Take 1 tablet (300 mg total) by mouth once daily.     azithromycin 500 MG tablet  Commonly known as: ZITHROMAX  Take 1 tablet (500 mg total) by mouth once daily.     darunavir 800 mg Tab  Commonly known as: PREZISTA  Take 1 tablet (800 mg total) by mouth daily with breakfast.     emtricitabine-tenofovir 200-300 mg 200-300 mg Tab  Commonly known as: TRUVADA  Take 1 tablet by mouth once daily.     ethambutoL 400 MG Tab  Commonly known as: MYAMBUTOL  Take 2 tablets (800 mg total) by mouth once daily.     ferrous sulfate 325 (65 FE) MG EC tablet  Take 1 tablet (325 mg total) by mouth once daily.     rifabutin 150 mg Cap  Commonly known as: MYCOBUTIN  Take 1 capsule (150 mg total) by mouth once daily.     ritonavir 100 mg Tab tablet  Commonly known as: NORVIR  Take 1 tablet (100 mg total) by mouth once daily.               Explained in detail to the patient about the discharge plan, medications, and follow-up visits. Pt understands and agrees with the treatment plan  Discharge Disposition: Transfer to [facility] for continuation of care   Discharged Condition: stable  Diet-   Dietary Orders (From admission, onward)       Start     Ordered    06/29/25 1207  Dietary nutrition supplements Daily; Boost Original Nutritional Drink - Any  flavor  Continuous        Question Answer Comment   Frequency: Daily    Select PO Supplement: Boost Original Nutritional Drink - Any flavor        06/29/25 1207    06/26/25 1227  Diet Adult Regular Standard Tray  Diet effective now        Question:  Tray type:  Answer:  Standard Tray    06/26/25 1227                   Medications Per DC med rec  Activities as tolerated    For further questions contact hospitalist office    Discharge time 34 minutes    For worsening symptoms, chest pain, shortness of breath, increased abdominal pain, high grade fever, stroke or stroke like symptoms, immediately go to the nearest Emergency Room or call 911 as soon as possible.    Portions of this note dictated using EMR integrated voice recognition software, and may be subject to voice recognition errors not corrected at proofreading. Please contact writer for clarification if needed    Carlton Woo MD  Department of Hospital Medicine   [facility]   07/08/2025 8:07 AM

## 2025-07-08 NOTE — PLAN OF CARE
07/08/25 0913   Final Note   Assessment Type Final Discharge Note   Anticipated Discharge Disposition [facility]   Post-Acute Status   Discharge Delays (!) Ambulance Transport/Facility Transport

## 2025-07-09 PROBLEM — E44.0 MODERATE MALNUTRITION: Status: ACTIVE | Noted: 2025-07-09

## 2025-07-09 PROCEDURE — 63600175 PHARM REV CODE 636 W HCPCS: Performed by: INTERNAL MEDICINE

## 2025-07-09 PROCEDURE — 96372 THER/PROPH/DIAG INJ SC/IM: CPT | Performed by: INTERNAL MEDICINE

## 2025-07-09 PROCEDURE — 25000003 PHARM REV CODE 250: Performed by: INTERNAL MEDICINE

## 2025-07-09 PROCEDURE — 63700000 PHARM REV CODE 250 ALT 637 W/O HCPCS: Performed by: INTERNAL MEDICINE

## 2025-07-09 PROCEDURE — G0378 HOSPITAL OBSERVATION PER HR: HCPCS

## 2025-07-09 RX ADMIN — RIFAMPIN 300 MG: 300 CAPSULE ORAL at 01:07

## 2025-07-09 RX ADMIN — DOLUTEGRAVIR SODIUM 50 MG: 50 TABLET, FILM COATED ORAL at 01:07

## 2025-07-09 RX ADMIN — SODIUM BICARBONATE 650 MG TABLET 650 MG: at 01:07

## 2025-07-09 RX ADMIN — PANTOPRAZOLE 40 MG: 40 TABLET, DELAYED RELEASE ORAL at 01:07

## 2025-07-09 RX ADMIN — FLUCONAZOLE 200 MG: 100 TABLET ORAL at 01:07

## 2025-07-09 RX ADMIN — SULFAMETHOXAZOLE AND TRIMETHOPRIM 1 TABLET: 800; 160 TABLET ORAL at 01:07

## 2025-07-09 RX ADMIN — ENOXAPARIN SODIUM 40 MG: 40 INJECTION SUBCUTANEOUS at 04:07

## 2025-07-09 RX ADMIN — AZITHROMYCIN DIHYDRATE 500 MG: 250 TABLET, FILM COATED ORAL at 01:07

## 2025-07-09 RX ADMIN — ETHAMBUTOL HYDROCHLORIDE 800 MG: 400 TABLET ORAL at 01:07

## 2025-07-09 RX ADMIN — EMTRICITABINE AND TENOFOVIR ALAFENAMIDE 1 TABLET: 200; 25 TABLET ORAL at 01:07

## 2025-07-09 RX ADMIN — FERROUS SULFATE TAB 325 MG (65 MG ELEMENTAL FE) 1 EACH: 325 (65 FE) TAB at 01:07

## 2025-07-09 NOTE — H&P
[facility]urg Telemetry  History & Physical    SUBJECTIVE:     Chief Complaint/Reason for Admission:  Originally admitted for altered mental status; transferred for continued monitoring as awaiting placement    History of Present Illness:  Anne Terry is a 65 y.o. male presents with a past medical history of HIV/aids, mac, history of disseminated histoplasmosis, PJP and cryptococcus at the time of diagnosis.  The patient presented to [address] [place] [address] on 06/21/2025 with a primary complaint of failure to thrive.  The patient was brought to the [facility] ED after he was found walking around naked and confused.  Per the patient he lives by himself in [place] but is unable to explain why he went to the emergency room or why he is in the hospital now.  He had not been compliant with his home medications.  He currently denies any fever, chills, sweats, shortness for breath, chest pain, nausea/vomiting/diarrhea, dysuria, new rashes.  He was recently diagnosed with oral thrush.  At the time of hospitalization at [facility] he was seen by infectious disease and started on azithromycin, Bactrim, ethambutol, rifampin, Diflucan.  His last CD4 count was 37.  MRI brain and LP were done.  CSF and MRA of brain were unremarkable.  CD4 on this most recent admission was 21/7%, ,288. He has a history of nonadherence to his medications.  He has missed multiple appointments.  He has elevation of alkaline phosphatase which was suspected to be due to infiltrated liver disease from HIV and AIDS   PTA Medications   Medication Sig    abacavir (ZIAGEN) 300 mg Tab Take 1 tablet (300 mg total) by mouth once daily.    azithromycin (ZITHROMAX) 500 MG tablet Take 1 tablet (500 mg total) by mouth once daily.    darunavir (PREZISTA) 800 mg Tab Take 1 tablet (800 mg total) by mouth daily with breakfast.    emtricitabine-tenofovir 200-300 mg (TRUVADA) 200-300 mg Tab Take 1 tablet by mouth once daily.    ethambutoL (MYAMBUTOL) 400 MG Tab  Take 2 tablets (800 mg total) by mouth once daily.    ferrous sulfate 325 (65 FE) MG EC tablet Take 1 tablet (325 mg total) by mouth once daily.    rifabutin (MYCOBUTIN) 150 mg Cap Take 1 capsule (150 mg total) by mouth once daily.    ritonavir (NORVIR) 100 mg Tab tablet Take 1 tablet (100 mg total) by mouth once daily.       Review of patient's allergies indicates:  No Known Allergies    No past medical history on file.  Past Surgical History:   Procedure Laterality Date    COLONOSCOPY  05/08/2015    MAGNETIC RESONANCE IMAGING N/A 6/27/2025    Procedure: MRI (Magnetic Resonance Imagine);  Surgeon: Dara Horne MD;  Location: [facility];  Service: Medicine;  Laterality: N/A;  MRI WITH ANESTHESIA /   BRAIN - WITH AND WITHOUT CONTRAST       Family History   Problem Relation Name Age of Onset    No Known Problems Mother      No Known Problems Father      Cancer Sister      No Known Problems Brother          Social History[1]  Review of Systems:  Constitutional:  No fever, chills    OBJECTIVE:     Vital Signs (Most Recent):  Temp:  [97 °F (36.1 °C)-98.7 °F (37.1 °C)] 98.4 °F (36.9 °C)  Pulse:  [56-84] 74  Resp:  [16-18] 16  SpO2:  [95 %-100 %] 98 %  BP: (134-155)/(77-89) 134/77    Physical Exam:  General: appears older than stated age, cachectic, no distress  HENT: Head:normocephalic, atraumatic. Ears:not examined. Nose: Nares normal. Septum midline. Mucosa normal. No drainage or sinus tenderness., no discharge. Throat: no throat erythema and lips dry with slight cracking ; patient would not open his mouth for exam .  Eyes: conjunctivae/corneas clear. PERRL.   Neck: supple, symmetrical, trachea midline, no JVD and thyroid not enlarged, symmetric, no tenderness/mass/nodules  Lungs:  clear to auscultation bilaterally and normal respiratory effort  Cardiovascular: Heart: regular rate and rhythm, S1, S2 normal, no  click, rub or gallop. Chest Wall: no tenderness.   Extremities: no cyanosis or edema, or clubbing. Pulses:  2+ dorsalis pedis; thickened dystrophic few calluses on toes  Abdomen/Rectal: Abdomen: soft, non-tender non-distented; bowel sounds normal; no masses,  no organomegaly and somewhat scaphoid; exam difficult because patient uncooperative. Rectal: normal tone, no masses or tenderness and not examined  Genitalia: deferred  Skin:  Diffuse scaling and dryness over entire body  Musculoskeletal:  Unable to fully evaluate as patient is lying on his left side and did not want to be examined further.  He did not ambulate when he arrived ; he was transferred from bed to bed  Lymph Nodes: No cervical or supraclavicular adenopathy and patient not cooperative with remainder of exam  Neurologic:  No focal numbness or weakness  Psych/Behavioral:  He is alert and responds to some questions appropriately.  He became agitated when I asked him what hospital he is in.  He is aware that he is in a hospital but could not articulate where.  He could not give me his home address and became agitated when I questioned him further    Laboratory:  07/07/2025   Latest Reference Range & Units 07/07/25 04:49 07/07/25 10:00   WBC 4.50 - 11.50 x10(3)/mcL 3.07 (L)    RBC 4.70 - 6.10 x10(6)/mcL 2.63 (L)    Hemoglobin 14.0 - 18.0 g/dL 7.3 (L)    Hematocrit 42.0 - 52.0 % 22.9 (L)    MCV 80.0 - 94.0 fL 87.1    MCH 27.0 - 31.0 pg 27.8    MCHC 33.0 - 36.0 g/dL 31.9 (L)    RDW 11.5 - 17.0 % 16.6    Platelet Count 130 - 400 x10(3)/mcL 253    MPV 7.4 - 10.4 fL 10.7 (H)    nRBC % 0.0    Sodium 136 - 145 mmol/L 137    Potassium 3.5 - 5.1 mmol/L 4.6    [place] 98 - 107 mmol/L 109 (H)    CO2 23 - 31 mmol/L 22 (L)    Anion Gap mEq/L 6.0    BUN 8.4 - 25.7 mg/dL 25.5    Creatinine 0.72 - 1.25 mg/dL 1.24    BUN/CREAT RATIO  21    eGFR mL/min/1.73/m2 >60    Glucose 82 - 115 mg/dL 68 (L)    Calcium 8.8 - 10.0 mg/dL 7.6 (L)    ALP 40 - 150 unit/L 1,943 (H)    PROTEIN TOTAL 5.8 - 7.6 gm/dL 7.4    Albumin 3.4 - 4.8 g/dL 1.6 (L)    Albumin/Globulin Ratio 1.1 - 2.0 ratio  0.3 (L)    BILIRUBIN TOTAL <=1.5 mg/dL 0.8    AST 11 - 45 unit/L 36    ALT 0 - 55 unit/L 25    Globulin, Total 2.4 - 3.5 gm/dL 5.8 (H)    Occult Blood Negative   Negative   Stool Color 1   Brown   Stool Consistancy 1   soft     Collected 06/27/2025 CSF   CSF culture/Gram stain no WBCs or bacteria seen; no growth at 5 days  Fungal culture in progress  Mycobacteria and Nocardia culture in process  CSF No AFB seen  Cryptococcal antigen negative  Blood culture negative x2  Diagnostic Results:  Retroperitoneal ultrasound  OTHER: Retroperitoneal adenopathy.  Largest lymph node seen is a 5.6 x 5 x 3.2 cm Lisa caval lymph node.  Aorta and IVC unremarkable.     Impression:     Normal sized, nonobstructed kidneys.     Retroperitoneal lymphadenopathy.  Defer to recent CT exam.        Electronically signed by:Moni Hdez  Date:                                            06/28/2025  Time:                                           13:42    US ABDOMEN LIMITED     CLINICAL HISTORY:  Right upper quadrant pain.     TECHNIQUE:  Multiple real-time transverse and longitudinal sections were performed of the right abdomen by the sonographer. Select images were submitted for review.     COMPARISON:  CT abdomen pelvis June 23, 2025     FINDINGS:  Liver is of unremarkable echotexture with normal contour and size. There was no delineation of discrete hepatic cystic or solid mass. Hepatic maximum diameter is estimated to be 20.26 cm with ultrasound and appears to be overestimated.  On the previous CT abdomen maximum diameter of the liver in the midclavicular line is 16.2 cm which is within normal limits.  There was unremarkable hepatopedal flow within the portal vein.  Pancreas is obscured by overlying bowel gas.     Gallbladder lumen is without echogenicity indicative of sludge or cholelithiasis. Gallbladder wall thickness is within normal limits. Common bile duct caliber of 2.0 mm is within normal limits for the age. Sonographer  reported negative Gomes's sign.     Inferior vena cava is unremarkable. Visualized portion of the abdominal aorta is without aneurysmal dilatation.     Normally located right kidney length measures 12.2 x 4.3 x 5.9 cm. Right renal corticomedullary differentiation is unremarkable. No evidence of hydronephrosis.     Impression:     No acute findings sonography identified.        Electronically signed by:Ezio Deleon  Date:                                            06/29/2025  MRI BRAIN W WO CONTRAST     CLINICAL HISTORY:  Mental status change, unknown cause;HIV/AIDs, dementia;     TECHNIQUE:  Multiplanar, multisequence MR images of the brain were obtained with and without administration of intravenous contrast.     COMPARISON:  CT head dated 06/24/2025     FINDINGS:  There is no restricted diffusion, hemorrhage or edema.  Mild patchy T2/FLAIR hyperintensity in the subcortical and periventricular white matter are nonspecific and may represent chronic microvascular ischemic changes.  There is no abnormal parenchymal or leptomeningeal enhancement.     There is no mass effect or midline shift.  The basal cisterns are patent there is mild diffuse parenchymal volume loss.  There is no hydrocephalus or abnormal extra-axial fluid collection.  The major intracranial flow voids are patent.  The paranasal sinuses are clear.     Impression:     1. No acute intracranial abnormality.  2. Mild chronic microvascular ischemic changes.        Electronically signed by:Mel Hanks  Date:                                            06/27/2025  Time:                                           13:48  COMPARISON:  CT chest, abdomen pelvis from 02/27/2025     Outside CT of the chest from 06/19/2025     Outside CT of the abdomen and pelvis from 06/19/2025     FINDINGS:  CHEST:     Partially calcified right thyroid nodule measures 7 mm.  Otherwise, unremarkable thoracic inlet.  Heart size is mildly enlarged.  No filling defects in the  central pulmonary arteries.  Mild mediastinal lymphadenopathy similar compared to 02/27/2025.     Appearance of the lungs is similar with scattered micronodular pattern at the dependent portion of both lungs, right more than left.  Tree-in-bud pattern is identified.  No interval change since 02/27/2025.     ABDOMEN/PELVIS:     Gallbladder is contracted.  The liver, spleen, pancreas, adrenal glands and kidneys appear normal.  No hydronephrosis.     Small hiatal hernia.  Small gastroesophageal varices are present.  Patent portal vein.     Mild abdominopelvic lymphadenopathy is present.  Abdominal lymphadenopathy is slightly worse compared to 02/27/2025.  Index right periaortic lymph node (image 124, series 3) measures 1.3 cm short axis dimension.  Lymph node previously measured 0.9 cm on the prior.     No ascites.  No suspicious peritoneal nodule.  Unremarkable bladder.     The bowel is nonobstructed.  Normal appendix is present.  Air and stool are present throughout the colon.  Mild atherosclerosis of the abdominal aorta and its branches.     BONES AND SOFT TISSUES:     No suspicious osteolytic or osteoblastic lesion.     Impression:     1. Multifocal micronodular pattern at the dependent portion of both lungs, similar extents 02/27/2025.  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  2. Stable thoracic lymphadenopathy.  3. Slight interval increase in size and extent of abdominopelvic lymphadenopathy.        Electronically signed by:Sg Rogel MD  Date:                                            06/23/2025    ASSESSMENT/PLAN:     Acute metabolic encephalopathy-resolved per transferring team  HIV/AIDS -CD4 21/7%, viral load 114,288 with opportunistic infection  Oral thrush-treated  History of disseminated MAC, now with lung mass,  History of cryptococcal meningitis  History of PJP  Nonadherence to use of HARRT  Elevated alkaline phosphatase-suspect infiltrative disease  TAYLOR-resolved  Severe  anemia acute on chronic with anemia of chronic disease  Iron-deficiency anemia  Severe malnutrition  DNR status    Plan:      Continue antiviral regimen including dolutegravir 50 mg daily and emtricitabine -tenofovir alafen 200-25 mg 1 tablet daily and PJP prophylaxis  Continue fluconazole 200 mg daily  Continue ethambutol 800 mg daily, rifampin 300 mg daily azithromycin 500 mg daily  Consult with [facility] ID to review meds  Consider GI consult regarding elevated alkaline phosphatase repeat now  Continue iron supplementation; repeat CBC now.  Consider transfusion if hemoglobin less than 7  Continue pantoprazole  VTE prophylaxis enoxaparin 40 mg daily and serial compression devices  Resume sodium bicarbonate as prescribed  Fall and decubitus precautions       [1]   Social History  Tobacco Use    Smoking status: Never    Smokeless tobacco: Never   Substance Use Topics    Alcohol use: Not Currently    Drug use: Yes     Types: Marijuana

## 2025-07-09 NOTE — PROGRESS NOTES
"   07/09/25 1214   Missed Time Reason   OT Attempted Eval Date 07/09/25   OT Attempted Eval Time 1214   Missed Treatment Reason Patient unwilling to participate     Pt reported " I'm just relaxed and good where I am" . 2nd refusal today. Will attempt again tomorrow.   "

## 2025-07-09 NOTE — PLAN OF CARE
Spoke to patient's sister, Shelbi, P: [phone], who lives in [place]. She stated that [facility] is interested in patient.     Spoke to Rudolph with intake at [facility], P: [phone], who requested that referral be re-submitted. Referral has been sent via Epic. CM will follow.

## 2025-07-09 NOTE — PT/OT/SLP PROGRESS
Physical Therapy    Missed Treatment Session - cancel note - 07/09/2025    Patient Name:  Anne Terry   MRN:  [id number]      Patient not seen at this time secondary to Patient unwilling to participate.    Will follow-up tomorrow as patient is appropriate/available/agreeable to participate and as therapists' schedule allows.

## 2025-07-09 NOTE — PROGRESS NOTES
07/09/25 0855   Missed Time Reason   OT Attempted Eval Date 07/09/25   OT Attempted Eval Time 0855   Missed Treatment Reason Patient unwilling to participate     Nurse Turner in room and pt also refused meds . Will attempt again as schedule permits

## 2025-07-09 NOTE — PROGRESS NOTES
History of Present Illness:  Anne Terry is a 65 y.o. male presented yesterday to [facility] as a  transfer awaiting  long term care placement with a past medical history of HIV/aids, MAC, history of disseminated histoplasmosis, history ofPJP and cryptococcal meningitis at the time of diagnosis.  The patient presented to [facility] on 06/21/2025 with a primary complaint of failure to thrive.  The patient was brought to the [facility] ED after he was found walking around naked and confused.  Per the patient on admission,  he lived by himself in [facility]  but is unable to explain why he went to the emergency room or why he is in the hospital now.  He had not been compliant with his home medications.  He currently denies any fever, chills, sweats, shortness for breath, chest pain, nausea/vomiting/diarrhea, dysuria, new rashes.  He was recently diagnosed with oral thrush.  At the time of hospitalization at [facility] he was seen by infectious disease and started on azithromycin, Bactrim, ethambutol, rifampin, Diflucan.  His last CD4 count PTA at [facility] was 37.  MRI brain and LP were done at [facility].  CSF routine culture and gram stain were negative  and MRI of brain was significant only for microvascular ischemic changes.  CD4 on this most recent admission was 21/7%,  with viral load 114,288. He has a history of nonadherence to his medications.  He has missed multiple appointments for follow up at ID clinic at [facility].  He has elevation of alkaline phosphatase which was suspected to be due to infiltrated liver disease from HIV and AIDS per ID consultant at [facility].  He has been consuming less than 50% of his meals at [facility] and has lost substantial weight.     Interval history.   This am patient denies CP, shortness of breath, pain in any location and he refused his am meds and did not eat breakfast. He says he will eat his lunch and take his meds then.        Scheduled Meds:   azithromycin  500 mg Oral Daily    dolutegravir  50 mg Oral  Daily    emtricitabine-tenofovir alafen  1 tablet Oral Daily    enoxparin  40 mg Subcutaneous Q24H (prophylaxis, 1700)    ethambutoL  800 mg Oral Daily    ferrous sulfate  1 tablet Oral Daily    fluconazole  200 mg Oral Daily    pantoprazole  40 mg Oral Daily    rifAMpin  300 mg Oral Daily    sodium bicarbonate  650 mg Oral Daily    sulfamethoxazole-trimethoprim 800-160mg  1 tablet Oral Daily     Continuous Infusions:  PRN Meds:  Current Facility-Administered Medications:     acetaminophen, 650 mg, Oral, Q6H PRN    dextrose 50%, 12.5 g, Intravenous, PRN    dextrose 50%, 25 g, Intravenous, PRN    glucagon (human recombinant), 1 mg, Intramuscular, PRN    glucose, 16 g, Oral, PRN    glucose, 24 g, Oral, PRN    naloxone, 0.02 mg, Intravenous, PRN    ondansetron, 4 mg, Intravenous, Q4H PRN    sodium chloride 0.9%, 10 mL, Intravenous, PRN    Vital signs in last 24 hours:  Temp:  [97.8 °F (36.6 °C)-99.1 °F (37.3 °C)] 97.8 °F (36.6 °C)  Pulse:  [60-87] 66  Resp:  [16-19] 18  SpO2:  [98 %-100 %] 100 %  BP: (134-154)/(77-91) 151/91  General: appears older than stated age, cachectic, no distress  HENT: Head:normocephalic, atraumatic.   Eyes: conjunctivae/corneas clear. PERRL.   Neck: supple, symmetrical, trachea midline, no JVD and thyroid not enlarged, symmetric, no tenderness/mass/nodules  Lungs:  clear to auscultation bilaterally and normal respiratory effort  Cardiovascular: Heart: regular rate and rhythm, S1, S2 normal, no  click, rub or gallop. Chest Wall: no tenderness.   Extremities: no cyanosis or edema, or clubbing. Pulses: 2+ dorsalis pedis; thickened dystrophic few calluses on toes  Abdomen/Rectal: Abdomen: soft, non-tender non-distented; bowel sounds normal; no masses,  no organomegaly and somewhat scaphoid; exam difficult because patient uncooperative. Rectal: normal tone, no masses or tenderness and not examined  Genitalia: deferred  Skin:  Diffuse scaling and dryness over entire body  Musculoskeletal:  Unable  to fully evaluate as patient is lying on his left side and did not want to be examined further.  He did not ambulate when he arrived ; he was transferred from bed to bed  Lymph Nodes: No cervical or supraclavicular adenopathy and patient not cooperative with remainder of exam  Neurologic:  No focal numbness or weakness  Psych/Behavioral:  He is alert and responds to some questions appropriately.  He became agitated when I asked him what hospital he is in.  He is aware that he is in a hospital but could not articulate where.  He could not give me his home address and became agitated when I questioned him further    LABS    Latest Reference Range & Units 07/06/25 04:39 07/07/25 04:49 07/07/25 10:00 07/08/25 19:34   WBC 4.50 - 11.50 x10(3)/mcL 3.20 (L) 3.07 (L)  3.41 (L)   RBC 4.70 - 6.10 x10(6)/mcL 2.65 (L) 2.63 (L)  2.74 (L)   Hemoglobin 14.0 - 18.0 g/dL 7.2 (L) 7.3 (L)  7.6 (L)   Hematocrit 42.0 - 52.0 % 23.5 (L) 22.9 (L)  24.6 (L)   MCV 80.0 - 94.0 fL 88.7 87.1  89.8   MCH 27.0 - 31.0 pg 27.2 27.8  27.7   MCHC 33.0 - 36.0 g/dL 30.6 (L) 31.9 (L)  30.9 (L)   RDW 11.5 - 17.0 % 16.7 16.6  16.2   Platelet Count 130 - 400 x10(3)/mcL 240 253  299   MPV 7.4 - 10.4 fL 10.8 (H) 10.7 (H)  10.3   Neut % %    75.9   LYMPH % %    11.1   Mono % %    8.8   Eos % %    2.1   Basophil % %    0.6   Immature Granulocytes %    1.5   Neut # 2.1 - 9.2 x10(3)/mcL    2.59   Lymph # 0.6 - 4.6 x10(3)/mcL    0.38 (L)   Mono # 0.1 - 1.3 x10(3)/mcL    0.30   Eos # 0 - 0.9 x10(3)/mcL    0.07   Baso # <=0.2 x10(3)/mcL    0.02   Immature Grans (Abs) 0.00 - 0.04 x10(3)/mcL    0.05 (H)   nRBC % 0.0 0.0  0.0   Sodium 136 - 145 mmol/L 138 137  141   Potassium 3.5 - 5.1 mmol/L 4.6 4.6  4.5   [place] 98 - 107 mmol/L 109 (H) 109 (H)  111 (H)   CO2 23 - 31 mmol/L 25 22 (L)  23   Anion Gap mEq/L 4.0 6.0  7.0   BUN 8.4 - 25.7 mg/dL 25.9 (H) 25.5  26.9 (H)   Creatinine 0.72 - 1.25 mg/dL 1.25 1.24  1.22   BUN/CREAT RATIO  21 21  22   eGFR mL/min/1.73/m2 >60  >60  >60   Glucose 82 - 115 mg/dL 73 (L) 68 (L)  82   Calcium 8.8 - 10.0 mg/dL 7.7 (L) 7.6 (L)  7.7 (L)   Magnesium  1.60 - 2.60 mg/dL 1.80      ALP 40 - 150 unit/L 1,943 (H) 1,943 (H)  1,939 (H)   PROTEIN TOTAL 5.8 - 7.6 gm/dL 7.4 7.4  8.1 (H)   Albumin 3.4 - 4.8 g/dL 1.7 (L) 1.6 (L)  1.8 (L)   Albumin/Globulin Ratio 1.1 - 2.0 ratio 0.3 (L) 0.3 (L)  0.3 (L)   BILIRUBIN TOTAL <=1.5 mg/dL 0.9 0.8  0.7   AST 11 - 45 unit/L 42 36  39   ALT 0 - 55 unit/L 28 25  26   Globulin, Total 2.4 - 3.5 gm/dL 5.7 (H) 5.8 (H)  6.3 (H)   Occult Blood Negative    Negative    Stool Color 1    Brown    Stool Consistancy 1    soft        Problem Assessment/Plan  Service: Hospital Medicine  Acute metabolic encephalopathy-resolved per transferring team  HIV/AIDS -CD4 21/7%, viral load 114,288 with opportunistic infection  Oral thrush-treated  History of disseminated MAC, now with lung mass,  History of cryptococcal meningitis  History of PJP  Nonadherence to use of HARRT- intermittently declines meds   Elevated alkaline phosphatase-suspect infiltrative disease  TAYLOR-resolved  Severe anemia acute on chronic with anemia of chronic disease  Iron-deficiency anemia  Severe malnutrition-did not consume breakfast   Moderate cognitive impairment with concern for dementia   Unable to care for himself/ Unsafe for discharge    DNR status    PLAN   Continue antiviral regimen including dolutegravir 50 mg daily and emtricitabine -tenofovir alafen 200-25 mg 1 tablet daily and PJP prophylaxis  Continue fluconazole 200 mg daily  Continue ethambutol 800 mg daily, rifampin 300 mg daily azithromycin 500 mg daily  Consult with [facility] ID to review meds  Consider GI consult regarding elevated alkaline phosphatase;  repeat tomorrow  Continue iron supplementation; repeat CBC tomorrow.  Consider transfusion if hemoglobin less than 7  Continue pantoprazole  VTE prophylaxis enoxaparin 40 mg daily and serial compression devices  Resume sodium bicarbonate as  prescribed  Fall and decubitus precaution  11. Appreciate nutritional consult; consider appetite stimulant

## 2025-07-09 NOTE — PT/OT/SLP PROGRESS
Physical Therapy    Missed Treatment Session - patient unwilling to participate note - 07/09/2025    Patient Name:  Anne Terry   MRN:  [id number]      Patient not seen at this time secondary to Patient unwilling to participate (Nursing in room, Pt refusing medication and to get up with PT.).    Will follow-up as patient is appropriate/available/agreeable to participate and as therapists' schedule allows.

## 2025-07-09 NOTE — PROGRESS NOTES
Inpatient Nutrition Assessment    Admit Date: 7/8/2025   Total duration of encounter: 1 day   Patient Age: 65 y.o.    Nutrition Recommendation/Prescription     Regular diet as ordered  Boost Plus (provides 360 kcal, 14 g protein per serving) BID  Boost Very High Calorie (provides 530 kcal, 22 g protein per serving) once daily  Suggest daily MVI  Consider appetite stimulant as medically appropriate    Communication of Recommendations: reviewed with nurse and reviewed with patient    Nutrition Assessment     Malnutrition Assessment/Nutrition-Focused Physical Exam    Malnutrition Context: acute illness or injury (07/09/25 1324)  Malnutrition Level: moderate (07/09/25 1324)  Energy Intake (Malnutrition): less than or equal to 50% for greater than or equal to 5 days (07/09/25 1324)  Weight Loss (Malnutrition): 5% in 1 month (07/09/25 1324)     Orbital Region (Subcutaneous Fat Loss): other (see comments) (unable to assess)           Latter day Region (Muscle Loss): other (see comments) (unable to assess)                       Fluid Accumulation (Malnutrition): other (see comments) (Not present) (07/09/25 1324)        A minimum of two characteristics is recommended for diagnosis of either severe or non-severe malnutrition.    Chart Review    Reason Seen: malnutrition screening tool (MST)    Malnutrition Screening Tool Results   Have you recently lost weight without trying?: Unsure  Have you been eating poorly because of a decreased appetite?: No   MST Score: 2   Diagnosis:  Acute metabolic encephalopathy-resolved per transferring team  HIV/AIDS -CD4 21/7%, viral load 114,288 with opportunistic infection  Oral thrush-treated  History of disseminated MAC, now with lung mass,  History of cryptococcal meningitis  History of PJP  Nonadherence to use of HARRT  Elevated alkaline phosphatase-suspect infiltrative disease  TAYLOR-resolved  Severe anemia acute on chronic with anemia of chronic disease  Iron-deficiency anemia  Severe  malnutrition  DNR statu    Relevant Medical History: HIV/aids, mac, history of disseminated histoplasmosis, PJP and cryptococcus     Scheduled Medications:  azithromycin, 500 mg, Daily  dolutegravir, 50 mg, Daily  emtricitabine-tenofovir alafen, 1 tablet, Daily  enoxparin, 40 mg, Q24H (prophylaxis, 1700)  ethambutoL, 800 mg, Daily  ferrous sulfate, 1 tablet, Daily  fluconazole, 200 mg, Daily  pantoprazole, 40 mg, Daily  rifAMpin, 300 mg, Daily  sodium bicarbonate, 650 mg, Daily  sulfamethoxazole-trimethoprim 800-160mg, 1 tablet, Daily    Continuous Infusions:   PRN Medications:  acetaminophen, 650 mg, Q6H PRN  dextrose 50%, 12.5 g, PRN  dextrose 50%, 25 g, PRN  glucagon (human recombinant), 1 mg, PRN  glucose, 16 g, PRN  glucose, 24 g, PRN  naloxone, 0.02 mg, PRN  ondansetron, 4 mg, Q4H PRN  sodium chloride 0.9%, 10 mL, PRN    Calorie Containing IV Medications: no significant kcals from medications at this time    Recent Labs   Lab 07/03/25  0501 07/04/25  0441 07/05/25  0451 07/06/25  0439 07/07/25  0449 07/08/25  1934   NA  --  138 140 138 137 141   K  --  5.1 5.5* 4.6 4.6 4.5   CALCIUM  --  7.6* 7.6* 7.7* 7.6* 7.7*   MG  --   --   --  1.80  --   --    CL  --  110* 112* 109* 109* 111*   CO2  --  22* 23 25 22* 23   BUN  --  28.5* 31.0* 25.9* 25.5 26.9*   CREATININE  --  1.58* 1.48* 1.25 1.24 1.22   EGFRNORACEVR  --  49 53 >60 >60 >60   GLU  --  75* 81* 73* 68* 82   BILITOT  --  1.0  --  0.9 0.8 0.7   ALKPHOS  --  2,208*  --  1,943* 1,943* 1,939*   ALT  --  37  --  28 25 26   AST  --  66*  --  42 36 39   ALBUMIN  --  1.7*  --  1.7* 1.6* 1.8*   WBC 4.16* 3.80*  --  3.20* 3.07* 3.41*   HGB 7.8* 7.5*  --  7.2* 7.3* 7.6*   HCT 24.5* 23.1*  --  23.5* 22.9* 24.6*     Nutrition Orders:  Diet Adult Regular Standard Tray  Dietary nutrition supplements Daily; Boost Plus Nutritional Drink - Very Vanilla, Boost Very High Calorie Nutritional Drink - Chocolate    Appetite/Oral Intake: fair/25-50% of meals  Factors Affecting  Nutritional Intake: decreased appetite  Social Needs Impacting Access to Food: unable to assess at this time; will attempt on follow-up  Food/Synagogue/Cultural Preferences: unable to obtain  Food Allergies: no known food allergies  Last Bowel Movement: 07/08/25  Wound(s):  none reported    Comments    7/9/25 -- Pt with covers over his head, declines to remove them; reports fair appetite, 0% of breakfast meal this am; meal intake fluctuating over the last 2 weeks at prior facility, averaging < 50%; denies n/v or abdominal pain, no diarrhea/constipation reported; Pt unsure of UBW, UBW per -160 lb; new bed weight this visit 126 lb, ? Accuracy of bed scale; significant change over the last month per EMR weight history (156 lb --> 148 lb) indicating at least 5% wt loss in last month, will continue to monitor; pt agreeable to continue Boost in vanilla or chocolate flavor; Pt may benefit from appetite stimulant as medically appropriate as well as daily MVI; H/H (L) - Fe supplementation ordered per MD    Anthropometrics    Height: 6' (182.9 cm), Height Method: Stated  Last Weight: 57.5 kg (126 lb 11.2 oz) (07/09/25 1246), Weight Method: Bed Scale  BMI (Calculated): 17.2  BMI Classification: underweight (BMI less than 18.5)        Ideal Body Weight (IBW), Male: 178 lb     % Ideal Body Weight, Male (lb): 83.15 %                          Usual Weight Provided By: EMR weight history and unable to obtain usual weight    Wt Readings from Last 5 Encounters:   07/09/25 57.5 kg (126 lb 11.2 oz)   06/21/25 71 kg (156 lb 8.4 oz)   02/28/25 70.3 kg (155 lb)   02/27/25 71.8 kg (158 lb 4.6 oz)   01/07/25 73.5 kg (161 lb 14.9 oz)     Weight Change(s) Since Admission: discrepancy, new bed weight vs documented admit weight  Wt Readings from Last 1 Encounters:   07/09/25 1246 57.5 kg (126 lb 11.2 oz)   07/08/25 2000 67.1 kg (148 lb)   Admit Weight: 67.1 kg (148 lb) (07/08/25 2000), Weight Method: Bed Scale    Estimated  Needs    Weight Used For Calorie Calculations: 57.5 kg (126 lb 12.2 oz)  Energy Calorie Requirements (kcal): 1405-9085 kcal (32 - 35 kcal/kg)  Energy Need Method: Kcal/kg  Weight Used For Protein Calculations: 57.5 kg (126 lb 12.2 oz)  Protein Requirements: 69-80 gm (1.2-1.4 gm/kg)  Fluid Requirements (mL): 1840 - 2015 ml (1ml/kcal)        Enteral Nutrition     Patient not receiving enteral nutrition at this time.    Parenteral Nutrition     Patient not receiving parenteral nutrition support at this time.    Evaluation of Received Nutrient Intake    Calories: not meeting estimated needs  Protein: not meeting estimated needs    Patient Education     Not applicable.    Nutrition Diagnosis     PES: Inadequate oral intake related to acute illness as evidenced by < 75% nutrition intake > 7 days. (new)     PES: Moderate acute disease or injury related malnutrition Related to HIV/AIDS As Evidenced by:  - weight loss: 5% in 1 month - energy intake: <= 50% for 14 days (meets criteria for <= 50% >= 5 days (severe - acute)) active    Nutrition Interventions     Intervention(s): general/healthful diet, commercial beverage, multivitamin/mineral supplement therapy, and collaboration with other providers  Intervention(s): Vitamin and mineral supplement therapy, Collaboration and referral of nutrition care, Medical food supplement therapy, General healthful diet    Goal: Meet greater than 80% of nutritional needs by follow-up. (new)  Goal: Consume % of oral supplements by follow-up. (new)    Nutrition Goals & Monitoring     Dietitian will monitor: food and beverage intake and weight  Discharge planning: continue regular diet with boost plus or equivalent oral supplements  Nutrition Risk/Follow-Up: patient at increased nutrition risk; dietitian will follow-up twice weekly   Please consult if re-assessment needed sooner.

## 2025-07-09 NOTE — PLAN OF CARE
07/09/25 1457   Discharge Assessment   Assessment Type Discharge Planning Assessment   Confirmed/corrected address, phone number and insurance Yes   Confirmed Demographics Correct on Facesheet   Source of Information family;health record   Communicated DAVID with patient/caregiver Date not available/Unable to determine   People in Home alone   Facility Arrived From: [facility]   Do you expect to return to your current living situation? No   Do you have help at home or someone to help you manage your care at home? No   Prior to hospitilization cognitive status: Unable to Assess;Alert/Oriented   Walking or Climbing Stairs Difficulty no   Dressing/Bathing Difficulty no   Home Accessibility wheelchair accessible   Home Layout Able to live on 1st floor   Equipment Currently Used at Home none   Readmission within 30 days? Yes   Patient currently being followed by outpatient case management? No   Do you currently have service(s) that help you manage your care at home? No   Do you take prescription medications? Yes   Do you have prescription coverage? Yes   Do you have any problems affording any of your prescribed medications? No   Is the patient taking medications as prescribed? yes   How do you get to doctors appointments? family or friend will provide   Are you on dialysis? No   Do you take coumadin? No   Discharge Plan A New Nursing Home placement - snf care facility   DME Needed Upon Discharge  none   Discharge Plan discussed with: Patient;Sibling   Name(s) and Number(s) Shelbi, , P: [phone]   Transition of Care Barriers Social     Patient is a transfer from [facility] for long term NH placement.

## 2025-07-10 PROBLEM — D64.9 ANEMIA: Status: ACTIVE | Noted: 2025-07-10

## 2025-07-10 PROBLEM — B20 HIV ENCEPHALOPATHY: Status: RESOLVED | Noted: 2025-07-10 | Resolved: 2025-07-10

## 2025-07-10 PROBLEM — B20 HIV ENCEPHALOPATHY: Status: ACTIVE | Noted: 2025-07-10

## 2025-07-10 PROBLEM — G93.49 HIV ENCEPHALOPATHY: Status: RESOLVED | Noted: 2025-07-10 | Resolved: 2025-07-10

## 2025-07-10 PROBLEM — G93.49 HIV ENCEPHALOPATHY: Status: ACTIVE | Noted: 2025-07-10

## 2025-07-10 LAB
ALBUMIN SERPL-MCNC: 1.8 G/DL (ref 3.4–4.8)
ALBUMIN/GLOB SERPL: 0.3 RATIO (ref 1.1–2)
ALP SERPL-CCNC: 1688 UNIT/L (ref 40–150)
ALP SERPL-CCNC: 1737 UNIT/L (ref 40–150)
ALT SERPL-CCNC: 19 UNIT/L (ref 0–55)
ANION GAP SERPL CALC-SCNC: 6 MEQ/L
AST SERPL-CCNC: 27 UNIT/L (ref 11–45)
BASOPHILS # BLD AUTO: 0.02 X10(3)/MCL
BASOPHILS NFR BLD AUTO: 0.6 %
BILIRUB SERPL-MCNC: 0.7 MG/DL
BUN SERPL-MCNC: 27.2 MG/DL (ref 8.4–25.7)
CALCIUM SERPL-MCNC: 8 MG/DL (ref 8.8–10)
CHLORIDE SERPL-SCNC: 113 MMOL/L (ref 98–107)
CO2 SERPL-SCNC: 24 MMOL/L (ref 23–31)
COLOR STL: ABNORMAL
CONSISTENCY STL: ABNORMAL
CREAT SERPL-MCNC: 1.44 MG/DL (ref 0.72–1.25)
CREAT/UREA NIT SERPL: 19
CRYPTOC AG SER QL IA.RAPID: NEGATIVE
EOSINOPHIL # BLD AUTO: 0.07 X10(3)/MCL (ref 0–0.9)
EOSINOPHIL NFR BLD AUTO: 2 %
ERYTHROCYTE [DISTWIDTH] IN BLOOD BY AUTOMATED COUNT: 16.1 % (ref 11.5–17)
GFR SERPLBLD CREATININE-BSD FMLA CKD-EPI: 55 ML/MIN/1.73/M2
GGT SERPL-CCNC: 1303 U/L (ref 12–64)
GLOBULIN SER-MCNC: 6.5 GM/DL (ref 2.4–3.5)
GLUCOSE SERPL-MCNC: 69 MG/DL (ref 82–115)
HAV IGM SERPL QL IA: NONREACTIVE
HBV CORE IGM SERPL QL IA: NONREACTIVE
HBV SURFACE AG SERPL QL IA: NONREACTIVE
HCT VFR BLD AUTO: 24 % (ref 42–52)
HCV AB SERPL QL IA: NONREACTIVE
HEMOCCULT SP2 STL QL: POSITIVE
HGB BLD-MCNC: 7.4 G/DL (ref 14–18)
HOLD SPECIMEN: NORMAL
IMM GRANULOCYTES # BLD AUTO: 0.09 X10(3)/MCL (ref 0–0.04)
IMM GRANULOCYTES NFR BLD AUTO: 2.6 %
LYMPHOCYTES # BLD AUTO: 0.36 X10(3)/MCL (ref 0.6–4.6)
LYMPHOCYTES NFR BLD AUTO: 10.5 %
MCH RBC QN AUTO: 27.7 PG (ref 27–31)
MCHC RBC AUTO-ENTMCNC: 30.8 G/DL (ref 33–36)
MCV RBC AUTO: 89.9 FL (ref 80–94)
MONOCYTES # BLD AUTO: 0.31 X10(3)/MCL (ref 0.1–1.3)
MONOCYTES NFR BLD AUTO: 9.1 %
NEUTROPHILS # BLD AUTO: 2.57 X10(3)/MCL (ref 2.1–9.2)
NEUTROPHILS NFR BLD AUTO: 75.2 %
NRBC BLD AUTO-RTO: 0 %
PLATELET # BLD AUTO: 329 X10(3)/MCL (ref 130–400)
PMV BLD AUTO: 10 FL (ref 7.4–10.4)
POTASSIUM SERPL-SCNC: 4.4 MMOL/L (ref 3.5–5.1)
PROT SERPL-MCNC: 8.3 GM/DL (ref 5.8–7.6)
RBC # BLD AUTO: 2.67 X10(6)/MCL (ref 4.7–6.1)
SODIUM SERPL-SCNC: 143 MMOL/L (ref 136–145)
WBC # BLD AUTO: 3.42 X10(3)/MCL (ref 4.5–11.5)

## 2025-07-10 PROCEDURE — 25000003 PHARM REV CODE 250: Performed by: INTERNAL MEDICINE

## 2025-07-10 PROCEDURE — 36415 COLL VENOUS BLD VENIPUNCTURE: CPT | Performed by: FAMILY MEDICINE

## 2025-07-10 PROCEDURE — 80053 COMPREHEN METABOLIC PANEL: CPT | Performed by: FAMILY MEDICINE

## 2025-07-10 PROCEDURE — 25000003 PHARM REV CODE 250: Performed by: FAMILY MEDICINE

## 2025-07-10 PROCEDURE — 87661 TRICHOMONAS VAGINALIS AMPLIF: CPT | Performed by: NURSE PRACTITIONER

## 2025-07-10 PROCEDURE — 11000001 HC ACUTE MED/SURG PRIVATE ROOM

## 2025-07-10 PROCEDURE — 80074 ACUTE HEPATITIS PANEL: CPT | Performed by: NURSE PRACTITIONER

## 2025-07-10 PROCEDURE — 85025 COMPLETE CBC W/AUTO DIFF WBC: CPT | Performed by: FAMILY MEDICINE

## 2025-07-10 PROCEDURE — 63600175 PHARM REV CODE 636 W HCPCS: Performed by: INTERNAL MEDICINE

## 2025-07-10 PROCEDURE — 21400001 HC TELEMETRY ROOM

## 2025-07-10 PROCEDURE — 87449 NOS EACH ORGANISM AG IA: CPT | Performed by: NURSE PRACTITIONER

## 2025-07-10 PROCEDURE — 87899 AGENT NOS ASSAY W/OPTIC: CPT | Performed by: NURSE PRACTITIONER

## 2025-07-10 PROCEDURE — 82272 OCCULT BLD FECES 1-3 TESTS: CPT | Performed by: INTERNAL MEDICINE

## 2025-07-10 PROCEDURE — 84075 ASSAY ALKALINE PHOSPHATASE: CPT | Performed by: FAMILY MEDICINE

## 2025-07-10 PROCEDURE — 82977 ASSAY OF GGT: CPT | Performed by: FAMILY MEDICINE

## 2025-07-10 PROCEDURE — 63700000 PHARM REV CODE 250 ALT 637 W/O HCPCS: Performed by: INTERNAL MEDICINE

## 2025-07-10 RX ORDER — SODIUM CHLORIDE 9 MG/ML
INJECTION, SOLUTION INTRAVENOUS CONTINUOUS
Status: DISCONTINUED | OUTPATIENT
Start: 2025-07-10 | End: 2025-07-10

## 2025-07-10 RX ORDER — DARUNAVIR 800 MG/1
800 TABLET, FILM COATED ORAL
Status: DISCONTINUED | OUTPATIENT
Start: 2025-07-11 | End: 2025-07-15 | Stop reason: HOSPADM

## 2025-07-10 RX ORDER — RITONAVIR 100 MG/1
100 TABLET ORAL DAILY
Status: DISCONTINUED | OUTPATIENT
Start: 2025-07-11 | End: 2025-07-15 | Stop reason: HOSPADM

## 2025-07-10 RX ORDER — ABACAVIR 300 MG/1
300 TABLET ORAL DAILY
Status: DISCONTINUED | OUTPATIENT
Start: 2025-07-11 | End: 2025-07-15 | Stop reason: HOSPADM

## 2025-07-10 RX ORDER — RIFABUTIN 150 MG/1
150 CAPSULE ORAL DAILY
Status: DISCONTINUED | OUTPATIENT
Start: 2025-07-11 | End: 2025-07-15 | Stop reason: HOSPADM

## 2025-07-10 RX ADMIN — DOLUTEGRAVIR SODIUM 50 MG: 50 TABLET, FILM COATED ORAL at 08:07

## 2025-07-10 RX ADMIN — FLUCONAZOLE 200 MG: 100 TABLET ORAL at 08:07

## 2025-07-10 RX ADMIN — SODIUM BICARBONATE 650 MG TABLET 650 MG: at 08:07

## 2025-07-10 RX ADMIN — SULFAMETHOXAZOLE AND TRIMETHOPRIM 1 TABLET: 800; 160 TABLET ORAL at 08:07

## 2025-07-10 RX ADMIN — ETHAMBUTOL HYDROCHLORIDE 800 MG: 400 TABLET ORAL at 08:07

## 2025-07-10 RX ADMIN — SODIUM CHLORIDE: 9 INJECTION, SOLUTION INTRAVENOUS at 08:07

## 2025-07-10 RX ADMIN — FERROUS SULFATE TAB 325 MG (65 MG ELEMENTAL FE) 1 EACH: 325 (65 FE) TAB at 08:07

## 2025-07-10 RX ADMIN — PANTOPRAZOLE 40 MG: 40 TABLET, DELAYED RELEASE ORAL at 08:07

## 2025-07-10 RX ADMIN — RIFAMPIN 300 MG: 300 CAPSULE ORAL at 08:07

## 2025-07-10 RX ADMIN — ENOXAPARIN SODIUM 40 MG: 40 INJECTION SUBCUTANEOUS at 04:07

## 2025-07-10 RX ADMIN — EMTRICITABINE AND TENOFOVIR ALAFENAMIDE 1 TABLET: 200; 25 TABLET ORAL at 08:07

## 2025-07-10 RX ADMIN — SODIUM CHLORIDE: 9 INJECTION, SOLUTION INTRAVENOUS at 04:07

## 2025-07-10 RX ADMIN — AZITHROMYCIN DIHYDRATE 500 MG: 250 TABLET, FILM COATED ORAL at 08:07

## 2025-07-10 NOTE — PROGRESS NOTES
07/10/25 0943   General   OT Attempted Eval Date 07/10/25   OT Attempted Eval Time 0936   OT Date of Treatment 07/10/25   OT Start Time 0936   OT Stop Time 0943   OT Total Time (min) 7 min   Assessment   OT Follow-up? Yes  (pt refused eval attempt, OT will f/u as schedule allows)

## 2025-07-10 NOTE — PROGRESS NOTES
[facility] Progress Note     Resident Team: [facility] Medicine List 2  Attending:  Resident:  Intern:    Subjective:      Brief HPI:  Anne Terry is a 65 y.o. male who with a history of HIV/aids, MAC, history of disseminated histoplasmosis, history ofPJP and cryptococcal meningitis  who presented to [facility] ED after he was found walking around naked and confused on 6/21/2025  with a primary complaint of Failure to Thrive. He was transferred to [facility] on 6/21/2025 for subspecialty consultation with ID given history.  Per the patient on admission,  he lived by himself in [facility]  but is unable to explain why he went to the emergency room or why he is in the hospital now.  He had not been compliant with his home medications.  He was recently diagnosed with oral thrush.  At the time of hospitalization at [facility] he was seen by infectious disease and started on azithromycin, Bactrim, ethambutol, rifampin, and Diflucan.  His last CD4 count PTA at [facility] was 37.  MRI brain and LP were done at [facility].  CSF routine culture and gram stain were negative  and MRI of brain was significant only for microvascular ischemic changes.  CD4 on this most recent admission was 21/7%,  with viral load 114,288. He has a history of nonadherence to his medications.  He has missed multiple appointments for follow up at ID clinic at [facility].  He has elevation of alkaline phosphatase which was suspected to be due to infiltrated liver disease from HIV and AIDS per ID consultant at [facility].  He has been consuming less than 50% of his meals at [facility] and has lost substantial weight.  He currently denies any fever, chills, sweats, shortness for breath, chest pain, nausea/vomiting/diarrhea, dysuria, new rashes.   .    Hospital Course/Significant events:  He refused his breakfast yesterday am and his morning meds. At lunch and dinner, he did eat and he did take his meds. He declined OT/PT yesterday. Patient has no new concerns but due to poor oral intake, his  creatinine increased this am.     Interval History:   Nurse reports of no overnight events. He only 25 % of his dinner and had his bath this am.    Review of Systems:  ROS See HPI      Objective:     Last 24 Hour Vital Signs:  BP  Min: 127/69  Max: 157/90  Temp  Av.9 °F (36.6 °C)  Min: 97.6 °F (36.4 °C)  Max: 98.4 °F (36.9 °C)  Pulse  Av.6  Min: 66  Max: 87  Resp  Av.8  Min: 17  Max: 18  SpO2  Av.6 %  Min: 95 %  Max: 100 %  Weight  Av.5 kg (126 lb 11.2 oz)  Min: 57.5 kg (126 lb 11.2 oz)  Max: 57.5 kg (126 lb 11.2 oz)  I/O last 3 completed shifts:  In: 0   Out: 700 [Urine:700]    Physical Examination:  Physical Exam  Constitutional:       General: He is not in acute distress.     Appearance: He is not ill-appearing, toxic-appearing or diaphoretic.   HENT:      Mouth/Throat:      Comments: No visible residual thrush . Mucous membranes slightly dry.   Cardiovascular:      Rate and Rhythm: Normal rate and regular rhythm.      Heart sounds:      No friction rub. No gallop.   Pulmonary:      Effort: Pulmonary effort is normal. No respiratory distress.      Breath sounds: No stridor. No wheezing, rhonchi or rales.   Skin:     Findings: No rash.      Comments: Dry    Neurological:      Mental Status: He is alert.      Comments: Knows he is in a hospital but does not know which one             Laboratory:  Most Recent Data:  CBC:   Lab Results   Component Value Date    WBC 3.42 (L) 07/10/2025    HGB 7.4 (L) 07/10/2025    HCT 24.0 (L) 07/10/2025     07/10/2025    MCV 89.9 07/10/2025    RDW 16.1 07/10/2025     WBC Differential:   Recent Labs   Lab 25  0441 25  0439 25  0449 25  1934 07/10/25  0515   WBC 3.80* 3.20* 3.07* 3.41* 3.42*   HGB 7.5* 7.2* 7.3* 7.6* 7.4*   HCT 23.1* 23.5* 22.9* 24.6* 24.0*    240 253 299 329   MCV 86.2 88.7 87.1 89.8 89.9     BMP:   Lab Results   Component Value Date     07/10/2025    K 4.4 07/10/2025     (H) 07/10/2025    CO2  "24 07/10/2025    BUN 27.2 (H) 07/10/2025    CREATININE 1.44 (H) 07/10/2025    GLU 69 (L) 07/10/2025    CALCIUM 8.0 (L) 07/10/2025    MG 1.80 07/06/2025    PHOS 3.4 06/21/2025     LFTs:   Lab Results   Component Value Date    PROT 8.3 (H) 07/10/2025    ALBUMIN 1.8 (L) 07/10/2025    BILITOT 0.7 07/10/2025    AST 27 07/10/2025    ALKPHOS 1,688 (H) 07/10/2025    ALT 19 07/10/2025     Coags:   Lab Results   Component Value Date    INR 1.0 06/25/2025    PROTIME 13.2 06/25/2025     FLP:   Lab Results   Component Value Date    CHOL 162 01/17/2025    HDL 25 (L) 01/17/2025    TRIG 300 (H) 01/17/2025     DM:   Lab Results   Component Value Date    CREATININE 1.44 (H) 07/10/2025     Thyroid:   Lab Results   Component Value Date    TSH 4.120 01/17/2025      Anemia:   Lab Results   Component Value Date    IRON 24 (L) 06/29/2025    TIBC 105 (L) 06/29/2025    FERRITIN 1,767.27 (H) 06/29/2025       Lab Results   Component Value Date    HMMEZKRX04 224 11/19/2024       Lab Results   Component Value Date    FOLATE 14.2 11/19/2024        Cardiac: No results found for: "TROPONINI", "CKTOTAL", "CKMB", "BNP"      Microbiology Data:  Microbiology Results (last 7 days)       ** No results found for the last 168 hours. **     6/27/2025 CSF Crypto Ag negative , CSF AFB Negative CSF Routine Culture no growth at 5 days   6/27/2025 Blood culture negative Negative x 2 Fungal Culture and Nocardia/ Mycobacteria culture pending         Other Results:  EKG : see results below       Vent. Rate :  53 BPM     Atrial Rate :    BPM     P-R Int :    ms          QRS Dur :  76 ms      QT Int : 480 ms       P-R-T Axes :    270  85 degrees    QTcB Int : 450 ms    Junctional rhythm  Indeterminate axis  ST and T wave abnormality, consider inferior ischemia  Abnormal ECG  No previous ECGs available  Confirmed by Sharyn Eastman ([id number]) on 6/22/2025 3:12:08 PM     Radiology:      Current Medications:     Infusions:   0.9% NaCl   Intravenous Continuous            " Scheduled:   azithromycin  500 mg Oral Daily    dolutegravir  50 mg Oral Daily    emtricitabine-tenofovir alafen  1 tablet Oral Daily    enoxparin  40 mg Subcutaneous Q24H (prophylaxis, 1700)    ethambutoL  800 mg Oral Daily    ferrous sulfate  1 tablet Oral Daily    fluconazole  200 mg Oral Daily    pantoprazole  40 mg Oral Daily    rifAMpin  300 mg Oral Daily    sodium bicarbonate  650 mg Oral Daily    sulfamethoxazole-trimethoprim 800-160mg  1 tablet Oral Daily        PRN:    Current Facility-Administered Medications:     acetaminophen, 650 mg, Oral, Q6H PRN    dextrose 50%, 12.5 g, Intravenous, PRN    dextrose 50%, 25 g, Intravenous, PRN    glucagon (human recombinant), 1 mg, Intramuscular, PRN    glucose, 16 g, Oral, PRN    glucose, 24 g, Oral, PRN    naloxone, 0.02 mg, Intravenous, PRN    ondansetron, 4 mg, Intravenous, Q4H PRN    sodium chloride 0.9%, 10 mL, Intravenous, PRN        Assessment & Plan:     HIV/AIDS -CD4 21/7%, viral load 114,288 with opportunistic infection        History of cryptococcal meningitis        History of PJP  Continue antiviral regimen including dolutegravir 50 mg daily and emtricitabine -tenofovir alafen 200-25 mg 1 tablet daily and PJP prophylaxis     History of disseminated MAC, now with lung mass  Continue ethambutol 800 mg daily, rifampin 300 mg daily azithromycin 500 mg daily     TAYLOR- due to poor oral intake   NS at 125 ml/hr . Repeat CMP in am    Severe anemia acute on chronic with anemia of chronic disease  Iron-deficiency anemia  Continue iron supplementation; repeat CBC 7/12/2025  Consider transfusion if hemoglobin less than 7    Severe malnutrition   Appreciate nutritional consult; consider appetite stimulant     Elevated alkaline phosphatase-suspect infiltrative disease   Improved today ; Consider GI consult regarding elevated alkaline phosphatase;  repeat tomorrow    Oral thrush-treated   Continue fluconazole 200 mg daily     Acute metabolic encephalopathy-resolved per  transferring team ; Moderate cognitive impairment with concern for dementia   Continue to monitor; awaiting placement in LTC facility     Unable to care for himself/ Unsafe for discharge  Awaiting placement in LTC facility     History of hyponatremia   Continue sodium bicarbonate as prescribed and monitor electrolytes       CODE STATUS: DNR (Do Not Resuscitate)  Access: Peripheral  Antibiotics: Bactrim , Rifampin,  Ethambutol, Azithromycin   Diet: Regular  DVT Prophylaxis: Lovenox- VTE prophylaxis enoxaparin 40 mg daily and serial compression devices   GI Prophylaxis: proton pump inhibitor per orders-Continue pantoprazole   Fluids: normal saline Normal Saline at 125 ml/hr started 7/10/2025 .     Disposition: day 3 of admission for  Failure to thrive     Patient's sister, Shelbi, P: [phone], who lives in [place],stated that [facility] is interested in patient.   Rudolph with intake at [facility], P: [phone],  requested that referral be re-submitted.   Referral has been sent via Epic. CM will follow.  Mely Holly MD  [facility] Family Medicine Staff - Long Term Care Service   ADDENDUM 1052 PM Appreciate consult from ID. New medication recommendations noted. Orders entered for new regimen to start tomorrow and PM nurse notified of changes. Will stop NS after current liter complete due to elevated BP.

## 2025-07-10 NOTE — CONSULTS
[facility]   Inpatient Infectious Diseases Consultation    Physician requesting consultation: Mely Nick MD  Service requesting consultation: Family Medicine  Reason for consultation: Advanced HIV disease / failure to thrive     Historian: Patient and Electronic Medical Record    Isolation Status: No active isolations     HPI:     Anne is a 65 yr old BM with past medical history that is significant for hypertension, chronic kidney disease, advanced HIV disease/AIDS with complex resistance (CD4 21 (7%) with viral load 114,288) (diagnosed 2015), disseminated MAC, history of disseminated cryptococcal disease (2015), history disseminated histoplasmosis (2015), history of fungal colitis (2015),  history of PJP, lung mass, and noncompliance who was transferred to [facility] from [facility] as he is awaiting additional placement.  He originally presented to the emergency room at [facility] on 06/21/2025 for complaints of confusion, walking around naked, and failure to thrive.  Patient had not been taking his medication and had missed multiple clinic visits.  CT and MRI of head were done which were negative for any acute findings.  Patient underwent LP 06/27/2025 which showed 0 nucleated cells, glucose 33 and elevated protein of 77.  Cryptococcal antigen CSF negative. CSF culture negative.  LEAH virus CSF negative.  ME panel negative.  Neurosyphilis IgG negative CSF.  AFB smear CSF negative.  CSF pathology was negative for malignancy.  Fungal and mycobacterial / nocardia CSF cultures are pending.  Blood cultures x2 NGTD.  Cryptococcal antigen Patient is noted to be leukopenic; WBC 3.07.  He is also anemic; hemoglobin 7.3/hematocrit 22.9.  Alkaline phosphatase is elevated; 1943.  According to chart review, patient had been off ART for a while.  Previous ART regimen consisted of Truvada, Abacavir, Darunavir, and Ritonavir, along with Bactrim DS for OI prophylaxis.  Resistance patterns / DDI were considered upon ART  formulation.  ART regimen was restarted on previous admission, but was changed to Descovy and Tivicay.  Patient was also previously on disseminated MAC treatment with Azithromycin, Ethambutol, and Rifabutin.  This regimen was also changed to Azithromycin, Ethambutol and Rifampin upon last admission.  ID has been consulted for AIDS/advanced HIV disease and disseminated MAC.  Upon exam, patient is lying in the bed with his head covered with a blanket.  He refuses to participate in exam, or answer questions.  He is very agitated.  It is noted upon chart review that he has been refusing medications.      In regards to HIV disease and chart review, patient was diagnosed with HIV and 2015.  Previously treated with Ziagen, Descovy, Prezista, and Norovir but stopped taking his medications and following up in clinic in 2021 after his wife passed away.  Strong history of medication noncompliance, along with multiple missed clinic visits is documented throughout chart history.  Past HIV genotype history:   3/30/15 HIV GT Magdalena V118I, V179 D/V, R211K, F214L, L33I, L63P, I64V with no resistance to any classes   5/4/15 HIV Phenosense Integrase sensitive  1/6/16 HIV GT Magdalena M184V with resistance to FTC/3TC  1/6/16 Phenosense Integrase resistance to elvitegravir and isentress  3/17/16 Phenosense Integrase Tivicay partially sensitive, resistance to elvitegravir and isentress  6/7/16 HIV GT Magdalena M184V with resistance to FTC/3TC  06/23/2025 GT E138K, G163R with integrase resistance (PLR to Biktegravir / Dolutegravir; LR to Cabotegravir; IR to Elvitegravir / Raltegravir)   Previously noted exposures from past review of history, patient has several dogs, cows, and pigs at home.  He hunts rabbits often and skins them himself.  Patient had denied bird exposure or other farm animal exposure.  He was unemployed, but used to work in insulation.  Patient previously denied MSM or IVDU.  History is obtained for consult mostly from chart review  as patient is noncooperative.      Antibiotic / Antiviral / Antifungal history:  Azithromycin 500 mg p.o. daily - 06/22/2025 to 06/28/2025   Rifampin 300 mg p.o. daily - 06/22/2025 to 06/28/2025  Ethambutol 800 mg p.o. daily - 06/23/2025 to 06/28/2025  Bactrim DS 1 tab p.o. b.i.d. - 06/22/2025 to 06/28/2025  Fluconazole 100 mg p.o. daily - 06/22/2025 to 06/24/2025  Fluconazole 400 mg p.o. daily - 06/24/2025 to 06/28/2025  Bactrim DS 1 tab p.o. daily - 06/29/2025 to present   Fluconazole 200 mg p.o. daily - 06/29/2025 to present  Dolutegravir 50 mg p.o. daily - 07/02/2025 to present   Emtricitabine/TAF 1 tab p.o. daily - 07/02/2025 to present   Azithromycin 500 mg p.o. daily - 07/02/2025 to present  Rifampin 300 mg p.o. daily - 07/02/2025 to present  Ethambutol 800 mg p.o. daily - 07/02/2025 to present      Past Medical History/Past Surgical History/Social History     No past medical history on file.    Past Surgical History:   Procedure Laterality Date    COLONOSCOPY  05/08/2015    MAGNETIC RESONANCE IMAGING N/A 6/27/2025    Procedure: MRI (Magnetic Resonance Imagine);  Surgeon: Dara Horne MD;  Location: [facility];  Service: Medicine;  Laterality: N/A;  MRI WITH ANESTHESIA /   BRAIN - WITH AND WITHOUT CONTRAST        FAMILY HISTORY:  family history includes Cancer in his sister; No Known Problems in his brother, father, and mother.     SOCIAL HISTORY:   Social History     Socioeconomic History    Marital status: Single   Tobacco Use    Smoking status: Never    Smokeless tobacco: Never   Substance and Sexual Activity    Alcohol use: Not Currently    Drug use: Yes     Types: Marijuana    Sexual activity: Not Currently     Partners: Female     Social Drivers of Health     Financial Resource Strain: Patient Declined (7/8/2025)    Overall Financial Resource Strain (CARDIA)     Difficulty of Paying Living Expenses: Patient declined   Recent Concern: Financial Resource Strain - Medium Risk (6/21/2025)    Overall  Financial Resource Strain (CARDIA)     Difficulty of Paying Living Expenses: Somewhat hard   Food Insecurity: Patient Declined (7/8/2025)    Hunger Vital Sign     Worried About Running Out of Food in the Last Year: Patient declined     Ran Out of Food in the Last Year: Patient declined   Recent Concern: Food Insecurity - Food Insecurity Present (6/21/2025)    Hunger Vital Sign     Worried About Running Out of Food in the Last Year: Sometimes true     Ran Out of Food in the Last Year: Sometimes true   Transportation Needs: Patient Declined (7/8/2025)    PRAPARE - Transportation     Lack of Transportation (Medical): Patient declined     Lack of Transportation (Non-Medical): Patient declined   Physical Activity: Inactive (2/28/2025)    Exercise Vital Sign     Days of Exercise per Week: 0 days     Minutes of Exercise per Session: 0 min   Stress: Patient Declined (7/8/2025)    Citizen of [place] of Occupational Health - Occupational Stress Questionnaire     Feeling of Stress : Patient declined   Housing Stability: Patient Declined (7/8/2025)    Housing Stability Vital Sign     Unable to Pay for Housing in the Last Year: Patient declined     Homeless in the Last Year: Patient declined   Recent Concern: Housing Stability - High Risk (6/21/2025)    Housing Stability Vital Sign     Unable to Pay for Housing in the Last Year: Yes     Homeless in the Last Year: No        ALLERGIES: Review of patient's allergies indicates:  No Known Allergies      Review of Systems     Review of Systems   Unable to perform ROS: Other (patient not cooperative and refusing to participate)         MEDICATIONS:   Scheduled Meds:   azithromycin  500 mg Oral Daily    dolutegravir  50 mg Oral Daily    emtricitabine-tenofovir alafen  1 tablet Oral Daily    enoxparin  40 mg Subcutaneous Q24H (prophylaxis, 1700)    ethambutoL  800 mg Oral Daily    ferrous sulfate  1 tablet Oral Daily    fluconazole  200 mg Oral Daily    pantoprazole  40 mg Oral Daily     rifAMpin  300 mg Oral Daily    sodium bicarbonate  650 mg Oral Daily    sulfamethoxazole-trimethoprim 800-160mg  1 tablet Oral Daily     Continuous Infusions:   0.9% NaCl   Intravenous Continuous 125 mL/hr at 07/10/25 0814 New Bag at 07/10/25 0814     PRN Meds:.  Current Facility-Administered Medications:     acetaminophen, 650 mg, Oral, Q6H PRN    dextrose 50%, 12.5 g, Intravenous, PRN    dextrose 50%, 25 g, Intravenous, PRN    glucagon (human recombinant), 1 mg, Intramuscular, PRN    glucose, 16 g, Oral, PRN    glucose, 24 g, Oral, PRN    naloxone, 0.02 mg, Intravenous, PRN    ondansetron, 4 mg, Intravenous, Q4H PRN    sodium chloride 0.9%, 10 mL, Intravenous, PRN    Physical exam:     Temp:  [97.6 °F (36.4 °C)-98.4 °F (36.9 °C)] 97.8 °F (36.6 °C)  Pulse:  [66-87] 87  Resp:  [17-18] 18  SpO2:  [95 %-100 %] 95 %  BP: (127-157)/(69-92) 136/89     (Exam limited due to patient's noncooperative status)   GENERAL:  Cachectic elderly BM who is agitate;  appears chronically ill  HEENT: atraumatic, normocephalic, anicteric, temporal wasting noted  LUNGS: breathing unlabored with symmetric chest rise  HEART:   ABDOMEN: abdomen soft, nondistended  : no CVA tenderness  EXTREMITIES: no obvious edema  SKIN: skin ashy and dry   NEURO: speech fluent and intact, facial symmetry preserved, no tremor noted  PSYCH:  Not cooperative, agitated   LINES: PIV       LABS:     I have personally reviewed patient's labs.  Pertinent results noted below.    CBC  Recent Labs     07/08/25  1934 07/10/25  0515   WBC 3.41* 3.42*   HGB 7.6* 7.4*   HCT 24.6* 24.0*    329       Differential  Recent Labs   Lab 07/10/25  0515   WBC 3.42*   HGB 7.4*   HCT 24.0*           Basic Metabolic Panel  Recent Labs     07/08/25  1934 07/10/25  0515    143   K 4.5 4.4   * 113*   CO2 23 24   BUN 26.9* 27.2*   CREATININE 1.22 1.44*        Hepatic Panel  Lab Results   Component Value Date    ALT 19 07/10/2025    AST 27 07/10/2025    ALKPHOS  1,688 (H) 07/10/2025    BILITOT 0.7 07/10/2025        Urinalysis:  Results for orders placed or performed during the hospital encounter of 06/21/25   Urinalysis, Reflex to Urine Culture Urine, Clean Catch    Specimen: Urine   Result Value Ref Range    Color, UA Yellow Yellow, Light-Yellow, Colorless, Straw, Dark-Yellow    Appearance, UA Clear Clear    Specific Gravity, UA 1.011 1.005 - 1.030    pH, UA 7.0 5.0 - 8.5    Protein, UA Trace (A) Negative    Glucose, UA Normal Negative, Normal    Ketones, UA Negative Negative    Blood, UA Negative Negative    Bilirubin, UA Negative Negative    Urobilinogen, UA Normal 0.2, 1.0, Normal    Nitrites, UA Negative Negative    Leukocyte Esterase, UA Negative Negative    RBC, UA 0-5 None Seen, 0-2, 3-5, 0-5 /HPF    WBC, UA 0-5 None Seen, 0-2, 3-5, 0-5 /HPF    Bacteria, UA Occasional (A) None Seen, Trace /HPF    Squamous Epithelial Cells, UA Trace None Seen, Trace /HPF    Mucous, UA Trace (A) None Seen /LPF       Estimated Creatinine Clearance: 42.7 mL/min (A) (based on SCr of 1.44 mg/dL (H)).     ESR:  Results for orders placed or performed during the hospital encounter of 11/19/24   Sedimentation Rate   Result Value Ref Range    Sed Rate 60 (H) 0 - 15 mm/hr      CRP:  Results for orders placed or performed during the hospital encounter of 11/19/24   C-Reactive Protein   Result Value Ref Range    CRP 16.40 (H) <5.00 mg/L       MICRO AND PATHOLOGY:   Colonization:  11/19/2024 urine culture with E coli (R - Ampicillin)  11/20/2024 blood cultures x2 NGTD   11/20/2024 stool culture negative for Salmonella, Shigella, Campylobacter, Vibrio, Aeromonas, Pleisiomonas,Yersinia, or Shiga Toxin 1 and 2  11/20/2024 sputum culture with yeast   11/21/2024 sputum for AFB smear negative   11/21/2024 sputum culture for mycobacteria and Nocardia with MAC   11/21/2024 mycobacterial blood culture with MAC   11/21/2024 sputum for AFB smear negative   11/21/2024 sputum culture for mycobacteria and  Nocardia with MAC   11/21/2024 sputum culture for mycobacteria and Nocardia with MAC   11/21/2024 sputum culture for mycobacteria and Nocardia with MAC (R - Linezolid; I - Moxifloxacin)   11/21/2024 sputum AFB smear negative   02/28/2025 blood cultures x2 NGTD   02/28/2025 mycobacterial blood culture with MAC   02/28/2025 cryptococcal antigen negative   03/01/2025 left foot wound with negative AFB smear  03/01/2025 left foot fungal wound culture NGTD  03/01/2025 left foot tissue culture with normal skin adriel   03/02/2025 sputum AFB smear negative  03/03/2025 left foot mycobacteria and Nocardia tissue culture negative   06/21/2025 blood cultures x2 NGTD   06/27/2025 fungal CSF culture pending   06/27/2025 CSF culture NGTD   06/27/2025 CSF mycobacteria and Nocardia culture pending   06/27/2025 CSF AFB smear negative   06/27/2025 cryptococcal antigen CSF negative           IMAGING:     I have personally reviewed patient's imaging. Pertinent results noted below.  No orders to display     CT chest/abdomen/pelvis with IV contrast 06/23/2025:   Impression:   1. Multifocal micronodular pattern at the dependent portion of both lungs, similar extents 02/27/2025.  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  2. Stable thoracic lymphadenopathy.  3. Slight interval increase in size and extent of abdominopelvic lymphadenopathy.      MRI brain with and without contrast 06/27/2025:   Impression:  1. No acute intracranial abnormality.  2. Mild chronic microvascular ischemic changes.      IMPRESSION     Advanced HIV disease/AIDS with complex resistance   Failure to thrive  / cachexia  Encephalopathy  S/P lumbar puncture 06/27/2025  Disseminated MAC  Lung Mass   History of disseminated cryptococcal disease   History of disseminated histoplasmosis  Oral candidiasis  Leukopenia  Anemia  TAYLOR  Elevated alkaline phosphatase  Noncompliance       RECOMMENDATIONS:      Recommend AFB smears x 3, along with collection of  mycobacteria/Nocardia cultures x3.  Please induced patient is if he is unable to expectorate.  Collection needs to be timed at least 8 hours apart.  Patient does not need isolation as this is looking for MAC.  For MAC treatment, would continue Azithromycin 500 mg po daily + Ethambutol 15 mg / kg po daily.  D/C Rifampin.  Add Rifabutin 150 mg po daily (dose reduced due to co-administration of ART).    Patient will need short follow-up for ophthalmology exam due to Ethambutol use  For ART, recommend to continue Truvada 1 tab p.o. daily + Darunavir 800 mg p.o. daily + Ritonavir 100 mg p.o. daily + Abacavir 300 mg po daily for ART, along with Bactrim DS 1 tab po daily for OI prophylaxis    D/C   Continue Fluconazole 200 mg po daily for history of disseminated histoplasmosis/cryptococcus  Please check EKG to assess QTC as prolonged treatment with Fluconazole can prolong QTC   Will defer workup of elevated alkaline phosphatase to primary team.  Noted Rifampin can also increase Alk phos   Renal protective measures   Encouraged nutrition  ID has added the following testing: Cryptococcal antigen, syphilis testing, urine Histoplasma/Blastomyces antigen, urine gonorrhea/chlamydia/Trichomonas, oral gonorrhea/chlamydia, and mycobacterial blood culture  Strongly stress compliance  Thank you for the consult. We will follow along with you. Please do not hesitate to call with any questions or concerns.         Sara Freeman, GORDOP-C  [facility] Infectious Diseases     *Portions of this note dictated using EMR integrated voice recognition software, and may be subject to voice recognition errors not corrected at proofreading. Please contact writer for clarification if needed. *

## 2025-07-10 NOTE — PLAN OF CARE
Problem: Adult Inpatient Plan of Care  Goal: Plan of Care Review  Outcome: Progressing  Flowsheets (Taken 7/10/2025 0342)  Plan of Care Reviewed With: patient  Goal: Patient-Specific Goal (Individualized)  Outcome: Progressing  Flowsheets (Taken 7/10/2025 0342)  Individualized Care Needs: pt safety  Anxieties, Fears or Concerns: none voiced  Patient/Family-Specific Goals (Include Timeframe): none voiced  Goal: Absence of Hospital-Acquired Illness or Injury  Outcome: Progressing  Goal: Optimal Comfort and Wellbeing  Outcome: Progressing  Goal: Readiness for Transition of Care  Outcome: Progressing     Problem: Wound  Goal: Optimal Coping  Outcome: Progressing  Goal: Optimal Functional Ability  Outcome: Progressing  Goal: Absence of Infection Signs and Symptoms  Outcome: Progressing  Goal: Improved Oral Intake  Outcome: Progressing  Goal: Optimal Pain Control and Function  Outcome: Progressing  Goal: Skin Health and Integrity  Outcome: Progressing  Goal: Optimal Wound Healing  Outcome: Progressing     Problem: Skin Injury Risk Increased  Goal: Skin Health and Integrity  Outcome: Progressing

## 2025-07-10 NOTE — PROGRESS NOTES
Physical Therapy    Missed Treatment Session - patient unwilling to participate note - 1359 - 07/10/2025    Patient Name:  Anne Terry   MRN:  [id number]      -patient not seen at this time secondary to patient unwilling to participate.  -multiple attempts unsuccessful  -patient shaking head side to side...'NO'  -patients nurse Keyanni notified  -will follow-up as patient is appropriate/available/agreeable to participate and as therapists' schedule allows.

## 2025-07-10 NOTE — PT/OT/SLP PROGRESS
"Physical Therapy    Missed Treatment Session - patient unwilling to participate note - 07/10/2025    Patient Name:  Anne Terry   MRN:  [id number]      Patient not seen at this time secondary to Patient unwilling to participate.    Pt initially said yes to getting up, but once therapy tried to move the blankets pt became agitated. PT asked again if he was ok with getting up and he shook his head yes. PT/OT waited about 1 min, but pt never initiated movement. PT asked pt again if he was going to get up, to which he yelled back "Lady, I told you I was going to get up!". OT told him we could wait a few more minutes to allow him time to get up. Therapy waited 3 more minutes then asked the pt again if there was something specific he was waiting on in order to start getting up. Pt then yelled "yeah, for yall to get out of my damn room!"     Bed rail was placed back up, bed alarm turned on and door left open as therapy left the room.     Will follow-up as patient is appropriate/available/agreeable to participate and as therapists' schedule allows.  "

## 2025-07-10 NOTE — PLAN OF CARE
Spoke to Maral in admissions at [facility], P: [phone], who stated that patient is accepted pending financial clearance. Maral stated that she left a VM for sister, Shelbi, P: [phone], and will attempt to reach her again today. Shelbi told CM yesterday that patient did not use a bank, receives paper SS checks, cashes them at check cashing businesses, and that she has his July check at her home. CM will continue to follow.

## 2025-07-11 PROBLEM — R41.89 MODERATE COGNITIVE IMPAIRMENT: Status: ACTIVE | Noted: 2025-07-11

## 2025-07-11 PROBLEM — D63.8 ANEMIA, CHRONIC DISEASE: Status: ACTIVE | Noted: 2025-07-10

## 2025-07-11 PROBLEM — R74.8 ELEVATED ALKALINE PHOSPHATASE LEVEL: Status: ACTIVE | Noted: 2025-07-11

## 2025-07-11 LAB
ALBUMIN SERPL-MCNC: 1.7 G/DL (ref 3.4–4.8)
ALBUMIN SERPL-MCNC: 1.8 G/DL (ref 3.4–4.8)
ALBUMIN/GLOB SERPL: 0.3 RATIO (ref 1.1–2)
ALBUMIN/GLOB SERPL: 0.3 RATIO (ref 1.1–2)
ALP SERPL-CCNC: 1735 UNIT/L (ref 40–150)
ALP SERPL-CCNC: 2022 UNIT/L (ref 40–150)
ALT SERPL-CCNC: 21 UNIT/L (ref 0–55)
ALT SERPL-CCNC: 24 UNIT/L (ref 0–55)
AMMONIA PLAS-MSCNC: 29.9 UMOL/L (ref 18–72)
ANION GAP SERPL CALC-SCNC: 5 MEQ/L
ANION GAP SERPL CALC-SCNC: 7 MEQ/L
AST SERPL-CCNC: 30 UNIT/L (ref 11–45)
AST SERPL-CCNC: 41 UNIT/L (ref 11–45)
BACTERIA #/AREA URNS AUTO: ABNORMAL /HPF
BASOPHILS # BLD AUTO: 0.01 X10(3)/MCL
BASOPHILS # BLD AUTO: 0.02 X10(3)/MCL
BASOPHILS NFR BLD AUTO: 0.4 %
BASOPHILS NFR BLD AUTO: 0.4 %
BILIRUB SERPL-MCNC: 0.6 MG/DL
BILIRUB SERPL-MCNC: 0.9 MG/DL
BILIRUB UR QL STRIP.AUTO: NEGATIVE
BUN SERPL-MCNC: 25.7 MG/DL (ref 8.4–25.7)
BUN SERPL-MCNC: 26.5 MG/DL (ref 8.4–25.7)
C TRACH RRNA UR QL NAA+PROBE: NOT DETECTED
CALCIUM SERPL-MCNC: 7.7 MG/DL (ref 8.8–10)
CALCIUM SERPL-MCNC: 8 MG/DL (ref 8.8–10)
CHLORIDE SERPL-SCNC: 112 MMOL/L (ref 98–107)
CHLORIDE SERPL-SCNC: 113 MMOL/L (ref 98–107)
CK SERPL-CCNC: 17 U/L (ref 30–200)
CLARITY UR: CLEAR
CO2 SERPL-SCNC: 22 MMOL/L (ref 23–31)
CO2 SERPL-SCNC: 22 MMOL/L (ref 23–31)
COLOR UR AUTO: YELLOW
CREAT SERPL-MCNC: 1.34 MG/DL (ref 0.72–1.25)
CREAT SERPL-MCNC: 1.46 MG/DL (ref 0.72–1.25)
CREAT/UREA NIT SERPL: 18
CREAT/UREA NIT SERPL: 20
EOSINOPHIL # BLD AUTO: 0.06 X10(3)/MCL (ref 0–0.9)
EOSINOPHIL # BLD AUTO: 0.07 X10(3)/MCL (ref 0–0.9)
EOSINOPHIL NFR BLD AUTO: 1.1 %
EOSINOPHIL NFR BLD AUTO: 2.6 %
ERYTHROCYTE [DISTWIDTH] IN BLOOD BY AUTOMATED COUNT: 16.1 % (ref 11.5–17)
ERYTHROCYTE [DISTWIDTH] IN BLOOD BY AUTOMATED COUNT: 16.4 % (ref 11.5–17)
FUNGUS SPEC CULT: NORMAL
GFR SERPLBLD CREATININE-BSD FMLA CKD-EPI: 54 ML/MIN/1.73/M2
GFR SERPLBLD CREATININE-BSD FMLA CKD-EPI: 60 ML/MIN/1.73/M2
GLOBULIN SER-MCNC: 6.5 GM/DL (ref 2.4–3.5)
GLOBULIN SER-MCNC: 6.8 GM/DL (ref 2.4–3.5)
GLUCOSE SERPL-MCNC: 62 MG/DL (ref 82–115)
GLUCOSE SERPL-MCNC: 68 MG/DL (ref 82–115)
GLUCOSE UR QL STRIP: NORMAL
GROUP & RH: NORMAL
HCT VFR BLD AUTO: 22.3 % (ref 42–52)
HCT VFR BLD AUTO: 24.7 % (ref 42–52)
HEMOCCULT SP1 STL QL: NEGATIVE
HGB BLD-MCNC: 7 G/DL (ref 14–18)
HGB BLD-MCNC: 7.7 G/DL (ref 14–18)
HGB UR QL STRIP: ABNORMAL
HOLD SPECIMEN: NORMAL
HYALINE CASTS #/AREA URNS LPF: ABNORMAL /LPF
IMM GRANULOCYTES # BLD AUTO: 0.05 X10(3)/MCL (ref 0–0.04)
IMM GRANULOCYTES # BLD AUTO: 0.11 X10(3)/MCL (ref 0–0.04)
IMM GRANULOCYTES NFR BLD AUTO: 1.8 %
IMM GRANULOCYTES NFR BLD AUTO: 2 %
INDIRECT COOMBS: NORMAL
KETONES UR QL STRIP: NEGATIVE
LACTATE SERPL-SCNC: 1 MMOL/L (ref 0.5–2.2)
LEUKOCYTE ESTERASE UR QL STRIP: NEGATIVE
LYMPHOCYTES # BLD AUTO: 0.35 X10(3)/MCL (ref 0.6–4.6)
LYMPHOCYTES # BLD AUTO: 0.6 X10(3)/MCL (ref 0.6–4.6)
LYMPHOCYTES NFR BLD AUTO: 10.9 %
LYMPHOCYTES NFR BLD AUTO: 12.9 %
MAGNESIUM SERPL-MCNC: 1.8 MG/DL (ref 1.6–2.6)
MCH RBC QN AUTO: 28.2 PG (ref 27–31)
MCH RBC QN AUTO: 28.2 PG (ref 27–31)
MCHC RBC AUTO-ENTMCNC: 31.2 G/DL (ref 33–36)
MCHC RBC AUTO-ENTMCNC: 31.4 G/DL (ref 33–36)
MCV RBC AUTO: 89.9 FL (ref 80–94)
MCV RBC AUTO: 90.5 FL (ref 80–94)
MONOCYTES # BLD AUTO: 0.27 X10(3)/MCL (ref 0.1–1.3)
MONOCYTES # BLD AUTO: 0.41 X10(3)/MCL (ref 0.1–1.3)
MONOCYTES NFR BLD AUTO: 10 %
MONOCYTES NFR BLD AUTO: 7.5 %
MUCOUS THREADS URNS QL MICRO: ABNORMAL /LPF
N GONORRHOEA DNA UR QL NAA+PROBE: NOT DETECTED
NEUTROPHILS # BLD AUTO: 1.96 X10(3)/MCL (ref 2.1–9.2)
NEUTROPHILS # BLD AUTO: 4.3 X10(3)/MCL (ref 2.1–9.2)
NEUTROPHILS NFR BLD AUTO: 72.3 %
NEUTROPHILS NFR BLD AUTO: 78.1 %
NITRITE UR QL STRIP: NEGATIVE
NRBC BLD AUTO-RTO: 0 %
NRBC BLD AUTO-RTO: 0 %
OHS QRS DURATION: 76 MS
OHS QTC CALCULATION: 457 MS
PH UR STRIP: 6.5 [PH]
PLATELET # BLD AUTO: 204 X10(3)/MCL (ref 130–400)
PLATELET # BLD AUTO: 357 X10(3)/MCL (ref 130–400)
PMV BLD AUTO: 10.1 FL (ref 7.4–10.4)
PMV BLD AUTO: 11.4 FL (ref 7.4–10.4)
POCT GLUCOSE: 108 MG/DL (ref 70–110)
POCT GLUCOSE: 111 MG/DL (ref 70–110)
POTASSIUM SERPL-SCNC: 4.4 MMOL/L (ref 3.5–5.1)
POTASSIUM SERPL-SCNC: 4.7 MMOL/L (ref 3.5–5.1)
PROT SERPL-MCNC: 8.2 GM/DL (ref 5.8–7.6)
PROT SERPL-MCNC: 8.6 GM/DL (ref 5.8–7.6)
PROT UR QL STRIP: ABNORMAL
RBC # BLD AUTO: 2.48 X10(6)/MCL (ref 4.7–6.1)
RBC # BLD AUTO: 2.73 X10(6)/MCL (ref 4.7–6.1)
RBC #/AREA URNS AUTO: ABNORMAL /HPF
SODIUM SERPL-SCNC: 140 MMOL/L (ref 136–145)
SODIUM SERPL-SCNC: 141 MMOL/L (ref 136–145)
SP GR UR STRIP.AUTO: 1.02 (ref 1–1.03)
SPECIMEN OUTDATE: NORMAL
SQUAMOUS #/AREA URNS LPF: ABNORMAL /HPF
T PALLIDUM AB SER QL: NONREACTIVE
T VAGINALIS RRNA UR QL NAA+PROBE: NOT DETECTED
TROPONIN I SERPL-MCNC: <0.01 NG/ML (ref 0–0.04)
UROBILINOGEN UR STRIP-ACNC: ABNORMAL
WBC # BLD AUTO: 2.71 X10(3)/MCL (ref 4.5–11.5)
WBC # BLD AUTO: 5.5 X10(3)/MCL (ref 4.5–11.5)
WBC #/AREA URNS AUTO: ABNORMAL /HPF

## 2025-07-11 PROCEDURE — 63700000 PHARM REV CODE 250 ALT 637 W/O HCPCS: Performed by: FAMILY MEDICINE

## 2025-07-11 PROCEDURE — 87040 BLOOD CULTURE FOR BACTERIA: CPT

## 2025-07-11 PROCEDURE — 63600175 PHARM REV CODE 636 W HCPCS

## 2025-07-11 PROCEDURE — 85025 COMPLETE CBC W/AUTO DIFF WBC: CPT | Performed by: FAMILY MEDICINE

## 2025-07-11 PROCEDURE — 83735 ASSAY OF MAGNESIUM: CPT | Performed by: FAMILY MEDICINE

## 2025-07-11 PROCEDURE — 83605 ASSAY OF LACTIC ACID: CPT | Performed by: FAMILY MEDICINE

## 2025-07-11 PROCEDURE — 99900031 HC PATIENT EDUCATION (STAT)

## 2025-07-11 PROCEDURE — 80053 COMPREHEN METABOLIC PANEL: CPT | Performed by: FAMILY MEDICINE

## 2025-07-11 PROCEDURE — 87116 MYCOBACTERIA CULTURE: CPT | Performed by: NURSE PRACTITIONER

## 2025-07-11 PROCEDURE — 25000003 PHARM REV CODE 250

## 2025-07-11 PROCEDURE — 94640 AIRWAY INHALATION TREATMENT: CPT

## 2025-07-11 PROCEDURE — 84484 ASSAY OF TROPONIN QUANT: CPT

## 2025-07-11 PROCEDURE — 63600175 PHARM REV CODE 636 W HCPCS: Performed by: FAMILY MEDICINE

## 2025-07-11 PROCEDURE — 82550 ASSAY OF CK (CPK): CPT | Performed by: FAMILY MEDICINE

## 2025-07-11 PROCEDURE — 82272 OCCULT BLD FECES 1-3 TESTS: CPT

## 2025-07-11 PROCEDURE — 87529 HSV DNA AMP PROBE: CPT | Performed by: FAMILY MEDICINE

## 2025-07-11 PROCEDURE — 36415 COLL VENOUS BLD VENIPUNCTURE: CPT | Performed by: FAMILY MEDICINE

## 2025-07-11 PROCEDURE — 25000003 PHARM REV CODE 250: Performed by: INTERNAL MEDICINE

## 2025-07-11 PROCEDURE — 81001 URINALYSIS AUTO W/SCOPE: CPT | Performed by: FAMILY MEDICINE

## 2025-07-11 PROCEDURE — 63700000 PHARM REV CODE 250 ALT 637 W/O HCPCS: Performed by: INTERNAL MEDICINE

## 2025-07-11 PROCEDURE — 36415 COLL VENOUS BLD VENIPUNCTURE: CPT

## 2025-07-11 PROCEDURE — 93005 ELECTROCARDIOGRAM TRACING: CPT

## 2025-07-11 PROCEDURE — 94760 N-INVAS EAR/PLS OXIMETRY 1: CPT

## 2025-07-11 PROCEDURE — 86780 TREPONEMA PALLIDUM: CPT | Performed by: NURSE PRACTITIONER

## 2025-07-11 PROCEDURE — 87186 SC STD MICRODIL/AGAR DIL: CPT | Performed by: FAMILY MEDICINE

## 2025-07-11 PROCEDURE — 63600175 PHARM REV CODE 636 W HCPCS: Performed by: INTERNAL MEDICINE

## 2025-07-11 PROCEDURE — 87486 CHLMYD PNEUM DNA AMP PROBE: CPT | Performed by: FAMILY MEDICINE

## 2025-07-11 PROCEDURE — 11000001 HC ACUTE MED/SURG PRIVATE ROOM

## 2025-07-11 PROCEDURE — 21400001 HC TELEMETRY ROOM

## 2025-07-11 PROCEDURE — 25000003 PHARM REV CODE 250: Performed by: FAMILY MEDICINE

## 2025-07-11 PROCEDURE — 86850 RBC ANTIBODY SCREEN: CPT

## 2025-07-11 PROCEDURE — 82140 ASSAY OF AMMONIA: CPT | Performed by: FAMILY MEDICINE

## 2025-07-11 RX ORDER — EMTRICITABINE AND TENOFOVIR DISOPROXIL FUMARATE 200; 300 MG/1; MG/1
1 TABLET, FILM COATED ORAL DAILY
Status: DISCONTINUED | OUTPATIENT
Start: 2025-07-12 | End: 2025-07-15 | Stop reason: HOSPADM

## 2025-07-11 RX ORDER — SODIUM CHLORIDE FOR INHALATION 3 %
4 VIAL, NEBULIZER (ML) INHALATION ONCE
Status: COMPLETED | OUTPATIENT
Start: 2025-07-12 | End: 2025-07-11

## 2025-07-11 RX ORDER — ACETAMINOPHEN 325 MG/1
650 TABLET ORAL ONCE
Status: COMPLETED | OUTPATIENT
Start: 2025-07-11 | End: 2025-07-11

## 2025-07-11 RX ORDER — SODIUM CHLORIDE, SODIUM LACTATE, POTASSIUM CHLORIDE, CALCIUM CHLORIDE 600; 310; 30; 20 MG/100ML; MG/100ML; MG/100ML; MG/100ML
INJECTION, SOLUTION INTRAVENOUS CONTINUOUS
Status: DISCONTINUED | OUTPATIENT
Start: 2025-07-11 | End: 2025-07-15

## 2025-07-11 RX ORDER — VALACYCLOVIR HYDROCHLORIDE 500 MG/1
1000 TABLET, FILM COATED ORAL EVERY 12 HOURS
Status: DISCONTINUED | OUTPATIENT
Start: 2025-07-11 | End: 2025-07-15 | Stop reason: HOSPADM

## 2025-07-11 RX ADMIN — FERROUS SULFATE TAB 325 MG (65 MG ELEMENTAL FE) 1 EACH: 325 (65 FE) TAB at 09:07

## 2025-07-11 RX ADMIN — SODIUM CHLORIDE, POTASSIUM CHLORIDE, SODIUM LACTATE AND CALCIUM CHLORIDE: 600; 310; 30; 20 INJECTION, SOLUTION INTRAVENOUS at 05:07

## 2025-07-11 RX ADMIN — DARUNAVIR 800 MG: 800 TABLET, FILM COATED ORAL at 09:07

## 2025-07-11 RX ADMIN — SODIUM CHLORIDE 30 MG/ML INHALATION SOLUTION 4 ML: 30 SOLUTION INHALANT at 11:07

## 2025-07-11 RX ADMIN — ACETAMINOPHEN 650 MG: 325 TABLET ORAL at 12:07

## 2025-07-11 RX ADMIN — SODIUM BICARBONATE 650 MG TABLET 650 MG: at 09:07

## 2025-07-11 RX ADMIN — ETHAMBUTOL HYDROCHLORIDE 800 MG: 400 TABLET ORAL at 09:07

## 2025-07-11 RX ADMIN — AZITHROMYCIN DIHYDRATE 500 MG: 250 TABLET, FILM COATED ORAL at 09:07

## 2025-07-11 RX ADMIN — EMTRICITABINE AND TENOFOVIR ALAFENAMIDE 1 TABLET: 200; 25 TABLET ORAL at 09:07

## 2025-07-11 RX ADMIN — FLUCONAZOLE 200 MG: 100 TABLET ORAL at 09:07

## 2025-07-11 RX ADMIN — PANTOPRAZOLE 40 MG: 40 TABLET, DELAYED RELEASE ORAL at 09:07

## 2025-07-11 RX ADMIN — ABACAVIR SULFATE 300 MG: 300 TABLET, FILM COATED ORAL at 09:07

## 2025-07-11 RX ADMIN — ENOXAPARIN SODIUM 40 MG: 40 INJECTION SUBCUTANEOUS at 05:07

## 2025-07-11 RX ADMIN — RITONAVIR 100 MG: 100 TABLET ORAL at 09:07

## 2025-07-11 RX ADMIN — RIFABUTIN 150 MG: 150 CAPSULE ORAL at 09:07

## 2025-07-11 RX ADMIN — SODIUM CHLORIDE, POTASSIUM CHLORIDE, SODIUM LACTATE AND CALCIUM CHLORIDE 1000 ML: 600; 310; 30; 20 INJECTION, SOLUTION INTRAVENOUS at 04:07

## 2025-07-11 RX ADMIN — VALACYCLOVIR 1000 MG: 500 TABLET, FILM COATED ORAL at 05:07

## 2025-07-11 RX ADMIN — SULFAMETHOXAZOLE AND TRIMETHOPRIM 1 TABLET: 800; 160 TABLET ORAL at 09:07

## 2025-07-11 NOTE — PLAN OF CARE
Problem: Adult Inpatient Plan of Care  Goal: Plan of Care Review  Outcome: Progressing  Flowsheets (Taken 7/11/2025 1101)  Plan of Care Reviewed With: patient  Goal: Patient-Specific Goal (Individualized)  Outcome: Progressing  Goal: Absence of Hospital-Acquired Illness or Injury  Outcome: Progressing  Goal: Optimal Comfort and Wellbeing  Outcome: Progressing  Goal: Readiness for Transition of Care  Outcome: Progressing     Problem: Wound  Goal: Optimal Coping  Outcome: Progressing  Goal: Optimal Functional Ability  Outcome: Progressing  Goal: Absence of Infection Signs and Symptoms  Outcome: Progressing  Goal: Improved Oral Intake  Outcome: Progressing  Goal: Optimal Pain Control and Function  Outcome: Progressing  Goal: Skin Health and Integrity  Outcome: Progressing  Goal: Optimal Wound Healing  Outcome: Progressing     Problem: Skin Injury Risk Increased  Goal: Skin Health and Integrity  Outcome: Progressing

## 2025-07-11 NOTE — NURSING
Pt refusing in & out cath for urine collection sample. Per nafisa Do to use sample collected from external cath if patient refuses.

## 2025-07-11 NOTE — PROGRESS NOTES
Physical Therapy    Missed Treatment Session - patient unwilling to participate note - 1227 - 07/11/2025    Patient Name:  Anne Terry   MRN:  [id number]    [id number]  -Patient not seen at this time secondary to patient declined to participate  -therapist offered standing edge of bed, sitting edge of bed, repositioning in bed  -patient declined all activities  -therapist educated patient that continued refusals to tx attempts will result in patient D/C from PT SERVICES  -patient acknowledged understanding  -will attempt PT SERVICES EVAL 1 additional time  -if patient declines will discontinue PT Orders  -will follow-up as patient is appropriate/available/agreeable to participate and as therapists' schedule allows.

## 2025-07-11 NOTE — PROGRESS NOTES
"   07/11/25 0854   General   OT Attempted Eval Date 07/11/25   OT Attempted Eval Time 0848   OT Date of Treatment 07/11/25   OT Start Time 0848   OT Stop Time 0850   OT Total Time (min) 2 min   Assessment   OT Follow-up? No  (pt initially nodded yes when asked if willing to get OOB for therapy eval, however when OT attempted to assist pt OOB, pt stated "what are you doing?  I'm not getting up!".  OT educated pt this is last attempt for therapy, pt stated "that's fine".)     DC OT eval orders d/t pt refusal to participate after 3 days of attempts.  Available for reconsult if appropriate.  "

## 2025-07-11 NOTE — PROGRESS NOTES
Inpatient Nutrition Assessment    Admit Date: 7/8/2025   Total duration of encounter: 3 days   Patient Age: 65 y.o.    Nutrition Recommendation/Prescription     Regular diet as ordered  Boost Plus (provides 360 kcal, 14 g protein per serving) BID  Boost Very High Calorie (provides 530 kcal, 22 g protein per serving) once daily  Suggest daily MVI  Consider appetite stimulant as medically appropriate    Communication of Recommendations: reviewed with patient    Nutrition Assessment     Malnutrition Assessment/Nutrition-Focused Physical Exam    Malnutrition Context: acute illness or injury (07/09/25 1324)  Malnutrition Level: moderate (07/09/25 1324)  Energy Intake (Malnutrition): less than or equal to 50% for greater than or equal to 5 days (07/09/25 1324)  Weight Loss (Malnutrition): 5% in 1 month (07/09/25 1324)     Or[place] Region (Subcutaneous Fat Loss): other (see comments) (unable to assess)           [place] Region (Muscle Loss): other (see comments) (unable to assess)                       Fluid Accumulation (Malnutrition): other (see comments) (Not present) (07/09/25 1324)        A minimum of two characteristics is recommended for diagnosis of either severe or non-severe malnutrition.    Chart Review    Reason Seen: malnutrition screening tool (MST) and follow-up    Malnutrition Screening Tool Results   Have you recently lost weight without trying?: Unsure  Have you been eating poorly because of a decreased appetite?: No   MST Score: 2   Diagnosis:  Acute metabolic encephalopathy-resolved per transferring team  HIV/AIDS -CD4 21/7%, viral load 114,288 with opportunistic infection  Oral thrush-treated  History of disseminated MAC, now with lung mass,  History of cryptococcal meningitis  History of PJP  Nonadherence to use of HARRT  Elevated alkaline phosphatase-suspect infiltrative disease  TAYLOR-resolved  Severe anemia acute on chronic with anemia of chronic disease  Iron-deficiency anemia  Severe  malnutrition  DNR statu    Relevant Medical History: HIV/aids, mac, history of disseminated histoplasmosis, PJP and cryptococcus     Scheduled Medications:  abacavir, 300 mg, Daily  azithromycin, 500 mg, Daily  darunavir, 800 mg, Daily with breakfast  [START ON 7/12/2025] emtricitabine-tenofovir 200-300 mg, 1 tablet, Daily  enoxparin, 40 mg, Q24H (prophylaxis, 1700)  ethambutoL, 800 mg, Daily  ferrous sulfate, 1 tablet, Daily  fluconazole, 200 mg, Daily  pantoprazole, 40 mg, Daily  rifabutin, 150 mg, Daily  ritonavir, 100 mg, Daily  sodium bicarbonate, 650 mg, Daily  sulfamethoxazole-trimethoprim 800-160mg, 1 tablet, Daily    Continuous Infusions:   PRN Medications:  acetaminophen, 650 mg, Q6H PRN  dextrose 50%, 12.5 g, PRN  dextrose 50%, 25 g, PRN  glucagon (human recombinant), 1 mg, PRN  glucose, 16 g, PRN  glucose, 24 g, PRN  naloxone, 0.02 mg, PRN  ondansetron, 4 mg, Q4H PRN  sodium chloride 0.9%, 10 mL, PRN    Calorie Containing IV Medications: no significant kcals from medications at this time    Recent Labs   Lab 07/05/25  0451 07/06/25  0439 07/07/25  0449 07/08/25  1934 07/10/25  0515 07/10/25  1028 07/11/25  0451    138 137 141 143  --  140   K 5.5* 4.6 4.6 4.5 4.4  --  4.4   CALCIUM 7.6* 7.7* 7.6* 7.7* 8.0*  --  7.7*   MG  --  1.80  --   --   --   --   --    * 109* 109* 111* 113*  --  113*   CO2 23 25 22* 23 24  --  22*   BUN 31.0* 25.9* 25.5 26.9* 27.2*  --  25.7   CREATININE 1.48* 1.25 1.24 1.22 1.44*  --  1.46*   EGFRNORACEVR 53 >60 >60 >60 55  --  54   GLU 81* 73* 68* 82 69*  --  68*   BILITOT  --  0.9 0.8 0.7 0.7  --  0.6   ALKPHOS  --  1,943* 1,943* 1,939* 1,688* 1,737* 1,735*   ALT  --  28 25 26 19  --  21   AST  --  42 36 39 27  --  30   ALBUMIN  --  1.7* 1.6* 1.8* 1.8*  --  1.7*   WBC  --  3.20* 3.07* 3.41* 3.42*  --  2.71*   HGB  --  7.2* 7.3* 7.6* 7.4*  --  7.0*   HCT  --  23.5* 22.9* 24.6* 24.0*  --  22.3*     Nutrition Orders:  Diet Adult Regular Standard Tray  Dietary nutrition  supplements Daily; Boost Plus Nutritional Drink - Very Vanilla, Boost Very High Calorie Nutritional Drink - Chocolate    Appetite/Oral Intake: fair/25-50% of meals  Factors Affecting Nutritional Intake: decreased appetite  Social Needs Impacting Access to Food: unable to assess at this time; will attempt on follow-up  Food/Scientology/Cultural Preferences: unable to obtain  Food Allergies: no known food allergies  Last Bowel Movement: 07/10/25  Wound(s):  none reported    Comments    7/11/25 -- Pt with inconsistent intake of meals the last couple of days. Pt reports eating 75% of his breakfast this morning. 0% consumption of breakfast and lunch documented yesterday in EMR flowsheets. Drinking about 50% of his Boost supplements. No GI complaints noted. Unable to obtain updated bedscale wt during rounds.     7/9/25 -- Pt with covers over his head, declines to remove them; reports fair appetite, 0% of breakfast meal this am; meal intake fluctuating over the last 2 weeks at prior facility, averaging < 50%; denies n/v or abdominal pain, no diarrhea/constipation reported; Pt unsure of UBW, UBW per -160 lb; new bed weight this visit 126 lb, ? Accuracy of bed scale; significant change over the last month per EMR weight history (156 lb --> 148 lb) indicating at least 5% wt loss in last month, will continue to monitor; pt agreeable to continue Boost in vanilla or chocolate flavor; Pt may benefit from appetite stimulant as medically appropriate as well as daily MVI; H/H (L) - Fe supplementation ordered per MD    Anthropometrics    Height: 6' (182.9 cm), Height Method: Stated  Last Weight: 57.5 kg (126 lb 11.2 oz) (07/09/25 1246), Weight Method: Bed Scale  BMI (Calculated): 17.2  BMI Classification: underweight (BMI less than 18.5)        Ideal Body Weight (IBW), Male: 178 lb     % Ideal Body Weight, Male (lb): 83.15 %                          Usual Weight Provided By: EMR weight history and unable to obtain usual  weight    Wt Readings from Last 5 Encounters:   07/09/25 57.5 kg (126 lb 11.2 oz)   06/21/25 71 kg (156 lb 8.4 oz)   02/28/25 70.3 kg (155 lb)   02/27/25 71.8 kg (158 lb 4.6 oz)   01/07/25 73.5 kg (161 lb 14.9 oz)     Weight Change(s) Since Admission: discrepancy, new bed weight vs documented admit weight  Wt Readings from Last 1 Encounters:   07/09/25 1246 57.5 kg (126 lb 11.2 oz)   07/08/25 2000 67.1 kg (148 lb)   Admit Weight: 67.1 kg (148 lb) (07/08/25 2000), Weight Method: Bed Scale    Estimated Needs    Weight Used For Calorie Calculations: 57.5 kg (126 lb 12.2 oz)  Energy Calorie Requirements (kcal): 9010-3265 kcal (32 - 35 kcal/kg)  Energy Need Method: Kcal/kg  Weight Used For Protein Calculations: 57.5 kg (126 lb 12.2 oz)  Protein Requirements: 69-80 gm (1.2-1.4 gm/kg)  Fluid Requirements (mL): 1840 - 2015 ml (1ml/kcal)        Enteral Nutrition     Patient not receiving enteral nutrition at this time.    Parenteral Nutrition     Patient not receiving parenteral nutrition support at this time.    Evaluation of Received Nutrient Intake    Calories: not meeting estimated needs  Protein: not meeting estimated needs    Patient Education     Not applicable.    Nutrition Diagnosis     PES: Inadequate oral intake related to acute illness as evidenced by < 75% nutrition intake > 7 days. (active)     PES: Moderate acute disease or injury related malnutrition Related to HIV/AIDS As Evidenced by:  - weight loss: 5% in 1 month - energy intake: <= 50% for 14 days (meets criteria for <= 50% >= 5 days (severe - acute)) active    Nutrition Interventions     Intervention(s): general/healthful diet, commercial beverage, multivitamin/mineral supplement therapy, and collaboration with other providers  Intervention(s): Vitamin and mineral supplement therapy, Collaboration and referral of nutrition care, Medical food supplement therapy, General healthful diet    Goal: Meet greater than 80% of nutritional needs by follow-up. (goal  progressing)  Goal: Consume % of oral supplements by follow-up. (goal progressing)    Nutrition Goals & Monitoring     Dietitian will monitor: food and beverage intake and weight  Discharge planning: continue regular diet with boost plus or equivalent oral supplements  Nutrition Risk/Follow-Up: patient at increased nutrition risk; dietitian will follow-up twice weekly   Please consult if re-assessment needed sooner.

## 2025-07-11 NOTE — PLAN OF CARE
Problem: Adult Inpatient Plan of Care  Goal: Plan of Care Review  Outcome: Progressing  Flowsheets (Taken 7/11/2025 0442)  Plan of Care Reviewed With: patient  Goal: Patient-Specific Goal (Individualized)  Outcome: Progressing  Flowsheets (Taken 7/11/2025 0442)  Individualized Care Needs: pt safety  Anxieties, Fears or Concerns: none voiced  Patient/Family-Specific Goals (Include Timeframe): none voiced  Goal: Absence of Hospital-Acquired Illness or Injury  Outcome: Progressing  Goal: Optimal Comfort and Wellbeing  Outcome: Progressing  Goal: Readiness for Transition of Care  Outcome: Progressing     Problem: Wound  Goal: Optimal Coping  Outcome: Progressing  Intervention: Support Patient and Family Response  Flowsheets (Taken 7/11/2025 0442)  Family/Support System Care: self-care encouraged  Goal: Optimal Functional Ability  Outcome: Progressing  Goal: Absence of Infection Signs and Symptoms  Outcome: Progressing  Intervention: Prevent or Manage Infection  Flowsheets (Taken 7/11/2025 0442)  Fever Reduction/Comfort Measures: fluid intake increased  Goal: Improved Oral Intake  Outcome: Progressing  Goal: Optimal Pain Control and Function  Outcome: Progressing  Intervention: Prevent or Manage Pain  Flowsheets (Taken 7/11/2025 0442)  Sleep/Rest Enhancement:   awakenings minimized   consistent schedule promoted   noise level reduced   regular sleep/rest pattern promoted   room darkened  Goal: Skin Health and Integrity  Outcome: Progressing  Goal: Optimal Wound Healing  Outcome: Progressing  Intervention: Promote Wound Healing  Flowsheets (Taken 7/11/2025 0442)  Sleep/Rest Enhancement:   awakenings minimized   consistent schedule promoted   noise level reduced   regular sleep/rest pattern promoted   room darkened     Problem: Skin Injury Risk Increased  Goal: Skin Health and Integrity  Outcome: Progressing

## 2025-07-11 NOTE — PROGRESS NOTES
[facility]  Infectious Diseases Progress Note        SUBJECTIVE:   HPI: Anne is a 65 yr old BM with past medical history that is significant for hypertension, chronic kidney disease, advanced HIV disease/AIDS with complex resistance (CD4 21 (7%) with viral load 114,288) (diagnosed 2015), disseminated MAC, history of disseminated cryptococcal disease (2015), history disseminated histoplasmosis (2015), history of fungal colitis (2015),  history of PJP, lung mass, and noncompliance who was transferred to [facility] from [facility] as he is awaiting additional placement.  He originally presented to the emergency room at [facility] on 06/21/2025 for complaints of confusion, walking around naked, and failure to thrive.  Patient had not been taking his medication and had missed multiple clinic visits.  CT and MRI of head were done which were negative for any acute findings.  Patient underwent LP 06/27/2025 which showed 0 nucleated cells, glucose 33 and elevated protein of 77.  Cryptococcal antigen CSF negative. CSF culture negative.  LEAH virus CSF negative.  ME panel negative.  Neurosyphilis IgG negative CSF.  AFB smear CSF negative.  CSF pathology was negative for malignancy.  Fungal and mycobacterial / nocardia CSF cultures are pending.  Blood cultures x2 NGTD.  Cryptococcal antigen Patient is noted to be leukopenic; WBC 3.07.  He is also anemic; hemoglobin 7.3/hematocrit 22.9.  Alkaline phosphatase is elevated; 1943.  According to chart review, patient had been off ART for a while.  Previous ART regimen consisted of Truvada, Abacavir, Darunavir, and Ritonavir, along with Bactrim DS for OI prophylaxis.  Resistance patterns / DDI were considered upon ART formulation.  ART regimen was restarted on previous admission, but was changed to Descovy and Tivicay.  Patient was also previously on disseminated MAC treatment with Azithromycin, Ethambutol, and Rifabutin.  This regimen was also changed to Azithromycin,  Ethambutol and Rifampin upon last admission.  ID has been consulted for AIDS/advanced HIV disease and disseminated MAC.  Upon exam, patient is lying in the bed with his head covered with a blanket.  He refuses to participate in exam, or answer questions.  He is very agitated.  It is noted upon chart review that he has been refusing medications.       In regards to HIV disease and chart review, patient was diagnosed with HIV and 2015.  Previously treated with Ziagen, Descovy, Prezista, and Norovir but stopped taking his medications and following up in clinic in 2021 after his wife passed away.  Strong history of medication noncompliance, along with multiple missed clinic visits is documented throughout chart history.  Past HIV genotype history:   3/30/15 HIV GT [place]dalena V118I, V179 D/V, R211K, F214L, L33I, L63P, I64V with no resistance to any classes   5/4/15 HIV Phenosense Integrase sensitive  1/6/16 HIV GT [place]dalena M184V with resistance to FTC/3TC  1/6/16 Phenosense Integrase resistance to elvitegravir and isentress  3/17/16 Phenosense Integrase Tivicay partially sensitive, resistance to elvitegravir and isentress  6/7/16 HIV GT [place]dalena M184V with resistance to FTC/3TC  06/23/2025 GT E138K, G163R with integrase resistance (PLR to Biktegravir / Dolutegravir; LR to Cabotegravir; IR to Elvitegravir / Raltegravir)   Previously noted exposures from past review of history, patient has several dogs, cows, and pigs at home.  He hunts rabbits often and skins them himself.  Patient had denied bird exposure or other farm animal exposure.  He was unemployed, but used to work in insulation.  Patient previously denied MSM or IVDU.  History is obtained for consult mostly from chart review as patient is noncooperative.      Interval History:  Patient sitting in bed.  He is a little more cooperative today and and will answer a couple of questions.  Oriented only to person and place.  No current complaints.  Denies fever, chills, night sweats,  rash, HA, vision changes, dizziness, CP/SOB, cough, N/V/D, abdominal pain, or urinary complaints.  Patient is afebrile.  Noted with leukopenia; WBC 2.7. H&H trending down; hemoglobin 7.0/hematocrit 22.3.  TAYLOR remains; BUN 25/creatinine 1.46.  ALP 1735.  GGT 1303.  Screening for hepatitis /syphilis negative.  Cryptococcal antigen serum negative.  Positive occult blood stool.  Patient is now on ART therapy with Abacavir, Darunavir, Ritonavir, and Emtricitabine/TDF, along with Bactrim DS for OI prophylaxis.  He is now on MAC treatment with Azithromycin, Ethambutol, and Rifabutin.  Patient is not readily taking medications; nurses have to consistently strongly encouraged patient to take them.      Antibiotic / Antiviral / Antifungal History:  Azithromycin 500 mg p.o. daily - 06/22/2025 to 06/28/2025   Rifampin 300 mg p.o. daily - 06/22/2025 to 06/28/2025  Ethambutol 800 mg p.o. daily - 06/23/2025 to 06/28/2025  Bactrim DS 1 tab p.o. b.i.d. - 06/22/2025 to 06/28/2025  Fluconazole 100 mg p.o. daily - 06/22/2025 to 06/24/2025  Fluconazole 400 mg p.o. daily - 06/24/2025 to 06/28/2025  Bactrim DS 1 tab p.o. daily - 06/29/2025 to present   Fluconazole 200 mg p.o. daily - 06/29/2025 to present  Dolutegravir 50 mg p.o. daily - 07/02/2025 to 07/10/2025  Emtricitabine/TAF 1 tab p.o. daily - 07/02/2025 to 07/10/2025  Azithromycin 500 mg p.o. daily - 07/02/2025 to present  Rifampin 300 mg p.o. daily - 07/02/2025 to 07/10/2025  Ethambutol 800 mg p.o. daily - 07/02/2025 to present  Darunavir 800 mg 1 tab p.o. daily - 07/11/2025 to present  Ritonavir 100 mg p.o. daily - 07/11/2025 to present  Abacavir 300 mg p.o. daily - 07/11/2025 to present   Rifabutin 150 mg p.o. daily - 07/11/2025 to present  Emtricitabine/TDF 1 tab p.o. daily - 07/11/2025 to present         MEDICATIONS:   Reviewed in EMR    REVIEW OF SYSTEMS:   Except as documented, all other systems reviewed and negative     PHYSICAL EXAM:   T 97.4 °F (36.3 °C)   /84   P  "76   RR 20   O2 100 %  GENERAL:  Cachectic elderly BM who is awake and alert; oriented to person and place; appears chronically ill  HEENT: atraumatic, normocephalic, anicteric, temporal wasting noted; no thrush noted  LUNGS: breathing unlabored with symmetric chest rise, lungs CTA bilateral   HEART: RRR, no rubs, gallops, or heaves  ABDOMEN: abdomen soft, nondistended, nontender to palpation  : no CVA tenderness  EXTREMITIES: no obvious edema  SKIN: skin ashy and dry   NEURO: speech fluent and intact, facial symmetry preserved, no tremor noted  PSYCH:  cooperative; indifferent  LINES: PIV       LABS AND IMAGING:     Recent Labs     07/10/25  0515 07/11/25  0451   WBC 3.42* 2.71*   RBC 2.67* 2.48*   HGB 7.4* 7.0*   HCT 24.0* 22.3*   MCV 89.9 89.9   MCH 27.7 28.2   MCHC 30.8* 31.4*   RDW 16.1 16.1    204     No results for input(s): "LACTIC" in the last 72 hours.  No results for input(s): "INR", "APTT", "D-DIMER" in the last 72 hours.  No results for input(s): "HGBA1C", "CHOL", "TRIG", "LDL", "VLDL", "HDL" in the last 72 hours.   Recent Labs     07/10/25  0515 07/10/25  1028 07/11/25  0451     --  140   K 4.4  --  4.4   CO2 24  --  22*   BUN 27.2*  --  25.7   CREATININE 1.44*  --  1.46*   CALCIUM 8.0*  --  7.7*   ALBUMIN 1.8*  --  1.7*   GLOBULIN 6.5*  --  6.5*   ALKPHOS 1,688* 1,737* 1,735*   ALT 19  --  21   AST 27  --  30   BILITOT 0.7  --  0.6     No results for input(s): "BNP", "CPK", "TROPONINI" in the last 72 hours.       Estimated Creatinine Clearance: 42.1 mL/min (A) (based on SCr of 1.46 mg/dL (H)).   Lab Results   Component Value Date    SEDRATE 60 (H) 11/25/2024      Lab Results   Component Value Date    CRP 16.40 (H) 11/25/2024        Micro:   Colonization:  11/19/2024 urine culture with E coli (R - Ampicillin)  11/20/2024 blood cultures x2 NGTD   11/20/2024 stool culture negative for Salmonella, Shigella, Campylobacter, Vibrio, Aeromonas, Pleisiomonas,Yersinia, or Shiga Toxin 1 and " 2  11/20/2024 sputum culture with yeast   11/21/2024 sputum for AFB smear negative   11/21/2024 sputum culture for mycobacteria and Nocardia with MAC   11/21/2024 mycobacterial blood culture with MAC   11/21/2024 sputum for AFB smear negative   11/21/2024 sputum culture for mycobacteria and Nocardia with MAC   11/21/2024 sputum culture for mycobacteria and Nocardia with MAC   11/21/2024 sputum culture for mycobacteria and Nocardia with MAC (R - Linezolid; I - Moxifloxacin)   11/21/2024 sputum AFB smear negative   02/28/2025 blood cultures x2 NGTD   02/28/2025 mycobacterial blood culture with MAC   02/28/2025 cryptococcal antigen negative   03/01/2025 left foot wound with negative AFB smear  03/01/2025 left foot fungal wound culture NGTD  03/01/2025 left foot tissue culture with normal skin adriel   03/02/2025 sputum AFB smear negative  03/03/2025 left foot mycobacteria and Nocardia tissue culture negative   06/21/2025 blood cultures x2 NGTD   06/27/2025 fungal CSF culture pending   06/27/2025 CSF culture NGTD   06/27/2025 CSF mycobacteria and Nocardia culture pending   06/27/2025 CSF AFB smear negative   06/27/2025 cryptococcal antigen CSF negative   07/10/2025 cryptococcal antigen serum negative        US Abdomen Limited  Narrative: EXAMINATION:  US ABDOMEN LIMITED    CLINICAL HISTORY:  Right upper quadrant pain.    TECHNIQUE:  Multiple real-time transverse and longitudinal sections were performed of the right abdomen by the sonographer. Select images were submitted for review.    COMPARISON:  CT abdomen pelvis June 23, 2025    FINDINGS:  Liver is of unremarkable echotexture with normal contour and size. There was no delineation of discrete hepatic cystic or solid mass. Hepatic maximum diameter is estimated to be 20.26 cm with ultrasound and appears to be overestimated.  On the previous CT abdomen maximum diameter of the liver in the midclavicular line is 16.2 cm which is within normal limits.  There was  unremarkable hepatopedal flow within the portal vein.  Pancreas is obscured by overlying bowel gas.    Gallbladder lumen is without echogenicity indicative of sludge or cholelithiasis. Gallbladder wall thickness is within normal limits. Common bile duct caliber of 2.0 mm is within normal limits for the age. Sonographer reported negative Gomes's sign.    Inferior vena cava is unremarkable. Visualized portion of the abdominal aorta is without aneurysmal dilatation.    Normally located right kidney length measures 12.2 x 4.3 x 5.9 cm. Right renal corticomedullary differentiation is unremarkable. No evidence of hydronephrosis.  Impression: No acute findings sonography identified.    Electronically signed by: Ezio Deleon  Date:    06/29/2025  Time:    20:00    CT chest/abdomen/pelvis with IV contrast 06/23/2025:   Impression:   1. Multifocal micronodular pattern at the dependent portion of both lungs, similar extents 02/27/2025.  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  2. Stable thoracic lymphadenopathy.  3. Slight interval increase in size and extent of abdominopelvic lymphadenopathy.        MRI brain with and without contrast 06/27/2025:   Impression:  1. No acute intracranial abnormality.  2. Mild chronic microvascular ischemic changes.      ASSESSMENT & PLAN:     Advanced HIV disease/AIDS with complex resistance   Failure to thrive  / cachexia  Encephalopathy  S/P lumbar puncture 06/27/2025  Disseminated MAC  Lung Mass   History of disseminated cryptococcal disease   History of disseminated histoplasmosis  Oral candidiasis  Leukopenia  Anemia  TAYLOR  Elevated alkaline phosphatase  Noncompliance         RECOMMENDATIONS:        So far, no AFB/myocbacterial cultures have been collected.  Still recommend AFB smears x 3, along with collection of mycobacteria/Nocardia cultures x3.  Please induced patient is if he is unable to expectorate.  Collection needs to be timed at least 8 hours apart.   Patient does not need isolation as this is looking for MAC.  For MAC treatment, would continue Azithromycin 500 mg po daily + Ethambutol 15 mg / kg po daily + Rifabutin 150 mg po daily (dose reduced due to co-administration of ART).    Patient will need short follow-up for ophthalmology exam due to Ethambutol use  For ART, recommend to continue Truvada 1 tab p.o. daily + Darunavir 800 mg p.o. daily + Ritonavir 100 mg p.o. daily + Abacavir 300 mg po daily for ART, along with Bactrim DS 1 tab po daily for OI prophylaxis    Continue Fluconazole 200 mg po daily for history of disseminated histoplasmosis/cryptococcus  Please check EKG to assess QTC as prolonged treatment with Fluconazole can prolong QTC   Will defer workup of elevated alkaline phosphatase to primary team.  Noted Rifampin can also increase Alk phos   Renal protective measures   Encouraged nutrition  ID has added the following testing:  urine Histoplasma/Blastomyces antigen, urine gonorrhea/chlamydia/Trichomonas, oral gonorrhea/chlamydia, and mycobacterial blood culture  Strongly stress compliance  ID will continue to follow        MARCE Alvares  [facility] Infectious Diseases     *Portions of this note dictated using EMR integrated voice recognition software, and may be subject to voice recognition errors not corrected at proofreading. Please contact writer for clarification if needed. *

## 2025-07-11 NOTE — CARE UPDATE
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 Will collect HSV PCR and start Valtrex 1 gm BID   Patient notably with new tachycardia, tremulousness and twitching. He had a single episode of vomiting today when respiratory therapist attempted sputum collection.Vomitus was pink tinged sputum- small volume.   He denies pain but is only nodding in response to questioning with minimal verbal interaction. EKG sinus tachycardia with nonspecific ST changes. Had not eaten today except for one boost. Repeat CBC, CMP, mag, troponin, BC x 2, CXR, urine culture and urinalysis due to immunocompromised state and new tachycardia of uncertain etiology.  I liter LR bolus and then 1 liter over 4 hrs. Type and screen x 2 units .  Ammonia level normal. Discussed with ID, antibiotics not recommended as lactate normal, patient afebrile and not hypotensive. Cultures pending.  Additional recommendations from ID   - If becomes febrile (>100.4) start vancomycin and rocephin  -Add CPK  -Add Respiratory Panel  Addendum: Patient ate 8 bites of rice and 4 bites of okra + 3 8 oz glasses of juice (gatorade and grape juice)  Patient with significant tremors, twitching and stiffness intermittently. Will order CT head without contrast to evaluate.    Latest Reference Range & Units 07/11/25 04:51 07/11/25 06:08 07/11/25 16:09 07/11/25 16:54   WBC 4.50 - 11.50 x10(3)/mcL 2.71 (L)   5.50   RBC 4.70 - 6.10 x10(6)/mcL 2.48 (L)   2.73 (L)   Hemoglobin 14.0 - 18.0 g/dL 7.0 (L)   7.7 (L)   Hematocrit 42.0 - 52.0 % 22.3 (L)   24.7 (L)   MCV 80.0 - 94.0 fL 89.9   90.5   MCH 27.0 - 31.0 pg 28.2   28.2   MCHC 33.0 - 36.0 g/dL 31.4 (L)   31.2 (L)   RDW 11.5 - 17.0 % 16.1   16.4   Platelet Count 130 - 400 x10(3)/mcL 204   357   MPV 7.4 - 10.4 fL 11.4 (H)   10.1   Neut % % 72.3   78.1   LYMPH % % 12.9   10.9   Mono % % 10.0   7.5   Eos % % 2.6   1.1   Basophil % % 0.4   0.4   Immature Granulocytes % 1.8   2.0   Neut # 2.1 - 9.2 x10(3)/mcL 1.96 (L)    4.30   Lymph # 0.6 - 4.6 x10(3)/mcL 0.35 (L)   0.60   Mono # 0.1 - 1.3 x10(3)/mcL 0.27   0.41   Eos # 0 - 0.9 x10(3)/mcL 0.07   0.06   Baso # <=0.2 x10(3)/mcL 0.01   0.02   Immature Grans (Abs) 0.00 - 0.04 x10(3)/mcL 0.05 (H)   0.11 (H)   nRBC % 0.0   0.0   Sodium 136 - 145 mmol/L 140   141   Potassium 3.5 - 5.1 mmol/L 4.4   4.7   Chloride 98 - 107 mmol/L 113 (H)   112 (H)   CO2 23 - 31 mmol/L 22 (L)   22 (L)   Anion Gap mEq/L 5.0   7.0   BUN 8.4 - 25.7 mg/dL 25.7   26.5 (H)   Creatinine 0.72 - 1.25 mg/dL 1.46 (H)   1.34 (H)   BUN/CREAT RATIO  18   20   eGFR mL/min/1.73/m2 54   60   Glucose 82 - 115 mg/dL 68 (L)   62 (L)   Calcium 8.8 - 10.0 mg/dL 7.7 (L)   8.0 (L)   Magnesium  1.60 - 2.60 mg/dL    1.80   ALP 40 - 150 unit/L 1,735 (H)   2,022 (H)   PROTEIN TOTAL 5.8 - 7.6 gm/dL 8.2 (H)   8.6 (H)   Albumin 3.4 - 4.8 g/dL 1.7 (L)   1.8 (L)   Albumin/Globulin Ratio 1.1 - 2.0 ratio 0.3 (L)   0.3 (L)   BILIRUBIN TOTAL <=1.5 mg/dL 0.6   0.9   AST 11 - 45 unit/L 30   41   ALT 0 - 55 unit/L 21   24   Globulin, Total 2.4 - 3.5 gm/dL 6.5 (H)   6.8 (H)   Troponin I 0.000 - 0.045 ng/mL    <0.010   Lactic Acid Level 0.5 - 2.2 mmol/L    1.0   Group & Rh     B POS   Indirect Larry GEL     NEG   Specimen Outdate     07/14/2025 23:59   Syphilis Antibody Nonreactive, Equivocal  Nonreactive      Blood Culture     Rpt (IP)  Rpt (IP)   POCT Glucose 70 - 110 mg/dL  108 111 (H)    EKG 12-lead        QRS Duration ms  ms       OHS QTC Calculation ms  ms

## 2025-07-11 NOTE — PLAN OF CARE
Spoke to Maral in admissions with [facility] in [place], P: [phone], who stated that she spoke to patient's sister, Mely, P: [phone], who stated that she works nights, but plans to bring patient's SS check to the NH this afternoon. Once patient is financially cleared, he can be admitted to [facility] on Monday. CM will continue to follow.

## 2025-07-11 NOTE — PROGRESS NOTES
[facility] Progress Note   Attending: Mely Holly MD   Chronic Care (Long Term Care) Rounding Service     Subjective:      Brief HPI:  Anne Terry is a 65 y.o. 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  The patient presented to [facility] on 06/21/2025 with a primary complaint of failure to thrive.  The patient was brought to the [facility] ED after he was found walking around naked and confused.  Per the patient on admission,  he lived by himself in [place]  but is unable to explain why he went to the emergency room or why he is in the hospital now.  He had not been compliant with his home medications.  He currently denies any fever, chills, sweats, shortness for breath, chest pain, nausea/vomiting/diarrhea, dysuria, new rashes.  He was recently diagnosed with oral thrush.  At the time of hospitalization at [facility] he was seen by infectious disease and started on azithromycin, Bactrim, ethambutol, rifampin, Diflucan.  His last CD4 count PTA at [facility] was 37.  MRI brain and LP were done at [facility].  CSF routine culture and gram stain were negative  and MRI of brain was significant only for microvascular ischemic changes.  CD4 on this most recent admission was 21/7%,  with viral load 114,288. He has a history of nonadherence to his medications.  He has missed multiple appointments for follow up at ID clinic at [facility].  He has elevation of alkaline phosphatase which was suspected to be due to infiltrated liver disease from HIV and AIDS per ID consultant at [facility].  He has been consuming less than 50% of his meals at [facility] and has lost substantial weight.        Interval History:   Nurse reports no overnight events. He did refuse vitals in early morning hours.Mr Terry continues to decline PT/OT. He was resting with his eyes closed this am  but responded with head nodding to all questions. He agreed to try PT tpday. He took his meds late all day yesterday.     Review of Systems:  ROS No new complaints     Objective:     Last 24 Hour Vital Signs:  BP  Min: 124/73  Max: 174/90  Temp  Av.8 °F (36.6 °C)  Min: 97.4 °F (36.3 °C)  Max: 98.1 °F (36.7 °C)  Pulse  Av.3  Min: 68  Max: 83  Resp  Av.7  Min: 18  Max: 20  SpO2  Av %  Min: 100 %  Max: 100 %  I/O last 3 completed shifts:  In: 2750.2 [P.O.:777; I.V.:1973.2]  Out: 1100 [Urine:1100]    Physical Examination:  Constitutional:       General: He is not in acute distress.     Appearance: He is not ill-appearing, toxic-appearing or diaphoretic.   HENT:      Mouth/Throat:      Comments: No visible residual thrush . Mucous membranes slightly dry.   Cardiovascular:      Rate and Rhythm: Normal rate and regular rhythm.      Heart sounds:      No friction rub. No gallop.   Pulmonary:      Effort: Pulmonary effort is normal. No respiratory distress.      Breath sounds: No stridor. No wheezing, rhonchi or rales.   Skin:     Findings: No rash.      Comments: Dry/scaly     Neurological:      Mental Status: He is alert.      Comments: Knows he is in a hospital but does not know which one    Laboratory:  Most Recent Data:  CBC:   Lab Results   Component Value Date    WBC 2.71 (L) 2025    HGB 7.0 (L) 2025    HCT 22.3 (L) 2025     2025    MCV 89.9 2025    RDW 16.1 2025     WBC Differential:   Recent Labs   Lab 25  0439 25  0449 25  1934 07/10/25  0515 25  0451   WBC 3.20* 3.07* 3.41* 3.42* 2.71*   HGB 7.2* 7.3* 7.6* 7.4* 7.0*   HCT 23.5* 22.9* 24.6* 24.0* 22.3*    253 299 329 204   MCV 88.7 87.1 89.8 89.9 89.9     BMP:   Lab Results   Component Value Date     2025    K 4.4 2025     (H) 2025    CO2 22 (L) 2025    BUN 25.7 2025    CREATININE 1.46 (H) 2025    GLU 68 (L) 2025     "CALCIUM 7.7 (L) 07/11/2025    MG 1.80 07/06/2025    PHOS 3.4 06/21/2025     LFTs:   Lab Results   Component Value Date    PROT 8.2 (H) 07/11/2025    ALBUMIN 1.7 (L) 07/11/2025    BILITOT 0.6 07/11/2025    AST 30 07/11/2025    ALKPHOS 1,735 (H) 07/11/2025    ALT 21 07/11/2025    GGT 1,303 (H) 07/10/2025     Coags:   Lab Results   Component Value Date    INR 1.0 06/25/2025    PROTIME 13.2 06/25/2025     FLP:   Lab Results   Component Value Date    CHOL 162 01/17/2025    HDL 25 (L) 01/17/2025    TRIG 300 (H) 01/17/2025     DM:   Lab Results   Component Value Date    CREATININE 1.46 (H) 07/11/2025     Thyroid:   Lab Results   Component Value Date    TSH 4.120 01/17/2025      Anemia:   Lab Results   Component Value Date    IRON 24 (L) 06/29/2025    TIBC 105 (L) 06/29/2025    FERRITIN 1,767.27 (H) 06/29/2025       Lab Results   Component Value Date    NLQDPGJH47 224 11/19/2024       Lab Results   Component Value Date    FOLATE 14.2 11/19/2024        Cardiac: No results found for: "TROPONINI", "CKTOTAL", "CKMB", "BNP"      Microbiology Data:  Microbiology Results (last 7 days)       Procedure Component Value Units Date/Time    Cryptococcal antigen, blood [[id number]]  (Normal) Collected: 07/10/25 1101    Order Status: Completed Specimen: Blood, Venous Updated: 07/10/25 2159     Cryptococcal Antigen, Serum Negative    AFB Smear [[id number]]     Order Status: Sent Specimen: Sputum, Induced     Mycobacteria and Nocardia Culture [[id number]]     Order Status: Sent Specimen: Respiratory from Sputum, Induced     C.trach/N.gonor AMP RNA [[id number]]     Order Status: Sent              Other Results   6/27/2025 CSF Crypto Ag negative , CSF AFB Negative CSF Routine Culture no growth at 5 days   6/27/2025 Blood culture negative Negative x 2 Fungal Culture and Nocardia/ Mycobacteria culture pending    EKG : see results below       Vent. Rate :  53 BPM     Atrial Rate :    BPM     P-R Int :    ms          QRS Dur :  76 ms      QT Int " : 480 ms       P-R-T Axes :    270  85 degrees    QTcB Int : 450 ms    Junctional rhythm  Indeterminate axis  ST and T wave abnormality, consider inferior ischemia  Abnormal ECG  No previous ECGs available  Confirmed by Sharyn Eastman ([id number]) on 6/22/2025 3:12:08 PM       Radiology:  No new results     Current Medications:     Infusions: NS stopped after bag complete        Scheduled:   abacavir  300 mg Oral Daily    azithromycin  500 mg Oral Daily    darunavir  800 mg Oral Daily with breakfast    emtricitabine-tenofovir alafen  1 tablet Oral Daily    enoxparin  40 mg Subcutaneous Q24H (prophylaxis, 1700)    ethambutoL  800 mg Oral Daily    ferrous sulfate  1 tablet Oral Daily    fluconazole  200 mg Oral Daily    pantoprazole  40 mg Oral Daily    rifabutin  150 mg Oral Daily    ritonavir  100 mg Oral Daily    sodium bicarbonate  650 mg Oral Daily    sulfamethoxazole-trimethoprim 800-160mg  1 tablet Oral Daily        PRN:    Current Facility-Administered Medications:     acetaminophen, 650 mg, Oral, Q6H PRN    dextrose 50%, 12.5 g, Intravenous, PRN    dextrose 50%, 25 g, Intravenous, PRN    glucagon (human recombinant), 1 mg, Intramuscular, PRN    glucose, 16 g, Oral, PRN    glucose, 24 g, Oral, PRN    naloxone, 0.02 mg, Intravenous, PRN    ondansetron, 4 mg, Intravenous, Q4H PRN    sodium chloride 0.9%, 10 mL, Intravenous, PRN        Assessment & Plan:     HIV/AIDS -CD4 21/7%, viral load 114,288 with opportunistic infection        History of cryptococcal meningitis        History of PJP  Appreciate consult from ID   Continue Bactrim DS 1 tab daily for opportunistic infection prophylaxis   Recommended changes to antiviral regimen implemented today  DISCONTINUE dolutegravir 50 mg daily   Continue emtricitabine -tenofovir alafen 200-25 mg 1 tablet daily and PJP prophylaxis   START the following:   - Prezcobix 1 tab PO daily (broken down into individual components of Darunavir 800mg PO daily and Ritonavir 100mg PO  daily, given together)    - Abacavir 300mg PO daily     History of disseminated MAC, now with lung mass  DISCONTINUE , rifampin 300 mg daily  Continue ethambutol 800 mg daily, azithromycin 500 mg daily   START - Rifabutin 150mg PO daily      TAYLOR- due to poor oral intake   NS at 125 ml/hr  started yesterday. Repeat creatinine without significant change, however, SBP increased  Encourage oral fluid intake and consider additional IVF      Severe anemia acute on chronic with anemia of chronic disease/  stool for OB +  1/2   Iron-deficiency anemia  Continue iron supplementation; repeat CBC 7/11/2025 with Hgb 7.0.  Consider transfusion if hemoglobin less than 7  GI consult      Severe malnutrition   Appreciate nutritional consult; consider appetite stimulant      Elevated alkaline phosphatase-suspect infiltrative disease   Improved slightly  today ; GGT elevation confirms liver origin. Will continue to monitor serially.   Consider GI consult regarding elevated alkaline phosphatase     Oral thrush-treated; ppx for cryptococcus and histoplasma   Continue fluconazole 200 mg daily      Acute metabolic encephalopathy-resolved per transferring team ; Moderate cognitive impairment with concern for dementia   Continue to monitor; awaiting placement in LTC facility      Unable to care for himself/ Unsafe for discharge  Awaiting placement in LTC facility      History of hyponatremia   Continue sodium bicarbonate as prescribed and monitor electrolytes   12. Elevated SBP intermittently         NS discontinued         CODE STATUS: DNR (Do Not Resuscitate)  Access: Peripheral  Antibiotics: Bactrim , Rifabutin, Ethambutol, azithromycin  Diet: Regular  DVT Prophylaxis: Lovenox- VTE prophylaxis enoxaparin 40 mg daily and serial compression devices   GI Prophylaxis: proton pump inhibitor per orders-Continue pantoprazole   Fluids: normal saline Normal Saline at 125 ml/hr started 7/10/2025 .   Precautions: Fall and decubitus precaution    Disposition: day 4 of admission for  Failure to thrive      Patient's sister, Shelbi, P: [phone], who lives in [place],stated that [facility] is interested in patient.   Rudolph with intake at [facility], P: [phone],  requested that referral be re-submitted.   Referral has been sent via Epic. CM will follow.    Mely Holly MD  [facility] Family Medicine Staff - Long Term Care Service

## 2025-07-12 PROBLEM — R00.0 SINUS TACHYCARDIA: Status: ACTIVE | Noted: 2025-07-12

## 2025-07-12 LAB
ABO + RH BLD: NORMAL
ABO + RH BLD: NORMAL
ALBUMIN SERPL-MCNC: 1.6 G/DL (ref 3.4–4.8)
ALBUMIN/GLOB SERPL: 0.3 RATIO (ref 1.1–2)
ALP SERPL-CCNC: 1684 UNIT/L (ref 40–150)
ALT SERPL-CCNC: 23 UNIT/L (ref 0–55)
ANION GAP SERPL CALC-SCNC: 4 MEQ/L
AST SERPL-CCNC: 37 UNIT/L (ref 11–45)
B PERT.PT PRMT NPH QL NAA+NON-PROBE: NOT DETECTED
BASOPHILS # BLD AUTO: 0.02 X10(3)/MCL
BASOPHILS # BLD AUTO: 0.02 X10(3)/MCL
BASOPHILS NFR BLD AUTO: 0.5 %
BASOPHILS NFR BLD AUTO: 0.6 %
BILIRUB SERPL-MCNC: 0.8 MG/DL
BLD PROD TYP BPU: NORMAL
BLD PROD TYP BPU: NORMAL
BLOOD UNIT EXPIRATION DATE: NORMAL
BLOOD UNIT EXPIRATION DATE: NORMAL
BLOOD UNIT TYPE CODE: 5100
BLOOD UNIT TYPE CODE: 5100
BUN SERPL-MCNC: 23.1 MG/DL (ref 8.4–25.7)
C PNEUM DNA NPH QL NAA+NON-PROBE: NOT DETECTED
CALCIUM SERPL-MCNC: 7.7 MG/DL (ref 8.8–10)
CHLORIDE SERPL-SCNC: 111 MMOL/L (ref 98–107)
CO2 SERPL-SCNC: 24 MMOL/L (ref 23–31)
CREAT SERPL-MCNC: 1.34 MG/DL (ref 0.72–1.25)
CREAT/UREA NIT SERPL: 17
CROSSMATCH INTERPRETATION: NORMAL
CROSSMATCH INTERPRETATION: NORMAL
DISPENSE STATUS: NORMAL
DISPENSE STATUS: NORMAL
EOSINOPHIL # BLD AUTO: 0.06 X10(3)/MCL (ref 0–0.9)
EOSINOPHIL # BLD AUTO: 0.07 X10(3)/MCL (ref 0–0.9)
EOSINOPHIL NFR BLD AUTO: 1.4 %
EOSINOPHIL NFR BLD AUTO: 1.9 %
ERYTHROCYTE [DISTWIDTH] IN BLOOD BY AUTOMATED COUNT: 15.5 % (ref 11.5–17)
ERYTHROCYTE [DISTWIDTH] IN BLOOD BY AUTOMATED COUNT: 16.1 % (ref 11.5–17)
GFR SERPLBLD CREATININE-BSD FMLA CKD-EPI: 60 ML/MIN/1.73/M2
GLOBULIN SER-MCNC: 6.1 GM/DL (ref 2.4–3.5)
GLUCOSE SERPL-MCNC: 63 MG/DL (ref 82–115)
HADV DNA NPH QL NAA+NON-PROBE: NOT DETECTED
HCOV 229E RNA NPH QL NAA+NON-PROBE: NOT DETECTED
HCOV HKU1 RNA NPH QL NAA+NON-PROBE: NOT DETECTED
HCOV NL63 RNA NPH QL NAA+NON-PROBE: NOT DETECTED
HCOV OC43 RNA NPH QL NAA+NON-PROBE: NOT DETECTED
HCT VFR BLD AUTO: 21.6 % (ref 42–52)
HCT VFR BLD AUTO: 30.9 % (ref 42–52)
HGB BLD-MCNC: 10.2 G/DL (ref 14–18)
HGB BLD-MCNC: 6.7 G/DL (ref 14–18)
HMPV RNA NPH QL NAA+NON-PROBE: NOT DETECTED
HPIV1 RNA NPH QL NAA+NON-PROBE: NOT DETECTED
HPIV2 RNA NPH QL NAA+NON-PROBE: NOT DETECTED
HPIV3 RNA NPH QL NAA+NON-PROBE: NOT DETECTED
HPIV4 RNA NPH QL NAA+NON-PROBE: NOT DETECTED
IMM GRANULOCYTES # BLD AUTO: 0.06 X10(3)/MCL (ref 0–0.04)
IMM GRANULOCYTES # BLD AUTO: 0.07 X10(3)/MCL (ref 0–0.04)
IMM GRANULOCYTES NFR BLD AUTO: 1.6 %
IMM GRANULOCYTES NFR BLD AUTO: 1.7 %
LYMPHOCYTES # BLD AUTO: 0.36 X10(3)/MCL (ref 0.6–4.6)
LYMPHOCYTES # BLD AUTO: 0.44 X10(3)/MCL (ref 0.6–4.6)
LYMPHOCYTES NFR BLD AUTO: 10 %
LYMPHOCYTES NFR BLD AUTO: 10 %
M PNEUMO DNA NPH QL NAA+NON-PROBE: NOT DETECTED
MCH RBC QN AUTO: 28 PG (ref 27–31)
MCH RBC QN AUTO: 29.5 PG (ref 27–31)
MCHC RBC AUTO-ENTMCNC: 31 G/DL (ref 33–36)
MCHC RBC AUTO-ENTMCNC: 33 G/DL (ref 33–36)
MCV RBC AUTO: 89.3 FL (ref 80–94)
MCV RBC AUTO: 90.4 FL (ref 80–94)
MONOCYTES # BLD AUTO: 0.39 X10(3)/MCL (ref 0.1–1.3)
MONOCYTES # BLD AUTO: 0.39 X10(3)/MCL (ref 0.1–1.3)
MONOCYTES NFR BLD AUTO: 10.8 %
MONOCYTES NFR BLD AUTO: 8.8 %
NEUTROPHILS # BLD AUTO: 2.71 X10(3)/MCL (ref 2.1–9.2)
NEUTROPHILS # BLD AUTO: 3.44 X10(3)/MCL (ref 2.1–9.2)
NEUTROPHILS NFR BLD AUTO: 75 %
NEUTROPHILS NFR BLD AUTO: 77.7 %
NRBC BLD AUTO-RTO: 0 %
NRBC BLD AUTO-RTO: 0 %
PLATELET # BLD AUTO: 240 X10(3)/MCL (ref 130–400)
PLATELET # BLD AUTO: 322 X10(3)/MCL (ref 130–400)
PMV BLD AUTO: 10.2 FL (ref 7.4–10.4)
PMV BLD AUTO: 10.9 FL (ref 7.4–10.4)
POCT GLUCOSE: 71 MG/DL (ref 70–110)
POCT GLUCOSE: 81 MG/DL (ref 70–110)
POCT GLUCOSE: 86 MG/DL (ref 70–110)
POTASSIUM SERPL-SCNC: 4.5 MMOL/L (ref 3.5–5.1)
PROT SERPL-MCNC: 7.7 GM/DL (ref 5.8–7.6)
RBC # BLD AUTO: 2.39 X10(6)/MCL (ref 4.7–6.1)
RBC # BLD AUTO: 3.46 X10(6)/MCL (ref 4.7–6.1)
RSV RNA NPH QL NAA+NON-PROBE: NOT DETECTED
RV+EV RNA NPH QL NAA+NON-PROBE: NOT DETECTED
SODIUM SERPL-SCNC: 139 MMOL/L (ref 136–145)
TROPONIN I SERPL-MCNC: <0.01 NG/ML (ref 0–0.04)
UNIT NUMBER: NORMAL
UNIT NUMBER: NORMAL
VIT B12 SERPL-MCNC: 364 PG/ML (ref 213–816)
WBC # BLD AUTO: 3.61 X10(3)/MCL (ref 4.5–11.5)
WBC # BLD AUTO: 4.42 X10(3)/MCL (ref 4.5–11.5)

## 2025-07-12 PROCEDURE — 25000003 PHARM REV CODE 250: Performed by: FAMILY MEDICINE

## 2025-07-12 PROCEDURE — 25000242 PHARM REV CODE 250 ALT 637 W/ HCPCS

## 2025-07-12 PROCEDURE — 63700000 PHARM REV CODE 250 ALT 637 W/O HCPCS: Performed by: FAMILY MEDICINE

## 2025-07-12 PROCEDURE — 63600175 PHARM REV CODE 636 W HCPCS: Performed by: FAMILY MEDICINE

## 2025-07-12 PROCEDURE — 85025 COMPLETE CBC W/AUTO DIFF WBC: CPT | Performed by: FAMILY MEDICINE

## 2025-07-12 PROCEDURE — 84484 ASSAY OF TROPONIN QUANT: CPT | Performed by: FAMILY MEDICINE

## 2025-07-12 PROCEDURE — 80053 COMPREHEN METABOLIC PANEL: CPT | Performed by: FAMILY MEDICINE

## 2025-07-12 PROCEDURE — 30233N1 TRANSFUSION OF NONAUTOLOGOUS RED BLOOD CELLS INTO PERIPHERAL VEIN, PERCUTANEOUS APPROACH: ICD-10-PCS | Performed by: FAMILY MEDICINE

## 2025-07-12 PROCEDURE — 97162 PT EVAL MOD COMPLEX 30 MIN: CPT | Performed by: PHYSICAL THERAPIST

## 2025-07-12 PROCEDURE — 63600175 PHARM REV CODE 636 W HCPCS: Performed by: INTERNAL MEDICINE

## 2025-07-12 PROCEDURE — 82607 VITAMIN B-12: CPT | Performed by: FAMILY MEDICINE

## 2025-07-12 PROCEDURE — 36415 COLL VENOUS BLD VENIPUNCTURE: CPT | Performed by: FAMILY MEDICINE

## 2025-07-12 PROCEDURE — 21400001 HC TELEMETRY ROOM

## 2025-07-12 PROCEDURE — 63700000 PHARM REV CODE 250 ALT 637 W/O HCPCS: Performed by: INTERNAL MEDICINE

## 2025-07-12 PROCEDURE — 93005 ELECTROCARDIOGRAM TRACING: CPT

## 2025-07-12 PROCEDURE — 25000003 PHARM REV CODE 250: Performed by: INTERNAL MEDICINE

## 2025-07-12 PROCEDURE — P9016 RBC LEUKOCYTES REDUCED: HCPCS | Performed by: FAMILY MEDICINE

## 2025-07-12 PROCEDURE — 36430 TRANSFUSION BLD/BLD COMPNT: CPT

## 2025-07-12 PROCEDURE — 11000001 HC ACUTE MED/SURG PRIVATE ROOM

## 2025-07-12 PROCEDURE — 25000003 PHARM REV CODE 250: Performed by: STUDENT IN AN ORGANIZED HEALTH CARE EDUCATION/TRAINING PROGRAM

## 2025-07-12 PROCEDURE — 86923 COMPATIBILITY TEST ELECTRIC: CPT | Performed by: FAMILY MEDICINE

## 2025-07-12 RX ORDER — FOLIC ACID 1 MG/1
1 TABLET ORAL DAILY
Status: DISCONTINUED | OUTPATIENT
Start: 2025-07-12 | End: 2025-07-15 | Stop reason: HOSPADM

## 2025-07-12 RX ORDER — LANOLIN ALCOHOL/MO/W.PET/CERES
2000 CREAM (GRAM) TOPICAL DAILY
Status: DISCONTINUED | OUTPATIENT
Start: 2025-07-12 | End: 2025-07-15 | Stop reason: HOSPADM

## 2025-07-12 RX ORDER — SODIUM CHLORIDE, SODIUM LACTATE, POTASSIUM CHLORIDE, CALCIUM CHLORIDE 600; 310; 30; 20 MG/100ML; MG/100ML; MG/100ML; MG/100ML
INJECTION, SOLUTION INTRAVENOUS CONTINUOUS
Status: DISCONTINUED | OUTPATIENT
Start: 2025-07-12 | End: 2025-07-14

## 2025-07-12 RX ORDER — HYDROCODONE BITARTRATE AND ACETAMINOPHEN 500; 5 MG/1; MG/1
TABLET ORAL
Status: DISCONTINUED | OUTPATIENT
Start: 2025-07-12 | End: 2025-07-15 | Stop reason: HOSPADM

## 2025-07-12 RX ADMIN — SODIUM BICARBONATE 650 MG TABLET 650 MG: at 09:07

## 2025-07-12 RX ADMIN — PANTOPRAZOLE 40 MG: 40 TABLET, DELAYED RELEASE ORAL at 09:07

## 2025-07-12 RX ADMIN — RITONAVIR 100 MG: 100 TABLET ORAL at 09:07

## 2025-07-12 RX ADMIN — FOLIC ACID 1 MG: 1 TABLET ORAL at 05:07

## 2025-07-12 RX ADMIN — SODIUM CHLORIDE, POTASSIUM CHLORIDE, SODIUM LACTATE AND CALCIUM CHLORIDE: 600; 310; 30; 20 INJECTION, SOLUTION INTRAVENOUS at 12:07

## 2025-07-12 RX ADMIN — EMTRICITABINE AND TENOFOVIR DISOPROXIL FUMARATE 1 TABLET: 200; 300 TABLET, FILM COATED ORAL at 09:07

## 2025-07-12 RX ADMIN — ABACAVIR SULFATE 300 MG: 300 TABLET, FILM COATED ORAL at 09:07

## 2025-07-12 RX ADMIN — ETHAMBUTOL HYDROCHLORIDE 800 MG: 400 TABLET ORAL at 09:07

## 2025-07-12 RX ADMIN — ENOXAPARIN SODIUM 40 MG: 40 INJECTION SUBCUTANEOUS at 05:07

## 2025-07-12 RX ADMIN — SULFAMETHOXAZOLE AND TRIMETHOPRIM 1 TABLET: 800; 160 TABLET ORAL at 09:07

## 2025-07-12 RX ADMIN — MULTIVIT AND MINERALS-FERROUS GLUCONATE 9 MG IRON/15 ML ORAL LIQUID 5 ML: at 05:07

## 2025-07-12 RX ADMIN — VALACYCLOVIR 1000 MG: 500 TABLET, FILM COATED ORAL at 09:07

## 2025-07-12 RX ADMIN — CYANOCOBALAMIN TAB 1000 MCG 2000 MCG: 1000 TAB at 05:07

## 2025-07-12 RX ADMIN — AZITHROMYCIN DIHYDRATE 500 MG: 250 TABLET, FILM COATED ORAL at 09:07

## 2025-07-12 RX ADMIN — FLUCONAZOLE 200 MG: 100 TABLET ORAL at 09:07

## 2025-07-12 RX ADMIN — SODIUM CHLORIDE, POTASSIUM CHLORIDE, SODIUM LACTATE AND CALCIUM CHLORIDE: 600; 310; 30; 20 INJECTION, SOLUTION INTRAVENOUS at 06:07

## 2025-07-12 RX ADMIN — DARUNAVIR 800 MG: 800 TABLET, FILM COATED ORAL at 09:07

## 2025-07-12 RX ADMIN — FERROUS SULFATE TAB 325 MG (65 MG ELEMENTAL FE) 1 EACH: 325 (65 FE) TAB at 09:07

## 2025-07-12 RX ADMIN — RIFABUTIN 150 MG: 150 CAPSULE ORAL at 09:07

## 2025-07-12 NOTE — NURSING
Unable to obtain sputum samples for AFB Smear and mycobacteria and nocardia culture after several attempts made by day shift nurse and night shift nurse. Respiratory attempted to induce cough and pt still unable to produce sputum sample due to weak and ineffective cough. Dr. Nick notified.

## 2025-07-12 NOTE — NURSING
Pt telemetry monitor showed HR of 178 bpm. Attempted to check pt's vital signs after telemetry alert and pt refused. Pt's telemetry monitor now showing stable HR in the 80s. Dr. Fink notified.

## 2025-07-12 NOTE — PLAN OF CARE
Problem: Adult Inpatient Plan of Care  Goal: Absence of Hospital-Acquired Illness or Injury  7/12/2025 0355 by Jayna Michele LPN  Outcome: Progressing  7/12/2025 0355 by Jayna Michele LPN  Outcome: Progressing  Intervention: Prevent Skin Injury  Flowsheets (Taken 7/12/2025 0355)  Skin Protection: incontinence pads utilized  Device Skin Pressure Protection:   absorbent pad utilized/changed   positioning supports utilized   tubing/devices free from skin contact     Problem: Wound  Goal: Absence of Infection Signs and Symptoms  7/12/2025 0355 by Jayna Michele LPN  Outcome: Progressing  7/12/2025 0355 by Jayna Michele LPN  Outcome: Progressing  Intervention: Prevent or Manage Infection  Flowsheets (Taken 7/12/2025 0355)  Fever Reduction/Comfort Measures:   lightweight bedding   lightweight clothing  Infection Management:   aseptic technique maintained   cultures obtained and sent to lab  Goal: Improved Oral Intake  7/12/2025 0355 by Jayna Michele LPN  Outcome: Progressing  7/12/2025 0355 by Jayna Michele LPN  Outcome: Progressing  Intervention: Promote and Optimize Oral Intake  Flowsheets (Taken 7/12/2025 0355)  Nutrition Interventions: food preferences provided     Problem: Infection  Goal: Absence of Infection Signs and Symptoms  7/12/2025 0355 by Jayna Michele LPN  Outcome: Progressing  7/12/2025 0355 by Jayna Michele LPN  Outcome: Progressing  Intervention: Prevent or Manage Infection  Flowsheets (Taken 7/12/2025 0355)  Fever Reduction/Comfort Measures:   lightweight bedding   lightweight clothing  Infection Management:   aseptic technique maintained   cultures obtained and sent to lab     Problem: Adult Inpatient Plan of Care  Goal: Plan of Care Review  7/12/2025 0355 by Jayna Michele LPN  Outcome: Adequate for Care Transition  Flowsheets (Taken 7/12/2025 0355)  Plan of Care Reviewed With: patient  7/12/2025 0355 by Jayna Michele  LPN  Outcome: Progressing  Goal: Patient-Specific Goal (Individualized)  7/12/2025 0355 by Jayna Michele LPN  Outcome: Adequate for Care Transition  Flowsheets (Taken 7/12/2025 0355)  Individualized Care Needs: cardiac monitoring, IV fluids, pt safety  Anxieties, Fears or Concerns: none voiced  Patient/Family-Specific Goals (Include Timeframe): none voiced  7/12/2025 0355 by Jayna Michele LPN  Outcome: Progressing  Goal: Optimal Comfort and Wellbeing  7/12/2025 0355 by Jayna Michele LPN  Outcome: Adequate for Care Transition  7/12/2025 0355 by Jayna Michele LPN  Outcome: Progressing  Intervention: Provide Person-Centered Care  Flowsheets (Taken 7/12/2025 0355)  Trust Relationship/Rapport:   care explained   questions encouraged  Goal: Readiness for Transition of Care  7/12/2025 0355 by Jayna Michele LPN  Outcome: Adequate for Care Transition  7/12/2025 0355 by Jayna Michele LPN  Outcome: Progressing     Problem: Wound  Goal: Optimal Coping  7/12/2025 0355 by Jayna Michele LPN  Outcome: Adequate for Care Transition  7/12/2025 0355 by Jayna Michele LPN  Outcome: Progressing  Intervention: Support Patient and Family Response  Flowsheets (Taken 7/12/2025 0355)  Supportive Measures:   active listening utilized   self-reflection promoted  Family/Support System Care: self-care encouraged  Goal: Optimal Functional Ability  7/12/2025 0355 by Jayna Michele LPN  Outcome: Adequate for Care Transition  7/12/2025 0355 by Jayna Michele LPN  Outcome: Progressing  Goal: Optimal Pain Control and Function  7/12/2025 0355 by Jayna Michele LPN  Outcome: Adequate for Care Transition  7/12/2025 0355 by Jayna Michele LPN  Outcome: Progressing  Intervention: Prevent or Manage Pain  Flowsheets (Taken 7/12/2025 0355)  Sleep/Rest Enhancement:   awakenings minimized   consistent schedule promoted   noise level reduced   regular sleep/rest pattern  promoted   room darkened  Goal: Skin Health and Integrity  7/12/2025 0355 by Jayna Michele LPN  Outcome: Adequate for Care Transition  7/12/2025 0355 by Jayna Michele LPN  Outcome: Progressing  Intervention: Optimize Skin Protection  Flowsheets (Taken 7/12/2025 0355)  Pressure Reduction Techniques:   frequent weight shift encouraged   weight shift assistance provided  Pressure Reduction Devices:   positioning supports utilized   pressure-redistributing mattress utilized  Skin Protection: incontinence pads utilized  Goal: Optimal Wound Healing  7/12/2025 0355 by Janya Michele LPN  Outcome: Adequate for Care Transition  7/12/2025 0355 by Jayna Michele LPN  Outcome: Progressing  Intervention: Promote Wound Healing  Flowsheets (Taken 7/12/2025 0355)  Sleep/Rest Enhancement:   awakenings minimized   consistent schedule promoted   noise level reduced   regular sleep/rest pattern promoted   room darkened     Problem: Skin Injury Risk Increased  Goal: Skin Health and Integrity  7/12/2025 0355 by Jayna Michele LPN  Outcome: Adequate for Care Transition  7/12/2025 0355 by Jayna Michele LPN  Outcome: Progressing  Intervention: Optimize Skin Protection  Flowsheets (Taken 7/12/2025 0355)  Pressure Reduction Techniques:   frequent weight shift encouraged   weight shift assistance provided  Pressure Reduction Devices:   positioning supports utilized   pressure-redistributing mattress utilized  Skin Protection: incontinence pads utilized  Intervention: Promote and Optimize Oral Intake  Flowsheets (Taken 7/12/2025 0355)  Nutrition Interventions: food preferences provided

## 2025-07-12 NOTE — PROGRESS NOTES
[facility] Progress Note     Resident Team: Long Term Care Team   Attending: Mely Holly MD       Subjective:      Brief HPI:  Admission History  Anne Terry is a 65 y.o. male presented yesterday to [facility] as a  transfer awaiting  long term care placement with a past medical history of HIV/aids, MAC, history of disseminated histoplasmosis, history of PJP and cryptococcal meningitis  The patient presented to [facility] on 06/21/2025 with a primary complaint of failure to thrive.  The patient was brought to the [facility] ED after he was found walking around naked and confused.  Per the patient on admission,  he lived by himself in [facility]  but is unable to explain why he went to the emergency room or why he is in the hospital now.  He had not been compliant with his home medications.  He was recently diagnosed with oral thrush.  At the time of hospitalization at [facility] he was seen by infectious disease and started on azithromycin, Bactrim, ethambutol, rifampin, Diflucan.  His last CD4 count PTA at [facility] was 37.  MRI brain and LP were done at [facility].  CSF routine culture and gram stain were negative  and MRI of brain was significant only for microvascular ischemic changes.  CD4 on this most recent admission was 21/7%,  with viral load 114,288. He has a history of nonadherence to his medications.  He has missed multiple appointments for follow up at ID clinic at [facility].  He has elevation of alkaline phosphatase which was suspected to be due to infiltrated liver disease from HIV and AIDS per ID consultant at [facility].  He has been consuming less than 50% of his meals at [facility] and has lost substantial weight.         Summary of Events 7/11/2025 :   He consumed a can of Boost in am yesterday but refused breakfast and lunch. He continued to decline PT/OT yesterday. He had a single episode of vomiting today when respiratory therapist attempted sputum collection.Vomitus was pink tinged sputum- small volume but he tolerated 25 % of his  dinner and drank 24 oz of juice/powerade without issue last night. He had a soft/liquid stool yesterday -dark brown in color and heme negative. Patient notably with new tachycardia, tremulousness and twitching yesterday afternoon which lasted several hours. Patient had significant tremors, twitching and stiffness intermittently yesterday, afternoon and evening. He also developed a lesion on his right lower lip resembling possible herpes simplex and was started on Valtrex 1 gm BID. He remained afebrile, normotensive, and lactate was 1.0. Though his WBC was low, he has chronic leukopenia.Case was discussed with ID and given he is afebrile and on prophylactic meds for opportunistic infection, antibiotics were not indicated.  He was given a 1 liter bolus of LR and an additional liter at 150/hr followed by 100 ml per hour. He was tachycardic and tremulous for several hours and the following tests were done in evaluation of the increased HR:     Initial EKG 1619:  sinus tachycardia with nonspecific ST changes. sinus tachycardia   Nonspecific ST and T wave abnormality. When compared with ECG of 21-Jun-2025 22:38, Sinus rhythm has replaced Junctional rhythm Vent. rate has increased by  68 bpm. Questionable change in The axis. ST now depressed in Anterior-lateral leads   T wave inversion now evident in Lateral leads Prolonged Qtc 472       Repeat EKG 1848: Nonspecific ST and T wave abnormality; When compared with ECG of 11-Jul-2025 16:19, ST less depressed in Inferior leads T wave inversion less evident in Lateral leads. Qtc 438      Troponin - normal <0.010  Mag 1.8   Ammonia - normal   CPK - normal   Lactate 1.0   BC x 2 - pending   Urine Cx - pending  HSV PCR - pending  CT Head Negative for acute intracranial abnormality  Glucose 62 BUN/Creat 26.5/1.34 Alk phos 2022 Chloride 112 Co2 22   WBC 5.5 H&H 7.7/24.7   Type and screen 2 units   Respiratory Panel - nothing detected      Interval History:   Nurse reports of  new overnight events. Patient refused VS. He reports pain in his extremities but is more interactive today and answered all  questions asked.    He denies any fever, chills, sweats, shortness for breath, chest pain, nausea/vomiting/diarrhea but his stool was loose x 1 and dark brown in color.   He had an episode of tachycardia on the monitor to 178 per nursing notes but it subsequently returned to 80 bpm. Patient participated with PT this am,   Review of Systems:  ROS pain in extremities.      Objective:     Last 24 Hour Vital Signs:  BP  Min: 139/78  Max: 161/93  Temp  Av.1 °F (36.7 °C)  Min: 97.6 °F (36.4 °C)  Max: 99.1 °F (37.3 °C)  Pulse  Av.9  Min: 88  Max: 124  Resp  Av  Min: 18  Max: 18  SpO2  Av.9 %  Min: 97 %  Max: 100 %  I/O last 3 completed shifts:  In: 2752.5 [P.O.:787; I.V.:1965.5]  Out: 1550 [Urine:1550]    Physical Examination:  Physical Exam  Alert in NAD , tremors and twitching present yesterday are absent today.   Constitutional:       General: He is not in acute distress.     Appearance: He is not ill-appearing, toxic-appearing or diaphoretic.   HENT:      Mouth/Throat:      Comments: No visible residual thrush . Mucous membranes slightly dry.   Cardiovascular:      Rate and Rhythm: Normal rate and regular rhythm.      Heart sounds:      No friction rub. No gallop.   Pulmonary:      Effort: Pulmonary effort is normal. No respiratory distress.      Breath sounds: No stridor. No wheezing, rhonchi or rales.   Skin:     Findings: No rash.      Comments: Dry/scaly     Neurological:      Mental Status: He is alert.      Comments: Knows he is in a hospital but does not know which one       Laboratory:  Most Recent Data:  CBC:   Lab Results   Component Value Date    WBC 3.61 (L) 2025    HGB 6.7 (L) 2025    HCT 21.6 (L) 2025     2025    MCV 90.4 2025    RDW 16.1 2025     WBC Differential:   Recent Labs   Lab 25  1934 07/10/25  0515  07/11/25  0451 07/11/25  1654 07/12/25  0338   WBC 3.41* 3.42* 2.71* 5.50 3.61*   HGB 7.6* 7.4* 7.0* 7.7* 6.7*   HCT 24.6* 24.0* 22.3* 24.7* 21.6*    329 204 357 322   MCV 89.8 89.9 89.9 90.5 90.4     BMP:   Lab Results   Component Value Date     07/12/2025    K 4.5 07/12/2025     (H) 07/12/2025    CO2 24 07/12/2025    BUN 23.1 07/12/2025    CREATININE 1.34 (H) 07/12/2025    GLU 63 (L) 07/12/2025    CALCIUM 7.7 (L) 07/12/2025    MG 1.80 07/11/2025    PHOS 3.4 06/21/2025     LFTs:   Lab Results   Component Value Date    PROT 7.7 (H) 07/12/2025    ALBUMIN 1.6 (L) 07/12/2025    BILITOT 0.8 07/12/2025    AST 37 07/12/2025    ALKPHOS 1,684 (H) 07/12/2025    ALT 23 07/12/2025    GGT 1,303 (H) 07/10/2025     Coags:   Lab Results   Component Value Date    INR 1.0 06/25/2025    PROTIME 13.2 06/25/2025     FLP:   Lab Results   Component Value Date    CHOL 162 01/17/2025    HDL 25 (L) 01/17/2025    TRIG 300 (H) 01/17/2025     DM:   Lab Results   Component Value Date    CREATININE 1.34 (H) 07/12/2025     Thyroid:   Lab Results   Component Value Date    TSH 4.120 01/17/2025      Anemia:   Lab Results   Component Value Date    IRON 24 (L) 06/29/2025    TIBC 105 (L) 06/29/2025    FERRITIN 1,767.27 (H) 06/29/2025       Lab Results   Component Value Date    FIOJREDI28 224 11/19/2024       Lab Results   Component Value Date    FOLATE 14.2 11/19/2024        Cardiac:   Lab Results   Component Value Date    TROPONINI <0.010 07/11/2025         Microbiology Data:  Microbiology Results (last 7 days)       Procedure Component Value Units Date/Time    AFB Smear [[id number]]     Order Status: Sent Specimen: Sputum, Induced     AFB Smear [[id number]]     Order Status: Sent Specimen: Sputum, Induced     Urine Culture High Risk [[id number]] Collected: 07/11/25 1936    Order Status: Sent Specimen: Urine Updated: 07/11/25 1942    Blood Culture [[id number]] Collected: 07/11/25 1654    Order Status: Resulted Specimen: Blood from  Forearm, Left Updated: 07/11/25 1700    Blood Culture [[id number]] Collected: 07/11/25 1654    Order Status: Resulted Specimen: Blood from Hand, Left Updated: 07/11/25 1700    AFB Smear [[id number]]     Order Status: Sent Specimen: Sputum, Induced     AFB Smear [[id number]]     Order Status: Sent Specimen: Sputum, Induced     AFB Smear [[id number]]     Order Status: Canceled Specimen: Sputum, Induced     AFB Smear [[id number]]     Order Status: Canceled Specimen: Sputum, Induced     Mycobacteria and Nocardia Culture [[id number]]     Order Status: Sent Specimen: Respiratory from Sputum, Induced     Cryptococcal antigen, blood [[id number]]  (Normal) Collected: 07/10/25 1101    Order Status: Completed Specimen: Blood, Venous Updated: 07/10/25 2159     Cryptococcal Antigen, Serum Negative    AFB Smear [[id number]]     Order Status: Sent Specimen: Sputum, Induced     Mycobacteria and Nocardia Culture [[id number]]     Order Status: Sent Specimen: Respiratory from Sputum, Induced     C.trach/N.gonor AMP RNA [[id number]]     Order Status: Sent            Radiology:      Current Medications:     Infusions:   lactated ringers   Intravenous Continuous   Stopped at 07/12/25 0049    lactated ringers   Intravenous Continuous 100 mL/hr at 07/12/25 0353 Rate Verify at 07/12/25 0353        Scheduled:   abacavir  300 mg Oral Daily    azithromycin  500 mg Oral Daily    darunavir  800 mg Oral Daily with breakfast    emtricitabine-tenofovir 200-300 mg  1 tablet Oral Daily    enoxparin  40 mg Subcutaneous Q24H (prophylaxis, 1700)    ethambutoL  800 mg Oral Daily    ferrous sulfate  1 tablet Oral Daily    fluconazole  200 mg Oral Daily    pantoprazole  40 mg Oral Daily    rifabutin  150 mg Oral Daily    ritonavir  100 mg Oral Daily    sodium bicarbonate  650 mg Oral Daily    sulfamethoxazole-trimethoprim 800-160mg  1 tablet Oral Daily    valACYclovir  1,000 mg Oral Q12H        PRN:    Current Facility-Administered Medications:     0.9%   NaCl infusion (for blood administration), , Intravenous, Q24H PRN    acetaminophen, 650 mg, Oral, Q6H PRN    dextrose 50%, 12.5 g, Intravenous, PRN    dextrose 50%, 25 g, Intravenous, PRN    glucagon (human recombinant), 1 mg, Intramuscular, PRN    glucose, 16 g, Oral, PRN    glucose, 24 g, Oral, PRN    naloxone, 0.02 mg, Intravenous, PRN    ondansetron, 4 mg, Intravenous, Q4H PRN    sodium chloride 0.9%, 10 mL, Intravenous, PRN    Assessment & Plan:   HIV/AIDS -CD4 21/7%, viral load 114,288 with opportunistic infection        History of cryptococcal meningitis        History of PJP  Appreciate consult from ID   Continue Bactrim DS 1 tab daily for opportunistic infection prophylaxis   Recommended changes to antiviral regimen implemented  DISCONTINUED dolutegravir 50 mg daily 7/10/2025  Continue emtricitabine -tenofovir alafen 200-25 mg 1 tablet daily and PJP prophylaxis   STARTED the following 7/11/2025:   - Prezcobix 1 tab PO daily (broken down into individual components of Darunavir 800mg PO daily and Ritonavir 100mg PO daily, given together)    - Abacavir 300mg PO daily  2.  Acute persistent tachycardia and tremors/twitching - - appears resolved  Etiology remains uncertain but may be related to mild dehydration secondary to poor oral intake vs medication side effect; will continue to monitor closely   Tachycardia resolved for now but did have elevated HR to 170's transiently last night and immediately returned to normal- patient refused vitals at the time  Repeat ecg today with results below  and troponin this am <0.010 again  Vent. Rate :  73 BPM     Atrial Rate :  73 BPM       P-R Int : 146 ms          QRS Dur :  86 ms        QT Int : 424 ms       P-R-T Axes :  56  27  77 degrees      QTcB Int : 467 ms     Normal sinus rhythm   Minimal voltage criteria for LVH, may be normal variant   Nonspecific ST and T wave abnormality   Prolonged QT   Abnormal ECG   When compared with ECG of 11-Jul-2025 18:48,   Vent. rate  has decreased by  46 bpm   3. History of disseminated MAC, now with lung mass  DISCONTINUED rifampin 300 mg daily 7/10/2025  Continue ethambutol 800 mg daily, azithromycin 500 mg daily   STARTED - Rifabutin 150mg PO daily 7/11/2025  4. TAYLOR- due to poor oral intake   Improved slightly after 1 liter LR bolus, 1 liter LR at 150 ml/hr and now at  ml/hr   Encourage oral fluid intake  Creatinine 1.22----> 1.44 ---> 1.34  5.  Severe anemia acute on chronic with anemia of chronic disease/stool OB 1/3 +;Iron-deficiency anemia  Continue iron supplementation  Stool OB  x 3         -7/7 negative / 7/10 positive/ 7/11 Negative   H&H  7.4/24---> 7.7/24.7----> 6.7/21.6  Tranfuse 2 units packed RBC  GI consult   6. Severe malnutrition/ Prior B12 deficiency    Appreciate nutritional consult; consider appetite stimulant   Needs assist with meals   Repeat B12 today and supplement   7. Elevated alkaline phosphatase-suspect infiltrative disease   Improved slightly  today 1684 ; GGT elevation confirms liver origin. Will continue to monitor serially.   Consider GI consult regarding elevated alkaline phosphatase  8. Oral thrush-treated; ppx for cryptococcus and histoplasma   Continue fluconazole 200 mg daily   9. Acute metabolic encephalopathy-resolved per transferring team ; Moderate cognitive impairment with concern for dementia   Continue to monitor; awaiting placement in LTC facility   10.Unable to care for himself/ Unsafe for discharge  Awaiting placement in LTC facility   Contacted sister Shelbi and gave update on his condition. Asked her to give my contact to his children and asked them to contact me.   11. History of hyponatremia   Continue sodium bicarbonate as prescribed and monitor electrolytes   12. Elevated SBP intermittently         Monitor Closely         Consider low dose ARB or BB         CODE STATUS: DNR (Do Not Resuscitate)  Access: Peripheral  Antibiotics: Bactrim , Rifabutin, Ethambutol, azithromycin  Diet:  Regular  DVT Prophylaxis: Lovenox- VTE prophylaxis enoxaparin 40 mg daily and serial compression devices   GI Prophylaxis: proton pump inhibitor per orders-Continue pantoprazole   Fluids: normal saline Normal Saline at 125 ml/hr started 7/10/2025 .   Precautions: Fall and decubitus precaution   Disposition: Day 4 of admission for  Failure to thrive      Patient's sister, Shelbi, P: [phone], who lives in [place],stated that [facility] is interested in patient.   Rudolph with intake at [facility], P: [phone],  requested that referral be re-submitted.   Referral has been sent via Epic. CM will follow.     Mely Holly MD  [facility] - Long Term Care Service

## 2025-07-12 NOTE — PLAN OF CARE
Problem: Adult Inpatient Plan of Care  Goal: Plan of Care Review  Outcome: Not Progressing  Goal: Patient-Specific Goal (Individualized)  Outcome: Not Progressing  Goal: Absence of Hospital-Acquired Illness or Injury  Outcome: Not Progressing  Goal: Optimal Comfort and Wellbeing  Outcome: Not Progressing  Goal: Readiness for Transition of Care  Outcome: Not Progressing     Problem: Wound  Goal: Optimal Coping  Outcome: Not Progressing  Goal: Optimal Functional Ability  Outcome: Not Progressing  Goal: Absence of Infection Signs and Symptoms  Outcome: Not Progressing  Goal: Improved Oral Intake  Outcome: Not Progressing  Goal: Optimal Pain Control and Function  Outcome: Not Progressing  Goal: Skin Health and Integrity  Outcome: Not Progressing  Goal: Optimal Wound Healing  Outcome: Not Progressing     Problem: Skin Injury Risk Increased  Goal: Skin Health and Integrity  Outcome: Not Progressing     Problem: Infection  Goal: Absence of Infection Signs and Symptoms  Outcome: Not Progressing

## 2025-07-12 NOTE — CARE UPDATE
Transition of Care Note   Checkout given to night call team     I have provided checkout to Dr. Wu regarding this patient.     Assessment:  Admission diagnosis: HIV encephalopathy, new onset tachycardia, tremors and twitching [N19]  See note for detailed history      Overnight management: Monitor vitals and H/H.    Code status: Full    Please call [phone] from 07/11/2025 7PM to 07/12/2025 7AM if any issues arise.     Mely Holly MD   Long term care/Chronic care team  07/11/2025

## 2025-07-12 NOTE — PT/OT/SLP EVAL
Physical Therapy Evaluation    Patient Name:  Anne Terry   MRN:  [id number]    Recommendations:     Therapy Intensity Recommendations at Discharge: Moderate Intensity Therapy  Discharge Equipment Recommendations:  (TBD based on progress and pt's performance with OOB activities.)   Equipment to be obtained for discharge: TBD pending progress.  Barriers to discharge: fall risk, severity of deficits, level of skilled assistance required, decreased endurance, decreased caregiver support, and past medical history    Assessment:     Anne Terry is a 65 y.o. male admitted with a medical diagnosis of AIDS.  1. Symptomatic HIV infection    2. HIV encephalopathy    3. Currently asymptomatic HIV infection, with history of HIV-related illness    4. Anemia, chronic disease    5. Iron deficiency anemia, unspecified iron deficiency anemia type    6. Mycobacterium avium-intracellulare complex    7. AIDS    8. Elevated alkaline phosphatase level    9. Severe malnutrition    10. Moderate cognitive impairment    11. Tachycardia       Problem List[1]   He presents with the following impairments/functional limitations:  weakness, impaired endurance, impaired self care skills, impaired functional mobility, impaired balance, decreased lower extremity function, decreased ROM.    Rehab Prognosis: TBD pending progress.    Patient would benefit from continued skilled acute PT services to: address above listed impairments/functional limitations; receive patient/caregiver education; reduce fall risk; and maximize independency/safety with functional mobility.    Recent Surgery: * No surgery found *      Plan:     During this hospitalization, patient to be seen 5 x/week to address the identified impairments/functional limitations via gait training, therapeutic activities, therapeutic exercises, neuromuscular re-education and progress toward the established goals.    Plan of Care Expires:  08/09/25    Subjective     Communicated with  patient's nurse Turner prior to session.    Patient agreeable to participate in evaluation. Pt at times agitated with PT assist to move BLE toward EOB.      Chief Complaint: BLE pain  Patient/Family Comments/goals: None stated   Pain/Comfort:  Pain Rating 1:  (No pain score verbalized, Pt reports pain in BLE)  Pain Addressed 1: Nurse notified  Pain Rating 2:  (No pain score verbalized, Pt reports pain in BLE)    Patients cultural, spiritual, Hoahaoism conflicts given the current situation: no    Social History  Living Environment: Patient lives alone in a single level home, with 3 steps steps outside, with   Functional Level: Prior to admission patient Pt reports that PLOF is walking without AD .  Equipment Used at Home: none  Equipment owned (not currently used): none.  Assistance Upon Discharge: unknown.      Objective:     Patient found with HOB elevated with PureWick, peripheral IV (Pt receiving blood)  upon PT entry to room. Bed alarm off at start of tx session.    General Precautions: Standard, fall   Orthopedic Precautions:    Braces:  N/A  Respiratory Status: room air    Vitals   At Rest (pre-session)  BP  132/73   HR  93   O2 Sat %  100          Exams:  Orientation: Patient is person  Commands: Patient follows commands consistently  BILAT UE ROM/strength - defer to OT - see OT note for details  RLE ROM: Deficits: Pt having difficulty active DF and active knee flexion bilaterally  RLE Strength: Deficits: Pt able to actively DF through partial ROM and assist with knee flexion through partial ROM  LLE ROM: Deficits: Pt having difficulty active DF and active knee flexion bilaterally  LLE Strength: Deficits: Pt able to actively DF through partial ROM and assist with knee flexion through partial ROM>     Functional Mobility:    Bed Mobility:  Rolling Right: maximal assistance  Scooting: maximal assistance  Supine to Sit: maximal assistance  Sit to Supine: maximal assistance and of 2 persons  with cues for  proper technique, LE management, and trunk management    Transfers:  N/A    Gait:  N/A    Other Mobility:  Therapeutic Activities performed:        -sitting balance activities:              weight shifting:                   -laterally (right)    Balance:  Sit  Static: FAIR: Maintains without assist, but unable to take any challenges   Dynamic: FAIR: Cannot move trunk without losing balance  Stand  N/A    Additional Treatment Session  N/A    Patient left with HOB elevated with all lines intact, call button in reach, tray table at bedside, RN present, and bed alarm on.    DME Justifications:  No DME recommended requiring DME justifications    Education     Patient educated on the importance of early mobility to prevent functional decline during hospital stay.  Patient educated on and assisted with functional mobility as noted above.  Patient educated on PT Plan of Care and role of PT in acute care.  Patient was instructed to utilize staff assistance for mobility/transfers.  White board updated regarding patient's safest level of mobility with staff assistance    Goals     Multidisciplinary Problems       Physical Therapy Goals          Problem: Physical Therapy    Goal Priority Disciplines Outcome Interventions   Physical Therapy Goal     PT, PT/OT Progressing    Description: Goals to be met by: 25     Patient will increase functional independence with mobility by performin. Supine to sit with Stand-by Assistance  2. Sit to supine with Stand-by Assistance  3. Sit to stand transfer with Stand-by Assistance  4. Bed to chair transfer with Stand-by Assistance using Rolling Walker  5. Gait  x 50 feet with Stand-by Assistance using Rolling Walker.   6. Ascend/descend 3 stair with no Handrails Minimal Assistance using No Assistive Device.                        History:   No past medical history on file.  Past Surgical History:   Procedure Laterality Date    COLONOSCOPY  2015    MAGNETIC RESONANCE IMAGING  N/A 6/27/2025    Procedure: MRI (Magnetic Resonance Imagine);  Surgeon: Dara Horne MD;  Location: [facility];  Service: Medicine;  Laterality: N/A;  MRI WITH ANESTHESIA /   BRAIN - WITH AND WITHOUT CONTRAST     Time Tracking:     PT Received On: 07/12/25  PT Start Time: 0856     PT Stop Time: 0920  PT Total Time (min): 24 min     Billable Minutes: Evaluation MOD 24 mins    07/12/2025       [1]   Patient Active Problem List  Diagnosis    Failure to thrive in adult    Severe malnutrition    HIV (human immunodeficiency virus infection)    Trichomonas vaginalis infection, male    UTI (urinary tract infection)    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

## 2025-07-12 NOTE — PLAN OF CARE
Problem: Physical Therapy  Goal: Physical Therapy Goal  Description: Goals to be met by: 25     Patient will increase functional independence with mobility by performin. Supine to sit with Stand-by Assistance  2. Sit to supine with Stand-by Assistance  3. Sit to stand transfer with Stand-by Assistance  4. Bed to chair transfer with Stand-by Assistance using Rolling Walker  5. Gait  x 50 feet with Stand-by Assistance using Rolling Walker.   6. Ascend/descend 3 stair with no Handrails Minimal Assistance using No Assistive Device.     Outcome: Progressing

## 2025-07-13 PROBLEM — E16.2 HYPOGLYCEMIA: Status: ACTIVE | Noted: 2025-07-13

## 2025-07-13 LAB
ABS NEUT CALC (OHS): 2.87 X10(3)/MCL (ref 2.1–9.2)
ALBUMIN SERPL-MCNC: 1.6 G/DL (ref 3.4–4.8)
ALBUMIN/GLOB SERPL: 0.3 RATIO (ref 1.1–2)
ALP SERPL-CCNC: 1479 UNIT/L (ref 40–150)
ALT SERPL-CCNC: 21 UNIT/L (ref 0–55)
ANION GAP SERPL CALC-SCNC: 5 MEQ/L
AST SERPL-CCNC: 36 UNIT/L (ref 11–45)
BILIRUB SERPL-MCNC: 0.8 MG/DL
BUN SERPL-MCNC: 21.6 MG/DL (ref 8.4–25.7)
CALCIUM SERPL-MCNC: 7.8 MG/DL (ref 8.8–10)
CHLORIDE SERPL-SCNC: 108 MMOL/L (ref 98–107)
CO2 SERPL-SCNC: 22 MMOL/L (ref 23–31)
CORTIS SERPL-SCNC: 10.2 UG/DL
CREAT SERPL-MCNC: 1.36 MG/DL (ref 0.72–1.25)
CREAT/UREA NIT SERPL: 16
EOSINOPHIL NFR BLD MANUAL: 0.08 X10(3)/MCL (ref 0–0.9)
EOSINOPHIL NFR BLD MANUAL: 2 % (ref 0–8)
ERYTHROCYTE [DISTWIDTH] IN BLOOD BY AUTOMATED COUNT: 15.5 % (ref 11.5–17)
GFR SERPLBLD CREATININE-BSD FMLA CKD-EPI: 58 ML/MIN/1.73/M2
GLOBULIN SER-MCNC: 6.2 GM/DL (ref 2.4–3.5)
GLUCOSE SERPL-MCNC: 132 MG/DL (ref 82–115)
HCT VFR BLD AUTO: 28.1 % (ref 42–52)
HGB BLD-MCNC: 9.5 G/DL (ref 14–18)
HOLD SPECIMEN: NORMAL
HOLD SPECIMEN: NORMAL
LYMPHOCYTES NFR BLD MANUAL: 0.5 X10(3)/MCL (ref 0.6–4.6)
LYMPHOCYTES NFR BLD MANUAL: 13 % (ref 13–40)
MAGNESIUM SERPL-MCNC: 1.7 MG/DL (ref 1.6–2.6)
MCH RBC QN AUTO: 29.9 PG (ref 27–31)
MCHC RBC AUTO-ENTMCNC: 33.8 G/DL (ref 33–36)
MCV RBC AUTO: 88.4 FL (ref 80–94)
MONOCYTES NFR BLD MANUAL: 0.38 X10(3)/MCL (ref 0.1–1.3)
MONOCYTES NFR BLD MANUAL: 10 % (ref 2–11)
NEUTROPHILS NFR BLD MANUAL: 75 % (ref 47–80)
NRBC BLD AUTO-RTO: 0 %
PHOSPHATE SERPL-MCNC: 3.4 MG/DL (ref 2.3–4.7)
PLATELET # BLD AUTO: 268 X10(3)/MCL (ref 130–400)
PLATELET # BLD EST: ADEQUATE 10*3/UL
PMV BLD AUTO: 10.3 FL (ref 7.4–10.4)
POCT GLUCOSE: 138 MG/DL (ref 70–110)
POCT GLUCOSE: 72 MG/DL (ref 70–110)
POCT GLUCOSE: 79 MG/DL (ref 70–110)
POCT GLUCOSE: 87 MG/DL (ref 70–110)
POCT GLUCOSE: 99 MG/DL (ref 70–110)
POTASSIUM SERPL-SCNC: 4.2 MMOL/L (ref 3.5–5.1)
PROT SERPL-MCNC: 7.8 GM/DL (ref 5.8–7.6)
RBC # BLD AUTO: 3.18 X10(6)/MCL (ref 4.7–6.1)
RBC MORPH BLD: NORMAL
SODIUM SERPL-SCNC: 135 MMOL/L (ref 136–145)
WBC # BLD AUTO: 3.82 X10(3)/MCL (ref 4.5–11.5)

## 2025-07-13 PROCEDURE — 25000003 PHARM REV CODE 250: Performed by: FAMILY MEDICINE

## 2025-07-13 PROCEDURE — 85025 COMPLETE CBC W/AUTO DIFF WBC: CPT | Performed by: FAMILY MEDICINE

## 2025-07-13 PROCEDURE — 25000003 PHARM REV CODE 250: Performed by: INTERNAL MEDICINE

## 2025-07-13 PROCEDURE — 63700000 PHARM REV CODE 250 ALT 637 W/O HCPCS: Performed by: FAMILY MEDICINE

## 2025-07-13 PROCEDURE — 84100 ASSAY OF PHOSPHORUS: CPT | Performed by: FAMILY MEDICINE

## 2025-07-13 PROCEDURE — 63600175 PHARM REV CODE 636 W HCPCS: Performed by: INTERNAL MEDICINE

## 2025-07-13 PROCEDURE — 82533 TOTAL CORTISOL: CPT | Performed by: FAMILY MEDICINE

## 2025-07-13 PROCEDURE — 36415 COLL VENOUS BLD VENIPUNCTURE: CPT | Performed by: FAMILY MEDICINE

## 2025-07-13 PROCEDURE — 83735 ASSAY OF MAGNESIUM: CPT | Performed by: FAMILY MEDICINE

## 2025-07-13 PROCEDURE — 63600175 PHARM REV CODE 636 W HCPCS: Performed by: FAMILY MEDICINE

## 2025-07-13 PROCEDURE — 25000003 PHARM REV CODE 250: Performed by: STUDENT IN AN ORGANIZED HEALTH CARE EDUCATION/TRAINING PROGRAM

## 2025-07-13 PROCEDURE — 21400001 HC TELEMETRY ROOM

## 2025-07-13 PROCEDURE — 80053 COMPREHEN METABOLIC PANEL: CPT | Performed by: FAMILY MEDICINE

## 2025-07-13 PROCEDURE — 63700000 PHARM REV CODE 250 ALT 637 W/O HCPCS: Performed by: INTERNAL MEDICINE

## 2025-07-13 RX ADMIN — ENOXAPARIN SODIUM 40 MG: 40 INJECTION SUBCUTANEOUS at 04:07

## 2025-07-13 RX ADMIN — EMTRICITABINE AND TENOFOVIR DISOPROXIL FUMARATE 1 TABLET: 200; 300 TABLET, FILM COATED ORAL at 08:07

## 2025-07-13 RX ADMIN — DEXTROSE MONOHYDRATE 12.5 G: 25 INJECTION, SOLUTION INTRAVENOUS at 03:07

## 2025-07-13 RX ADMIN — SULFAMETHOXAZOLE AND TRIMETHOPRIM 1 TABLET: 800; 160 TABLET ORAL at 08:07

## 2025-07-13 RX ADMIN — FLUCONAZOLE 200 MG: 100 TABLET ORAL at 08:07

## 2025-07-13 RX ADMIN — ABACAVIR SULFATE 300 MG: 300 TABLET, FILM COATED ORAL at 08:07

## 2025-07-13 RX ADMIN — SODIUM BICARBONATE 650 MG TABLET 650 MG: at 08:07

## 2025-07-13 RX ADMIN — CYANOCOBALAMIN TAB 1000 MCG 2000 MCG: 1000 TAB at 08:07

## 2025-07-13 RX ADMIN — RITONAVIR 100 MG: 100 TABLET ORAL at 08:07

## 2025-07-13 RX ADMIN — FERROUS SULFATE TAB 325 MG (65 MG ELEMENTAL FE) 1 EACH: 325 (65 FE) TAB at 08:07

## 2025-07-13 RX ADMIN — VALACYCLOVIR 1000 MG: 500 TABLET, FILM COATED ORAL at 08:07

## 2025-07-13 RX ADMIN — RIFABUTIN 150 MG: 150 CAPSULE ORAL at 08:07

## 2025-07-13 RX ADMIN — AZITHROMYCIN DIHYDRATE 500 MG: 250 TABLET, FILM COATED ORAL at 08:07

## 2025-07-13 RX ADMIN — PANTOPRAZOLE 40 MG: 40 TABLET, DELAYED RELEASE ORAL at 08:07

## 2025-07-13 RX ADMIN — ETHAMBUTOL HYDROCHLORIDE 800 MG: 400 TABLET ORAL at 08:07

## 2025-07-13 RX ADMIN — FOLIC ACID 1 MG: 1 TABLET ORAL at 08:07

## 2025-07-13 RX ADMIN — SODIUM CHLORIDE, POTASSIUM CHLORIDE, SODIUM LACTATE AND CALCIUM CHLORIDE: 600; 310; 30; 20 INJECTION, SOLUTION INTRAVENOUS at 11:07

## 2025-07-13 RX ADMIN — DARUNAVIR 800 MG: 800 TABLET, FILM COATED ORAL at 08:07

## 2025-07-13 RX ADMIN — SODIUM CHLORIDE, POTASSIUM CHLORIDE, SODIUM LACTATE AND CALCIUM CHLORIDE: 600; 310; 30; 20 INJECTION, SOLUTION INTRAVENOUS at 01:07

## 2025-07-13 RX ADMIN — SODIUM CHLORIDE, POTASSIUM CHLORIDE, SODIUM LACTATE AND CALCIUM CHLORIDE: 600; 310; 30; 20 INJECTION, SOLUTION INTRAVENOUS at 03:07

## 2025-07-13 RX ADMIN — MULTIVIT AND MINERALS-FERROUS GLUCONATE 9 MG IRON/15 ML ORAL LIQUID 5 ML: at 11:07

## 2025-07-13 NOTE — PROGRESS NOTES
[facility] Progress Note     Long Term Care Service   Attending: Mely Nick'       Subjective:      Brief HPI:  Admission History  Anne Terry is a 65 y.o. male presented yesterday to [facility] as a  transfer awaiting  long term care placement with a past medical history of HIV/aids, MAC, history of disseminated histoplasmosis, history of PJP and cryptococcal meningitis  The patient presented to [facility] on 06/21/2025 with a primary complaint of failure to thrive.  The patient was brought to the [facility] ED after he was found walking around naked and confused.  Per the patient on admission,  he lived by himself in [facility]  but is unable to explain why he went to the emergency room or why he is in the hospital now.  He had not been compliant with his home medications.  He was recently diagnosed with oral thrush.  At the time of hospitalization at [facility] he was seen by infectious disease and started on azithromycin, Bactrim, ethambutol, rifampin, Diflucan.  His last CD4 count PTA at [facility] was 37.  MRI brain and LP were done at [facility].  CSF routine culture and gram stain were negative  and MRI of brain was significant only for microvascular ischemic changes.  CD4 on this most recent admission was 21/7%,  with viral load 114,288. He has a history of nonadherence to his medications.  He has missed multiple appointments for follow up at ID clinic at [facility].  He has elevation of alkaline phosphatase which was suspected to be due to infiltrated liver disease from HIV and AIDS per ID consultant at [facility].  He has been consuming less than 50% of his meals at [facility] and has lost substantial weight.     Significant events:  Summary of Events 7/11/2025 :   He consumed a can of Boost in am yesterday but refused breakfast and lunch. He continued to decline PT/OT yesterday. He had a single episode of vomiting today when respiratory therapist attempted sputum collection.Vomitus was pink tinged sputum- small volume but he tolerated 25 % of his  dinner and drank 24 oz of juice/powerade without issue last night. He had a soft/liquid stool yesterday -dark brown in color and heme negative. Patient notably with new tachycardia, tremulousness and twitching yesterday afternoon which lasted several hours. Patient had significant tremors, twitching and stiffness intermittently yesterday, afternoon and evening. He also developed a lesion on his right lower lip resembling possible herpes simplex and was started on Valtrex 1 gm BID. He remained afebrile, normotensive, and lactate was 1.0. Though his WBC was low, he has chronic leukopenia.Case was discussed with ID and given he is afebrile and on prophylactic meds for opportunistic infection, antibiotics were not indicated.  He was given a 1 liter bolus of LR and an additional liter at 150/hr followed by 100 ml per hour. He was tachycardic and tremulous for several hours and the following tests were done in evaluation of the increased HR:   Initial EKG 1619:  sinus tachycardia with nonspecific ST changes. sinus tachycardia   Nonspecific ST and T wave abnormality. When compared with ECG of 21-Jun-2025 22:38, Sinus rhythm has replaced Junctional rhythm Vent. rate has increased by  68 bpm. Questionable change in The axis. ST now depressed in Anterior-lateral leads   T wave inversion now evident in Lateral leads Prolonged Qtc 472      Repeat EKG 1848: Nonspecific ST and T wave abnormality; When compared with ECG of 11-Jul-2025 16:19, ST less depressed in Inferior leads T wave inversion less evident in Lateral leads. Qtc 438      Troponin - normal <0.010  Mag 1.8   Ammonia - normal   CPK - normal   Lactate 1.0   BC x 2 - No growth at 24 hrs   Urine Cx - 25,000-50,000 GNR updated 7/13/2025 at 713 AM   HSV PCR - pending  CT Head Negative for acute intracranial abnormality  Glucose 62 BUN/Creat 26.5/1.34 Alk phos 2022 Chloride 112 Co2 22   WBC 5.5 H&H 7.7/24.7   Type and screen 2 units   Respiratory Panel - nothing  detected    Interval History:   Due to recurrent AM hypoglycemia, serial glucose monitoring was started yesterday.  He reports pain in his extremities but is more interactive today and answered all  questions asked.    He denies any fever, chills, sweats, shortness for breath, chest pain, nausea/vomiting/diarrhea. No BM since last reported on 2025. I was told by the on call team and the nurse on days that he reportedly had a glucose in the 60's and was give D50. I do not see anything lower than 71 documented however.    Review of Systems:  ROS see above      Objective:     Last 24 Hour Vital Signs:  BP  Min: 132/69  Max: 149/95  Temp  Av.9 °F (36.6 °C)  Min: 97.4 °F (36.3 °C)  Max: 98.3 °F (36.8 °C)  Pulse  Av.8  Min: 67  Max: 94  Resp  Av.1  Min: 14  Max: 19  SpO2  Av.3 %  Min: 97 %  Max: 100 %  Weight  Av.5 kg (126 lb 12.2 oz)  Min: 57.5 kg (126 lb 12.2 oz)  Max: 57.5 kg (126 lb 12.2 oz)  I/O last 3 completed shifts:  In: 6092.7 [P.O.:; I.V.:3140.7; Blood:860]  Out: 1250 [Urine:1250]    Physical Examination:  Physical Exam  Alert in NAD , tremors and twitching present yesterday are absent today.   Constitutional:       General: He is not in acute distress.     Appearance: He is not ill-appearing, toxic-appearing or diaphoretic.   HENT:      Mouth/Throat:      Comments: No visible residual thrush . Mucous membranes slightly dry. Lesion on lower lip right markedly improved/ resolving   Cardiovascular:      Rate and Rhythm: Normal rate and regular rhythm.      Heart sounds:      No friction rub. No gallop.   Pulmonary:      Effort: Pulmonary effort is normal. No respiratory distress.      Breath sounds: No stridor. No wheezing, rhonchi or rales.   Skin:     Findings: No rash.      Comments: Dry/scaly     Neurological:      Mental Status: He is alert.      Comments: Knows he is in a hospital but does not know which one; agitated   Laboratory:  Most Recent Data:  CBC:   Lab Results    Component Value Date    WBC 3.82 (L) 07/13/2025    HGB 9.5 (L) 07/13/2025    HCT 28.1 (L) 07/13/2025     07/13/2025    MCV 88.4 07/13/2025    RDW 15.5 07/13/2025     WBC Differential:   Recent Labs   Lab 07/11/25  0451 07/11/25  1654 07/12/25  0338 07/12/25  1809 07/13/25 0325   WBC 2.71* 5.50 3.61* 4.42* 3.82*   HGB 7.0* 7.7* 6.7* 10.2* 9.5*   HCT 22.3* 24.7* 21.6* 30.9* 28.1*    357 322 240 268   MCV 89.9 90.5 90.4 89.3 88.4     BMP:   Lab Results   Component Value Date     (L) 07/13/2025    K 4.2 07/13/2025     (H) 07/13/2025    CO2 22 (L) 07/13/2025    BUN 21.6 07/13/2025    CREATININE 1.36 (H) 07/13/2025     (H) 07/13/2025    CALCIUM 7.8 (L) 07/13/2025    MG 1.70 07/13/2025    PHOS 3.4 07/13/2025     LFTs:   Lab Results   Component Value Date    PROT 7.8 (H) 07/13/2025    ALBUMIN 1.6 (L) 07/13/2025    BILITOT 0.8 07/13/2025    AST 36 07/13/2025    ALKPHOS 1,479 (H) 07/13/2025    ALT 21 07/13/2025    GGT 1,303 (H) 07/10/2025     Coags:   Lab Results   Component Value Date    INR 1.0 06/25/2025    PROTIME 13.2 06/25/2025     FLP:   Lab Results   Component Value Date    CHOL 162 01/17/2025    HDL 25 (L) 01/17/2025    TRIG 300 (H) 01/17/2025     DM:   Lab Results   Component Value Date    CREATININE 1.36 (H) 07/13/2025     Thyroid:   Lab Results   Component Value Date    TSH 4.120 01/17/2025      Anemia:   Lab Results   Component Value Date    IRON 24 (L) 06/29/2025    TIBC 105 (L) 06/29/2025    FERRITIN 1,767.27 (H) 06/29/2025       Lab Results   Component Value Date    MRYRQFUO86 364 07/12/2025       Lab Results   Component Value Date    FOLATE 14.2 11/19/2024        Cardiac:   Lab Results   Component Value Date    TROPONINI <0.010 07/12/2025         Microbiology Data:  Microbiology Results (last 7 days)       Procedure Component Value Units Date/Time    AFB Smear   Not yet collected    Urine Culture High Risk (Abnormal) Collected: 07/11/25 1936    Order Status: Completed  Specimen: Urine Updated: 07/13/25 0713     Urine Culture 25,000-50,000 colonies/ml Gram-negative Rods    Blood Culture (Normal) Collected: 07/11/25 1654    Order Status: Completed Specimen: Blood from Hand, Left Updated: 07/12/25 1902     Blood Culture No Growth At 24 Hours    Blood Culture   (Normal) Collected: 07/11/25 1654    Order Status: Completed Specimen: Blood from Forearm, Left Updated: 07/12/25 1902     Blood Culture No Growth At 24 Hours    Mycobacteria and Nocardia Culture      Order Status: Sent Specimen: Respiratory from Sputum, Induced Not yet collected     Cryptococcal antigen, blood  (Normal) Collected: 07/10/25 1101    Order Status: Completed Specimen: Blood, Venous Updated: 07/10/25 2159     Cryptococcal Antigen, Serum Negative    C.trach/N.gonor AMP RNA Oral  ?    Order Status: Sent     Mycobacterial Blood culture  In progress           Other Results:  EKG   Test Reason : R00.0,    Vent. Rate :  73 BPM     Atrial Rate :  73 BPM     P-R Int : 146 ms          QRS Dur :  86 ms      QT Int : 424 ms       P-R-T Axes :  56  27  77 degrees    QTcB Int : 467 ms    Normal sinus rhythm  Minimal voltage criteria for LVH, may be normal variant  Nonspecific ST and T wave abnormality  Prolonged QT  Abnormal ECG  When compared with ECG of 11-Jul-2025 18:48,  Vent. rate has decreased by  46 bpm    Referred By: KATHLEEN SOLANO           Confirmed By:     Radiology:  CT HEAD WITHOUT   COMPARISON:  MRI of the brain 06/27/2025.     FINDINGS:  There is normal brain formation.  There is normal gray-white matter differentiation.  There is no hemorrhage, hydrocephalus, or midline shift.  There is no cytotoxic or vasogenic edema.  There is no intra or extra-axial fluid collection.  There is no herniation.     The calvarium is intact.  There is no fracture.  The bilateral orbits are normal.  The paranasal sinuses are free of disease.     Impression:     No acute intracranial abnormality.     [facility] concordance         Electronically signed by:Boris Rogel MD  Date:                                            07/12/2025  Time:                                           06:34   Portable Chest X-RAY   COMPARISON:  Chest radiograph 02/27/2025.     FINDINGS:  The lungs are clear without consolidation or effusion.  There is no pneumothorax.  The cardiac silhouette is normal in size.  There is no acute osseous abnormality.     Impression:     No acute cardiopulmonary abnormality.        Electronically signed by:Boris Rogel MD  Date:                                            07/12/2025  Time:                                           10:50    Current Medications:     Infusions:   lactated ringers   Intravenous Continuous   Stopped at 07/12/25 0049    lactated ringers   Intravenous Continuous 100 mL/hr at 07/13/25 0617 Rate Verify at 07/13/25 0617        Scheduled:   abacavir  300 mg Oral Daily    azithromycin  500 mg Oral Daily    cyanocobalamin  2,000 mcg Oral Daily    darunavir  800 mg Oral Daily with breakfast    emtricitabine-tenofovir 200-300 mg  1 tablet Oral Daily    enoxparin  40 mg Subcutaneous Q24H (prophylaxis, 1700)    ethambutoL  800 mg Oral Daily    ferrous sulfate  1 tablet Oral Daily    fluconazole  200 mg Oral Daily    folic acid  1 mg Oral Daily    multivitamin liquid  5 mL Oral Daily    pantoprazole  40 mg Oral Daily    rifabutin  150 mg Oral Daily    ritonavir  100 mg Oral Daily    sodium bicarbonate  650 mg Oral Daily    sulfamethoxazole-trimethoprim 800-160mg  1 tablet Oral Daily    valACYclovir  1,000 mg Oral Q12H        PRN:    Current Facility-Administered Medications:     0.9%  NaCl infusion (for blood administration), , Intravenous, Q24H PRN    acetaminophen, 650 mg, Oral, Q6H PRN    dextrose 50%, 12.5 g, Intravenous, PRN    dextrose 50%, 25 g, Intravenous, PRN    glucagon (human recombinant), 1 mg, Intramuscular, PRN    glucose, 16 g, Oral, PRN    glucose, 24 g, Oral, PRN    naloxone, 0.02 mg, Intravenous, PRN     ondansetron, 4 mg, Intravenous, Q4H PRN    sodium chloride 0.9%, 10 mL, Intravenous, PRN      Assessment & Plan:   HIV/AIDS -CD4 21/7%, viral load 114,288 with opportunistic infection        History of cryptococcal meningitis        History of PJP  Appreciate consult from ID   Continue Bactrim DS 1 tab daily for opportunistic infection prophylaxis   Recommended changes to antiviral regimen implemented  DISCONTINUED dolutegravir 50 mg daily 7/10/2025  Continue emtricitabine -tenofovir alafen 200-25 mg 1 tablet daily and PJP prophylaxis   STARTED the following 7/11/2025:   - Prezcobix 1 tab PO daily (broken down into individual components of Darunavir 800mg PO daily and Ritonavir 100mg PO daily, given together)    - Abacavir 300mg PO daily  Monitor CBC/CMP at least monthly post discharge   2. History of disseminated MAC, now with lung mass  DISCONTINUED rifampin 300 mg daily 7/10/2025  Continue ethambutol 800 mg daily, azithromycin 500 mg daily   STARTED - Rifabutin 150mg PO daily 7/11/2025  Monitored for rifabutin toxicity such as LFT abnormalities, neutropenia, and uveitis, particularly while on Diflucan  Patient will need EKG routinely while on above as well as eye exams every 2-3 months to monitor for ocular toxicity from ethambutol   MAC treatment to continue for 1 year timed from first negative sputum cx; ID requested  AFB smear and mycobacterial culture while hospitalized but thus far patient has refused   3. TAYLOR- due to poor oral intake   Improved slightly after 1 liter LR bolus, 1 liter LR at 150 ml/hr and now at  ml/hr   Encourage oral fluid intake  Creatinine 1.22----> 1.44 ---> 1.34--->1.36  4.  Severe anemia acute on chronic with anemia of chronic disease/stool OB 1/3 +;Iron-deficiency anemia  Continue iron supplementation  Stool OB  x 3         -7/7 negative / 7/10 positive/ 7/11 Negative   H&H  7.4/24---> 7.7/24.7----> 6.7/21.6 (transfusion 2 unit pRBC) 7/12/2025---> 10.2/30.9---> 9.5/28.1    Tranfused 2 units packed RBC 7/12/2025  GI consult tomorrow - GI aware   5. Severe malnutrition/ Prior B12 deficiency    Appreciate nutritional consult; consider appetite stimulant but defer for now due to concern for potential SE  Needs assist with meals   Repeat B12 7/12/2025 is low normal range and patient has been deficient in the past - will supplement  Added MVI and folic acid due to poor oral intake    Calorie count ordered  MD to nurse order for assistance with eating started yesterday   6.  Acute persistent tachycardia and tremors/twitching - - appears resolved  Etiology remains uncertain but may be related to mild dehydration secondary to poor oral intake vs medication side effect; will continue to monitor closely   Tachycardia resolved for now but did have elevated HR to 170's transiently early am hours on 7/12/2025 and immediately returned to normal- patient refused vitals at the time  Repeat ecg today with results above  and repeat troponin <0.010 again 7/12/2025 AM ; continue to monitor Qtc  Blood cultures negative for 24 hrs   CXR without acute findings   Resp Panel Negative   Urine Cx + 25,000-50,00 colonies GNR sensitivities pending; urine culture was ordered as a cath but I cannot verify it was collected as a cath specimen; per ID ,urine must be recollected before treating as it may have been collected from a purewick cannister  7. Elevated alkaline phosphatase-suspect infiltrative disease   Improved again today 1688-->1737-->1735-->2022-->1684---> 1479   GGT elevation confirms liver origin. Will continue to monitor serially.   GI consult regarding elevated alkaline phosphatase- GI aware   8. Oral thrush-treated; ppx for cryptococcus and histoplasma   Continue fluconazole 200 mg daily   9. Acute metabolic encephalopathy-resolved per transferring team ; Moderate cognitive impairment with concern for dementia   Continue to monitor; awaiting placement in LTC facility   10.Unable to care for himself/  Unsafe for discharge  Awaiting placement in LTC facility   Contacted sister Shelbi and gave update on his condition. Asked her to give my contact to his children and asked them to contact me. I have not received any calls from family. Will reach out to Shelbi again  Awaiting placement in LTC facility provided medically stable   11. History of hyponatremia   Continue sodium bicarbonate as prescribed and monitor electrolytes   12. Elevated SBP intermittently         Monitor Closely         Consider low dose ARB or BB   13. Suspect HSV affecting left lower lip - much improved   HSV PCR pending  Valtrex 1 gm BID started 7/12/2025  14. Urine culture collected from a purewick because patient refused in and out cath   Urine Cx + 25,000-50,00 colonies GNR sensitivities pending  Discuss treatment vs no treatment with ID given patient is currently without urinary symptoms or tachycardia ; he recommend recollection urine via I/O cath  15. Recurrent hypoglycemia  Continue glucose monitoring   Patient consumed juice with sugar packets but remained low. He has glucose tabs ordered but he declined and was given D50 overnight    8 AM serum cortisol normal 7/13/2025    Consider further evaluation    CODE STATUS: DNR (Do Not Resuscitate)  Access: Peripheral  Antibiotics: Bactrim , Rifabutin, Ethambutol, azithromycin  Diet: Regular  DVT Prophylaxis: Lovenox- VTE prophylaxis enoxaparin 40 mg daily and serial compression devices (not wearing)  GI Prophylaxis: proton pump inhibitor per orders-Continue pantoprazole   Fluids: LR at 100 ml/hr (started after bolus + 1 liter at 150/hr on 7/10/2025)  Precautions: Fall and decubitus precaution   Disposition: Day 5 of admission for Failure to thrive      Patient's sister, Shelbi  P: [phone], who lives in [place],stated that [facility] is interested in patient. [facility] in [place] will accept patient once patient is financially cleared and  medically stable. He can be admitted to [facility] on Monday provided these conditions are met. CM will continue to follow.      Mely Holly MD  [facility] Staff - Long Term Care Service

## 2025-07-13 NOTE — PT/OT/SLP PROGRESS
Physical Therapy    Missed Treatment Session - cancel note - 07/13/2025    Patient Name:  Anne Terry   MRN:  [id number]      Patient not seen at this time secondary to Pain.  Pt ininitally agreeable to participate in therapy.  PT attempted to sit pt up EOB and pt requesting to be left in supine 2/2 pain to BLE.  PT initiated request to perform BLE exercises and pt unable to tolerate 2/2 pain in BLE.  RN notified.      Will follow-up as patient is appropriate/available/agreeable to participate and as therapists' schedule allows.

## 2025-07-14 LAB
ALBUMIN SERPL-MCNC: 1.6 G/DL (ref 3.4–4.8)
ALBUMIN/GLOB SERPL: 0.3 RATIO (ref 1.1–2)
ALP SERPL-CCNC: 1370 UNIT/L (ref 40–150)
ALT SERPL-CCNC: 21 UNIT/L (ref 0–55)
ANION GAP SERPL CALC-SCNC: 5 MEQ/L
AST SERPL-CCNC: 29 UNIT/L (ref 11–45)
BACTERIA UR CULT: ABNORMAL
BASOPHILS # BLD AUTO: 0.03 X10(3)/MCL
BASOPHILS NFR BLD AUTO: 0.7 %
BILIRUB SERPL-MCNC: 0.7 MG/DL
BUN SERPL-MCNC: 23.3 MG/DL (ref 8.4–25.7)
CALCIUM SERPL-MCNC: 7.9 MG/DL (ref 8.8–10)
CHLORIDE SERPL-SCNC: 107 MMOL/L (ref 98–107)
CO2 SERPL-SCNC: 23 MMOL/L (ref 23–31)
CREAT SERPL-MCNC: 1.7 MG/DL (ref 0.72–1.25)
CREAT/UREA NIT SERPL: 14
EOSINOPHIL # BLD AUTO: 0.08 X10(3)/MCL (ref 0–0.9)
EOSINOPHIL NFR BLD AUTO: 1.9 %
ERYTHROCYTE [DISTWIDTH] IN BLOOD BY AUTOMATED COUNT: 15.7 % (ref 11.5–17)
GFR SERPLBLD CREATININE-BSD FMLA CKD-EPI: 45 ML/MIN/1.73/M2
GLOBULIN SER-MCNC: 6.2 GM/DL (ref 2.4–3.5)
GLUCOSE SERPL-MCNC: 62 MG/DL (ref 82–115)
HCT VFR BLD AUTO: 30.5 % (ref 42–52)
HGB BLD-MCNC: 10 G/DL (ref 14–18)
IMM GRANULOCYTES # BLD AUTO: 0.05 X10(3)/MCL (ref 0–0.04)
IMM GRANULOCYTES NFR BLD AUTO: 1.2 %
LYMPHOCYTES # BLD AUTO: 0.51 X10(3)/MCL (ref 0.6–4.6)
LYMPHOCYTES NFR BLD AUTO: 12 %
MAGNESIUM SERPL-MCNC: 1.6 MG/DL (ref 1.6–2.6)
MCH RBC QN AUTO: 28.9 PG (ref 27–31)
MCHC RBC AUTO-ENTMCNC: 32.8 G/DL (ref 33–36)
MCV RBC AUTO: 88.2 FL (ref 80–94)
MONOCYTES # BLD AUTO: 0.35 X10(3)/MCL (ref 0.1–1.3)
MONOCYTES NFR BLD AUTO: 8.2 %
NEUTROPHILS # BLD AUTO: 3.23 X10(3)/MCL (ref 2.1–9.2)
NEUTROPHILS NFR BLD AUTO: 76 %
NRBC BLD AUTO-RTO: 0 %
OHS QRS DURATION: 78 MS
OHS QRS DURATION: 80 MS
OHS QTC CALCULATION: 438 MS
OHS QTC CALCULATION: 472 MS
PHOSPHATE SERPL-MCNC: 3.6 MG/DL (ref 2.3–4.7)
PLATELET # BLD AUTO: 262 X10(3)/MCL (ref 130–400)
PMV BLD AUTO: 10.3 FL (ref 7.4–10.4)
POCT GLUCOSE: 128 MG/DL (ref 70–110)
POCT GLUCOSE: 272 MG/DL (ref 70–110)
POCT GLUCOSE: 70 MG/DL (ref 70–110)
POCT GLUCOSE: 83 MG/DL (ref 70–110)
POTASSIUM SERPL-SCNC: 4.4 MMOL/L (ref 3.5–5.1)
PROT SERPL-MCNC: 7.8 GM/DL (ref 5.8–7.6)
RBC # BLD AUTO: 3.46 X10(6)/MCL (ref 4.7–6.1)
SODIUM SERPL-SCNC: 135 MMOL/L (ref 136–145)
WBC # BLD AUTO: 4.25 X10(3)/MCL (ref 4.5–11.5)

## 2025-07-14 PROCEDURE — 25000003 PHARM REV CODE 250: Performed by: INTERNAL MEDICINE

## 2025-07-14 PROCEDURE — 80053 COMPREHEN METABOLIC PANEL: CPT | Performed by: FAMILY MEDICINE

## 2025-07-14 PROCEDURE — 36415 COLL VENOUS BLD VENIPUNCTURE: CPT | Performed by: FAMILY MEDICINE

## 2025-07-14 PROCEDURE — 25000003 PHARM REV CODE 250: Performed by: FAMILY MEDICINE

## 2025-07-14 PROCEDURE — 21400001 HC TELEMETRY ROOM

## 2025-07-14 PROCEDURE — 84100 ASSAY OF PHOSPHORUS: CPT | Performed by: FAMILY MEDICINE

## 2025-07-14 PROCEDURE — 63700000 PHARM REV CODE 250 ALT 637 W/O HCPCS: Performed by: FAMILY MEDICINE

## 2025-07-14 PROCEDURE — 63600175 PHARM REV CODE 636 W HCPCS: Performed by: INTERNAL MEDICINE

## 2025-07-14 PROCEDURE — 63700000 PHARM REV CODE 250 ALT 637 W/O HCPCS: Performed by: INTERNAL MEDICINE

## 2025-07-14 PROCEDURE — 25000003 PHARM REV CODE 250: Performed by: STUDENT IN AN ORGANIZED HEALTH CARE EDUCATION/TRAINING PROGRAM

## 2025-07-14 PROCEDURE — 63600175 PHARM REV CODE 636 W HCPCS: Performed by: FAMILY MEDICINE

## 2025-07-14 PROCEDURE — 94761 N-INVAS EAR/PLS OXIMETRY MLT: CPT

## 2025-07-14 PROCEDURE — 85025 COMPLETE CBC W/AUTO DIFF WBC: CPT | Performed by: FAMILY MEDICINE

## 2025-07-14 PROCEDURE — 83735 ASSAY OF MAGNESIUM: CPT | Performed by: FAMILY MEDICINE

## 2025-07-14 RX ORDER — SODIUM CHLORIDE, SODIUM LACTATE, POTASSIUM CHLORIDE, CALCIUM CHLORIDE 600; 310; 30; 20 MG/100ML; MG/100ML; MG/100ML; MG/100ML
INJECTION, SOLUTION INTRAVENOUS CONTINUOUS
Status: DISCONTINUED | OUTPATIENT
Start: 2025-07-14 | End: 2025-07-15

## 2025-07-14 RX ORDER — MAGNESIUM SULFATE HEPTAHYDRATE 40 MG/ML
2 INJECTION, SOLUTION INTRAVENOUS ONCE
Status: COMPLETED | OUTPATIENT
Start: 2025-07-14 | End: 2025-07-14

## 2025-07-14 RX ADMIN — EMTRICITABINE AND TENOFOVIR DISOPROXIL FUMARATE 1 TABLET: 200; 300 TABLET, FILM COATED ORAL at 08:07

## 2025-07-14 RX ADMIN — VALACYCLOVIR 1000 MG: 500 TABLET, FILM COATED ORAL at 08:07

## 2025-07-14 RX ADMIN — AZITHROMYCIN DIHYDRATE 500 MG: 250 TABLET, FILM COATED ORAL at 08:07

## 2025-07-14 RX ADMIN — FOLIC ACID 1 MG: 1 TABLET ORAL at 08:07

## 2025-07-14 RX ADMIN — MULTIVIT AND MINERALS-FERROUS GLUCONATE 9 MG IRON/15 ML ORAL LIQUID 5 ML: at 08:07

## 2025-07-14 RX ADMIN — SULFAMETHOXAZOLE AND TRIMETHOPRIM 1 TABLET: 800; 160 TABLET ORAL at 08:07

## 2025-07-14 RX ADMIN — MAGNESIUM SULFATE HEPTAHYDRATE 2 G: 40 INJECTION, SOLUTION INTRAVENOUS at 08:07

## 2025-07-14 RX ADMIN — RITONAVIR 100 MG: 100 TABLET ORAL at 08:07

## 2025-07-14 RX ADMIN — SODIUM BICARBONATE 650 MG TABLET 650 MG: at 08:07

## 2025-07-14 RX ADMIN — SODIUM CHLORIDE, POTASSIUM CHLORIDE, SODIUM LACTATE AND CALCIUM CHLORIDE: 600; 310; 30; 20 INJECTION, SOLUTION INTRAVENOUS at 11:07

## 2025-07-14 RX ADMIN — ETHAMBUTOL HYDROCHLORIDE 800 MG: 400 TABLET ORAL at 08:07

## 2025-07-14 RX ADMIN — ABACAVIR SULFATE 300 MG: 300 TABLET, FILM COATED ORAL at 08:07

## 2025-07-14 RX ADMIN — RIFABUTIN 150 MG: 150 CAPSULE ORAL at 08:07

## 2025-07-14 RX ADMIN — CYANOCOBALAMIN TAB 1000 MCG 2000 MCG: 1000 TAB at 08:07

## 2025-07-14 RX ADMIN — FERROUS SULFATE TAB 325 MG (65 MG ELEMENTAL FE) 1 EACH: 325 (65 FE) TAB at 08:07

## 2025-07-14 RX ADMIN — SODIUM CHLORIDE, POTASSIUM CHLORIDE, SODIUM LACTATE AND CALCIUM CHLORIDE: 600; 310; 30; 20 INJECTION, SOLUTION INTRAVENOUS at 08:07

## 2025-07-14 RX ADMIN — FLUCONAZOLE 200 MG: 100 TABLET ORAL at 08:07

## 2025-07-14 RX ADMIN — DARUNAVIR 800 MG: 800 TABLET, FILM COATED ORAL at 08:07

## 2025-07-14 RX ADMIN — ENOXAPARIN SODIUM 40 MG: 40 INJECTION SUBCUTANEOUS at 05:07

## 2025-07-14 RX ADMIN — SODIUM CHLORIDE, POTASSIUM CHLORIDE, SODIUM LACTATE AND CALCIUM CHLORIDE: 600; 310; 30; 20 INJECTION, SOLUTION INTRAVENOUS at 05:07

## 2025-07-14 RX ADMIN — PANTOPRAZOLE 40 MG: 40 TABLET, DELAYED RELEASE ORAL at 08:07

## 2025-07-14 NOTE — CONSULTS
Consult for hospice noted. Per Dr. Quijano, patient's sister, Shelbi, is in agreement. Spoke to Maral with [facility] in [place], who stated that they have in house hospice with [facility]. Hospice order has been sent to [facility] via [employer]. Maral will make arrangements with [facility]. Plan is to admit patient tomorrow.

## 2025-07-14 NOTE — PT/OT/SLP PROGRESS
Physical Therapy    Missed Treatment Session - patient unwilling to participate note - 1010 - 07/14/2025    Patient Name:  Anne Terry   MRN:  [id number]      1010----  -patient not seen at this time secondary to patient unwilling to participate  -multiple attempts by therapist unsuccessful  -per patient-take your time, no, don't feel like it, no  -patient declined sitting edge of bed AND/OR ROM ex's in bed  -patients nurse Turner notified  -will follow-up as patient is appropriate/available/agreeable to participate and as therapists' schedule allows.

## 2025-07-14 NOTE — PROGRESS NOTES
[facility] [place]  Infectious Diseases Progress Note        SUBJECTIVE:   HPI: Anne is a 65 yr old BM with past medical history that is significant for hypertension, chronic kidney disease, advanced HIV disease/AIDS with complex resistance (CD4 21 (7%) with viral load 114,288) (diagnosed 2015), disseminated MAC, history of disseminated cryptococcal disease (2015), history disseminated histoplasmosis (2015), history of fungal colitis (2015),  history of PJP, lung mass, and noncompliance who was transferred to [facility] from [facility] as he is awaiting additional placement.  He originally presented to the emergency room at [facility] on 06/21/2025 for complaints of confusion, walking around naked, and failure to thrive.  Patient had not been taking his medication and had missed multiple clinic visits.  CT and MRI of head were done which were negative for any acute findings.  Patient underwent LP 06/27/2025 which showed 0 nucleated cells, glucose 33 and elevated protein of 77.  Cryptococcal antigen CSF negative. CSF culture negative.  LEAH virus CSF negative.  ME panel negative.  Neurosyphilis IgG negative CSF.  AFB smear CSF negative.  CSF pathology was negative for malignancy.  Fungal and mycobacterial / nocardia CSF cultures are pending.  Blood cultures x2 NGTD.  Cryptococcal antigen Patient is noted to be leukopenic; WBC 3.07.  He is also anemic; hemoglobin 7.3/hematocrit 22.9.  Alkaline phosphatase is elevated; 1943.  According to chart review, patient had been off ART for a while.  Previous ART regimen consisted of Truvada, Abacavir, Darunavir, and Ritonavir, along with Bactrim DS for OI prophylaxis.  Resistance patterns / DDI were considered upon ART formulation.  ART regimen was restarted on previous admission, but was changed to Descovy and Tivicay.  Patient was also previously on disseminated MAC treatment with Azithromycin, Ethambutol, and Rifabutin.  This regimen was also changed to Azithromycin,  Ethambutol and Rifampin upon last admission.  ID has been consulted for AIDS/advanced HIV disease and disseminated MAC.  Upon exam, patient is lying in the bed with his head covered with a blanket.  He refuses to participate in exam, or answer questions.  He is very agitated.  It is noted upon chart review that he has been refusing medications.       In regards to HIV disease and chart review, patient was diagnosed with HIV and 2015.  Previously treated with Ziagen, Descovy, Prezista, and Norovir but stopped taking his medications and following up in clinic in 2021 after his wife passed away.  Strong history of medication noncompliance, along with multiple missed clinic visits is documented throughout chart history.  Past HIV genotype history:   3/30/15 HIV GT Magdalena V118I, V179 D/V, R211K, F214L, L33I, L63P, I64V with no resistance to any classes   5/4/15 HIV Phenosense Integrase sensitive  1/6/16 HIV GT Magdalena M184V with resistance to FTC/3TC  1/6/16 Phenosense Integrase resistance to elvitegravir and isentress  3/17/16 Phenosense Integrase Tivicay partially sensitive, resistance to elvitegravir and isentress  6/7/16 HIV GT Magdlaena M184V with resistance to FTC/3TC  06/23/2025 GT E138K, G163R with integrase resistance (PLR to Biktegravir / Dolutegravir; LR to Cabotegravir; IR to Elvitegravir / Raltegravir)   Previously noted exposures from past review of history, patient has several dogs, cows, and pigs at home.  He hunts rabbits often and skins them himself.  Patient had denied bird exposure or other farm animal exposure.  He was unemployed, but used to work in insulation.  Patient previously denied MSM or IVDU.  History is obtained for consult mostly from chart review as patient is noncooperative.      Interval History:  Patient sitting in bed.  He is awake and alert.  He will answer a few questions.  He is trying to eat breakfast.  Assistance offered, but patient refused.  Patient denies current complaints.  Denies  fever, chills, night sweats, rash, HA, vision changes, dizziness, CP/SOB, cough, N/V/D, abdominal pain, or urinary complaints.  He is afebrile with no leukocytosis.  TAYLOR is worsening; BUN 23/creatinine 1.70.  Urine culture now noted with ESBL Klebsiella.  Appears urine sample was either collected from wall canister or Purewick which does not represent a sterile sample.  Respiratory viral panel negative.  Sputum induction was attempted multiple times per respiratory, patient either vomited or is unable to produce productive sputum.  ALP is improving; 1370.  Patient is noted with crusting lip lesion and concern for HSV.  HSV swab pending.  Pateint has now been started on empiric HSV therapy with Valtrex.  Patient is on Abacavir, Darunavir, Ritonavir, and Emtricitabine/TDF for ART, along with Bactrim DS for OI prophylaxis.  He on MAC treatment with Azithromycin, Ethambutol, and Rifabutin.  Patient is not readily taking medications; nurses have to consistently strongly encouraged patient to take them.  EKG with .        Antibiotic / Antiviral / Antifungal History:  Azithromycin 500 mg p.o. daily - 06/22/2025 to 06/28/2025   Rifampin 300 mg p.o. daily - 06/22/2025 to 06/28/2025  Ethambutol 800 mg p.o. daily - 06/23/2025 to 06/28/2025  Bactrim DS 1 tab p.o. b.i.d. - 06/22/2025 to 06/28/2025  Fluconazole 100 mg p.o. daily - 06/22/2025 to 06/24/2025  Fluconazole 400 mg p.o. daily - 06/24/2025 to 06/28/2025  Bactrim DS 1 tab p.o. daily - 06/29/2025 to present   Fluconazole 200 mg p.o. daily - 06/29/2025 to present  Dolutegravir 50 mg p.o. daily - 07/02/2025 to 07/10/2025  Emtricitabine/TAF 1 tab p.o. daily - 07/02/2025 to 07/10/2025  Azithromycin 500 mg p.o. daily - 07/02/2025 to present  Rifampin 300 mg p.o. daily - 07/02/2025 to 07/10/2025  Ethambutol 800 mg p.o. daily - 07/02/2025 to present  Darunavir 800 mg 1 tab p.o. daily - 07/11/2025 to present  Ritonavir 100 mg p.o. daily - 07/11/2025 to present  Abacavir 300  "mg p.o. daily - 07/11/2025 to present   Rifabutin 150 mg p.o. daily - 07/11/2025 to present  Emtricitabine/TDF 1 tab p.o. daily - 07/11/2025 to present  Valtrex 1000 mg po BID - 07/11/2025 to present          MEDICATIONS:   Reviewed in EMR    REVIEW OF SYSTEMS:   Except as documented, all other systems reviewed and negative     PHYSICAL EXAM:   T 97.6 °F (36.4 °C)   BP (!) 151/88   P 78   RR 18   O2 100 %  GENERAL:  Cachectic elderly BM who is awake and alert; oriented to person and place; appears chronically ill  HEENT: atraumatic, normocephalic, anicteric, temporal wasting noted; no thrush noted; crusting lip lesion noted   LUNGS: breathing unlabored with symmetric chest rise, lungs CTA bilateral   HEART: RRR, no rubs, gallops, or heaves  ABDOMEN: abdomen soft, nondistended, nontender to palpation  : no CVA tenderness  EXTREMITIES: no obvious edema  SKIN: skin ashy and dry   NEURO: speech fluent and intact, facial symmetry preserved, no tremor noted  PSYCH:  cooperative; indifferent  LINES: PIV       LABS AND IMAGING:     Recent Labs     07/13/25 0325 07/14/25 0337   WBC 3.82* 4.25*   RBC 3.18* 3.46*   HGB 9.5* 10.0*   HCT 28.1* 30.5*   MCV 88.4 88.2   MCH 29.9 28.9   MCHC 33.8 32.8*   RDW 15.5 15.7    262     No results for input(s): "LACTIC" in the last 72 hours.  No results for input(s): "INR", "APTT", "D-DIMER" in the last 72 hours.  No results for input(s): "HGBA1C", "CHOL", "TRIG", "LDL", "VLDL", "HDL" in the last 72 hours.   Recent Labs     07/13/25 0325 07/14/25 0337   * 135*   K 4.2 4.4   CO2 22* 23   BUN 21.6 23.3   CREATININE 1.36* 1.70*   CALCIUM 7.8* 7.9*   MG 1.70 1.60   PHOS 3.4 3.6   ALBUMIN 1.6* 1.6*   GLOBULIN 6.2* 6.2*   ALKPHOS 1,479* 1,370*   ALT 21 21   AST 36 29   BILITOT 0.8 0.7     Recent Labs     07/11/25  1654 07/12/25  1106   CPK 17*  --    TROPONINI <0.010 <0.010          Estimated Creatinine Clearance: 36.2 mL/min (A) (based on SCr of 1.7 mg/dL (H)).   Lab Results "   Component Value Date    SEDRATE 60 (H) 11/25/2024      Lab Results   Component Value Date    CRP 16.40 (H) 11/25/2024        Micro:   Colonization:  11/19/2024 urine culture with E coli (R - Ampicillin)  11/20/2024 blood cultures x2 NGTD   11/20/2024 stool culture negative for Salmonella, Shigella, Campylobacter, Vibrio, Aeromonas, Pleisiomonas,Yersinia, or Shiga Toxin 1 and 2  11/20/2024 sputum culture with yeast   11/21/2024 sputum for AFB smear negative   11/21/2024 sputum culture for mycobacteria and Nocardia with MAC   11/21/2024 mycobacterial blood culture with MAC   11/21/2024 sputum for AFB smear negative   11/21/2024 sputum culture for mycobacteria and Nocardia with MAC   11/21/2024 sputum culture for mycobacteria and Nocardia with MAC   11/21/2024 sputum culture for mycobacteria and Nocardia with MAC (R - Linezolid; I - Moxifloxacin)   11/21/2024 sputum AFB smear negative   02/28/2025 blood cultures x2 NGTD   02/28/2025 mycobacterial blood culture with MAC   02/28/2025 cryptococcal antigen negative   03/01/2025 left foot wound with negative AFB smear  03/01/2025 left foot fungal wound culture NGTD  03/01/2025 left foot tissue culture with normal skin adriel   03/02/2025 sputum AFB smear negative  03/03/2025 left foot mycobacteria and Nocardia tissue culture negative   06/21/2025 blood cultures x2 NGTD   06/27/2025 fungal CSF culture pending   06/27/2025 CSF culture NGTD   06/27/2025 CSF mycobacteria and Nocardia culture pending   06/27/2025 CSF AFB smear negative   06/27/2025 cryptococcal antigen CSF negative   07/10/2025 cryptococcal antigen serum negative  07/11/2025 blood cultures x2 NGTD  07/11/2025 urine culture with ESBL Klebsiella pneumoniae        X-Ray Chest 1 View  Narrative: EXAMINATION:  Single view chest radiograph.    CLINICAL HISTORY:  tachcardia;    TECHNIQUE:  Single view of the chest.    COMPARISON:  Chest radiograph 02/27/2025.    FINDINGS:  The lungs are clear without consolidation or  effusion.  There is no pneumothorax.  The cardiac silhouette is normal in size.  There is no acute osseous abnormality.  Impression: No acute cardiopulmonary abnormality.    Electronically signed by: Boris Rogel MD  Date:    07/12/2025  Time:    10:50  CT Head Without Contrast  Narrative: EXAMINATION:  CT HEAD WITHOUT CONTRAST    CLINICAL HISTORY:  Mental status change, unknown cause;    TECHNIQUE:  Axial images of the head were obtained without IV contrast administration.  Coronal and sagittal reconstructions were provided.  Three dimensional and MIP images were obtained and evaluated.  Total DLP was 972 mGy-cm. Dose lowering technique and automated exposure control were utilized for this exam.    COMPARISON:  MRI of the brain 06/27/2025.    FINDINGS:  There is normal brain formation.  There is normal gray-white matter differentiation.  There is no hemorrhage, hydrocephalus, or midline shift.  There is no cytotoxic or vasogenic edema.  There is no intra or extra-axial fluid collection.  There is no herniation.    The calvarium is intact.  There is no fracture.  The bilateral orbits are normal.  The paranasal sinuses are free of disease.  Impression: No acute intracranial abnormality.    [facility] concordance    Electronically signed by: Boris Rogel MD  Date:    07/12/2025  Time:    06:34      CT chest/abdomen/pelvis with IV contrast 06/23/2025:   Impression:   1. Multifocal micronodular pattern at the dependent portion of both lungs, similar extents 02/27/2025.  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  2. Stable thoracic lymphadenopathy.  3. Slight interval increase in size and extent of abdominopelvic lymphadenopathy.        MRI brain with and without contrast 06/27/2025:   Impression:  1. No acute intracranial abnormality.  2. Mild chronic microvascular ischemic changes.      ASSESSMENT & PLAN:     Advanced HIV disease/AIDS with complex resistance   Failure to thrive  /  cachexia  Encephalopathy  S/P lumbar puncture 06/27/2025  Disseminated MAC  Lung Mass   History of disseminated cryptococcal disease   History of disseminated histoplasmosis  Oral candidiasis  Leukopenia  Anemia  TAYLOR  Elevated alkaline phosphatase  Noncompliance   Likely HSV lip lesion           RECOMMENDATIONS:        Patient has been unable to collect AFB/mycobacterial cultures after multiple attempts.  Will plan for MAC treatment x 1 year to begin from today as not able to provide samples.    For MAC treatment, would continue Azithromycin 500 mg po daily + Ethambutol 15 mg / kg po daily + Rifabutin 150 mg po daily (dose reduced due to co-administration of ART).  End date planned for 07/15/2026.  Patient will need short follow-up for ophthalmology exam due to Ethambutol use  Renal indices worsening.  D/C Bactrim DS as this could be contributing.  Start Mepron 1500 mg po daily instead for OI prophylaxis.  Patient will need to remain on this until CD4 restores > 200 for more than 3 months duration.    For ART, recommend to continue Truvada 1 tab p.o. daily + Darunavir 800 mg p.o. daily + Ritonavir 100 mg p.o. daily + Abacavir 300 mg po daily for ART  Continue Fluconazole 200 mg po daily for history of disseminated histoplasmosis/cryptococcus  Please check routine EKG to assess QTC as prolonged treatment with Fluconazole can prolong QTC   In regards to urine with ESBL Kleb, it appears sample was collected in an unsterile manner which makes the sample invalid.  Patient is currently afebrile and without leukocytosis.  If primary team wants to persue additional urinary workup, urine would need to be collected with in&out catheter to have a valid sample for analysis   Follow up results of HSV swab.  Continue empiric therapy with Valtrex 1000 mg po BID to complete a 10 day course for HSV.  Today is day 3 of 10.  Will defer workup of elevated alkaline phosphatase to primary team.   Renal protective measures   Encouraged  nutrition  Pending tests include: urine Histoplasma/Blastomyces antigen (needs collection), urine gonorrhea/chlamydia/Trichomonas (needs collection), oral gonorrhea/chlamydia (needs collection), and mycobacterial blood culture  Strongly stress compliance  ID will continue to follow        MARCE Alvares  [facility]     *Portions of this note dictated using EMR integrated voice recognition software, and may be subject to voice recognition errors not corrected at proofreading. Please contact writer for clarification if needed. *

## 2025-07-14 NOTE — CONSULTS
Gastroenterology/Hepatology Initial Consult Note    Patient Name: Anne Terry  Age: 65 y.o.  : 1960  MRN: [id number]  Admission Date: 2025    Reason for Consult:      Elevated ALP and GGT; hx of AIDS and MAC infection     HPI:     Anne Terry is a 65 y.o. male w Pmhx of AIDS CD4 21 ,288 and disseminated MAC who was admitted to LTAC service on 25 as a transfer from [facility]. Patient was initially admitted to [facility] on 25 with primary complaint of failure to thrive. Currently receiving ART for AIDS and MAC treatment while awaiting placement. 25, ART and MAC therapies optomized to avoid interaction. Last colonoscopy in , ulcerated mass found, path consistent with fungal colitis.     Today, he is uncooperative and does not participate in exam. He refuses to answer questions other than denies pain.       No past medical history on file.     Past Surgical History:   Procedure Laterality Date    COLONOSCOPY  2015    MAGNETIC RESONANCE IMAGING N/A 2025    Procedure: MRI (Magnetic Resonance Imagine);  Surgeon: Dara Horne MD;  Location: [facility];  Service: Medicine;  Laterality: N/A;  MRI WITH ANESTHESIA /   BRAIN - WITH AND WITHOUT CONTRAST        Family History   Problem Relation Name Age of Onset    No Known Problems Mother      No Known Problems Father      Cancer Sister      No Known Problems Brother         Social History     Tobacco Use    Smoking status: Never    Smokeless tobacco: Never   Substance Use Topics    Alcohol use: Not Currently         Review of patient's allergies indicates:  No Known Allergies     Prescriptions Prior to Admission[1]      INPATIENT MEDICATIONS    Scheduled Meds:   abacavir  300 mg Oral Daily    azithromycin  500 mg Oral Daily    cyanocobalamin  2,000 mcg Oral Daily    darunavir  800 mg Oral Daily with breakfast    emtricitabine-tenofovir 200-300 mg  1 tablet Oral Daily    enoxparin  40 mg Subcutaneous Q24H (prophylaxis, 1700)     ethambutoL  800 mg Oral Daily    ferrous sulfate  1 tablet Oral Daily    fluconazole  200 mg Oral Daily    folic acid  1 mg Oral Daily    magnesium sulfate 2 g IVPB  2 g Intravenous Once    multivitamin liquid  5 mL Oral Daily    pantoprazole  40 mg Oral Daily    rifabutin  150 mg Oral Daily    ritonavir  100 mg Oral Daily    sodium bicarbonate  650 mg Oral Daily    sulfamethoxazole-trimethoprim 800-160mg  1 tablet Oral Daily    valACYclovir  1,000 mg Oral Q12H     Continuous Infusions:   lactated ringers   Intravenous Continuous   Stopped at 07/12/25 0049    lactated ringers   Intravenous Continuous 150 mL/hr at 07/14/25 0833 New Bag at 07/14/25 0833     PRN Meds:    Current Facility-Administered Medications:     0.9%  NaCl infusion (for blood administration), , Intravenous, Q24H PRN    acetaminophen, 650 mg, Oral, Q6H PRN    dextrose 50%, 12.5 g, Intravenous, PRN    dextrose 50%, 25 g, Intravenous, PRN    glucagon (human recombinant), 1 mg, Intramuscular, PRN    glucose, 16 g, Oral, PRN    glucose, 24 g, Oral, PRN    naloxone, 0.02 mg, Intravenous, PRN    ondansetron, 4 mg, Intravenous, Q4H PRN    sodium chloride 0.9%, 10 mL, Intravenous, PRN          Review of Systems:       Review of Systems     As stated in HPI     Objective:       VITAL SIGNS: 24 HR MIN & MAX LAST    Temp  Min: 97.6 °F (36.4 °C)  Max: 99.1 °F (37.3 °C)  97.6 °F (36.4 °C)        BP  Min: 140/89  Max: 159/95  (!) 151/88     Pulse  Min: 73  Max: 87  78     Resp  Min: 18  Max: 18  18    SpO2  Min: 96 %  Max: 100 %  100 %        Physical Exam  Vitals and nursing note reviewed.   Constitutional:       General: He is awake. He is not in acute distress.     Appearance: He is underweight. He is ill-appearing.   HENT:      Head: Normocephalic and atraumatic.      Mouth/Throat:      Mouth: Mucous membranes are dry.      Pharynx: Oropharynx is clear.   Eyes:      General: No scleral icterus.     Pupils: Pupils are equal, round, and reactive to light.  "  Cardiovascular:      Rate and Rhythm: Normal rate.   Pulmonary:      Effort: Pulmonary effort is normal. No respiratory distress.   Abdominal:      General: Abdomen is flat. Bowel sounds are normal. There is no distension.      Palpations: Abdomen is soft.      Tenderness: There is no abdominal tenderness. There is no guarding.   Neurological:      Mental Status: He is alert.   Psychiatric:         Behavior: Behavior is uncooperative.              LABS:      Recent Labs   Lab 07/11/25  1654 07/12/25  0338 07/12/25 1809 07/13/25  0325 07/14/25  0337   WBC 5.50 3.61* 4.42* 3.82* 4.25*   HGB 7.7* 6.7* 10.2* 9.5* 10.0*   HCT 24.7* 21.6* 30.9* 28.1* 30.5*    322 240 268 262   MCV 90.5 90.4 89.3 88.4 88.2       Recent Labs   Lab 07/08/25  1934 07/10/25  0515 07/11/25  0451 07/11/25 1654 07/12/25  0338 07/12/25 1809 07/13/25  0325 07/14/25  0337   HGB 7.6* 7.4* 7.0* 7.7* 6.7* 10.2* 9.5* 10.0*   HCT 24.6* 24.0* 22.3* 24.7* 21.6* 30.9* 28.1* 30.5*        Recent Labs   Lab 07/11/25  0451 07/11/25  1654 07/12/25 0338 07/13/25  0325 07/14/25  0337    141 139 135* 135*   K 4.4 4.7 4.5 4.2 4.4   CO2 22* 22* 24 22* 23   BUN 25.7 26.5* 23.1 21.6 23.3   CREATININE 1.46* 1.34* 1.34* 1.36* 1.70*   BILITOT 0.6 0.9 0.8 0.8 0.7   ALKPHOS 1,735* 2,022* 1,684* 1,479* 1,370*   AST 30 41 37 36 29   ALT 21 24 23 21 21   ALBUMIN 1.7* 1.8* 1.6* 1.6* 1.6*        No results for input(s): "INR", "PROTIME", "PTT" in the last 168 hours.     No results for input(s): "IRON", "FERRITIN" in the last 168 hours.       IMAGING:   X-Ray Chest 1 View  Result Date: 7/12/2025  EXAMINATION: Single view chest radiograph. CLINICAL HISTORY: tachcardia; TECHNIQUE: Single view of the chest. COMPARISON: Chest radiograph 02/27/2025. FINDINGS: The lungs are clear without consolidation or effusion.  There is no pneumothorax.  The cardiac silhouette is normal in size.  There is no acute osseous abnormality.     No acute cardiopulmonary abnormality. " Electronically signed by: Boris Rogel MD Date:    07/12/2025 Time:    10:50    CT Head Without Contrast  Result Date: 7/12/2025  EXAMINATION: CT HEAD WITHOUT CONTRAST CLINICAL HISTORY: Mental status change, unknown cause; TECHNIQUE: Axial images of the head were obtained without IV contrast administration.  Coronal and sagittal reconstructions were provided.  Three dimensional and MIP images were obtained and evaluated.  Total DLP was 972 mGy-cm. Dose lowering technique and automated exposure control were utilized for this exam. COMPARISON: MRI of the brain 06/27/2025. FINDINGS: There is normal brain formation.  There is normal gray-white matter differentiation.  There is no hemorrhage, hydrocephalus, or midline shift.  There is no cytotoxic or vasogenic edema.  There is no intra or extra-axial fluid collection.  There is no herniation. The calvarium is intact.  There is no fracture.  The bilateral orbits are normal.  The paranasal sinuses are free of disease.     No acute intracranial abnormality. [employer] concordance Electronically signed by: Boris Rogel MD Date:    07/12/2025 Time:    06:34    US Abdomen Limited  Result Date: 6/29/2025  EXAMINATION: US ABDOMEN LIMITED CLINICAL HISTORY: Right upper quadrant pain. TECHNIQUE: Multiple real-time transverse and longitudinal sections were performed of the right abdomen by the sonographer. Select images were submitted for review. COMPARISON: CT abdomen pelvis June 23, 2025 FINDINGS: Liver is of unremarkable echotexture with normal contour and size. There was no delineation of discrete hepatic cystic or solid mass. Hepatic maximum diameter is estimated to be 20.26 cm with ultrasound and appears to be overestimated.  On the previous CT abdomen maximum diameter of the liver in the midclavicular line is 16.2 cm which is within normal limits.  There was unremarkable hepatopedal flow within the portal vein.  Pancreas is obscured by overlying bowel gas. Gallbladder lumen is  without echogenicity indicative of sludge or cholelithiasis. Gallbladder wall thickness is within normal limits. Common bile duct caliber of 2.0 mm is within normal limits for the age. Sonographer reported negative Gomes's sign. Inferior vena cava is unremarkable. Visualized portion of the abdominal aorta is without aneurysmal dilatation. Normally located right kidney length measures 12.2 x 4.3 x 5.9 cm. Right renal corticomedullary differentiation is unremarkable. No evidence of hydronephrosis.     No acute findings sonography identified. Electronically signed by: Ezio Deleon Date:    06/29/2025 Time:    20:00    US Retroperitoneal Complete  Result Date: 6/28/2025  EXAMINATION: US RETROPERITONEAL COMPLETE CLINICAL HISTORY: ARF; TECHNIQUE: Ultrasound of the kidneys and urinary bladder was performed including color flow and grayscale evaluation of the kidneys. COMPARISON: CT chest abdomen pelvis 06/23/2025 FINDINGS: RIGHT KIDNEY: The right kidney measures 9.1 cm.  No hydronephrosis. LEFT KIDNEY: The left kidney measures 10.8 cm. No hydronephrosis.Portions of the upper pole obscured by shadowing. BLADDER: The bladder has an unremarkable appearance taking into account degree of distention. OTHER: Retroperitoneal adenopathy.  Largest lymph node seen is a 5.6 x 5 x 3.2 cm Lisa caval lymph node.  Aorta and IVC unremarkable.     Normal sized, nonobstructed kidneys. Retroperitoneal lymphadenopathy.  Defer to recent CT exam. Electronically signed by: Moni Hdez Date:    06/28/2025 Time:    13:42    IR LUMBAR PUNCTURE DIAGNOSTIC WITH IMAGING  Result Date: 6/27/2025  EXAMINATION: IR LUMBAR PUNCTURE DIAGNOSTIC WITH IMAGING CLINICAL HISTORY: eval for cryptococcal meningitis; TECHNIQUE: The risks and benefits of the procedure were explained to the patient's son and written informed consent was obtained. All questions were answered. A suitable skin entry site was chosen under fluoroscopy. The lower back was then prepped  and draped in sterile fashion. 1% lidocaine was used for local anesthesia. COMPARISON: No relevant comparison studies available at the time of dictation. FINDINGS: Under intermittent fluoroscopic guidance, a 20-gauge spinal needle was advanced into the thecal sac at the L2-L3 interlaminar space with position confirmed by flow of CSF. Approximately 8.5 mL of clear CSF was collected and sent to the lab for further evaluation.  3 tubes of fluid collected four total of 8.5 mL.  Attempts to aspirate further fluid for 4th tube unsuccessful.  The needle was removed and hemostasis achieved at the skin puncture site. No immediate complication. Estimated blood loss was negligible. Total fluoroscopy time is 0.1 minute with reference air kerma of 7.19 mGy. Opening pressure 13 cm water Closing pressure less than 9 cm water     Successful fluoroscopic guided lumbar puncture. Electronically signed by: Moni Hdez Date:    06/27/2025 Time:    14:43    MRI Brain W WO Contrast  Result Date: 6/27/2025  EXAMINATION: MRI BRAIN W WO CONTRAST CLINICAL HISTORY: Mental status change, unknown cause;HIV/AIDs, dementia; TECHNIQUE: Multiplanar, multisequence MR images of the brain were obtained with and without administration of intravenous contrast. COMPARISON: CT head dated 06/24/2025 FINDINGS: There is no restricted diffusion, hemorrhage or edema.  Mild patchy T2/FLAIR hyperintensity in the subcortical and periventricular white matter are nonspecific and may represent chronic microvascular ischemic changes.  There is no abnormal parenchymal or leptomeningeal enhancement. There is no mass effect or midline shift.  The basal cisterns are patent there is mild diffuse parenchymal volume loss.  There is no hydrocephalus or abnormal extra-axial fluid collection.  The major intracranial flow voids are patent.  The paranasal sinuses are clear.     1. No acute intracranial abnormality. 2. Mild chronic microvascular ischemic changes.  Electronically signed by: Mel Hanks Date:    06/27/2025 Time:    13:48    CT Head Without Contrast  Result Date: 6/24/2025  EXAMINATION: CT HEAD WITHOUT CONTRAST CLINICAL HISTORY: Mental status change, unknown cause; TECHNIQUE: Multiple axial images were obtained from the base of the brain to the vertex without contrast administration.  Sagittal and coronal reconstructions were performed..Automatic exposure control is utilized to reduce patient radiation exposure. COMPARISON: 06/09/2015 FINDINGS: There is no intracranial mass or lesion seen.  No hemorrhage is seen.  No acute infarct is seen.  There is some cerebral atrophy seen.  There is some compensatory ventricular dilatation and periventricular white matter changes consistent with the patient's age.  Calvarium is intact.  The posterior fossa is unremarkable..  The visualized portions of the paranasal sinuses show no acute abnormality.     Chronic age-related changes.  No acute process Electronically signed by: Ankush Gordon Date:    06/24/2025 Time:    18:36    CT Chest Abdomen Pelvis With IV Contrast (XPD) NO Oral Contrast  Result Date: 6/23/2025  EXAMINATION: CT CHEST ABDOMEN PELVIS WITH IV CONTRAST (XPD) CLINICAL HISTORY: Sepsis; Anne Terry is a 65 y.o. 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 The patient presented to [facility] on 6/21/2025 with a primary complaint of failure to thrive. Patient was brought to OSH ED found walking around naked and confused. He also likely has dementia. Patient says that he lives by himself in [place] but is unable to explain why he went to the ER or why he is now in the hospital but say he had no choice. He has not been taking his medications. Pt had a CT scans done at OSH, results to be uploaded. He had a hospital admission in March 2025 for treatment of HIV and MAC, a punch biopsy of BLLE that was negative for malignancy, and an  Ophthalmology evaluation. He has a history of non-compliance and missing several follow up appointments. Denies fevers, chills, sweats, SOB, N/V/D, dysuria, new rashes. Does have oral thrush TECHNIQUE: Helical axial images are acquired through the chest, abdomen and pelvis after the IV administration 100 mL of Omnipaque 350.  Coronal sagittal reconstructions were performed.  Dose automated exposure control, dose radiation lowering technique was utilized. COMPARISON: CT chest, abdomen pelvis from 02/27/2025 Outside CT of the chest from 06/19/2025 Outside CT of the abdomen and pelvis from 06/19/2025 FINDINGS: CHEST: Partially calcified right thyroid nodule measures 7 mm.  Otherwise, unremarkable thoracic inlet.  Heart size is mildly enlarged.  No filling defects in the central pulmonary arteries.  Mild mediastinal lymphadenopathy similar compared to 02/27/2025. Appearance of the lungs is similar with scattered micronodular pattern at the dependent portion of both lungs, right more than left.  Tree-in-bud pattern is identified.  No interval change since 02/27/2025. ABDOMEN/PELVIS: Gallbladder is contracted.  The liver, spleen, pancreas, adrenal glands and kidneys appear normal.  No hydronephrosis. Small hiatal hernia.  Small gastroesophageal varices are present.  Patent portal vein. Mild abdominopelvic lymphadenopathy is present.  Abdominal lymphadenopathy is slightly worse compared to 02/27/2025.  Index right periaortic lymph node (image 124, series 3) measures 1.3 cm short axis dimension.  Lymph node previously measured 0.9 cm on the prior. No ascites.  No suspicious peritoneal nodule.  Unremarkable bladder. The bowel is nonobstructed.  Normal appendix is present.  Air and stool are present throughout the colon.  Mild atherosclerosis of the abdominal aorta and its branches. BONES AND SOFT TISSUES: No suspicious osteolytic or osteoblastic lesion.     1. Multifocal micronodular pattern at the dependent portion of both  lungs, similar extents 02/27/2025.  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 2. Stable thoracic lymphadenopathy. 3. Slight interval increase in size and extent of abdominopelvic lymphadenopathy. Electronically signed by: Sg Rogel MD Date:    06/23/2025 Time:    10:20        Assessment / Plan:   Elevated ALP and GGT - Suspected 2/2 to below   Disseminated MAC   ESBL Klebsiella UTI  AIDS CD4 21 ,288   Hx of multiple opportunistic infections   Hx of medication noncompliance  - No invasive procedure at this time as this is likely related to disseminated MAC infection   - Autoimmune hepatitis lab work ordered   - Continue MAC and ART therapies   - Rest per primary team           Arminda Berger MD   [facility] HO3           [1]   Medications Prior to Admission   Medication Sig Dispense Refill Last Dose/Taking    abacavir (ZIAGEN) 300 mg Tab Take 1 tablet (300 mg total) by mouth once daily. 90 tablet 0     azithromycin (ZITHROMAX) 500 MG tablet Take 1 tablet (500 mg total) by mouth once daily. 90 tablet 0     darunavir (PREZISTA) 800 mg Tab Take 1 tablet (800 mg total) by mouth daily with breakfast. 90 tablet 0     emtricitabine-tenofovir 200-300 mg (TRUVADA) 200-300 mg Tab Take 1 tablet by mouth once daily. 90 tablet 0     ethambutoL (MYAMBUTOL) 400 MG Tab Take 2 tablets (800 mg total) by mouth once daily. 180 tablet 0     ferrous sulfate 325 (65 FE) MG EC tablet Take 1 tablet (325 mg total) by mouth once daily. 90 tablet 0     rifabutin (MYCOBUTIN) 150 mg Cap Take 1 capsule (150 mg total) by mouth once daily. 90 capsule 0     ritonavir (NORVIR) 100 mg Tab tablet Take 1 tablet (100 mg total) by mouth once daily. 90 tablet 0

## 2025-07-14 NOTE — MED STUDENT SUBJECTIVE & OBJECTIVE
Medical Student Subjective & Objective     Principal Problem: AIDS, Malnutrition, iron deficiency anemia.     Chief Complaint: No chief complaint on file.      HPI: No notes on file    Hospital Course: No notes on file    Interval History: ***        Past Surgical History:   Procedure Laterality Date    COLONOSCOPY  05/08/2015    MAGNETIC RESONANCE IMAGING N/A 6/27/2025    Procedure: MRI (Magnetic Resonance Imagine);  Surgeon: Dara Horne MD;  [facility];  Service: Medicine;  Laterality: N/A;  MRI WITH ANESTHESIA /   BRAIN - WITH AND WITHOUT CONTRAST       Review of patient's allergies indicates:  No Known Allergies    No current facility-administered medications on file prior to encounter.     Current Outpatient Medications on File Prior to Encounter   Medication Sig    abacavir (ZIAGEN) 300 mg Tab Take 1 tablet (300 mg total) by mouth once daily.    azithromycin (ZITHROMAX) 500 MG tablet Take 1 tablet (500 mg total) by mouth once daily.    darunavir (PREZISTA) 800 mg Tab Take 1 tablet (800 mg total) by mouth daily with breakfast.    emtricitabine-tenofovir 200-300 mg (TRUVADA) 200-300 mg Tab Take 1 tablet by mouth once daily.    ethambutoL (MYAMBUTOL) 400 MG Tab Take 2 tablets (800 mg total) by mouth once daily.    ferrous sulfate 325 (65 FE) MG EC tablet Take 1 tablet (325 mg total) by mouth once daily.    rifabutin (MYCOBUTIN) 150 mg Cap Take 1 capsule (150 mg total) by mouth once daily.    ritonavir (NORVIR) 100 mg Tab tablet Take 1 tablet (100 mg total) by mouth once daily.     Family History       Problem Relation (Age of Onset)    Cancer Sister    No Known Problems Mother, Father, Brother          Tobacco Use    Smoking status: Never    Smokeless tobacco: Never   Substance and Sexual Activity    Alcohol use: Not Currently    Drug use: Yes     Types: Marijuana    Sexual activity: Not Currently     Partners: Female     Review of Systems  Objective:     Vital Signs (Most Recent):  Temp: 99.1 °F  "(37.3 °C) (07/14/25 0005)  Pulse: 80 (07/14/25 0429)  Resp: 18 (07/14/25 0429)  BP: (!) 146/88 (07/14/25 0429)  SpO2: 100 % (07/14/25 0429) Vital Signs (24h Range):  Temp:  [97.6 °F (36.4 °C)-99.1 °F (37.3 °C)] 99.1 °F (37.3 °C)  Pulse:  [67-87] 80  Resp:  [18] 18  SpO2:  [96 %-100 %] 100 %  BP: (140-159)/(83-95) 146/88     Weight: 57.5 kg (126 lb 12.2 oz)  Body mass index is 17.19 kg/m².    Intake/Output Summary (Last 24 hours) at 7/14/2025 0724  Last data filed at 7/14/2025 0614  Gross per 24 hour   Intake 3443.02 ml   Output 1850 ml   Net 1593.02 ml      Physical Exam    Significant Labs: {Results:53588::"All pertinent labs within the past 24 hours have been reviewed."}    Significant Imaging: {Imaging Review:22043::"I have reviewed all pertinent imaging results/findings within the past 24 hours."}    Medical Student Subjective & Objective   "

## 2025-07-14 NOTE — PROGRESS NOTES
[facility] Progress Note     Long Term Care Service   Attending: Betzy Quijano MD      Subjective:      Brief HPI:  Anne Terry is a 65 y.o. male PMH  HIV/AIDS, MAC, history of disseminated histoplasmosis, history of PJP and cryptococcal meningitis that presented to [facility] as a transfer awaiting long term care placement. See previous documentation for additional details and summary of events.       Interval History:    Patient was seen and examined this morning. Patient responsive to some questions.  Reporting  pain in [place] extremities. Nurse reported assisting in feeds.     Review of Systems:  ROS see above      Objective:     Last 24 Hour Vital Signs:  Vitals:    25 0753   BP: (!) 151/88   Pulse: 78   Resp: 18   Temp: 97.6 °F (36.4 °C)         Physical Examination:  Alert in NAD, resting comfortably in bed.   HENT: Dry mucous membranes, lesion on right lower lip  Cardiovascular:      Rate and Rhythm: Normal rate and regular rhythm.   Pulmonary:       Breath sounds: No stridor. No wheezing, rhonchi or rales.   Abdomen: Soft, No abdominal tenderness  Skin:     Findings: No rash. Dry, scaly, areas of hyperpigmentation     Neurological: Alert, Calm, Stated his     Laboratory:  Most Recent Data:  CBC:   Lab Results   Component Value Date    WBC 4.25 (L) 2025    HGB 10.0 (L) 2025    HCT 30.5 (L) 2025     2025    MCV 88.2 2025    RDW 15.7 2025     WBC Differential:   Recent Labs   Lab 25  1654 25  0338 25  1809 25  0325 25  0337   WBC 5.50 3.61* 4.42* 3.82* 4.25*   HGB 7.7* 6.7* 10.2* 9.5* 10.0*   HCT 24.7* 21.6* 30.9* 28.1* 30.5*    322 240 268 262   MCV 90.5 90.4 89.3 88.4 88.2     BMP:   Lab Results   Component Value Date     (L) 2025    K 4.4 2025     2025    CO2 23 2025    BUN 23.3 2025    CREATININE 1.70 (H) 2025    GLU 62 (L) 2025    CALCIUM 7.9 (L) 2025    MG 1.60  07/14/2025    PHOS 3.6 07/14/2025     LFTs:   Lab Results   Component Value Date    PROT 7.8 (H) 07/14/2025    ALBUMIN 1.6 (L) 07/14/2025    BILITOT 0.7 07/14/2025    AST 29 07/14/2025    ALKPHOS 1,370 (H) 07/14/2025    ALT 21 07/14/2025    GGT 1,303 (H) 07/10/2025     Coags:   Lab Results   Component Value Date    INR 1.0 06/25/2025    PROTIME 13.2 06/25/2025     FLP:   Lab Results   Component Value Date    CHOL 162 01/17/2025    HDL 25 (L) 01/17/2025    TRIG 300 (H) 01/17/2025     DM:   Lab Results   Component Value Date    CREATININE 1.70 (H) 07/14/2025     Thyroid:   Lab Results   Component Value Date    TSH 4.120 01/17/2025      Anemia:   Lab Results   Component Value Date    IRON 24 (L) 06/29/2025    TIBC 105 (L) 06/29/2025    FERRITIN 1,767.27 (H) 06/29/2025       Lab Results   Component Value Date    QVMPQHRZ99 364 07/12/2025       Lab Results   Component Value Date    FOLATE 14.2 11/19/2024        Cardiac:   Lab Results   Component Value Date    TROPONINI <0.010 07/12/2025         Microbiology Data:  Microbiology Results (last 7 days)       Procedure Component Value Units Date/Time    AFB Smear   Not yet collected    Urine Culture High Risk (Abnormal) Collected: 07/11/25 1936    Order Status: Completed Specimen: Urine Updated: 07/13/25 0713     Urine Culture 25,000-50,000 colonies/ml Gram-negative Rods    Blood Culture (Normal) Collected: 07/11/25 1654    Order Status: Completed Specimen: Blood from Hand, Left Updated: 07/12/25 1902     Blood Culture No Growth At 24 Hours    Blood Culture   (Normal) Collected: 07/11/25 1654    Order Status: Completed Specimen: Blood from Forearm, Left Updated: 07/12/25 1902     Blood Culture No Growth At 24 Hours    Mycobacteria and Nocardia Culture      Order Status: Sent Specimen: Respiratory from Sputum, Induced Not yet collected     Cryptococcal antigen, blood  (Normal) Collected: 07/10/25 1101    Order Status: Completed Specimen: Blood, Venous Updated: 07/10/25 2154      Cryptococcal Antigen, Serum Negative    C.trach/N.gonor AMP RNA Oral  ?    Order Status: Sent     Mycobacterial Blood culture  In progress           Other Results:  EKG   Test Reason : R00.0,    Vent. Rate :  73 BPM     Atrial Rate :  73 BPM     P-R Int : 146 ms          QRS Dur :  86 ms      QT Int : 424 ms       P-R-T Axes :  56  27  77 degrees    QTcB Int : 467 ms    Normal sinus rhythm  Minimal voltage criteria for LVH, may be normal variant  Nonspecific ST and T wave abnormality  Prolonged QT  Abnormal ECG  When compared with ECG of 11-Jul-2025 18:48,  Vent. rate has decreased by  46 bpm    Referred By: KATHLEEN SOLANO           Confirmed By:     Radiology:  CT HEAD WITHOUT   COMPARISON:  MRI of the brain 06/27/2025.     FINDINGS:  There is normal brain formation.  There is normal gray-white matter differentiation.  There is no hemorrhage, hydrocephalus, or midline shift.  There is no cytotoxic or vasogenic edema.  There is no intra or extra-axial fluid collection.  There is no herniation.     The calvarium is intact.  There is no fracture.  The bilateral orbits are normal.  The paranasal sinuses are free of disease.     Impression:     No acute intracranial abnormality.     [facility] concordance        Electronically signed by:Boris Rogel MD  Date:                                            07/12/2025  Time:                                           06:34   Portable Chest X-RAY   COMPARISON:  Chest radiograph 02/27/2025.     FINDINGS:  The lungs are clear without consolidation or effusion.  There is no pneumothorax.  The cardiac silhouette is normal in size.  There is no acute osseous abnormality.     Impression:     No acute cardiopulmonary abnormality.        Electronically signed by:Boris Rogel MD  Date:                                            07/12/2025  Time:                                           10:50    Current Medications:     Infusions:   lactated ringers   Intravenous Continuous   Stopped at  07/12/25 0049    lactated ringers   Intravenous Continuous 150 mL/hr at 07/14/25 0833 New Bag at 07/14/25 0833        Scheduled:   abacavir  300 mg Oral Daily    azithromycin  500 mg Oral Daily    cyanocobalamin  2,000 mcg Oral Daily    darunavir  800 mg Oral Daily with breakfast    emtricitabine-tenofovir 200-300 mg  1 tablet Oral Daily    enoxparin  40 mg Subcutaneous Q24H (prophylaxis, 1700)    ethambutoL  800 mg Oral Daily    ferrous sulfate  1 tablet Oral Daily    fluconazole  200 mg Oral Daily    folic acid  1 mg Oral Daily    multivitamin liquid  5 mL Oral Daily    pantoprazole  40 mg Oral Daily    rifabutin  150 mg Oral Daily    ritonavir  100 mg Oral Daily    sodium bicarbonate  650 mg Oral Daily    sulfamethoxazole-trimethoprim 800-160mg  1 tablet Oral Daily    valACYclovir  1,000 mg Oral Q12H        PRN:    Current Facility-Administered Medications:     0.9%  NaCl infusion (for blood administration), , Intravenous, Q24H PRN    acetaminophen, 650 mg, Oral, Q6H PRN    dextrose 50%, 12.5 g, Intravenous, PRN    dextrose 50%, 25 g, Intravenous, PRN    glucagon (human recombinant), 1 mg, Intramuscular, PRN    glucose, 16 g, Oral, PRN    glucose, 24 g, Oral, PRN    naloxone, 0.02 mg, Intravenous, PRN    ondansetron, 4 mg, Intravenous, Q4H PRN    sodium chloride 0.9%, 10 mL, Intravenous, PRN      Assessment & Plan:   HIV/AIDS with opportunistic Infection  History of disseminated MAC - with lung MASS  History of disseminated histoplasmosis  TAYLOR Likely due to poor intake  Urine CX with Klebsiella 25-50K  Metabolic Encephalopathy  Severe Anemia - Chronic Disease and Iron Defiency  Malnutrition and 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 Terry was transfused 2 units PRBC over the weekend with appropriate response. GI consulted for elevated alk phos. There was slight bump in creatinine which may be due  to Bactrim and decreased po intake. Can switch to Mepron per ID. Patient has fluids going, LR @ 150ml/hr. Patient declined in & out cath. Have been unable to collect AFB/mycobacterial cultures.  Continue treatment of  HSV with Valtrex for a total of 7days. Continue assisted feeds.  Goals of care discussed with patient's sister, Shelbi. Family is interested in hospice care with nursing home in [place].  Consult placed with case management. Will need to discuss with family continuation of ART. Left message x 2.

## 2025-07-15 VITALS
WEIGHT: 126.75 LBS | OXYGEN SATURATION: 100 % | BODY MASS INDEX: 17.17 KG/M2 | HEIGHT: 72 IN | TEMPERATURE: 98 F | DIASTOLIC BLOOD PRESSURE: 84 MMHG | RESPIRATION RATE: 18 BRPM | HEART RATE: 76 BPM | SYSTOLIC BLOOD PRESSURE: 130 MMHG

## 2025-07-15 PROBLEM — N17.9 AKI (ACUTE KIDNEY INJURY): Status: ACTIVE | Noted: 2025-07-15

## 2025-07-15 LAB
ABS NEUT CALC (OHS): 4.36 X10(3)/MCL (ref 2.1–9.2)
ALBUMIN SERPL-MCNC: 1.6 G/DL (ref 3.4–4.8)
ALBUMIN/GLOB SERPL: 0.3 RATIO (ref 1.1–2)
ALP SERPL-CCNC: 1521 UNIT/L (ref 40–150)
ALT SERPL-CCNC: 20 UNIT/L (ref 0–55)
ANION GAP SERPL CALC-SCNC: 8 MEQ/L
AST SERPL-CCNC: 33 UNIT/L (ref 11–45)
BILIRUB SERPL-MCNC: 0.7 MG/DL
BUN SERPL-MCNC: 22.9 MG/DL (ref 8.4–25.7)
CALCIUM SERPL-MCNC: 8 MG/DL (ref 8.8–10)
CHLORIDE SERPL-SCNC: 104 MMOL/L (ref 98–107)
CO2 SERPL-SCNC: 20 MMOL/L (ref 23–31)
CREAT SERPL-MCNC: 1.63 MG/DL (ref 0.72–1.25)
CREAT/UREA NIT SERPL: 14
ERYTHROCYTE [DISTWIDTH] IN BLOOD BY AUTOMATED COUNT: 15.6 % (ref 11.5–17)
GFR SERPLBLD CREATININE-BSD FMLA CKD-EPI: 47 ML/MIN/1.73/M2
GLOBULIN SER-MCNC: 6.4 GM/DL (ref 2.4–3.5)
GLUCOSE SERPL-MCNC: 57 MG/DL (ref 82–115)
HCT VFR BLD AUTO: 28.5 % (ref 42–52)
HGB BLD-MCNC: 9.7 G/DL (ref 14–18)
HOLD SPECIMEN: NORMAL
LYMPHOCYTES NFR BLD MANUAL: 0.59 X10(3)/MCL (ref 0.6–4.6)
LYMPHOCYTES NFR BLD MANUAL: 11 % (ref 13–40)
MAGNESIUM SERPL-MCNC: 2 MG/DL (ref 1.6–2.6)
MCH RBC QN AUTO: 30.5 PG (ref 27–31)
MCHC RBC AUTO-ENTMCNC: 34 G/DL (ref 33–36)
MCV RBC AUTO: 89.6 FL (ref 80–94)
MONOCYTES NFR BLD MANUAL: 0.43 X10(3)/MCL (ref 0.1–1.3)
MONOCYTES NFR BLD MANUAL: 8 % (ref 2–11)
NEUTROPHILS NFR BLD MANUAL: 81 % (ref 47–80)
NRBC BLD AUTO-RTO: 0 %
OHS QRS DURATION: 86 MS
OHS QTC CALCULATION: 467 MS
OVALOCYTES (OLG): SLIGHT
PLATELET # BLD AUTO: 238 X10(3)/MCL (ref 130–400)
PLATELET # BLD EST: ADEQUATE 10*3/UL
PMV BLD AUTO: 9.9 FL (ref 7.4–10.4)
POCT GLUCOSE: 74 MG/DL (ref 70–110)
POCT GLUCOSE: 79 MG/DL (ref 70–110)
POTASSIUM SERPL-SCNC: 4.7 MMOL/L (ref 3.5–5.1)
PROT SERPL-MCNC: 8 GM/DL (ref 5.8–7.6)
RBC # BLD AUTO: 3.18 X10(6)/MCL (ref 4.7–6.1)
SODIUM SERPL-SCNC: 132 MMOL/L (ref 136–145)
WBC # BLD AUTO: 5.38 X10(3)/MCL (ref 4.5–11.5)

## 2025-07-15 PROCEDURE — 80053 COMPREHEN METABOLIC PANEL: CPT | Performed by: FAMILY MEDICINE

## 2025-07-15 PROCEDURE — 86039 ANTINUCLEAR ANTIBODIES (ANA): CPT | Performed by: INTERNAL MEDICINE

## 2025-07-15 PROCEDURE — 25000003 PHARM REV CODE 250: Performed by: STUDENT IN AN ORGANIZED HEALTH CARE EDUCATION/TRAINING PROGRAM

## 2025-07-15 PROCEDURE — 86015 ACTIN ANTIBODY EACH: CPT | Performed by: INTERNAL MEDICINE

## 2025-07-15 PROCEDURE — 63700000 PHARM REV CODE 250 ALT 637 W/O HCPCS: Performed by: FAMILY MEDICINE

## 2025-07-15 PROCEDURE — 85025 COMPLETE CBC W/AUTO DIFF WBC: CPT | Performed by: FAMILY MEDICINE

## 2025-07-15 PROCEDURE — 25000003 PHARM REV CODE 250: Performed by: FAMILY MEDICINE

## 2025-07-15 PROCEDURE — 63600175 PHARM REV CODE 636 W HCPCS: Performed by: FAMILY MEDICINE

## 2025-07-15 PROCEDURE — 36415 COLL VENOUS BLD VENIPUNCTURE: CPT | Performed by: INTERNAL MEDICINE

## 2025-07-15 PROCEDURE — 25000003 PHARM REV CODE 250: Performed by: INTERNAL MEDICINE

## 2025-07-15 PROCEDURE — 36415 COLL VENOUS BLD VENIPUNCTURE: CPT | Performed by: FAMILY MEDICINE

## 2025-07-15 PROCEDURE — 63700000 PHARM REV CODE 250 ALT 637 W/O HCPCS: Performed by: INTERNAL MEDICINE

## 2025-07-15 PROCEDURE — 86381 MITOCHONDRIAL ANTIBODY EACH: CPT | Performed by: INTERNAL MEDICINE

## 2025-07-15 PROCEDURE — 83735 ASSAY OF MAGNESIUM: CPT | Performed by: FAMILY MEDICINE

## 2025-07-15 PROCEDURE — 94761 N-INVAS EAR/PLS OXIMETRY MLT: CPT

## 2025-07-15 RX ORDER — SODIUM BICARBONATE 650 MG/1
650 TABLET ORAL DAILY
Qty: 30 TABLET | Refills: 11 | Status: SHIPPED | OUTPATIENT
Start: 2025-07-16 | End: 2026-07-16

## 2025-07-15 RX ORDER — CYANOCOBALAMIN (VITAMIN B-12) 2000 MCG
2000 TABLET ORAL DAILY
Qty: 30 TABLET | Refills: 0 | Status: SHIPPED | OUTPATIENT
Start: 2025-07-16

## 2025-07-15 RX ORDER — FOLIC ACID 1 MG/1
1 TABLET ORAL DAILY
Qty: 30 TABLET | Refills: 0 | Status: SHIPPED | OUTPATIENT
Start: 2025-07-16 | End: 2026-07-16

## 2025-07-15 RX ORDER — FERROUS SULFATE 325(65) MG
325 TABLET, DELAYED RELEASE (ENTERIC COATED) ORAL DAILY
Qty: 30 TABLET | Refills: 0 | Status: SHIPPED | OUTPATIENT
Start: 2025-07-15

## 2025-07-15 RX ORDER — VALACYCLOVIR HYDROCHLORIDE 1 G/1
1000 TABLET, FILM COATED ORAL EVERY 12 HOURS
Qty: 14 TABLET | Refills: 0 | Status: SHIPPED | OUTPATIENT
Start: 2025-07-15 | End: 2025-07-22

## 2025-07-15 RX ORDER — FERROUS SULFATE 325(65) MG
325 TABLET, DELAYED RELEASE (ENTERIC COATED) ORAL DAILY
Qty: 30 TABLET | Refills: 0 | Status: CANCELLED | OUTPATIENT
Start: 2025-07-15

## 2025-07-15 RX ORDER — PANTOPRAZOLE SODIUM 40 MG/1
40 TABLET, DELAYED RELEASE ORAL DAILY
Qty: 30 TABLET | Refills: 0 | Status: SHIPPED | OUTPATIENT
Start: 2025-07-16 | End: 2026-07-16

## 2025-07-15 RX ORDER — ACETAMINOPHEN 325 MG/1
650 TABLET ORAL EVERY 6 HOURS PRN
Qty: 30 TABLET | Refills: 0 | Status: SHIPPED | OUTPATIENT
Start: 2025-07-15

## 2025-07-15 RX ADMIN — VALACYCLOVIR 1000 MG: 500 TABLET, FILM COATED ORAL at 08:07

## 2025-07-15 RX ADMIN — CYANOCOBALAMIN TAB 1000 MCG 2000 MCG: 1000 TAB at 08:07

## 2025-07-15 RX ADMIN — SODIUM CHLORIDE, POTASSIUM CHLORIDE, SODIUM LACTATE AND CALCIUM CHLORIDE: 600; 310; 30; 20 INJECTION, SOLUTION INTRAVENOUS at 06:07

## 2025-07-15 RX ADMIN — FLUCONAZOLE 200 MG: 100 TABLET ORAL at 08:07

## 2025-07-15 RX ADMIN — RIFABUTIN 150 MG: 150 CAPSULE ORAL at 08:07

## 2025-07-15 RX ADMIN — PANTOPRAZOLE 40 MG: 40 TABLET, DELAYED RELEASE ORAL at 08:07

## 2025-07-15 RX ADMIN — RITONAVIR 100 MG: 100 TABLET ORAL at 08:07

## 2025-07-15 RX ADMIN — ABACAVIR SULFATE 300 MG: 300 TABLET, FILM COATED ORAL at 08:07

## 2025-07-15 RX ADMIN — FOLIC ACID 1 MG: 1 TABLET ORAL at 08:07

## 2025-07-15 RX ADMIN — SODIUM BICARBONATE 650 MG TABLET 650 MG: at 08:07

## 2025-07-15 RX ADMIN — ETHAMBUTOL HYDROCHLORIDE 800 MG: 400 TABLET ORAL at 08:07

## 2025-07-15 RX ADMIN — SULFAMETHOXAZOLE AND TRIMETHOPRIM 1 TABLET: 800; 160 TABLET ORAL at 08:07

## 2025-07-15 RX ADMIN — MULTIVIT AND MINERALS-FERROUS GLUCONATE 9 MG IRON/15 ML ORAL LIQUID 5 ML: at 08:07

## 2025-07-15 RX ADMIN — FERROUS SULFATE TAB 325 MG (65 MG ELEMENTAL FE) 1 EACH: 325 (65 FE) TAB at 08:07

## 2025-07-15 RX ADMIN — DARUNAVIR 800 MG: 800 TABLET, FILM COATED ORAL at 08:07

## 2025-07-15 RX ADMIN — EMTRICITABINE AND TENOFOVIR DISOPROXIL FUMARATE 1 TABLET: 200; 300 TABLET, FILM COATED ORAL at 08:07

## 2025-07-15 RX ADMIN — AZITHROMYCIN DIHYDRATE 500 MG: 250 TABLET, FILM COATED ORAL at 08:07

## 2025-07-15 NOTE — PROGRESS NOTES
Inpatient Nutrition Assessment    Admit Date: 7/8/2025   Total duration of encounter: 7 days   Patient Age: 65 y.o.    Nutrition Recommendation/Prescription     Continue Regular diet as ordered  Boost Plus (provides 360 kcal, 14 g protein per serving) BID  Boost Very High Calorie (provides 530 kcal, 22 g protein per serving) once daily  Suggest daily MVI  Consider appetite stimulant as medically appropriate    Communication of Recommendations: reviewed with nurse and reviewed with patient    Nutrition Assessment     Malnutrition Assessment/Nutrition-Focused Physical Exam    Malnutrition Context: acute illness or injury (07/09/25 1324)  Malnutrition Level: moderate (07/09/25 1324)  Energy Intake (Malnutrition): less than or equal to 50% for greater than or equal to 5 days (07/09/25 1324)  Weight Loss (Malnutrition): 5% in 1 month (07/09/25 1324)     Orbital Region (Subcutaneous Fat Loss): other (see comments) (unable to assess)           [place] (Muscle Loss): other (see comments) (unable to assess)         Fluid Accumulation (Malnutrition): other (see comments) (Not present) (07/09/25 1324)        A minimum of two characteristics is recommended for diagnosis of either severe or non-severe malnutrition.    Chart Review    Reason Seen: malnutrition screening tool (MST) and follow-up    Malnutrition Screening Tool Results   Have you recently lost weight without trying?: Unsure  Have you been eating poorly because of a decreased appetite?: No   MST Score: 2   Diagnosis:  Acute metabolic encephalopathy-resolved per transferring team  HIV/AIDS -CD4 21/7%, viral load 114,288 with opportunistic infection  Oral thrush-treated  History of disseminated MAC, now with lung mass,  History of cryptococcal meningitis  History of PJP  Nonadherence to use of HARRT  Elevated alkaline phosphatase-suspect infiltrative disease  TAYLOR-resolved  Severe anemia acute on chronic with anemia of chronic disease  Iron-deficiency  anemia  Severe malnutrition  DNR statu    Relevant Medical History: HIV/aids, mac, history of disseminated histoplasmosis, PJP and cryptococcus     Scheduled Medications:  abacavir, 300 mg, Daily  azithromycin, 500 mg, Daily  cyanocobalamin, 2,000 mcg, Daily  darunavir, 800 mg, Daily with breakfast  emtricitabine-tenofovir 200-300 mg, 1 tablet, Daily  enoxparin, 40 mg, Q24H (prophylaxis, 1700)  ethambutoL, 800 mg, Daily  ferrous sulfate, 1 tablet, Daily  fluconazole, 200 mg, Daily  folic acid, 1 mg, Daily  multivitamin liquid, 5 mL, Daily  pantoprazole, 40 mg, Daily  rifabutin, 150 mg, Daily  ritonavir, 100 mg, Daily  sodium bicarbonate, 650 mg, Daily  sulfamethoxazole-trimethoprim 800-160mg, 1 tablet, Daily  valACYclovir, 1,000 mg, Q12H    Continuous Infusions:   PRN Medications:  0.9%  NaCl infusion (for blood administration), , Q24H PRN  acetaminophen, 650 mg, Q6H PRN  dextrose 50%, 12.5 g, PRN  dextrose 50%, 25 g, PRN  glucagon (human recombinant), 1 mg, PRN  glucose, 16 g, PRN  glucose, 24 g, PRN  naloxone, 0.02 mg, PRN  ondansetron, 4 mg, Q4H PRN  sodium chloride 0.9%, 10 mL, PRN    Calorie Containing IV Medications: no significant kcals from medications at this time    Recent Labs   Lab 07/10/25  0515 07/10/25  1028 07/11/25  0451 07/11/25  1654 07/11/25  1822 07/12/25  0338 07/12/25  1809 07/13/25  0325 07/14/25  0337 07/15/25  0553     --  140 141  --  139  --  135* 135* 132*   K 4.4  --  4.4 4.7  --  4.5  --  4.2 4.4 4.7   CALCIUM 8.0*  --  7.7* 8.0*  --  7.7*  --  7.8* 7.9* 8.0*   PHOS  --   --   --   --   --   --   --  3.4 3.6  --    MG  --   --   --  1.80  --   --   --  1.70 1.60 2.00   *  --  113* 112*  --  111*  --  108* 107 104   CO2 24  --  22* 22*  --  24  --  22* 23 20*   BUN 27.2*  --  25.7 26.5*  --  23.1  --  21.6 23.3 22.9   CREATININE 1.44*  --  1.46* 1.34*  --  1.34*  --  1.36* 1.70* 1.63*   EGFRNORACEVR 55  --  54 60  --  60  --  58 45 47   GLU 69*  --  68* 62*  --  63*  --   132* 62* 57*   BILITOT 0.7  --  0.6 0.9  --  0.8  --  0.8 0.7 0.7   ALKPHOS 1,688* 1,737* 1,735* 2,022*  --  1,684*  --  1,479* 1,370* 1,521*   ALT 19  --  21 24  --  23  --  21 21 20   AST 27  --  30 41  --  37  --  36 29 33   ALBUMIN 1.8*  --  1.7* 1.8*  --  1.6*  --  1.6* 1.6* 1.6*   AMMONIA  --   --   --   --  29.9  --   --   --   --   --    WBC 3.42*  --  2.71* 5.50  --  3.61* 4.42* 3.82* 4.25* 5.38   HGB 7.4*  --  7.0* 7.7*  --  6.7* 10.2* 9.5* 10.0* 9.7*   HCT 24.0*  --  22.3* 24.7*  --  21.6* 30.9* 28.1* 30.5* 28.5*     Nutrition Orders:  Diet Adult Regular Standard Tray  Dietary nutrition supplements BID; Boost Plus Nutritional Drink - Very Vanilla,Dietary nutrition supplements Daily; Boost Very High Calorie Nutritional Drink - Chocolate    Appetite/Oral Intake: poor/25-50% of meals  Factors Affecting Nutritional Intake: decreased appetite  Social Needs Impacting Access to Food: unable to assess at this time; will attempt on follow-up  Food/Worship/Cultural Preferences: unable to obtain  Food Allergies: no known food allergies  Last Bowel Movement: 07/14/25  Wound(s):  none reported    Comments  (7/15) Pt being discharged [facility] to [facility] care; pt remains with decreased oral intake; eating approx 25% meals but pt does like ONS--encouraged use. Can consider appetite stimulant for oral intake boost. Wt stable 126#.  Noted alk phos remains elevated. Suggest continue current diet/ONS at NH>     7/11/25 -- Pt with inconsistent intake of meals the last couple of days. Pt reports eating 75% of his breakfast this morning. 0% consumption of breakfast and lunch documented yesterday in EMR flowsheets. Drinking about 50% of his Boost supplements. No GI complaints noted. Unable to obtain updated bedscale wt during rounds.     7/9/25 -- Pt with covers over his head, declines to remove them; reports fair appetite, 0% of breakfast meal this am; meal intake fluctuating over the last 2 weeks at prior facility,  averaging < 50%; denies n/v or abdominal pain, no diarrhea/constipation reported; Pt unsure of UBW, UBW per -160 lb; new bed weight this visit 126 lb, ? Accuracy of bed scale; significant change over the last month per EMR weight history (156 lb --> 148 lb) indicating at least 5% wt loss in last month, will continue to monitor; pt agreeable to continue Boost in vanilla or chocolate flavor; Pt may benefit from appetite stimulant as medically appropriate as well as daily MVI; H/H (L) - Fe supplementation ordered per MD    Anthropometrics    Height: 6' (182.9 cm), Height Method: Stated  Last Weight: 57.5 kg (126 lb 12.2 oz) (07/13/25 0700), Weight Method: Bed Scale  BMI (Calculated): 17.2  BMI Classification: underweight (BMI less than 18.5)        Ideal Body Weight (IBW), Male: 178 lb     % Ideal Body Weight, Male (lb): 83.15 %            Usual Weight Provided By: EMR weight history and unable to obtain usual weight    Wt Readings from Last 5 Encounters:   07/13/25 57.5 kg (126 lb 12.2 oz)   06/21/25 71 kg (156 lb 8.4 oz)   02/28/25 70.3 kg (155 lb)   02/27/25 71.8 kg (158 lb 4.6 oz)   01/07/25 73.5 kg (161 lb 14.9 oz)     Weight Change(s) Since Admission: discrepancy, new bed weight vs documented admit weight  Wt Readings from Last 1 Encounters:   07/13/25 0700 57.5 kg (126 lb 12.2 oz)   07/09/25 1246 57.5 kg (126 lb 11.2 oz)   07/08/25 2000 67.1 kg (148 lb)   Admit Weight: 67.1 kg (148 lb) (07/08/25 2000), Weight Method: Bed Scale    Estimated Needs    Weight Used For Calorie Calculations: 57.5 kg (126 lb 12.2 oz)  Energy Calorie Requirements (kcal): 3662-9422 kcal (32 - 35 kcal/kg)  Energy Need Method: Kcal/kg  Weight Used For Protein Calculations: 57.5 kg (126 lb 12.2 oz)  Protein Requirements: 69-80 gm (1.2-1.4 gm/kg)  Fluid Requirements (mL): 1840 - 2015 ml (1ml/kcal)        Enteral Nutrition     Patient not receiving enteral nutrition at this time.    Parenteral Nutrition     Patient not receiving  parenteral nutrition support at this time.    Evaluation of Received Nutrient Intake    Calories: not meeting estimated needs  Protein: not meeting estimated needs    Patient Education     Not applicable.    Nutrition Diagnosis     PES: Inadequate oral intake related to acute illness as evidenced by < 75% nutrition intake > 7 days. (active)     PES: Moderate acute disease or injury related malnutrition Related to HIV/AIDS As Evidenced by:  - weight loss: 5% in 1 month - energy intake: <= 50% for 14 days (meets criteria for <= 50% >= 5 days (severe - acute)) active    Nutrition Interventions     Intervention(s): general/healthful diet, commercial beverage, multivitamin/mineral supplement therapy, and collaboration with other providers  Intervention(s): Vitamin and mineral supplement therapy, Collaboration and referral of nutrition care, Medical food supplement therapy, General healthful diet    Goal: Meet greater than 80% of nutritional needs by follow-up. (goal progressing)  Goal: Consume % of oral supplements by follow-up. (goal progressing)    Nutrition Goals & Monitoring     Dietitian will monitor: food and beverage intake and weight  Discharge planning: continue regular diet with boost plus or equivalent oral supplements  Nutrition Risk/Follow-Up: patient at increased nutrition risk; dietitian will follow-up twice weekly   Please consult if re-assessment needed sooner.

## 2025-07-15 NOTE — PLAN OF CARE
Received call from Maral with [facility] in [place], P: [phone], stating that [facility] has been able to get in touch with family regarding discontinuation of HIV meds, and they are in agreement. [facility] can accept patient today under the care of Dr. Boris Pineda. Dr. Pascual has been updated. Will send DC summary and med rec when available.

## 2025-07-15 NOTE — NURSING
Report given to Nurse Camargo at [facility]. Patient taken out of will call with [facility] ETA 1-2 hours.

## 2025-07-15 NOTE — PT/OT/SLP PROGRESS
Physical Therapy    Missed Treatment Session - cancel note - 07/15/2025    Patient Name:  Anne Terry   MRN:  [id number]      Patient not seen at this time secondary to Other (Comment) (Upon attempt, nursing reports pt will be DC 'd today. Due to frequent refusals and plans for DC, tx not completed.).    Will follow-up as patient is appropriate/available/agreeable to participate and as therapists' schedule allows.

## 2025-07-15 NOTE — PLAN OF CARE
Received message from Maral with [facility] stating that report can be called to the [facility] Nurse, P: [phone]. Spoke to Elia with [facility] to place patient in will call for transportation, P: [phone]. All information has been sent to patient's nurseTurner via secure chat.

## 2025-07-15 NOTE — DISCHARGE SUMMARY
[facility] Discharge Summary    Admitting Physician: Mely Nick MD  Attending Physician: Mely Nick MD  Date of Admit: 7/8/2025  Date of Discharge: 7/15/2025    Condition: Serious  Outcome: patient will be transferred for hospice care in nursing home  DISPOSITION: Hospice/Medical Facility          Discharge Diagnoses     Problem List[1]  Advanced HIV disease/AIDS with complex resistance   Failure to thrive  / cachexia  Encephalopathy  S/P lumbar puncture 06/27/2025  Disseminated MAC  Lung Mass   History of disseminated cryptococcal disease   History of disseminated histoplasmosis  Oral candidiasis  Leukopenia  Anemia  TAYLOR  Elevated alkaline phosphatase  Noncompliance   Likely HSV lip lesion   Principal Problem:  AIDS    Consultants and Procedures     Consultants:  IP CONSULT TO INFECTIOUS DISEASES  IP CONSULT TO GI  IP CONSULT FOR CALORIE COUNT  IP CONSULT TO SOCIAL WORK/CASE MANAGEMENT    Procedures:   * No surgery found *     Brief Admission History      Anne Terry is a 65 y.o. male presented to [facility] as a transfer awaiting long term care placement with a past medical history of HIV/aids, MAC, history of disseminated histoplasmosis, history of PJP and cryptococcal meningitis The patient presented to [facility] on 06/21/2025 with a primary complaint of failure to thrive. The patient was brought to the [facility] ED after he was found walking around naked and confused. Per the patient on admission, he lived by himself in [facility] but is unable to explain why he went to the emergency room or why he is in the hospital now. He had not been compliant with his home medications. He was recently diagnosed with oral thrush. At the time of hospitalization at [facility] he was seen by infectious disease and started on azithromycin, Bactrim, ethambutol, rifampin, Diflucan. His last CD4 count PTA at [facility] was 37. MRI brain and LP were done at [facility]. CSF routine culture and gram stain were negative and MRI of brain was  significant only for microvascular ischemic changes. CD4 on this most recent admission was 21/7%, with viral load 114,288. He has a history of nonadherence to his medications. He has missed multiple appointments for follow up at ID clinic at [facility]. He has elevation of alkaline phosphatase which was suspected to be due to infiltrated liver disease from HIV and AIDS per ID consultant at [facility]. He has been consuming less than 50% of his meals at [facility] and has lost substantial weight.     Hospital Course with Pertinent Findings     Pt did not have any improvement in his status during his stay. Case management and family decided upon hospice care at [facility]. Pt had ESBL Klebsiella pneumoniae in urine culture but it was obtained from a purewick which is attached to his diaper. Per ID, should not be considered a sterile collection and therefore would not treat at this time . Pt refused I&O catheterization for repeat culture. ID also recommended stopping all medications related to HIV and MAC. Patient developed HSV on his lower lip and started on valtrex for 7 days. On 7-11-25, pt did have some tachycardia and vomitus and was transfused 2 units. GI was also consulted at this time and no invasive procedure was recommended.    Discharge physical exam:  Vitals  BP: 130/84  Temp: 97.7 °F (36.5 °C)  Temp Source: Oral  Pulse: 76  Resp: 18  SpO2: 100 %  Height: 6' (182.9 cm)  Weight: 57.5 kg (126 lb 12.2 oz)    Alert but does not respond to any questions  Constitutional:       General: He is not in acute distress.     Appearance: He is not toxic-appearing or diaphoretic.   Cardiovascular:      Rate and Rhythm: Normal rate and regular rhythm.      Heart sounds:      No friction rub. No gallop.   Pulmonary:      Effort: Pulmonary effort is normal. No respiratory distress.      Breath sounds: No stridor. No wheezing, rhonchi or rales.   Skin:     Findings: No rash.      Comments: Dry    Neurological:      Mental Status:  He is alert but not oriented           TIME SPENT ON DISCHARGE: 60 minutes    Discharge Medications        Medication List        START taking these medications      acetaminophen 325 MG tablet  Commonly known as: TYLENOL  Take 2 tablets (650 mg total) by mouth every 6 (six) hours as needed for Pain or Temperature greater than.     cyanocobalamin (vitamin B-12) 2,000 mcg Tab  Take 2,000 mcg by mouth once daily.  Start taking on: July 16, 2025     folic acid 1 MG tablet  Commonly known as: FOLVITE  Take 1 tablet (1 mg total) by mouth once daily.  Start taking on: July 16, 2025     pantoprazole 40 MG tablet  Commonly known as: PROTONIX  Take 1 tablet (40 mg total) by mouth once daily.  Start taking on: July 16, 2025     sodium bicarbonate 650 MG tablet  Take 1 tablet (650 mg total) by mouth once daily.  Start taking on: July 16, 2025     valACYclovir 1000 MG tablet  Commonly known as: VALTREX  Take 1 tablet (1,000 mg total) by mouth every 12 (twelve) hours. for 7 days            CONTINUE taking these medications      ferrous sulfate 325 (65 FE) MG EC tablet  Take 1 tablet (325 mg total) by mouth once daily.            STOP taking these medications      abacavir 300 mg Tab  Commonly known as: ZIAGEN     azithromycin 500 MG tablet  Commonly known as: ZITHROMAX     darunavir 800 mg Tab  Commonly known as: PREZISTA     emtricitabine-tenofovir 200-300 mg 200-300 mg Tab  Commonly known as: TRUVADA     ethambutoL 400 MG Tab  Commonly known as: MYAMBUTOL     rifabutin 150 mg Cap  Commonly known as: MYCOBUTIN     ritonavir 100 mg Tab tablet  Commonly known as: NORVIR               Where to Get Your Medications        These medications were sent to [address] [address]      Phone: [phone]   acetaminophen 325 MG tablet  cyanocobalamin (vitamin B-12) 2,000 mcg Tab  ferrous sulfate 325 (65 FE) MG EC tablet  folic acid 1 MG  tablet  pantoprazole 40 MG tablet  sodium bicarbonate 650 MG tablet  valACYclovir 1000 MG tablet         Discharge Information:     Discharge to [facility] in [place] with [facility]                 [1]   Patient Active Problem List  Diagnosis    Failure to thrive in adult    Severe malnutrition    HIV (human immunodeficiency virus infection)    Trichomonas vaginalis infection, male    UTI (urinary tract infection)    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

## 2025-07-15 NOTE — PROGRESS NOTES
Gastroenterology/Hepatology Initial Consult Note    Patient Name: Anne Terry  Age: 65 y.o.  : 1960  MRN: [id number]  Admission Date: 2025    Reason for Consult:      Elevated ALP and GGT; hx of AIDS and MAC infection     HPI:     Anne Terry is a 65 y.o. male w Pmhx of AIDS CD4 21 ,288 and disseminated MAC who was admitted to LTAC service on 25 as a transfer from [facility]. Patient was initially admitted to [facility] on 25 with primary complaint of failure to thrive. Currently receiving ART for AIDS and MAC treatment while awaiting placement. 25, ART and MAC therapies optomized to avoid interaction. Last colonoscopy in , ulcerated mass found, path consistent with fungal colitis.     Today, he is uncooperative and does not participate in exam. He refuses to answer questions other than denies pain.     Interval Hx:   NAEO documented, VSS and AF. No significant clinical change. ALP uptrended 1521 without transaminitis or hyperbilirubinemia. Still uncooperative to examination. Denies pain. States BM are normal and baseline. Discharging on hospice expected today.       No past medical history on file.     Past Surgical History:   Procedure Laterality Date    COLONOSCOPY  2015    MAGNETIC RESONANCE IMAGING N/A 2025    Procedure: MRI (Magnetic Resonance Imagine);  Surgeon: Dara Horne MD;  Location: [facility];  Service: Medicine;  Laterality: N/A;  MRI WITH ANESTHESIA /   BRAIN - WITH AND WITHOUT CONTRAST        Family History   Problem Relation Name Age of Onset    No Known Problems Mother      No Known Problems Father      Cancer Sister      No Known Problems Brother         Social History     Tobacco Use    Smoking status: Never    Smokeless tobacco: Never   Substance Use Topics    Alcohol use: Not Currently         Review of patient's allergies indicates:  No Known Allergies     Prescriptions Prior to Admission[1]      INPATIENT MEDICATIONS    Scheduled Meds:   abacavir   300 mg Oral Daily    azithromycin  500 mg Oral Daily    cyanocobalamin  2,000 mcg Oral Daily    darunavir  800 mg Oral Daily with breakfast    emtricitabine-tenofovir 200-300 mg  1 tablet Oral Daily    enoxparin  40 mg Subcutaneous Q24H (prophylaxis, 1700)    ethambutoL  800 mg Oral Daily    ferrous sulfate  1 tablet Oral Daily    fluconazole  200 mg Oral Daily    folic acid  1 mg Oral Daily    multivitamin liquid  5 mL Oral Daily    pantoprazole  40 mg Oral Daily    rifabutin  150 mg Oral Daily    ritonavir  100 mg Oral Daily    sodium bicarbonate  650 mg Oral Daily    sulfamethoxazole-trimethoprim 800-160mg  1 tablet Oral Daily    valACYclovir  1,000 mg Oral Q12H     Continuous Infusions:      PRN Meds:    Current Facility-Administered Medications:     0.9%  NaCl infusion (for blood administration), , Intravenous, Q24H PRN    acetaminophen, 650 mg, Oral, Q6H PRN    dextrose 50%, 12.5 g, Intravenous, PRN    dextrose 50%, 25 g, Intravenous, PRN    glucagon (human recombinant), 1 mg, Intramuscular, PRN    glucose, 16 g, Oral, PRN    glucose, 24 g, Oral, PRN    naloxone, 0.02 mg, Intravenous, PRN    ondansetron, 4 mg, Intravenous, Q4H PRN    sodium chloride 0.9%, 10 mL, Intravenous, PRN          Review of Systems:       Review of Systems     As stated in HPI     Objective:       VITAL SIGNS: 24 HR MIN & MAX LAST    Temp  Min: 97.7 °F (36.5 °C)  Max: 99.3 °F (37.4 °C)  97.7 °F (36.5 °C)        BP  Min: 123/69  Max: 152/83  130/84     Pulse  Min: 73  Max: 90  76     Resp  Min: 17  Max: 18  18    SpO2  Min: 96 %  Max: 100 %  100 %        Physical Exam  Vitals and nursing note reviewed.   Constitutional:       General: He is awake. He is not in acute distress.     Appearance: He is underweight. He is ill-appearing.   HENT:      Head: Normocephalic and atraumatic.      Mouth/Throat:      Mouth: Mucous membranes are dry.      Pharynx: Oropharynx is clear.   Eyes:      General: No scleral icterus.     Pupils: Pupils are  "equal, round, and reactive to light.   Cardiovascular:      Rate and Rhythm: Normal rate.   Pulmonary:      Effort: Pulmonary effort is normal. No respiratory distress.   Abdominal:      General: Abdomen is flat. Bowel sounds are normal. There is no distension.      Palpations: Abdomen is soft.      Tenderness: There is no abdominal tenderness. There is no guarding.   Neurological:      Mental Status: He is alert.   Psychiatric:         Behavior: Behavior is uncooperative.              LABS:      Recent Labs   Lab 07/12/25  0338 07/12/25  1809 07/13/25  0325 07/14/25  0337 07/15/25  0553   WBC 3.61* 4.42* 3.82* 4.25* 5.38   HGB 6.7* 10.2* 9.5* 10.0* 9.7*   HCT 21.6* 30.9* 28.1* 30.5* 28.5*    240 268 262 238   MCV 90.4 89.3 88.4 88.2 89.6       Recent Labs   Lab 07/08/25  1934 07/10/25  0515 07/11/25  0451 07/11/25  1654 07/12/25  0338 07/12/25  1809 07/13/25  0325 07/14/25  0337 07/15/25  0553   HGB 7.6* 7.4* 7.0* 7.7* 6.7* 10.2* 9.5* 10.0* 9.7*   HCT 24.6* 24.0* 22.3* 24.7* 21.6* 30.9* 28.1* 30.5* 28.5*        Recent Labs   Lab 07/11/25  1654 07/12/25  0338 07/13/25  0325 07/14/25  0337 07/15/25  0553    139 135* 135* 132*   K 4.7 4.5 4.2 4.4 4.7   CO2 22* 24 22* 23 20*   BUN 26.5* 23.1 21.6 23.3 22.9   CREATININE 1.34* 1.34* 1.36* 1.70* 1.63*   BILITOT 0.9 0.8 0.8 0.7 0.7   ALKPHOS 2,022* 1,684* 1,479* 1,370* 1,521*   AST 41 37 36 29 33   ALT 24 23 21 21 20   ALBUMIN 1.8* 1.6* 1.6* 1.6* 1.6*        No results for input(s): "INR", "PROTIME", "PTT" in the last 168 hours.     No results for input(s): "IRON", "FERRITIN" in the last 168 hours.       IMAGING:   X-Ray Chest 1 View  Result Date: 7/12/2025  EXAMINATION: Single view chest radiograph. CLINICAL HISTORY: tachcardia; TECHNIQUE: Single view of the chest. COMPARISON: Chest radiograph 02/27/2025. FINDINGS: The lungs are clear without consolidation or effusion.  There is no pneumothorax.  The cardiac silhouette is normal in size.  There is no acute " osseous abnormality.     No acute cardiopulmonary abnormality. Electronically signed by: Boris Rogel MD Date:    07/12/2025 Time:    10:50    CT Head Without Contrast  Result Date: 7/12/2025  EXAMINATION: CT HEAD WITHOUT CONTRAST CLINICAL HISTORY: Mental status change, unknown cause; TECHNIQUE: Axial images of the head were obtained without IV contrast administration.  Coronal and sagittal reconstructions were provided.  Three dimensional and MIP images were obtained and evaluated.  Total DLP was 972 mGy-cm. Dose lowering technique and automated exposure control were utilized for this exam. COMPARISON: MRI of the brain 06/27/2025. FINDINGS: There is normal brain formation.  There is normal gray-white matter differentiation.  There is no hemorrhage, hydrocephalus, or midline shift.  There is no cytotoxic or vasogenic edema.  There is no intra or extra-axial fluid collection.  There is no herniation. The calvarium is intact.  There is no fracture.  The bilateral orbits are normal.  The paranasal sinuses are free of disease.     No acute intracranial abnormality. [employer] concordance Electronically signed by: Boris Rogel MD Date:    07/12/2025 Time:    06:34    US Abdomen Limited  Result Date: 6/29/2025  EXAMINATION: US ABDOMEN LIMITED CLINICAL HISTORY: Right upper quadrant pain. TECHNIQUE: Multiple real-time transverse and longitudinal sections were performed of the right abdomen by the sonographer. Select images were submitted for review. COMPARISON: CT abdomen pelvis June 23, 2025 FINDINGS: Liver is of unremarkable echotexture with normal contour and size. There was no delineation of discrete hepatic cystic or solid mass. Hepatic maximum diameter is estimated to be 20.26 cm with ultrasound and appears to be overestimated.  On the previous CT abdomen maximum diameter of the liver in the midclavicular line is 16.2 cm which is within normal limits.  There was unremarkable hepatopedal flow within the portal vein.   Pancreas is obscured by overlying bowel gas. Gallbladder lumen is without echogenicity indicative of sludge or cholelithiasis. Gallbladder wall thickness is within normal limits. Common bile duct caliber of 2.0 mm is within normal limits for the age. Sonographer reported negative Gomes's sign. Inferior vena cava is unremarkable. Visualized portion of the abdominal aorta is without aneurysmal dilatation. Normally located right kidney length measures 12.2 x 4.3 x 5.9 cm. Right renal corticomedullary differentiation is unremarkable. No evidence of hydronephrosis.     No acute findings sonography identified. Electronically signed by: Ezio Deleon Date:    06/29/2025 Time:    20:00    US Retroperitoneal Complete  Result Date: 6/28/2025  EXAMINATION: US RETROPERITONEAL COMPLETE CLINICAL HISTORY: ARF; TECHNIQUE: Ultrasound of the kidneys and urinary bladder was performed including color flow and grayscale evaluation of the kidneys. COMPARISON: CT chest abdomen pelvis 06/23/2025 FINDINGS: RIGHT KIDNEY: The right kidney measures 9.1 cm.  No hydronephrosis. LEFT KIDNEY: The left kidney measures 10.8 cm. No hydronephrosis.Portions of the upper pole obscured by shadowing. BLADDER: The bladder has an unremarkable appearance taking into account degree of distention. OTHER: Retroperitoneal adenopathy.  Largest lymph node seen is a 5.6 x 5 x 3.2 cm Lisa caval lymph node.  Aorta and IVC unremarkable.     Normal sized, nonobstructed kidneys. Retroperitoneal lymphadenopathy.  Defer to recent CT exam. Electronically signed by: Moni Hdez Date:    06/28/2025 Time:    13:42    IR LUMBAR PUNCTURE DIAGNOSTIC WITH IMAGING  Result Date: 6/27/2025  EXAMINATION: IR LUMBAR PUNCTURE DIAGNOSTIC WITH IMAGING CLINICAL HISTORY: eval for cryptococcal meningitis; TECHNIQUE: The risks and benefits of the procedure were explained to the patient's son and written informed consent was obtained. All questions were answered. A suitable skin entry  site was chosen under fluoroscopy. The lower back was then prepped and draped in sterile fashion. 1% lidocaine was used for local anesthesia. COMPARISON: No relevant comparison studies available at the time of dictation. FINDINGS: Under intermittent fluoroscopic guidance, a 20-gauge spinal needle was advanced into the thecal sac at the L2-L3 interlaminar space with position confirmed by flow of CSF. Approximately 8.5 mL of clear CSF was collected and sent to the lab for further evaluation.  3 tubes of fluid collected four total of 8.5 mL.  Attempts to aspirate further fluid for 4th tube unsuccessful.  The needle was removed and hemostasis achieved at the skin puncture site. No immediate complication. Estimated blood loss was negligible. Total fluoroscopy time is 0.1 minute with reference air kerma of 7.19 mGy. Opening pressure 13 cm water Closing pressure less than 9 cm water     Successful fluoroscopic guided lumbar puncture. Electronically signed by: Moni Hdez Date:    06/27/2025 Time:    14:43    MRI Brain W WO Contrast  Result Date: 6/27/2025  EXAMINATION: MRI BRAIN W WO CONTRAST CLINICAL HISTORY: Mental status change, unknown cause;HIV/AIDs, dementia; TECHNIQUE: Multiplanar, multisequence MR images of the brain were obtained with and without administration of intravenous contrast. COMPARISON: CT head dated 06/24/2025 FINDINGS: There is no restricted diffusion, hemorrhage or edema.  Mild patchy T2/FLAIR hyperintensity in the subcortical and periventricular white matter are nonspecific and may represent chronic microvascular ischemic changes.  There is no abnormal parenchymal or leptomeningeal enhancement. There is no mass effect or midline shift.  The basal cisterns are patent there is mild diffuse parenchymal volume loss.  There is no hydrocephalus or abnormal extra-axial fluid collection.  The major intracranial flow voids are patent.  The paranasal sinuses are clear.     1. No acute intracranial  abnormality. 2. Mild chronic microvascular ischemic changes. Electronically signed by: Mel Hanks Date:    06/27/2025 Time:    13:48    CT Head Without Contrast  Result Date: 6/24/2025  EXAMINATION: CT HEAD WITHOUT CONTRAST CLINICAL HISTORY: Mental status change, unknown cause; TECHNIQUE: Multiple axial images were obtained from the base of the brain to the vertex without contrast administration.  Sagittal and coronal reconstructions were performed..Automatic exposure control is utilized to reduce patient radiation exposure. COMPARISON: 06/09/2015 FINDINGS: There is no intracranial mass or lesion seen.  No hemorrhage is seen.  No acute infarct is seen.  There is some cerebral atrophy seen.  There is some compensatory ventricular dilatation and periventricular white matter changes consistent with the patient's age.  Calvarium is intact.  The posterior fossa is unremarkable..  The visualized portions of the paranasal sinuses show no acute abnormality.     Chronic age-related changes.  No acute process Electronically signed by: Ankush Gordon Date:    06/24/2025 Time:    18:36    CT Chest Abdomen Pelvis With IV Contrast (XPD) NO Oral Contrast  Result Date: 6/23/2025  EXAMINATION: CT CHEST ABDOMEN PELVIS WITH IV CONTRAST (XPD) CLINICAL HISTORY: Sepsis; Anne Terry is a 65 y.o. 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 The patient presented to [facility] on [facility]2025 with a primary complaint of failure to thrive. Patient was brought to OSH ED found walking around naked and confused. He also likely has dementia. Patient says that he lives by himself in [place] but is unable to explain why he went to the ER or why he is now in the hospital but say he had no choice. He has not been taking his medications. Pt had a CT scans done at OSH, results to be uploaded. He had a hospital admission in March 2025 for treatment of HIV and MAC, a punch biopsy  of BLLE that was negative for malignancy, and an Ophthalmology evaluation. He has a history of non-compliance and missing several follow up appointments. Denies fevers, chills, sweats, SOB, N/V/D, dysuria, new rashes. Does have oral thrush TECHNIQUE: Helical axial images are acquired through the chest, abdomen and pelvis after the IV administration 100 mL of Omnipaque 350.  Coronal sagittal reconstructions were performed.  Dose automated exposure control, dose radiation lowering technique was utilized. COMPARISON: CT chest, abdomen pelvis from 02/27/2025 Outside CT of the chest from 06/19/2025 Outside CT of the abdomen and pelvis from 06/19/2025 FINDINGS: CHEST: Partially calcified right thyroid nodule measures 7 mm.  Otherwise, unremarkable thoracic inlet.  Heart size is mildly enlarged.  No filling defects in the central pulmonary arteries.  Mild mediastinal lymphadenopathy similar compared to 02/27/2025. Appearance of the lungs is similar with scattered micronodular pattern at the dependent portion of both lungs, right more than left.  Tree-in-bud pattern is identified.  No interval change since 02/27/2025. ABDOMEN/PELVIS: Gallbladder is contracted.  The liver, spleen, pancreas, adrenal glands and kidneys appear normal.  No hydronephrosis. Small hiatal hernia.  Small gastroesophageal varices are present.  Patent portal vein. Mild abdominopelvic lymphadenopathy is present.  Abdominal lymphadenopathy is slightly worse compared to 02/27/2025.  Index right periaortic lymph node (image 124, series 3) measures 1.3 cm short axis dimension.  Lymph node previously measured 0.9 cm on the prior. No ascites.  No suspicious peritoneal nodule.  Unremarkable bladder. The bowel is nonobstructed.  Normal appendix is present.  Air and stool are present throughout the colon.  Mild atherosclerosis of the abdominal aorta and its branches. BONES AND SOFT TISSUES: No suspicious osteolytic or osteoblastic lesion.     1. Multifocal  micronodular pattern at the dependent portion of both lungs, similar extents 02/27/2025.  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 2. Stable thoracic lymphadenopathy. 3. Slight interval increase in size and extent of abdominopelvic lymphadenopathy. Electronically signed by: Sg Rogel MD Date:    06/23/2025 Time:    10:20        Assessment / Plan:   Elevated ALP and GGT - Suspected 2/2 to below   Disseminated MAC   ESBL Klebsiella UTI  AIDS CD4 21 ,288   Hx of multiple opportunistic infections   Hx of medication noncompliance  - No invasive procedure at this time as this is likely related to disseminated MAC infection   - Autoimmune hepatitis lab work collected, pending results   - Apparently discharging to hospice today   - Rest per primary team           Arminda Berger MD   [facility]             [1]   Medications Prior to Admission   Medication Sig Dispense Refill Last Dose/Taking    [DISCONTINUED] abacavir (ZIAGEN) 300 mg Tab Take 1 tablet (300 mg total) by mouth once daily. 90 tablet 0     [DISCONTINUED] azithromycin (ZITHROMAX) 500 MG tablet Take 1 tablet (500 mg total) by mouth once daily. 90 tablet 0     [DISCONTINUED] darunavir (PREZISTA) 800 mg Tab Take 1 tablet (800 mg total) by mouth daily with breakfast. 90 tablet 0     [DISCONTINUED] emtricitabine-tenofovir 200-300 mg (TRUVADA) 200-300 mg Tab Take 1 tablet by mouth once daily. 90 tablet 0     [DISCONTINUED] ethambutoL (MYAMBUTOL) 400 MG Tab Take 2 tablets (800 mg total) by mouth once daily. 180 tablet 0     [DISCONTINUED] ferrous sulfate 325 (65 FE) MG EC tablet Take 1 tablet (325 mg total) by mouth once daily. 90 tablet 0     [DISCONTINUED] rifabutin (MYCOBUTIN) 150 mg Cap Take 1 capsule (150 mg total) by mouth once daily. 90 capsule 0     [DISCONTINUED] ritonavir (NORVIR) 100 mg Tab tablet Take 1 tablet (100 mg total) by mouth once daily. 90 tablet 0

## 2025-07-15 NOTE — PLAN OF CARE
DC summary and order have been sent to [facility] via [facility].

## 2025-07-15 NOTE — NURSING
Patient left with [facility] to [facility] in [place], [place] awake and alert. Informed Nurse Mary Jo and liaison Maral, patient has left facility with [facility],

## 2025-07-15 NOTE — PLAN OF CARE
07/15/25 1235   Final Note   Assessment Type Final Discharge Note   Anticipated Discharge Disposition [facility]   Post-Acute Status   Post-Acute Authorization Placement;Hospice   Post-Acute Placement Status Set-up Complete/Auth obtained   Hospice Status Set-up Complete/Auth obtained   Discharge Delays (!) Ambulance Transport/Facility Transport     Patient is being discharged to [facility] in [place] with [facility].

## 2025-07-16 LAB
ANA PAT SER IF-IMP: ABNORMAL
ANA SER QL HEP2 SUBST: ABNORMAL
ANA TITR SER HEP2 SUBST: ABNORMAL {TITER}
BACTERIA BLD CULT: NORMAL
BACTERIA BLD CULT: NORMAL
M HISTOPLASMA/BLASTOMYCES AG RESULT, U: NOT DETECTED
M HISTOPLASMA/BLASTOMYCES AG VALUE, U: NOT DETECTED NG/ML

## 2025-07-16 NOTE — PT/OT/SLP DISCHARGE
POST DISCHARGE DOCUMENTATION - 2025 7:41 AM    Physical Therapy Discharge Summary    Name: Anne Terry  MRN: [id number]   Principal Problem: AIDS   1. TAYLOR (acute kidney injury)    2. HIV (human immunodeficiency virus infection)    3. HIV encephalopathy    4. Symptomatic HIV infection    5. Currently asymptomatic HIV infection, with history of HIV-related illness    6. Anemia, chronic disease    7. Iron deficiency anemia, unspecified iron deficiency anemia type    8. Mycobacterium avium-intracellulare complex    9. AIDS    10. Elevated alkaline phosphatase level    11. Severe malnutrition    12. Moderate cognitive impairment    13. Tachycardia    14. Sinus tachycardia    15. Failure to thrive in adult    16. Opportunistic infection    17. Hypoglycemia    18. Moderate malnutrition    19. 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    Objective     GOALS: - 0 of 6 STG's met by patient  Multidisciplinary Problems       Physical Therapy Goals       Not on file              Multidisciplinary Problems (Resolved)          Problem: Physical Therapy    Goal Priority Disciplines Outcome Interventions   Physical Therapy Goal   (Resolved)     PT, PT/OT Met    Description: Goals to be met by: 25     Patient will increase functional independence with mobility by performin. Supine to sit with Stand-by Assistance  2. Sit to supine with Stand-by Assistance  3. Sit to stand transfer with Stand-by Assistance  4. Bed to chair transfer with Stand-by Assistance using Rolling Walker  5. Gait  x 50 feet with Stand-by Assistance using Rolling Walker.   6.  Ascend/descend 3 stair with no Handrails Minimal Assistance using No Assistive Device.                        Plan     Patient Discharged to: palliative care/hospice per chart.    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

## 2025-07-17 LAB
HSV1 DNA SPEC QL NAA+PROBE: POSITIVE
HSV2 DNA SPEC QL NAA+PROBE: NEGATIVE
MITOCHONDRIA M2 IGG SER-ACNC: 94.2 UNITS
SMOOTH MUSCLE AB SER QL IF: NEGATIVE
SPECIMEN SOURCE: ABNORMAL

## 2025-08-08 ENCOUNTER — TELEPHONE (OUTPATIENT)
Dept: INFECTIOUS DISEASES | Facility: CLINIC | Age: 65
End: 2025-08-08
Payer: MEDICARE

## 2025-08-25 PROBLEM — R00.0 SINUS TACHYCARDIA: Status: RESOLVED | Noted: 2025-07-12 | Resolved: 2025-08-25

## 2025-08-27 ENCOUNTER — TELEPHONE (OUTPATIENT)
Dept: INFECTIOUS DISEASES | Facility: CLINIC | Age: 65
End: 2025-08-27
Payer: MEDICARE